# Patient Record
Sex: FEMALE | Race: WHITE | NOT HISPANIC OR LATINO | Employment: OTHER | URBAN - METROPOLITAN AREA
[De-identification: names, ages, dates, MRNs, and addresses within clinical notes are randomized per-mention and may not be internally consistent; named-entity substitution may affect disease eponyms.]

---

## 2017-01-09 ENCOUNTER — GENERIC CONVERSION - ENCOUNTER (OUTPATIENT)
Dept: OTHER | Facility: OTHER | Age: 82
End: 2017-01-09

## 2017-05-25 ENCOUNTER — ALLSCRIPTS OFFICE VISIT (OUTPATIENT)
Dept: OTHER | Facility: OTHER | Age: 82
End: 2017-05-25

## 2017-06-02 ENCOUNTER — GENERIC CONVERSION - ENCOUNTER (OUTPATIENT)
Dept: OTHER | Facility: OTHER | Age: 82
End: 2017-06-02

## 2017-06-05 DIAGNOSIS — M25.559 PAIN IN HIP: ICD-10-CM

## 2017-06-05 DIAGNOSIS — R54 AGE-RELATED PHYSICAL DEBILITY: ICD-10-CM
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
## 2017-06-05 DIAGNOSIS — M16.11 PRIMARY OSTEOARTHRITIS OF RIGHT HIP: ICD-10-CM

## 2017-06-07 ENCOUNTER — HOSPITAL ENCOUNTER (OUTPATIENT)
Dept: RADIOLOGY | Facility: HOSPITAL | Age: 82
Discharge: HOME | End: 2017-06-07
Payer: COMMERCIAL

## 2017-06-07 ENCOUNTER — GENERIC CONVERSION - ENCOUNTER (OUTPATIENT)
Dept: OTHER | Facility: OTHER | Age: 82
End: 2017-06-07

## 2017-06-07 DIAGNOSIS — M25.559 PAIN IN HIP: ICD-10-CM
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
## 2017-06-07 DIAGNOSIS — R54 AGE-RELATED PHYSICAL DEBILITY: ICD-10-CM

## 2017-06-07 PROCEDURE — 73502 X-RAY EXAM HIP UNI 2-3 VIEWS: CPT

## 2017-06-09 ENCOUNTER — GENERIC CONVERSION - ENCOUNTER (OUTPATIENT)
Dept: OTHER | Facility: OTHER | Age: 82
End: 2017-06-09

## 2017-06-13 ENCOUNTER — ALLSCRIPTS OFFICE VISIT (OUTPATIENT)
Dept: OTHER | Facility: OTHER | Age: 82
End: 2017-06-13

## 2017-06-13 ENCOUNTER — APPOINTMENT (OUTPATIENT)
Dept: RADIOLOGY | Facility: CLINIC | Age: 82
End: 2017-06-13
Payer: COMMERCIAL

## 2017-06-13 DIAGNOSIS — M25.559 PAIN IN HIP: ICD-10-CM

## 2017-06-13 PROCEDURE — 73502 X-RAY EXAM HIP UNI 2-3 VIEWS: CPT

## 2017-06-17 ENCOUNTER — GENERIC CONVERSION - ENCOUNTER (OUTPATIENT)
Dept: OTHER | Facility: OTHER | Age: 82
End: 2017-06-17

## 2017-07-14 ENCOUNTER — GENERIC CONVERSION - ENCOUNTER (OUTPATIENT)
Dept: OTHER | Facility: OTHER | Age: 82
End: 2017-07-14

## 2017-07-31 ENCOUNTER — ALLSCRIPTS OFFICE VISIT (OUTPATIENT)
Dept: OTHER | Facility: OTHER | Age: 82
End: 2017-07-31

## 2017-10-19 ENCOUNTER — LAB CONVERSION - ENCOUNTER (OUTPATIENT)
Dept: OTHER | Facility: OTHER | Age: 82
End: 2017-10-19

## 2017-10-19 LAB
25(OH)D3 SERPL-MCNC: 26 NG/ML (ref 30–100)
CHOLEST SERPL-MCNC: 223 MG/DL
CHOLEST/HDLC SERPL: 3.2 (CALC)
HDLC SERPL-MCNC: 70 MG/DL
LDL CHOLESTEROL (HISTORICAL): 132 MG/DL (CALC)
NON-HDL-CHOL (CHOL-HDL) (HISTORICAL): 153 MG/DL (CALC)
TRIGL SERPL-MCNC: 107 MG/DL
TSH SERPL DL<=0.05 MIU/L-ACNC: 2.02 MIU/L (ref 0.4–4.5)

## 2017-10-26 ENCOUNTER — ALLSCRIPTS OFFICE VISIT (OUTPATIENT)
Dept: OTHER | Facility: OTHER | Age: 82
End: 2017-10-26

## 2017-10-27 NOTE — PROGRESS NOTES
Assessment  1  Hyperlipidemia (272 4) (E78 5)   2  Hyperkalemia (276 7) (E87 5)   3  Vitamin D deficiency (268 9) (E55 9)   4  Frailty (797) (R54)   5  Falls (E888 9) (W19 XXXA)   6  Never a smoker   7  Encounter for preventive health examination (V70 0) (Z00 00)    Plan  Falls, Frailty, Hyperkalemia, Hyperlipidemia, Vitamin D deficiency    · (1) CBC/PLT/DIFF; Status:Active; Requested for:26Apr2018;    · (1) COMPREHENSIVE METABOLIC PANEL; Status:Active; Requested for:26Apr2018;    · (1) LIPID PANEL FASTING W DIRECT LDL REFLEX; Status:Active; Requested  for:26Apr2018;    · (1) TSH WITH FT4 REFLEX; Status:Active; Requested for:26Apr2018;    · (1) VITAMIN D 25-HYDROXY; Status:Active; Requested for:26Apr2018; Discussion/Summary    1  Hyperlipidemia-cholesterol numbers are very stable over the past year  She has LDL of 132 with HDL of 70  Hyperkalemia-presently stable, no medication changes  Vitamin-D deficiency-recommended taking a vitamin D3, 2000 international units daily over-the-counter  Discussed benefits of treatment  GERD-stable with Protonix 40 milligrams daily  Health maintenance-flu vaccine and Pneumovax 23 given today  Annual Medicare wellness visit completed  See separate note  Chief Complaint  Follow up to chronic conditions and review bw shot today  History of Present Illness  This is an 80-year-old female who presents to the office for follow-up of chronic health conditions as well as annual Medicare wellness visit  She has been feeling well without any acute complaints  She does mention that she has been receiving physical therapy at home through [facility] and has been very pleased  Her daughter was in earlier today and states that she feels she is doing much better with the therapy  She seems to be coping well since the loss of her  over a year ago  She is staying physically active and still managing well in the home   She does have a history of some esophageal reflux disease and (V49 89) (Z78 9)   · Non-smoker (V49 89) (Z78 9)   · Retired   ·     Current Meds   1  Biotin CAPS; TAKE 1 CAPSULE Daily Recorded   2  Ondansetron HCl - 4 MG Oral Tablet; TAKE 1 TABLET Every 8 hours; Therapy: 84NBO4975 to (Last Rx:17Jun2017)  Requested for: 60QSL6655 Ordered   3  Pantoprazole Sodium 40 MG Oral Tablet Delayed Release; Therapy: 38PSX3305 to (Last Rx:67Tnj9037)  Requested for: 38Bbg7831 Ordered    The medication list was reviewed and updated today  Allergies  1  Sulfa Drugs    Vitals  Vital Signs    Recorded: 95KIY4412 10:34AM Recorded: 21YYO3758 10:02AM   Heart Rate  80   Systolic 253 462   Diastolic 70 70   Height  5 ft 5 in   Weight  138 lb 2 oz   BMI Calculated  22 99   BSA Calculated  1 69     Physical Exam    Constitutional   General appearance: No acute distress, well appearing and well nourished  Eyes   Conjunctiva and lids: No swelling, erythema or discharge  Pupils and irises: Equal, round and reactive to light  Ears, Nose, Mouth, and Throat   External inspection of ears and nose: Normal     Otoscopic examination: Tympanic membranes translucent with normal light reflex  Canals patent without erythema  Nasal mucosa, septum, and turbinates: Normal without edema or erythema  Oropharynx: Normal with no erythema, edema, exudate or lesions  Pulmonary   Respiratory effort: No increased work of breathing or signs of respiratory distress  Auscultation of lungs: Clear to auscultation  Cardiovascular   Auscultation of heart: Normal rate and rhythm, normal S1 and S2, without murmurs  Examination of extremities for edema and/or varicosities: Normal     Abdomen   Abdomen: Non-tender, no masses  Liver and spleen: No hepatomegaly or splenomegaly  Lymphatic   Palpation of lymph nodes in neck: No lymphadenopathy  Musculoskeletal   Gait and station: Abnormal  -- Ambulates with a single-point cane  Digits and nails: Normal without clubbing or cyanosis  Skin   Skin and subcutaneous tissue: Normal without rashes or lesions  Neurologic   Reflexes: 2+ and symmetric  Sensation: No sensory loss  Psychiatric   Orientation to person, place, and time: Normal     Mood and affect: Normal          Health Management  Health Maintenance   Medicare Annual Wellness Visit; every 1 year; Next Due: 36YXQ1174;  Overdue    Signatures   Electronically signed by : Gene Crenshaw [facility]; Oct 26 2017 10:33AM EST                       (Author)    Electronically signed by : Andre Rossi DO; Oct 26 2017 10:38AM EST                       (Author)

## 2018-01-09 NOTE — MISCELLANEOUS
Message  I received a call from Pt's Daughter Royal Ramirez stating pt has had loose stools and nausea with occasional vomiting for the past 2 days  Per Cassandra Perez pt may use Imodium as directed and Zofran 4 mg 1 PO Q8 hours and if not help over the weekend pt should go to ER if worse or follow up with Cassandra Perez on Monday  Daughter agrees and RX sent to [facility]  Active Problems    1  Abnormal weight loss (783 21) (R63 4)   2  Acute conjunctivitis (372 00) (H10 30)   3  Acute lumbar radiculopathy (724 4) (M54 16)   4  Cough (786 2) (R05)   5  Esophageal reflux (530 81) (K21 9)   6  Esophagitis, reflux (530 11) (K21 0)   7  Falls (E888 9) (W19 XXXA)   8  Frailty (797) (R54)   9  Hip pain, acute (719 45) (M25 559)   10  Denied: History of mental disorder   11  Hyperkalemia (276 7) (E87 5)   12  Hyperlipidemia (272 4) (E78 5)   13  Leukocytosis (288 60) (D72 829)   14  Nausea (787 02) (R11 0)   15  Need for immunization against influenza (V04 81) (Z23)   16  Primary localized osteoarthritis of right hip (715 15) (M16 11)   17  Psoas tendinitis (726 5) (M76 10)   18  Viral infection (079 99) (B34 9)   19  Vitamin D deficiency (268 9) (E55 9)    Current Meds   1  Biotin CAPS; TAKE 1 CAPSULE Daily Recorded   2  Cyclobenzaprine HCl - 10 MG Oral Tablet; Take 1 tablet 3 times daily as needed; Therapy: 45HOL6835 to (Last Rx:02Jun2017)  Requested for: 02Jun2017 Ordered   3  MethylPREDNISolone 4 MG Oral Tablet Therapy Pack; use as directed; Therapy: 62QDH4674 to (Last Rx:13Jun2017)  Requested for: 13Jun2017 Ordered   4  Multi Vitamin Daily Oral Tablet; Take 1 tablet daily Recorded   5  Naproxen Sodium 550 MG Oral Tablet (Anaprox DS); TAKE 1 TABLET TWICE DAILY   WITH MEALS; Therapy: 20YEA0612 to (Evaluate:10Jun2017)  Requested for: 19NOD0962; Last   Rx:82Qlv1709 Ordered   6  Pantoprazole Sodium 40 MG Oral Tablet Delayed Release; Therapy: 18GAH2191 to (Last Rx:02Feb2012)  Requested for: 02Feb2012 Ordered   7   TraMADol HCl - 50 MG Oral Tablet; TAKE 1 TABLET 3 TIMES DAILY AS NEEDED; Therapy: 29RDD0926 to (Evaluate:19Jun2017); Last Rx:09Jun2017 Ordered    Allergies    1   Sulfa Drugs    Plan  Nausea    · Ondansetron HCl - 4 MG Oral Tablet; TAKE 1 TABLET Every 8 hours    Signatures   Electronically signed by : Claudia Abel, ; Jun 17 2017 10:46AM EST                       (Author)

## 2018-01-11 NOTE — MISCELLANEOUS
Message   Recorded as Task   Date: 06/02/2017 11:24 AM, Created By: Mandy Marti   Task Name: Follow Up   Assigned To: Juan M and Dong,Clinical Team   Regarding Patient: ANA GONZALES, Status: In Progress   Comment:    Emily Dwyerhany - 02 Jun 2017 11:24 AM     TASK CREATED  Caller: Self; General Medical Question; [phone] (Home)  [phone] Patient has questions re: meds Morgan prescribed at last visit  Please call  Kathy Saglado - 60 Jun 2017 3:54 PM     TASK IN PROGRESS   Nupursobeida Salgado - 10 Jun 2017 3:56 PM     TASK EDITED  Per Pt she is not getting any relief with Naproxen  Morgan states we can add Flexeril 10 mg 1/2 po TID if no relief increase to 1 po TID  I warned pt of Drowsiness  RX sent to [facility] [phone]        Active Problems    1  Abnormal weight loss (783 21) (R63 4)   2  Acute conjunctivitis (372 00) (H10 30)   3  Cough (786 2) (R05)   4  Esophageal reflux (530 81) (K21 9)   5  Esophagitis, reflux (530 11) (K21 0)   6  Falls (E888 9) (W19 XXXA)   7  Frailty (797) (R54)   8  Hip pain, acute (719 45) (M25 559)   9  Denied: History of mental disorder   10  Hyperkalemia (276 7) (E87 5)   11  Hyperlipidemia (272 4) (E78 5)   12  Leukocytosis (288 60) (D72 829)   13  Need for immunization against influenza (V04 81) (Z23)   14  Psoas tendinitis (726 5) (M76 10)   15  Viral infection (079 99) (B34 9)   16  Vitamin D deficiency (268 9) (E55 9)    Current Meds   1  Biotin CAPS; TAKE 1 CAPSULE Daily Recorded   2  Multi Vitamin Daily Oral Tablet; Take 1 tablet daily Recorded   3  Naproxen Sodium 550 MG Oral Tablet (Anaprox DS); TAKE 1 TABLET TWICE DAILY   WITH MEALS; Therapy: 82ETU4818 to (Evaluate:10Jun2017)  Requested for: 40HEM4982; Last   Rx:04Dda8204 Ordered   4  Pantoprazole Sodium 40 MG Oral Tablet Delayed Release; Therapy: 63XFI5684 to (Last Rx:15Orl4166)  Requested for: 67Xxm3483 Ordered    Allergies    1   Sulfa Drugs    Signatures   Electronically signed by : Aarti Gilmore, ; Jun 2 2017 3:57PM EST                       (Author)

## 2018-01-12 NOTE — RESULT NOTES
Verified Results  * XR HIP/PELV 2-3 VWS RIGHT W PELVIS IF PERFORMED 07Jun2017 08:36AM Nessa Addison Order Number: [id number]     Test Name Result Flag Reference   * XR HIP/PELV 2-3 VWS RIGHT (Report)     RIGHT HIP     INDICATION: Chronic right hip pain  COMPARISON: None     VIEWS: AP pelvis and 2 coned down views of the hip     IMAGES: 3     FINDINGS:     There is no acute fracture or dislocation  Mild right hip osteoarthritis is present  No lytic or blastic lesions are seen  Soft tissues are unremarkable  IMPRESSION:     Mild right hip osteoarthritis  Workstation performed: [id number]     Signed by:    Savanna Be MD   6/7/17

## 2018-01-12 NOTE — MISCELLANEOUS
Message  Patient's son Ayana Srinivasan called stating that his sister had just spoken to someone in our office about their mother  She is dehydrated, had nausea/vomiting, and loose bowels  He would like her to be seen tonight but we don't have any appointments open  After speaking with MS he wants pt to go to ER for fluids/to be evaluated and stated if pt were to come in we would end up sending her there anyway    KD      Active Problems    1  Abnormal weight loss (783 21) (R63 4)   2  Acute conjunctivitis (372 00) (H10 30)   3  Cough (786 2) (R05)   4  Esophageal reflux (530 81) (K21 9)   5  Esophagitis, reflux (530 11) (K21 0)   6  Fall at home (H422 2,U875 5) (V76  RKKF,M62 951)   7  Frailty (797) (R54)   8  Denied: History of mental disorder   9  Hyperkalemia (276 7) (E87 5)   10  Hyperlipidemia (272 4) (E78 5)   11  Leukocytosis (288 60) (D72 829)   12  Need for immunization against influenza (V04 81) (Z23)   13  Psoas tendinitis (726 5) (M76 10)   14  Viral infection (079 99) (B34 9)   15  Vitamin D deficiency (268 9) (E55 9)    Current Meds   1  Biotin CAPS; TAKE 1 CAPSULE Daily Recorded   2  Pantoprazole Sodium 40 MG Oral Tablet Delayed Release; Therapy: 89MNS2207 to (Last Rx:85Wvz5939)  Requested for: 80Cad1059 Ordered    Allergies    1   Sulfa Drugs    Signatures   Electronically signed by : Imelda Miles [facility]; Jan 9 2017  3:51PM EST                       (Author)

## 2018-01-13 VITALS — HEART RATE: 101 BPM | DIASTOLIC BLOOD PRESSURE: 73 MMHG | SYSTOLIC BLOOD PRESSURE: 151 MMHG | HEIGHT: 65 IN

## 2018-01-13 VITALS
SYSTOLIC BLOOD PRESSURE: 130 MMHG | WEIGHT: 136.38 LBS | HEART RATE: 88 BPM | BODY MASS INDEX: 22.72 KG/M2 | DIASTOLIC BLOOD PRESSURE: 60 MMHG | HEIGHT: 65 IN

## 2018-01-13 VITALS
SYSTOLIC BLOOD PRESSURE: 134 MMHG | DIASTOLIC BLOOD PRESSURE: 75 MMHG | HEART RATE: 96 BPM | HEIGHT: 65 IN | WEIGHT: 134.13 LBS | BODY MASS INDEX: 22.35 KG/M2

## 2018-01-14 VITALS
WEIGHT: 138.13 LBS | HEART RATE: 80 BPM | HEIGHT: 65 IN | BODY MASS INDEX: 23.01 KG/M2 | DIASTOLIC BLOOD PRESSURE: 70 MMHG | SYSTOLIC BLOOD PRESSURE: 140 MMHG

## 2018-01-14 NOTE — RESULT NOTES
Message   Recorded as Task   Date: 06/09/2017 11:50 AM, Created By: Alyssa Cornell   Task Name: Medical Complaint Callback   Assigned To: Arie,Clinical Team   Regarding Patient: ANA GONZALES, Status: Active   CommentRemer Yvonne - 09 Jun 2017 11:50 AM     TASK CREATED  Caller: Self; Medical Complaint; [phone] (Home)  [phone] Derek Briceno (son) called, naproxen is not relieving pain for patient , son wants to know if there is an non narcotic alternative she could take  please call son he does work in an or if he does not  please leave msg on his Fangruba Moreno - 09 Jun 2017 1:03 PM     TASK EDITED  Would recommend trial of tramadol 50 mg, one tablet every 8 hours when necessary pain, 30 pills with no refills until she can be seen by orthopedic specialist    Star Herndon - 09 Jun 2017 1:03 PM     TASK REASSIGNED: Previously Assigned To Beto Cole Cheryl - 09 Jun 2017 2:32 PM     TASK EDITED  i spoke with patient and script was called to saud   cd        Plan  Hip pain, acute    · TraMADol HCl - 50 MG Oral Tablet; TAKE 1 TABLET 3 TIMES DAILY AS NEEDED    Signatures   Electronically signed by : Jan Olivia, ; Jun 9 2017  2:33PM EST                       (Author)

## 2018-01-15 ENCOUNTER — ALLSCRIPTS OFFICE VISIT (OUTPATIENT)
Dept: OTHER | Facility: OTHER | Age: 83
End: 2018-01-15

## 2018-01-16 NOTE — PROGRESS NOTES
Assessment   1  Herpes zoster (053 9) (B02 9)    Plan   Herpes zoster    · ValACYclovir HCl - 1 GM Oral Tablet; TAKE 1 TABLET 3 TIMES DAILY    Discussion/Summary      1  herpes zoster- valtrex as directed  Care instructions given  Patient had shingles vaccination 2008  No need to follow up unless complications  May use Benadryl or hydrocortisone topically if itching occurs  Chief Complaint   c/o rash on right side and around to back x 1 wk  Pt  is questioning if it could be shingles  kck      History of Present Illness   HPI: Patient here today because last Tuesday she looked in the mirror getting ready to take a shower and she noticed she had a rash on her right side which has gotten worse over the past few days just getting more extensive staying on her right side  She thought at 1st that she ate some bit she might about allergic to however she does not have any pain unless she touches it no itching or burning no cold symptoms  She thinks it might be shingles  She did have the shingles vaccine in 2008  Review of Systems        Constitutional: No fever, no chills, feels well, no tiredness, no recent weight gain or loss  ENT: no ear ache, no loss of hearing, no nosebleeds or nasal discharge, no sore throat or hoarseness  Cardiovascular: no complaints of slow or fast heart rate, no chest pain, no palpitations, no leg claudication or lower extremity edema  Respiratory: no complaints of shortness of breath, no wheezing, no dyspnea on exertion, no orthopnea or PND  Breasts: no complaints of breast pain, breast lump or nipple discharge  Gastrointestinal: no complaints of abdominal pain, no constipation, no nausea or diarrhea, no vomiting, no bloody stools  Genitourinary: no complaints of dysuria, no incontinence, no pelvic pain, no dysmenorrhea, no vaginal discharge or abnormal vaginal bleeding        Musculoskeletal: no complaints of arthralgia, no myalgia, no joint swelling or stiffness, no limb pain or swelling  Integumentary: rash, but-- as noted in HPI  Neurological: no complaints of headache, no confusion, no numbness or tingling, no dizziness or fainting  ROS reviewed  Active Problems   1  Abnormal weight loss (783 21) (R63 4)   2  Acute lumbar radiculopathy (724 4) (M54 16)   3  Esophagitis, reflux (530 11) (K21 0)   4  Falls (E888 9) (W19 XXXA)   5  Frailty (797) (R54)   6  Hip pain, acute (719 45) (M25 559)   7  Denied: History of mental disorder   8  Hyperkalemia (276 7) (E87 5)   9  Hyperlipidemia (272 4) (E78 5)   10  Leukocytosis (288 60) (D72 829)   11  Nausea (787 02) (R11 0)   12  Need for immunization against influenza (V04 81) (Z23)   13  Need for pneumococcal vaccination (V03 82) (Z23)   14  Primary localized osteoarthritis of right hip (715 15) (M16 11)   15  Psoas tendinitis (726 5) (M76 10)   16  Vitamin D deficiency (268 9) (E55 9)    Past Medical History   1  Denied: History of mental disorder  Active Problems And Past Medical History Reviewed: The active problems and past medical history were reviewed and updated today  Family History   Mother    1  Family history of Congestive Heart Failure  Father    2  Family history of Coronary Artery Disease (V17 49)  Family History    3  No family history of mental disorder  Family History Reviewed: The family history was reviewed and updated today  Social History    · Denied: History of Drug Use   · Minimum alcohol consumption   · Never a smoker   · No secondhand smoke exposure (V49 89) (Z78 9)   · Non-smoker (V49 89) (Z78 9)   · Retired   ·   The social history was reviewed and updated today  Surgical History   1  History of Hysterectomy  Surgical History Reviewed: The surgical history was reviewed and updated today  Current Meds    1  Biotin CAPS; TAKE 1 CAPSULE Daily Recorded   2  Pantoprazole Sodium 40 MG Oral Tablet Delayed Release;  Take 1 tablet daily; Therapy: 93FKJ1935 to (Evaluate:26Jun2018)  Requested for: 73Lkt3689; Last     Rx:77Mbh9549 Ordered   3  PreserVision AREDS Oral Capsule; Therapy: 69TUJ6542 to Recorded   4  Vitamin D 1000 UNIT Oral Tablet; Therapy: 36EPI7677 to Recorded     The medication list was reviewed and updated today  Allergies   1  Sulfa Drugs    Vitals    Recorded: 37PFY2148 02:52PM   Heart Rate 68   Systolic 366, LUE, Sitting   Diastolic 62, LUE, Sitting   Height 5 ft 5 in   Weight 138 lb 8 oz   BMI Calculated 23 05   BSA Calculated 1 69     Physical Exam        Constitutional      General appearance: No acute distress, well appearing and well nourished  Eyes      Conjunctiva and lids: No swelling, erythema or discharge  Ears, Nose, Mouth, and Throat      External inspection of ears and nose: Normal        Pulmonary      Respiratory effort: No increased work of breathing or signs of respiratory distress  Cardiovascular      Auscultation of heart: Normal rate and rhythm, normal S1 and S2, without murmurs  Musculoskeletal      Gait and station: Normal        Skin      Skin and subcutaneous tissue: Abnormal  -- Right thoracic back with scattered erythematous based healing vesicles in different stages that come around to the patient's front torso and in her right breast  No oozing or any signs of cellulitis  Future Appointments      Date/Time Provider Specialty Site   05/03/2018 10:00 AM Nessa Rider, [facility]     Signatures    Electronically signed by : Narayan Richmond [facility]; Duglas 15 2018  3:24PM EST                       (Author)     Electronically signed by :  VAZQUEZ John ; Duglas 15 2018  5:27PM EST

## 2018-01-17 NOTE — PROGRESS NOTES
Assessment    1  Hyperlipidemia (272 4) (E78 5)   2  Hyperkalemia (276 7) (E87 5)   3  Vitamin D deficiency (268 9) (E55 9)   4  Frailty (797) (R54)   5  Falls (E888 9) (W19 XXXA)   6  Never a smoker   7  Encounter for preventive health examination (V70 0) (Z00 00)    Plan  Falls, Frailty, Hyperkalemia, Hyperlipidemia, Vitamin D deficiency    · (1) CBC/PLT/DIFF; Status:Active; Requested for:26Apr2018;    · (1) COMPREHENSIVE METABOLIC PANEL; Status:Active; Requested for:26Apr2018;    · (1) LIPID PANEL FASTING W DIRECT LDL REFLEX; Status:Active; Requested  for:26Apr2018;    · (1) TSH WITH FT4 REFLEX; Status:Active; Requested for:26Apr2018;    · (1) VITAMIN D 25-HYDROXY; Status:Active; Requested for:26Apr2018; Health Maintenance    · *VB - Urinary Incontinence Screen (Dx Z13 89 Screen for UI); Status:Complete;   Done:  70OCW5812 11:07AM  Need for immunization against influenza    · Fluzone High-Dose 0 5 ML Intramuscular Suspension Prefilled Syringe  Need for pneumococcal vaccination    · Pneumovax 23 25 MCG/0 5ML Injection Injectable    Chief Complaint  Annual Medicare Wellness Exam  mjs      History of Present Illness  The patient is being seen for the subsequent annual wellness visit  Medicare Screening and Risk Factors   Hospitalizations: no previous hospitalizations  Medicare Screening Tests Risk Questions   Drug and Alcohol Use: The patient has never smoked cigarettes  The patient reports never drinking alcohol  Alcohol concern:   The patient has no concerns about alcohol abuse  She has never used illicit drugs  Diet and Physical Activity: Current diet includes well balanced meals, 1 servings of fruit per day, 2 servings of vegetables per day, 2 servings of meat per day, 1 servings of whole grains per day, 2 servings of dairy products per day, 1 cups of coffee per day and 3 glasses of water/day  She exercises daily  Exercise: walking, stretching 45 minutes per day     Mood Disorder and Cognitive Impairment Screening: She denies feeling down, depressed, or hopeless over the past two weeks  She denies feeling little interest or pleasure in doing things over the past two weeks  Cognitive impairment screening: denies difficulty learning/retaining new information, denies difficulty handling complex tasks, denies difficulty with reasoning, denies difficulty with spatial ability and orientation, denies difficulty with language and denies difficulty with behavior  Functional Ability/Level of Safety: Hearing is normal bilaterally, normal in the right ear and normal in the left ear  She denies hearing difficulties  She does not use a hearing aid  Activities of daily living details: does not need help using the phone, no transportation help needed, does not need help shopping, no meal preparation help needed, does not need help doing housework, does not need help doing laundry, does not need help managing medications and does not need help managing money  Fall risk factors: The patient fell 0 times in the past 12 months  Home safety risk factors:  no loose rugs, no poor household lighting, no uneven floors, no household clutter, grab bars in the bathroom and handrails on the stairs  Advance Directives: Advance directives: living will, durable power of  for health care directives and advance directives  end of life decisions were reviewed with the patient and I agree with the patient's decisions  Co-Managers and Medical Equipment/Suppliers: See Patient Care Team   Falls Risk: The patient fell 0 times in the past 12 months  The patient currently has no urinary incontinence symptoms         Patient Care Team    Care Team Member Role Specialty Office Number   Tashi Blandon  [place] [address] Specialist Orthopedic Surgery [phone] [facility]  Physician Assistant [phone]   [facility]  Physician Assistant [phone]   Shawna Islas [facility]  Physician Assistant [phone]     Active Problems    1  Abnormal weight loss (783 21) (R63 4)   2  Acute lumbar radiculopathy (724 4) (M54 16)   3  Esophagitis, reflux (530 11) (K21 0)   4  Falls (E888 9) (W19 XXXA)   5  Frailty (797) (R54)   6  Hip pain, acute (719 45) (M25 559)   7  Denied: History of mental disorder   8  Hyperkalemia (276 7) (E87 5)   9  Hyperlipidemia (272 4) (E78 5)   10  Leukocytosis (288 60) (D72 829)   11  Nausea (787 02) (R11 0)   12  Need for immunization against influenza (V04 81) (Z23)   13  Primary localized osteoarthritis of right hip (715 15) (M16 11)   14  Psoas tendinitis (726 5) (M76 10)   15  Vitamin D deficiency (268 9) (E55 9)    Past Medical History    1  Denied: History of mental disorder    Surgical History    1  History of Hysterectomy    Family History  Mother    1  Family history of Congestive Heart Failure  Father    2  Family history of Coronary Artery Disease (V17 49)  Family History    3  No family history of mental disorder    Social History    · Denied: History of Drug Use   · Minimum alcohol consumption   · Never a smoker   · No secondhand smoke exposure (V49 89) (Z78 9)   · Non-smoker (V49 89) (Z78 9)   · Retired   ·     Current Meds   1  Biotin CAPS; TAKE 1 CAPSULE Daily Recorded   2  Ondansetron HCl - 4 MG Oral Tablet; TAKE 1 TABLET Every 8 hours; Therapy: 92ZHY6596 to (Last Rx:17Jun2017)  Requested for: 94KXS7019 Ordered   3  Pantoprazole Sodium 40 MG Oral Tablet Delayed Release; Therapy: 65CYN0189 to (Last Rx:39Jgm1561)  Requested for: 75Lqg6946 Ordered    Allergies    1  Sulfa Drugs    Immunizations  Influenza --- Tacy Later: 29-Sep-2004  (76y); Manuel Case: 34-NXD-0983  (77y); Shruthi Noa: 27-ZQV-3787   (78y); Series4: 12-Nov-2007  (79y); Series5: 11-Oct-2008  (80y); Series6: 07-Oct-2009  (81y); Series7: 19-Oct-2010  (82y); Series8: 04-Oct-2012  (84y); Series9: 14-Oct-2013  (85y); Ayuawm17:  20-Oct-2014  (86y); QSTKZL33: 04-Nov-2015  (87y);  XKRTXM83: 13-Oct-2016  (88y)   Zoster --- Tacy Later: 28-Apr-2008  (80y)     Vitals  Signs   Recorded: 30ZQA7404 96:28VM   Systolic: 040  Diastolic: 70  Recorded: 76WVJ4361 10:02AM   Heart Rate: 80  Systolic: 441  Diastolic: 70  Height: 5 ft 5 in  Weight: 138 lb 2 oz  BMI Calculated: 22 99  BSA Calculated: 1 69    Results/Data  Falls Risk Assessment (Dx Z13 89 Screen for Neurologic Disorder) 26Oct2017 11:07AM User, Ahs     Test Name Result Flag Reference   Falls Risk      No falls in the past year     *VB - Urinary Incontinence Screen (Dx Z13 89 Screen for UI) 26Oct2017 11:07AM Cherie Hagan     Test Name Result Flag Reference   Urinary Incontinence Assessment 26Oct2017       PHQ-9 Adult Depression Screening 26Oct2017 10:53AM User, Ahs     Test Name Result Flag Reference   PHQ-9 Adult Depression Score 2     Over the last two weeks, how often have you been bothered by any of the following problems? Little interest or pleasure in doing things: Not at all - 0  Feeling down, depressed, or hopeless: Not at all - 0  Trouble falling or staying asleep, or sleeping too much: Several days - 1  Feeling tired or having little energy: Several days - 1  Poor appetite or over eating: Not at all - 0  Feeling bad about yourself - or that you are a failure or have let yourself or your family down: Not at all - 0  Trouble concentrating on things, such as reading the newspaper or watching television: Not at all - 0  Moving or speaking so slowly that other people could have noticed  Or the opposite -  being so fidgety or restless that you have been moving around a lot more than usual: Not at all - 0  Thoughts that you would be better off dead, or of hurting yourself in some way: Not at all - 0   PHQ-9 Adult Depression Screening Negative     PHQ-9 Difficulty Level Not difficult at all     PHQ-9 Severity Minimal Depression         Health Management  Health Maintenance   Medicare Annual Wellness Visit; every 1 year; Next Due: 82GZS2084;  Overdue    Future Appointments    Date/Time Provider Specialty Site   05/03/2018 10:00 AM Venessa Wilkinson, [address]     Signatures   Electronically signed by : Leonie Pablo, [facility]; Oct 26 2017 11:29AM EST                       (Author)    Electronically signed by : Donis Mendez DO; Oct 26 2017 11:30AM EST                       (Author)

## 2018-01-22 VITALS
HEART RATE: 68 BPM | SYSTOLIC BLOOD PRESSURE: 138 MMHG | DIASTOLIC BLOOD PRESSURE: 62 MMHG | WEIGHT: 138.5 LBS | HEIGHT: 65 IN | BODY MASS INDEX: 23.07 KG/M2
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
## 2018-04-26 DIAGNOSIS — E78.5 HYPERLIPIDEMIA: ICD-10-CM

## 2018-04-26 DIAGNOSIS — E55.9 VITAMIN D DEFICIENCY: ICD-10-CM

## 2018-04-26 DIAGNOSIS — R54 AGE-RELATED PHYSICAL DEBILITY: ICD-10-CM

## 2018-04-26 DIAGNOSIS — E87.5 HYPERKALEMIA: ICD-10-CM

## 2018-05-03 LAB
25(OH)D3 SERPL-MCNC: 40 NG/ML (ref 30–100)
ALBUMIN SERPL-MCNC: 4 G/DL (ref 3.6–5.1)
ALBUMIN/GLOB SERPL: 1.5 (CALC) (ref 1–2.5)
ALP SERPL-CCNC: 44 U/L (ref 33–130)
ALT SERPL-CCNC: 11 U/L (ref 6–29)
AST SERPL-CCNC: 17 U/L (ref 10–35)
BASOPHILS # BLD AUTO: 42 CELLS/UL (ref 0–200)
BASOPHILS NFR BLD AUTO: 0.8 %
BILIRUB SERPL-MCNC: 0.6 MG/DL (ref 0.2–1.2)
BUN SERPL-MCNC: 34 MG/DL (ref 7–25)
BUN/CREAT SERPL: 34 (CALC) (ref 6–22)
CALCIUM SERPL-MCNC: 9.3 MG/DL (ref 8.6–10.4)
CHLORIDE SERPL-SCNC: 106 MMOL/L (ref 98–110)
CHOLEST SERPL-MCNC: 217 MG/DL
CHOLEST/HDLC SERPL: 2.8 (CALC)
CO2 SERPL-SCNC: 32 MMOL/L (ref 20–31)
CREAT SERPL-MCNC: 1.01 MG/DL (ref 0.6–0.88)
EOSINOPHIL # BLD AUTO: 348 CELLS/UL (ref 15–500)
EOSINOPHIL NFR BLD AUTO: 6.7 %
ERYTHROCYTE [DISTWIDTH] IN BLOOD BY AUTOMATED COUNT: 12 % (ref 11–15)
GLOBULIN SER CALC-MCNC: 2.7 G/DL (CALC) (ref 1.9–3.7)
GLUCOSE SERPL-MCNC: 91 MG/DL (ref 65–99)
HCT VFR BLD AUTO: 35.4 % (ref 35–45)
HDLC SERPL-MCNC: 78 MG/DL
HGB BLD-MCNC: 11.8 G/DL (ref 11.7–15.5)
LDLC SERPL CALC-MCNC: 125 MG/DL (CALC)
LYMPHOCYTES # BLD AUTO: 1643 CELLS/UL (ref 850–3900)
LYMPHOCYTES NFR BLD AUTO: 31.6 %
MCH RBC QN AUTO: 33.3 PG (ref 27–33)
MCHC RBC AUTO-ENTMCNC: 33.3 G/DL (ref 32–36)
MCV RBC AUTO: 100 FL (ref 80–100)
MONOCYTES # BLD AUTO: 489 CELLS/UL (ref 200–950)
MONOCYTES NFR BLD AUTO: 9.4 %
NEUTROPHILS # BLD AUTO: 2678 CELLS/UL (ref 1500–7800)
NEUTROPHILS NFR BLD AUTO: 51.5 %
NONHDLC SERPL-MCNC: 139 MG/DL (CALC)
PLATELET # BLD AUTO: 219 THOUSAND/UL (ref 140–400)
PMV BLD REES-ECKER: 11.6 FL (ref 7.5–12.5)
POTASSIUM SERPL-SCNC: 4.2 MMOL/L (ref 3.5–5.3)
PROT SERPL-MCNC: 6.7 G/DL (ref 6.1–8.1)
RBC # BLD AUTO: 3.54 MILLION/UL (ref 3.8–5.1)
SL AMB EGFR AFRICAN AMERICAN: 57 ML/MIN/1.73M2
SL AMB EGFR NON AFRICAN AMERICAN: 49 ML/MIN/1.73M2
SODIUM SERPL-SCNC: 144 MMOL/L (ref 135–146)
TRIGL SERPL-MCNC: 58 MG/DL
TSH SERPL-ACNC: 2.05 MIU/L (ref 0.4–4.5)
WBC # BLD AUTO: 5.2 THOUSAND/UL (ref 3.8–10.8)

## 2018-05-10 ENCOUNTER — OFFICE VISIT (OUTPATIENT)
Dept: FAMILY MEDICINE CLINIC | Facility: CLINIC | Age: 83
End: 2018-05-10
Payer: COMMERCIAL

## 2018-05-10 VITALS
HEART RATE: 76 BPM | DIASTOLIC BLOOD PRESSURE: 72 MMHG | BODY MASS INDEX: 23.49 KG/M2 | HEIGHT: 65 IN | SYSTOLIC BLOOD PRESSURE: 140 MMHG | WEIGHT: 141 LBS

## 2018-05-10 DIAGNOSIS — E87.5 HYPERKALEMIA: ICD-10-CM

## 2018-05-10 DIAGNOSIS — E55.9 VITAMIN D DEFICIENCY: ICD-10-CM

## 2018-05-10 DIAGNOSIS — E78.2 MIXED HYPERLIPIDEMIA: Primary | ICD-10-CM

## 2018-05-10 DIAGNOSIS — R54 FRAILTY: ICD-10-CM

## 2018-05-10 PROBLEM — M16.11 PRIMARY LOCALIZED OSTEOARTHRITIS OF RIGHT HIP: Status: ACTIVE | Noted: 2017-06-13

## 2018-05-10 PROCEDURE — 99214 OFFICE O/P EST MOD 30 MIN: CPT | Performed by: PHYSICIAN ASSISTANT

## 2018-05-10 RX ORDER — VIT A/VIT C/VIT E/ZINC/COPPER 4296-226
CAPSULE ORAL 2 TIMES DAILY
COMMUNITY
Start: 2018-01-15

## 2018-05-10 RX ORDER — NICOTINE POLACRILEX 2 MG
1 GUM BUCCAL DAILY
COMMUNITY
End: 2021-01-01 | Stop reason: ALTCHOICE

## 2018-05-10 RX ORDER — PANTOPRAZOLE SODIUM 40 MG/1
TABLET, DELAYED RELEASE ORAL
Refills: 1 | COMMUNITY
Start: 2018-03-26 | End: 2018-06-23 | Stop reason: SDUPTHER

## 2018-05-10 NOTE — PROGRESS NOTES
Assessment/Plan:  Patient Instructions   1  Mixed hyperlipidemia-cholesterol numbers were reviewed with the patient  Her HDL is good at 78 with LDL of 121   2   Esophageal reflux disease-presently stable with Protonix 40 mg daily  3   Hyperkalemia-stable, potassium level was at 4 0   4  Vitamin-D deficiency-level is at 40  Continue over-the-counter supplementation  5   Frailty-continue regular walking and strengthening  No problem-specific Assessment & Plan notes found for this encounter  Diagnoses and all orders for this visit:    Mixed hyperlipidemia  -     CBC and differential; Future  -     Comprehensive metabolic panel; Future  -     Lipid Panel with Direct LDL reflex; Future  -     TSH, 3rd generation; Future  -     Vitamin D 25 hydroxy; Future    Vitamin D deficiency  -     CBC and differential; Future  -     Comprehensive metabolic panel; Future  -     Lipid Panel with Direct LDL reflex; Future  -     TSH, 3rd generation; Future  -     Vitamin D 25 hydroxy; Future    Frailty  -     CBC and differential; Future  -     Comprehensive metabolic panel; Future  -     Lipid Panel with Direct LDL reflex; Future  -     TSH, 3rd generation; Future  -     Vitamin D 25 hydroxy; Future    Hyperkalemia    Other orders  -     pantoprazole (PROTONIX) 40 mg tablet; TK 1 T PO D  -     Biotin 1 MG CAPS; Take 1 capsule by mouth daily  -     Multiple Vitamins-Minerals (PRESERVISION AREDS) CAPS; Take by mouth  -     cholecalciferol (VITAMIN D3) 1,000 units tablet; Take by mouth          Subjective:   6 month follow up to chronic conditions and review bw   c/o in the morning upon wakening she has a funny feeling in her head  Then it goes away  Onset 2 weeks  mjs     Patient ID: Oscar Blackwell is a 80 y o  female  HPI:  This is a 51-year-old female who presents to the office for follow-up of six-month blood work  She has been feeling well for the most part    She does complain of an occasional episode in the morning where she may wake up in feel slightly funny in the head for an hour or 2  She states that it is not dizziness and there is no spinning  She does not have any difficulty walking  Symptoms always resolved spontaneously  She does not have any accompanying shortness of breath or chest pain  She denies any other neurologic symptoms such as difficulty speaking or weakness in an extremity  She does have a history of hyperkalemia in the past but has had recent blood test completed and is here to discuss the results  The only prescription medication she is on is Protonix currently for esophageal reflux disease and seems to be helping to control her symptoms quite well  She has had a history of hyperlipidemia in the past but does not want to take statin therapy  She states that her  had such leg weakness and problems with it before he passed that she is concerned with similar symptoms  The following portions of the patient's history were reviewed and updated as appropriate: allergies, current medications, past family history, past medical history, past social history, past surgical history and problem list     Review of Systems   Constitutional: Negative for chills, fatigue and fever  HENT: Negative for congestion, ear pain and sinus pressure  Eyes: Negative for visual disturbance  Respiratory: Negative for cough, chest tightness and shortness of breath  Cardiovascular: Negative for chest pain and palpitations  Gastrointestinal: Negative for diarrhea, nausea and vomiting  Endocrine: Negative for polyuria  Genitourinary: Negative for dysuria and frequency  Musculoskeletal: Negative for arthralgias and myalgias  Skin: Negative for pallor and rash  Neurological: Negative for dizziness, weakness, light-headedness, numbness and headaches  Psychiatric/Behavioral: Negative for agitation, behavioral problems and sleep disturbance  All other systems reviewed and are negative  Objective:      /72   Pulse 76   Ht 5' 5" (1 651 m)   Wt 64 kg (141 lb)   BMI 23 46 kg/m²          Physical Exam   Constitutional: She is oriented to person, place, and time  She appears well-developed and well-nourished  No distress  HENT:   Head: Normocephalic and atraumatic  Right Ear: External ear normal    Left Ear: External ear normal    Nose: Nose normal    Mouth/Throat: Oropharynx is clear and moist  No oropharyngeal exudate  Eyes: Conjunctivae and EOM are normal  Pupils are equal, round, and reactive to light  Neck: Normal range of motion  Neck supple  No tracheal deviation present  No thyromegaly present  Cardiovascular: Normal rate, regular rhythm and normal heart sounds  Exam reveals no friction rub  No murmur heard  Pulmonary/Chest: Effort normal and breath sounds normal  No respiratory distress  She has no wheezes  She has no rales  Abdominal: Soft  Bowel sounds are normal  She exhibits no distension  There is no tenderness  There is no rebound and no guarding  Musculoskeletal: Normal range of motion  She exhibits edema  She exhibits no tenderness  1+ pitting edema symmetric bilaterally  Baseline for patient  Lymphadenopathy:     She has no cervical adenopathy  Neurological: She is alert and oriented to person, place, and time  No cranial nerve deficit  Coordination normal    Skin: Skin is warm and dry  No rash noted  No erythema  Psychiatric: She has a normal mood and affect  Her behavior is normal  Thought content normal    Nursing note and vitals reviewed

## 2018-05-10 NOTE — PATIENT INSTRUCTIONS
1   Mixed hyperlipidemia-cholesterol numbers were reviewed with the patient  Her HDL is good at 78 with LDL of 121   2   Esophageal reflux disease-presently stable with Protonix 40 mg daily  3   Hyperkalemia-stable, potassium level was at 4 0   4  Vitamin-D deficiency-level is at 40  Continue over-the-counter supplementation  5   Frailty-continue regular walking and strengthening

## 2018-06-23 DIAGNOSIS — K21.9 GASTROESOPHAGEAL REFLUX DISEASE WITHOUT ESOPHAGITIS: Primary | ICD-10-CM

## 2018-06-24 RX ORDER — PANTOPRAZOLE SODIUM 40 MG/1
TABLET, DELAYED RELEASE ORAL
Qty: 90 TABLET | Refills: 0 | Status: SHIPPED | OUTPATIENT
Start: 2018-06-24 | End: 2018-10-17 | Stop reason: SDUPTHER

## 2018-07-19 ENCOUNTER — TRANSCRIBE ORDERS (OUTPATIENT)
Dept: URGENT CARE | Facility: MEDICAL CENTER | Age: 83
End: 2018-07-19

## 2018-07-19 ENCOUNTER — APPOINTMENT (OUTPATIENT)
Dept: RADIOLOGY | Facility: MEDICAL CENTER | Age: 83
End: 2018-07-19
Payer: COMMERCIAL

## 2018-07-19 ENCOUNTER — OFFICE VISIT (OUTPATIENT)
Dept: URGENT CARE | Facility: MEDICAL CENTER | Age: 83
End: 2018-07-19
Payer: COMMERCIAL

## 2018-07-19 VITALS
TEMPERATURE: 96.4 F | HEART RATE: 95 BPM | RESPIRATION RATE: 20 BRPM | OXYGEN SATURATION: 95 % | HEIGHT: 66 IN | DIASTOLIC BLOOD PRESSURE: 60 MMHG | WEIGHT: 141.4 LBS | SYSTOLIC BLOOD PRESSURE: 135 MMHG | BODY MASS INDEX: 22.73 KG/M2
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
## 2018-07-19 DIAGNOSIS — S52.501A CLOSED FRACTURE OF DISTAL END OF RIGHT RADIUS, UNSPECIFIED FRACTURE MORPHOLOGY, INITIAL ENCOUNTER: ICD-10-CM

## 2018-07-19 DIAGNOSIS — S42.224A CLOSED 2-PART NONDISPLACED FRACTURE OF SURGICAL NECK OF RIGHT HUMERUS, INITIAL ENCOUNTER: ICD-10-CM
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
## 2018-07-19 PROCEDURE — 99213 OFFICE O/P EST LOW 20 MIN: CPT | Performed by: PHYSICIAN ASSISTANT

## 2018-07-19 PROCEDURE — 29125 APPL SHORT ARM SPLINT STATIC: CPT | Performed by: PHYSICIAN ASSISTANT

## 2018-07-19 PROCEDURE — 73030 X-RAY EXAM OF SHOULDER: CPT

## 2018-07-19 PROCEDURE — 73130 X-RAY EXAM OF HAND: CPT

## 2018-07-19 PROCEDURE — 73110 X-RAY EXAM OF WRIST: CPT

## 2018-07-19 RX ORDER — TRAMADOL HYDROCHLORIDE 50 MG/1
50 TABLET ORAL EVERY 6 HOURS PRN
Qty: 30 TABLET | Refills: 0 | Status: SHIPPED | OUTPATIENT
Start: 2018-07-19 | End: 2018-11-27 | Stop reason: ALTCHOICE

## 2018-07-19 NOTE — PROGRESS NOTES
[facility] [facility]        NAME: Joy Wiggins is a 80 y o  female  : 1927    MRN: [id number]  DATE: 2018  TIME: 7:03 PM    Assessment and Plan   Fall, initial encounter [W19  XXXA]  1  Fall, initial encounter  XR hand 3+ vw right    XR wrist 3+ vw right    XR shoulder 2+ vw right   2  Closed 2-part nondisplaced fracture of surgical neck of right humerus, initial encounter  Sling    traMADol (ULTRAM) 50 mg tablet    Ambulatory referral to Orthopedic Surgery    Splint application   3  Closed fracture of distal end of right radius, unspecified fracture morphology, initial encounter  traMADol (ULTRAM) 50 mg tablet    Ambulatory referral to Orthopedic Surgery    Splint application     Patient placed in static splint  Volar wrist splint  Patient placed in sling  Will start patient on tramadol  I explained urgent need to follow up with Orthopedic surgery  Patient understands and agrees  Patient Instructions       Rest, ice, elevate the affected limb  Take over-the-counter pain medication for symptom relief  Follow up with Orthopedics as soon as possible  Call Dayron Mejia to schedule an appointment: [phone]  Go to ER if new or worsening symptoms occur  Chief Complaint     Chief Complaint   Patient presents with    Shoulder Pain     Pt states she fell today in her backyard as she went to trim a bush at [facility]  She has right sided shoulder pain that radiates to her arm and hand  Has positive swelling in hand  Two Tylenol taken at [facility]          History of Present Illness       Shoulder Pain    Pain location: Worst pain is in R shoulder however she has profound swelling in R radial aspet of hand however this does not hurt badly  This is a new problem  The current episode started today (1 hour PTA patient was in garding trimming bushes  Bent over and lost balance, landing on R shoulder in garden)  There has been no history of extremity trauma  The problem occurs constantly   The problem has been unchanged  The quality of the pain is described as aching  The pain is at a severity of 7/10  Pertinent negatives include no fever, joint locking, numbness, stiffness or tingling  The symptoms are aggravated by activity  Treatments tried: Ice  Review of Systems   Review of Systems   Constitutional: Negative  Negative for chills, fatigue and fever  HENT: Negative  Eyes: Negative  Respiratory: Negative  Negative for chest tightness and shortness of breath  Cardiovascular: Negative  Negative for chest pain and palpitations  Gastrointestinal: Negative for abdominal pain, constipation, diarrhea, nausea and vomiting  Endocrine: Negative  Genitourinary: Negative for dysuria, flank pain, frequency, pelvic pain, vaginal discharge and vaginal pain  Musculoskeletal: Negative for back pain, gait problem, neck pain, neck stiffness and stiffness  Allergic/Immunologic: Negative  Neurological: Negative  Negative for tingling, weakness and numbness  Hematological: Negative  Psychiatric/Behavioral: Negative            Current Medications       Current Outpatient Prescriptions:     Biotin 1 MG CAPS, Take 1 capsule by mouth daily, Disp: , Rfl:     cholecalciferol (VITAMIN D3) 1,000 units tablet, Take by mouth, Disp: , Rfl:     Multiple Vitamins-Minerals (PRESERVISION AREDS) CAPS, Take by mouth, Disp: , Rfl:     pantoprazole (PROTONIX) 40 mg tablet, TAKE 1 TABLET BY MOUTH DAILY, Disp: 90 tablet, Rfl: 0    traMADol (ULTRAM) 50 mg tablet, Take 1 tablet (50 mg total) by mouth every 6 (six) hours as needed for moderate pain, Disp: 30 tablet, Rfl: 0    Current Allergies     Allergies as of 07/19/2018 - Reviewed 07/19/2018   Allergen Reaction Noted    Sulfa antibiotics Hives 05/10/2018            The following portions of the patient's history were reviewed and updated as appropriate: allergies, current medications, past family history, past medical history, past social history, past surgical history and problem list      No past medical history on file  Past Surgical History:   Procedure Laterality Date    HYSTERECTOMY  1973    Total       Family History   Problem Relation Age of Onset    Heart failure Mother     Coronary artery disease Father          Medications have been verified  Objective   /60   Pulse 95   Temp (!) 96 4 °F (35 8 °C)   Resp 20   Ht 5' 6" (1 676 m)   Wt 64 1 kg (141 lb 6 4 oz)   SpO2 95%   Breastfeeding? No   BMI 22 82 kg/m²        Physical Exam     Physical Exam   Constitutional: She is oriented to person, place, and time  She appears well-developed and well-nourished  No distress  HENT:   Head: Normocephalic and atraumatic  Right Ear: External ear normal    Left Ear: External ear normal    Nose: Nose normal    Mouth/Throat: Oropharynx is clear and moist  No oropharyngeal exudate  Eyes: Conjunctivae and EOM are normal  Pupils are equal, round, and reactive to light  Right eye exhibits no discharge  Left eye exhibits no discharge  No scleral icterus  Neck: Normal range of motion  Neck supple  Cardiovascular: Normal rate, regular rhythm, normal heart sounds and intact distal pulses  Pulmonary/Chest: Effort normal and breath sounds normal  No respiratory distress  She has no wheezes  She has no rales  Musculoskeletal: She exhibits no edema or deformity  Right shoulder: She exhibits tenderness  She exhibits no swelling, no effusion, no crepitus and no deformity  Right wrist: She exhibits normal range of motion, no tenderness, no bony tenderness and no deformity  Arms:       Right hand: She exhibits swelling (Profound bruising and swelling to radial aspect of R dorsal hand)  She exhibits normal range of motion, no tenderness (No snuffbox tenderness), no bony tenderness, normal capillary refill, no deformity and no laceration  Normal sensation noted  Normal strength noted          Hands:  Neurological: She is alert and oriented to person, place, and time  Skin: Skin is warm  No rash noted  She is not diaphoretic  Nursing note and vitals reviewed  Splint application  Date/Time: 7/19/2018 7:00 PM  Performed by: Reynaldo Schmitt  Authorized by: Reynaldo Schmitt     Consent:     Consent obtained:  Verbal    Consent given by:  Patient    Risks discussed:  Discoloration, numbness, pain and swelling    Alternatives discussed:  Referral and delayed treatment  Universal protocol:     Procedure explained and questions answered to patient or proxy's satisfaction: yes      Relevant documents present and verified: yes      Test results available and properly labeled: yes      Imaging studies available: yes      Required blood products, implants, devices, and special equipment available: yes      Site/side marked: yes      Immediately prior to procedure a time out was called: yes      Patient identity confirmed:  Verbally with patient  Pre-procedure details:     Sensation:  Normal  Procedure details:     Laterality:  Right    Location:  Wrist    Wrist:  R wristCast type: Volar  Splint type:  Volar short arm    Supplies:  Cotton padding, sling, fiberglass and elastic bandage  Post-procedure details:     Pain:  Unchanged    Sensation:  Normal    Patient tolerance of procedure: Tolerated well, no immediate complications  Comments:      Patient instructed that if swelling, color change, numbness, tingling, or other sensations occur, she may adjust tightness of splint or go immediately to ER  Pt referred to ortho STAT  Pt understands and agrees

## 2018-07-19 NOTE — PATIENT INSTRUCTIONS
Rest, ice, elevate the affected limb  Take over-the-counter pain medication for symptom relief  Follow up with Orthopedics as soon as possible  Call Diana Cho to schedule an appointment: [phone]  Go to ER if new or worsening symptoms occur  Wrist Fracture in Adults   AMBULATORY CARE:   A wrist fracture  is a break in one or more of the bones in your wrist  A wrist fracture may be caused by a fall, car accident, or sports injury  In older adults, a wrist fracture may be caused by weak bones  Signs and symptoms:   · Pain, swelling, and bruising of your injured wrist    · Wrist pain that is worse when you hold something or put pressure on your wrist    · Weakness, numbness, or tingling in your injured hand or wrist    · Trouble moving your wrist, hand, or fingers    · A change in the shape of your wrist  Seek care immediately if:   · Your pain gets worse or does not get better after you take pain medicine  · Your cast or splint breaks, gets wet, or is damaged  · Your hand or fingers feel numb or cold  · Your hand or fingers turn white or blue  · Your splint or cast feels too tight  · You have more pain or swelling after the cast or splint is put on  Contact your healthcare provider if:   · You have a fever  · There is a foul smell or blood coming from under the cast     · You have questions or concerns about your condition or care  Treatment for a wrist fracture  will depend on which wrist bone was broken and the kind of fracture you have  You may need any of the following:  · Medicine  may be given to decrease pain and swelling  You may need antibiotic medicine or a tetanus shot if there is a break in your skin  · A cast, splint, or brace  may be placed on your wrist to decrease movement  These devices will help hold the bones in place while they heal      · Traction  may be needed if your bone broke into 2 pieces   Traction pulls on the bone pieces to pull them back into place  A pin may be put in your bone or cast and hooked to ropes and a pulley  Weight is hung on the rope to help pull on the bones so they will heal correctly  · A closed reduction  is a procedure to put your bones into the correct position without surgery  · Surgery  may be needed to put your bones back into the correct position  Wires, pins, plates or screws may be used to help hold the bones in place  Self-care:   · Rest  as much as possible  Do not play contact sports until the healthcare provider says it is okay  · Apply ice  on your wrist for 15 to 20 minutes every hour or as directed  Use an ice pack, or put crushed ice in a plastic bag  Cover it with a towel before you place it on your skin  Ice helps prevent tissue damage and decreases swelling and pain  · Elevate  your wrist above the level of your heart as often as possible  This will help decrease swelling and pain  Prop your wrist on pillows or blankets to keep it elevated comfortably  Cast or splint care:   · You may take a bath or shower as directed  Do not let your cast or splint get wet  Before bathing, cover the cast or splint with 2 plastic trash bags  Tape the bags to your skin above the cast or splint to seal out the water  Keep your arm out of the water in case the bag breaks  If a plaster cast gets wet and soft, call your healthcare provider  · Check the skin around the cast or splint every day  You may put lotion on any red or sore areas  · Do not push down or lean on the cast or brace because it may break  · Do not  scratch the skin under the cast by putting a sharp or pointed object inside the cast   Go to physical therapy as directed: You may need physical therapy after your wrist heals and the cast is removed  A physical therapist can teach you exercises to help improve movement and strength and to decrease pain  Follow up with your healthcare provider or bone specialist as directed:   You may need to return to have your cast removed  You may also need an x-ray to check how well the bone has healed  Write down your questions so you remember to ask them during your visits  © 2017 [employer]  Information is for End User's use only and may not be sold, redistributed or otherwise used for commercial purposes  All illustrations and images included in CareNotes® are the copyrighted property of [employer]  or [employer]  The above information is an  only  It is not intended as medical advice for individual conditions or treatments  Talk to your doctor, nurse or pharmacist before following any medical regimen to see if it is safe and effective for you  Arm Fracture in Adults   WHAT YOU NEED TO KNOW:   An arm fracture is a crack or break in one or more of the bones in your arm  An arm fracture may be caused by a fall onto an outstretched hand  It may also be caused by trauma from a car accident or a sports injury  Osteoporosis (brittle bones) can increase your risk for a fracture  DISCHARGE INSTRUCTIONS:   Return to the emergency department if:   · The pain in your injured arm does not get better or gets worse, even after you rest and take medicine  · Your injured arm, hand, or fingers feel numb  · Your arm is swollen, red, and feels warm  · Your skin over the arm fracture is swollen, cold, or pale  · You cannot move your arm, hand, or fingers  Contact your healthcare provider if:   · You have a fever  · Your brace or splint becomes wet, damaged, or comes off  · You have questions or concerns about your injury, treatment, or care  Medicines:   · NSAIDs , such as ibuprofen, help decrease swelling and pain  This medicine is available with or without a doctor's order  NSAIDs can cause stomach bleeding or kidney problems in certain people  If you take blood thinner medicine, always ask your healthcare provider if NSAIDs are safe for you   Always read the medicine label and follow directions  · Acetaminophen  decreases pain  It is available without a doctor's order  Ask how much to take and how often to take it  Follow directions  Acetaminophen can cause liver damage if not taken correctly  · Prescription pain medicine  may be given  Ask how to take this medicine safely  · Take your medicine as directed  Contact your healthcare provider if you think your medicine is not helping or if you have side effects  Tell him or her if you are allergic to any medicine  Keep a list of the medicines, vitamins, and herbs you take  Include the amounts, and when and why you take them  Bring the list or the pill bottles to follow-up visits  Carry your medicine list with you in case of an emergency  Follow up with your healthcare provider within 1 week: You may need to see a bone specialist within 3 to 4 days if you need surgery or further treatment for your arm fracture  Write down your questions so you remember to ask them during your visits  Rest:  You should rest your arm as much as possible  Ask your healthcare provider when you can put pressure or weight on your arm  Also ask when you can return to sports or vigorous exercises  Ice:  Apply ice on your arm for 15 to 20 minutes every hour or as directed  Use an ice pack, or put crushed ice in a plastic bag  Cover it with a towel  Ice helps prevent tissue damage and decreases swelling and pain  Elevate:  Elevate your arm above the level of your heart as often as you can  This will help decrease swelling and pain  Prop your arm on pillows or blankets to keep it elevated comfortably  Care for your cast or splint:  Ask your healthcare provider when it is okay to bathe  Do not get your cast or splint wet  Before you take a bath or shower, cover your cast or splint with a plastic bag  Tape the bag to your skin to help keep water out  Hold your arm away from the water in case the bag leaks    · Check the skin around your cast or splint each day for any redness or open skin  · Do not use a sharp or pointed object to scratch your skin under the cast or splint  Physical therapy:  A physical therapist teaches you exercises to help improve movement and strength, and to decrease pain  © 2017 [employer] Information is for End User's use only and may not be sold, redistributed or otherwise used for commercial purposes  All illustrations and images included in CareNotes® are the copyrighted property of [employer]  or [employer]  The above information is an  only  It is not intended as medical advice for individual conditions or treatments  Talk to your doctor, nurse or pharmacist before following any medical regimen to see if it is safe and effective for you

## 2018-07-20 ENCOUNTER — HOSPITAL ENCOUNTER (INPATIENT)
Facility: HOSPITAL | Age: 83
LOS: 4 days | Discharge: NURSING, REHAB OR LONG-TERM CARE | DRG: 563 | End: 2018-07-24
Attending: EMERGENCY MEDICINE | Admitting: INTERNAL MEDICINE
Payer: COMMERCIAL

## 2018-07-20 ENCOUNTER — APPOINTMENT (EMERGENCY)
Dept: CT IMAGING | Facility: HOSPITAL | Age: 83
DRG: 563 | End: 2018-07-20
Payer: COMMERCIAL

## 2018-07-20 DIAGNOSIS — R53.1 WEAKNESS: ICD-10-CM

## 2018-07-20 DIAGNOSIS — N39.0 URINARY TRACT INFECTION: Primary | ICD-10-CM
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
## 2018-07-20 DIAGNOSIS — S42.301A RIGHT HUMERAL FRACTURE: ICD-10-CM

## 2018-07-20 PROBLEM — R73.9 HYPERGLYCEMIA: Status: ACTIVE | Noted: 2018-07-20

## 2018-07-20 PROBLEM — S52.501A FRACTURE OF DISTAL END OF RIGHT RADIUS: Status: ACTIVE | Noted: 2018-07-20

## 2018-07-20 LAB
ALBUMIN SERPL BCP-MCNC: 3.9 G/DL (ref 3.5–5)
ALP SERPL-CCNC: 36 U/L (ref 46–116)
ALT SERPL W P-5'-P-CCNC: 17 U/L (ref 12–78)
ANION GAP SERPL CALCULATED.3IONS-SCNC: 7 MMOL/L (ref 4–13)
APTT PPP: 28 SECONDS (ref 24–36)
AST SERPL W P-5'-P-CCNC: 21 U/L (ref 5–45)
ATRIAL RATE: 96 BPM
BACTERIA UR QL AUTO: ABNORMAL /HPF
BASOPHILS # BLD AUTO: 0.01 THOUSANDS/ΜL (ref 0–0.1)
BASOPHILS NFR BLD AUTO: 0 % (ref 0–1)
BILIRUB DIRECT SERPL-MCNC: 0.17 MG/DL (ref 0–0.2)
BILIRUB SERPL-MCNC: 0.75 MG/DL (ref 0.2–1)
BILIRUB UR QL STRIP: NEGATIVE
BUN SERPL-MCNC: 36 MG/DL (ref 5–25)
CALCIUM SERPL-MCNC: 9 MG/DL (ref 8.3–10.1)
CHLORIDE SERPL-SCNC: 104 MMOL/L (ref 100–108)
CLARITY UR: ABNORMAL
CO2 SERPL-SCNC: 26 MMOL/L (ref 21–32)
COLOR UR: YELLOW
CREAT SERPL-MCNC: 1.15 MG/DL (ref 0.6–1.3)
EOSINOPHIL # BLD AUTO: 0.01 THOUSAND/ΜL (ref 0–0.61)
EOSINOPHIL NFR BLD AUTO: 0 % (ref 0–6)
ERYTHROCYTE [DISTWIDTH] IN BLOOD BY AUTOMATED COUNT: 13 % (ref 11.6–15.1)
GFR SERPL CREATININE-BSD FRML MDRD: 42 ML/MIN/1.73SQ M
GLUCOSE SERPL-MCNC: 161 MG/DL (ref 65–140)
GLUCOSE UR STRIP-MCNC: NEGATIVE MG/DL
HCT VFR BLD AUTO: 32.3 % (ref 34.8–46.1)
HGB BLD-MCNC: 10.4 G/DL (ref 11.5–15.4)
HGB UR QL STRIP.AUTO: ABNORMAL
INR PPP: 1.09 (ref 0.86–1.17)
KETONES UR STRIP-MCNC: NEGATIVE MG/DL
LEUKOCYTE ESTERASE UR QL STRIP: ABNORMAL
LYMPHOCYTES # BLD AUTO: 0.71 THOUSANDS/ΜL (ref 0.6–4.47)
LYMPHOCYTES NFR BLD AUTO: 7 % (ref 14–44)
MAGNESIUM SERPL-MCNC: 2.2 MG/DL (ref 1.6–2.6)
MCH RBC QN AUTO: 32.9 PG (ref 26.8–34.3)
MCHC RBC AUTO-ENTMCNC: 32.2 G/DL (ref 31.4–37.4)
MCV RBC AUTO: 102 FL (ref 82–98)
MONOCYTES # BLD AUTO: 0.66 THOUSAND/ΜL (ref 0.17–1.22)
MONOCYTES NFR BLD AUTO: 7 % (ref 4–12)
NEUTROPHILS # BLD AUTO: 8.72 THOUSANDS/ΜL (ref 1.85–7.62)
NEUTS SEG NFR BLD AUTO: 86 % (ref 43–75)
NITRITE UR QL STRIP: POSITIVE
NON-SQ EPI CELLS URNS QL MICRO: ABNORMAL /HPF
NRBC BLD AUTO-RTO: 0 /100 WBCS
P AXIS: 42 DEGREES
PH UR STRIP.AUTO: 5.5 [PH] (ref 4.5–8)
PLATELET # BLD AUTO: 184 THOUSANDS/UL (ref 149–390)
PMV BLD AUTO: 11.4 FL (ref 8.9–12.7)
POTASSIUM SERPL-SCNC: 4 MMOL/L (ref 3.5–5.3)
PR INTERVAL: 122 MS
PROT SERPL-MCNC: 7 G/DL (ref 6.4–8.2)
PROT UR STRIP-MCNC: ABNORMAL MG/DL
PROTHROMBIN TIME: 14.2 SECONDS (ref 11.8–14.2)
QRS AXIS: -7 DEGREES
QRSD INTERVAL: 100 MS
QT INTERVAL: 364 MS
QTC INTERVAL: 460 MS
RBC # BLD AUTO: 3.16 MILLION/UL (ref 3.81–5.12)
RBC #/AREA URNS AUTO: ABNORMAL /HPF
SODIUM SERPL-SCNC: 137 MMOL/L (ref 136–145)
SP GR UR STRIP.AUTO: 1.02 (ref 1–1.03)
T WAVE AXIS: 233 DEGREES
TSH SERPL DL<=0.05 MIU/L-ACNC: 1.46 UIU/ML (ref 0.36–3.74)
UROBILINOGEN UR QL STRIP.AUTO: 0.2 E.U./DL
VENTRICULAR RATE: 96 BPM
WBC # BLD AUTO: 10.11 THOUSAND/UL (ref 4.31–10.16)
WBC #/AREA URNS AUTO: ABNORMAL /HPF

## 2018-07-20 PROCEDURE — G8979 MOBILITY GOAL STATUS: HCPCS

## 2018-07-20 PROCEDURE — 99285 EMERGENCY DEPT VISIT HI MDM: CPT

## 2018-07-20 PROCEDURE — 87040 BLOOD CULTURE FOR BACTERIA: CPT | Performed by: EMERGENCY MEDICINE

## 2018-07-20 PROCEDURE — 70450 CT HEAD/BRAIN W/O DYE: CPT

## 2018-07-20 PROCEDURE — 85610 PROTHROMBIN TIME: CPT | Performed by: EMERGENCY MEDICINE

## 2018-07-20 PROCEDURE — 36415 COLL VENOUS BLD VENIPUNCTURE: CPT | Performed by: EMERGENCY MEDICINE

## 2018-07-20 PROCEDURE — 81001 URINALYSIS AUTO W/SCOPE: CPT

## 2018-07-20 PROCEDURE — 83735 ASSAY OF MAGNESIUM: CPT | Performed by: EMERGENCY MEDICINE

## 2018-07-20 PROCEDURE — 97166 OT EVAL MOD COMPLEX 45 MIN: CPT

## 2018-07-20 PROCEDURE — 87186 SC STD MICRODIL/AGAR DIL: CPT

## 2018-07-20 PROCEDURE — G8987 SELF CARE CURRENT STATUS: HCPCS

## 2018-07-20 PROCEDURE — 99223 1ST HOSP IP/OBS HIGH 75: CPT | Performed by: INTERNAL MEDICINE

## 2018-07-20 PROCEDURE — 80048 BASIC METABOLIC PNL TOTAL CA: CPT | Performed by: EMERGENCY MEDICINE

## 2018-07-20 PROCEDURE — 97163 PT EVAL HIGH COMPLEX 45 MIN: CPT

## 2018-07-20 PROCEDURE — 96365 THER/PROPH/DIAG IV INF INIT: CPT

## 2018-07-20 PROCEDURE — G8978 MOBILITY CURRENT STATUS: HCPCS

## 2018-07-20 PROCEDURE — 97116 GAIT TRAINING THERAPY: CPT

## 2018-07-20 PROCEDURE — 81002 URINALYSIS NONAUTO W/O SCOPE: CPT | Performed by: EMERGENCY MEDICINE

## 2018-07-20 PROCEDURE — 87086 URINE CULTURE/COLONY COUNT: CPT

## 2018-07-20 PROCEDURE — 93010 ELECTROCARDIOGRAM REPORT: CPT | Performed by: INTERNAL MEDICINE

## 2018-07-20 PROCEDURE — 85025 COMPLETE CBC W/AUTO DIFF WBC: CPT | Performed by: EMERGENCY MEDICINE

## 2018-07-20 PROCEDURE — 80076 HEPATIC FUNCTION PANEL: CPT | Performed by: EMERGENCY MEDICINE

## 2018-07-20 PROCEDURE — 87077 CULTURE AEROBIC IDENTIFY: CPT

## 2018-07-20 PROCEDURE — 84443 ASSAY THYROID STIM HORMONE: CPT | Performed by: EMERGENCY MEDICINE

## 2018-07-20 PROCEDURE — 85730 THROMBOPLASTIN TIME PARTIAL: CPT | Performed by: EMERGENCY MEDICINE

## 2018-07-20 PROCEDURE — 93005 ELECTROCARDIOGRAM TRACING: CPT

## 2018-07-20 PROCEDURE — G8988 SELF CARE GOAL STATUS: HCPCS

## 2018-07-20 RX ORDER — ACETAMINOPHEN 325 MG/1
650 TABLET ORAL EVERY 6 HOURS PRN
Status: DISCONTINUED | OUTPATIENT
Start: 2018-07-20 | End: 2018-07-24 | Stop reason: HOSPADM

## 2018-07-20 RX ORDER — HYDROCODONE BITARTRATE AND ACETAMINOPHEN 5; 325 MG/1; MG/1
1 TABLET ORAL EVERY 4 HOURS PRN
Status: DISCONTINUED | OUTPATIENT
Start: 2018-07-20 | End: 2018-07-24 | Stop reason: HOSPADM

## 2018-07-20 RX ORDER — TRAMADOL HYDROCHLORIDE 50 MG/1
50 TABLET ORAL EVERY 6 HOURS PRN
Status: DISCONTINUED | OUTPATIENT
Start: 2018-07-20 | End: 2018-07-24 | Stop reason: HOSPADM

## 2018-07-20 RX ORDER — DOCUSATE SODIUM 100 MG/1
100 CAPSULE, LIQUID FILLED ORAL 2 TIMES DAILY PRN
Status: DISCONTINUED | OUTPATIENT
Start: 2018-07-20 | End: 2018-07-24 | Stop reason: HOSPADM

## 2018-07-20 RX ORDER — PANTOPRAZOLE SODIUM 40 MG/1
40 TABLET, DELAYED RELEASE ORAL
Status: DISCONTINUED | OUTPATIENT
Start: 2018-07-21 | End: 2018-07-24 | Stop reason: HOSPADM

## 2018-07-20 RX ORDER — ONDANSETRON 2 MG/ML
4 INJECTION INTRAMUSCULAR; INTRAVENOUS EVERY 4 HOURS PRN
Status: DISCONTINUED | OUTPATIENT
Start: 2018-07-20 | End: 2018-07-24 | Stop reason: HOSPADM

## 2018-07-20 RX ORDER — MELATONIN
1000 DAILY
Status: DISCONTINUED | OUTPATIENT
Start: 2018-07-21 | End: 2018-07-24 | Stop reason: HOSPADM

## 2018-07-20 RX ADMIN — ENOXAPARIN SODIUM 30 MG: 30 INJECTION SUBCUTANEOUS at 16:39

## 2018-07-20 RX ADMIN — CEFTRIAXONE SODIUM 1000 MG: 10 INJECTION, POWDER, FOR SOLUTION INTRAVENOUS at 12:52

## 2018-07-20 RX ADMIN — CEFAZOLIN SODIUM 1000 MG: 1 SOLUTION INTRAVENOUS at 21:01

## 2018-07-20 NOTE — ASSESSMENT & PLAN NOTE
Right humeral neck fracture after falling in to a raspberry push  Has been splinted  Will consult orthopedics to determine need for operative intervention    Continue tramadol p r n  and add Norco for breakthrough pain

## 2018-07-20 NOTE — OCCUPATIONAL THERAPY NOTE
[address] [facility] Evaluation     Patient Name: Justin Hernandez  JHDXI'V Date: 7/20/2018  Problem List  Patient Active Problem List   Diagnosis    Esophagitis, reflux    Frailty    Hyperkalemia    Hyperlipidemia    Primary localized osteoarthritis of right hip    Vitamin D deficiency     Past Medical History  Past Medical History:   Diagnosis Date    GERD (gastroesophageal reflux disease)      Past Surgical History  Past Surgical History:   Procedure Laterality Date    HYSTERECTOMY  1973    Total    JOINT REPLACEMENT Right     knee         07/20/18 1158   Note Type   Note type Eval only   Restrictions/Precautions   Weight Bearing Precautions Per Order Yes   RUE Weight Bearing Per Order NWB   Braces or Orthoses Sling  (RUE sling)   Other Precautions WBS; Fall Risk;Pain   Pain Assessment   Pain Assessment FLACC   Pain Rating: FLACC (Rest) - Face 0   Pain Rating: FLACC (Rest) - Legs 0   Pain Rating: FLACC (Rest) - Activity 0   Pain Rating: FLACC (Rest) - Cry 0   Pain Rating: FLACC (Rest) - Consolability 0   Score: FLACC (Rest) 0   Pain Rating: FLACC (Activity) - Face 1   Pain Rating: FLACC (Activity) - Legs 1   Pain Rating: FLACC (Activity) - Activity 1   Pain Rating: FLACC (Activity) - Cry 1   Pain Rating: FLACC (Activity) - Consolability 1   Score: FLACC (Activity) 5   Home Living   Type of Home House   Home Layout Two level;Stairs to enter with rails; Able to live on main level with bedroom/bathroom  (4 HÉCTOR with R Railing (pt NWB RUE))   Bathroom Shower/Tub Tub/shower unit   [employer] Raised   Bathroom Equipment Grab bars in shower;Built-in shower seat   [address]  (RW)   Additional Comments Pt lives alone in a two level home with 4 HÉCTOR w/ R railing and 1st floor setup  Pt reports local family (dtr, grandchildren) that are able to assist as needed      Prior Function   Level of [place] Independent with ADLs and functional mobility   Lives With Alone Receives Help From Family   ADL Assistance Independent   IADLs Independent   Falls in the last 6 months 1 to 4  (3 falls since yesterday)   Vocational Retired   Comments At baseline, pt was I w/ ADLs, IADLs, and functional mobility/transfers w/o use of DME/AD, (+) , and reports 3 falls PTA (all 3 falls since yesterday)  Lifestyle   Autonomy At baseline, pt was I w/ ADLs, IADLs, and functional mobility/transfers w/o use of DME/AD, (+) , and reports 3 falls PTA (all 3 falls since yesterday)  Reciprocal Relationships Lives alone- Supportive family   Service to 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   Intrinsic Gratification Gardening   Psychosocial   Psychosocial (WDL) WDL   Subjective   Subjective "My legs feel so weak  They've never been this weak before!"   ADL   Eating Assistance 4  Minimal Assistance   Grooming Assistance 4  Minimal Assistance   UB Bathing Assistance 4  Minimal Assistance   LB Bathing Assistance 3  Moderate Assistance   [address] 4  Minimal [address] 3  Moderate [address]  3  Moderate Assistance   Bed Mobility   Supine to Sit 4  Minimal assistance   Additional items Assist x 2;HOB elevated; Bedrails; Increased time required;Verbal cues;LE management   Sit to Supine 3  Moderate assistance   Additional items Assist x 2;Bedrails; Increased time required;Verbal cues;LE management   Additional Comments Pt lying supine at end of session with call bell and phone within reach  All needs met and pt reports no further questions for OT at this time   Transfers   Sit to Stand 4  Minimal assistance   Additional items Assist x 2; Increased time required;Verbal cues   Stand to Sit 4  Minimal assistance   Additional items Assist x 2; Increased time required;Verbal cues   Additional Comments Cues for safe technique and placement of LUE   Functional Mobility   Functional Mobility 4  Minimal assistance   Additional Comments Assist x2 w/ 1 LOB noted, requiring Min A of 2 to regain balance   Additional items Hand hold assistance   Balance   Static Sitting Fair   Dynamic Sitting Fair -   Static Standing Fair -   Dynamic Standing Poor +   Ambulatory Poor +   Activity Tolerance   Activity Tolerance Patient limited by fatigue;Patient limited by pain   Nurse Made Aware Pt appropriate to be seen per nursing   RUE Assessment   RUE Assessment (not formally tested 2* pt in RUE sling; NWB RUE)   LUE Assessment   LUE Assessment WFL   LUE Strength   LUE Overall Strength Within Functional Limits - able to perform ADL tasks with strength  (3+/5)   Hand Function   Gross Motor Coordination (Functional LUE, Impaired RUE)   Fine Motor Coordination (Functional LUE, Impaired RUE)   Sensation   Light Touch No apparent deficits   Sharp/Dull No apparent deficits   Proprioception   Proprioception No apparent deficits   Vision-Basic Assessment   Current Vision Wears glasses all the time   Vision - Complex Assessment   Ocular Range of Motion [facility]   Acuity Able to read clock/calendar on wall without difficulty   Perception   Inattention/Neglect Appears intact   Cognition   Overall Cognitive Status [facility]   Arousal/Participation Alert; Cooperative   Attention Within functional limits   Orientation Level Oriented X4   Memory Within functional limits   Following Commands Follows one step commands without difficulty   Assessment   Limitation Decreased ADL status; Decreased UE ROM; Decreased UE strength;Decreased endurance;Decreased self-care trans;Decreased high-level ADLs   Prognosis Fair   Assessment Pt is a 80 y o  female seen for OT evaluation s/p adm to [facility] on 7/20/2018 s/p 3 falls since yesterday, which resulted in fracture of R humerus and likely acute fracture deformity of the distal radius  Pt w/ orders for NWB RUE and RUE sling  Comorbidities affecting pts functional performance include a significant PMH of GERD   Pt with active OT orders and activity orders for Activity as tolerated  Pt lives alone in a two level home with 4 HÉCTOR w/ R railing and 1st floor setup  Pt reports local family (dtr, grandchildren) that are able to assist as needed  At baseline, pt was I w/ ADLs, IADLs, and functional mobility/transfers w/o use of DME/AD, (+) , and reports 3 falls PTA (all 3 falls since yesterday)  Upon evaluation, pt currently requires Mod A for LB ADLs, Mod A for toileting, Min A for UB ADLs, Min A for eating/grooming, Mod-Min A of 2 for bed mobility, and Min A of 2 for functional mobility/transfers 2* the following deficits impacting occupational performance: weakness, decreased ROM, decreased strength, decreased balance, decreased tolerance and increased pain   These impairments, as well at pts steps to enter environment, limited home support, difficulty performing ADLS and difficulty performing IADLS  limit pts ability to safely engage in all baseline areas of occupation, including grooming, bathing, dressing, toileting, functional mobility/transfers, community mobility, laundry , house maintenance, meal prep, cleaning, social participation  and leisure activities   Pt scored overall 40/100 on the Barthel Index  Based on the aforementioned OT evaluation, functional performance deficits, and assessments, pt has been identified as a moderate complexity evaluation  Pt to continue to benefit from continued acute OT services during hospital stay to address defined deficits and to maximize level of functional independence in the following Occupational Performance areas: grooming, bathing/shower, toilet hygiene, dressing, socialization, health maintenance, functional mobility, community mobility, clothing management, cleaning, meal prep, household maintenance, social participation and transfers to common household surfaces  From OT standpoint, recommend STR upon D/C   OT will continue to follow pt 3-5x/wk to address the following goals to  w/in 10-14 days:   Goals Patient Goals "to get my legs stronger"   LTG Time Frame 10-14   Long Term Goal Please refer to LTGs listed below   Plan   Treatment Interventions ADL retraining;Functional transfer training;UE strengthening/ROM; Endurance training;Equipment evaluation/education;Patient/family training; Compensatory technique education;Continued evaluation; Energy conservation; Activityengagement   Goal Expiration Date 08/03/18   Treatment Day 0   OT Frequency 3-5x/wk   Recommendation   OT Discharge Recommendation Short Term Rehab   OT - OK to Discharge Yes  (when medically cleared to STR)   Barthel Index   Feeding 5   Bathing 0   Grooming Score 0   Dressing Score 5   Bladder Score 10   Bowels Score 10   Toilet Use Score 5   Transfers (Bed/Chair) Score 5   Mobility (Level Surface) Score 0   Stairs Score 0   Barthel Index Score 40   Modified [place] Scale   Modified [place] Scale 4      GOALS    1) Pt will improve activity tolerance to G for min 45 min txment sessions    2) Pt will complete bed mobility at a Mod I level w/ G balance/safety demonstrated    3) Pt will demonstrate 100% adherence to NWB status of RUE during functional activities w/o cues from therapist     4) Pt will complete UB/LB dressing/self care w/ mod I using adaptive device, DME, and one-handed dressing techniques as needed    5) Pt will complete bathing w/ Mod I w/ use of AE and DME as needed    6) Pt will complete toileting w/ mod I w/ G hygiene/thoroughness using DME as needed    7) Pt will improve functional transfers to Mod I on/off all surfaces using DME as needed w/ G balance/safety     8) Pt will improve functional mobility during ADL/IADL/leisure tasks to Mod I using DME as needed w/ G balance/safety     9) Pt will participate in simulated IADL management task to increase independence to Mod I w/ G safety and endurance    10) Pt will engage in ongoing cognitive assessment w/ G participation w/ mod I to assist w/ safe d/c planning/recommendations    11) Pt will demonstrate G carryover of pt/caregiver education and training as appropriate w/ mod I w/o cues w/ good tolerance    12) Pt will demonstrate 100% carryover of energy conservation techniques w/ mod I t/o functional I/ADL/leisure tasks w/o cues s/p skilled education     13) Pt will increase UE strength by 1 MM grade to increase independence in ADLs and transfers      Matteo Flores, OTR/L

## 2018-07-20 NOTE — PHYSICAL THERAPY NOTE
PT EVALUATION  Time-In: 1132  Time-Out: 1150  Total Time: 18 minutes    80 y o     [id number]    Arm pain [M79 603]    Length of Stay: 0    Past Medical History:   Diagnosis Date    GERD (gastroesophageal reflux disease)          Past Surgical History:   Procedure Laterality Date    HYSTERECTOMY  1973    Total    JOINT REPLACEMENT Right     knee      07/20/18 1200   Note Type   Note type Eval/Treat   Pain Assessment   Pain Assessment FLACC   Pain Rating: FLACC (Rest) - Face 0   Pain Rating: FLACC (Rest) - Legs 0   Pain Rating: FLACC (Rest) - Activity 0   Pain Rating: FLACC (Rest) - Cry 0   Pain Rating: FLACC (Rest) - Consolability 0   Score: FLACC (Rest) 0   Pain Rating: FLACC (Activity) - Face 1   Pain Rating: FLACC (Activity) - Legs 1   Pain Rating: FLACC (Activity) - Activity 1   Pain Rating: FLACC (Activity) - Cry 1   Pain Rating: FLACC (Activity) - Consolability 1   Score: FLACC (Activity) 5   Home Living   Type of Home House   Home Layout Two level;Stairs to enter with rails; Able to live on main level with bedroom/bathroom  (4 HÉCTOR w/ R railing)   Bathroom Shower/Tub Tub/shower unit   [employer] Raised   Bathroom Equipment Grab bars in shower;Built-in [id number]  (RW)   Prior Function   Level of [place] Independent with ADLs and functional mobility   Lives With Alone   Receives Help From Family   ADL Assistance Independent   IADLs Independent   Falls in the last 6 months 1 to 4  (3 between today and yesterday)   Vocational Retired   Comments (I) ADLS, (I) IADLS, ambulates w/o DME, 3 recent falls, (+)    Restrictions/Precautions   Weight Bearing Precautions Per Order Yes   RUE Weight Bearing Per Order NWB   Braces or Orthoses Sling  (RUE sling)   Other Precautions WBS; Fall Risk;Pain;Multiple lines;Telemetry; hard of hearing   General   Additional Pertinent History acute comminuted fracture R humerus neck and fracture deformity distal radius likely acute Family/Caregiver Present Yes  (daughter)   Cognition   Overall Cognitive Status WFL   Arousal/Participation Alert   Orientation Level Oriented X4   Memory Within functional limits   Following Commands Follows all commands and directions without difficulty   RUE Assessment   RUE Assessment X  (not formally tested  in RUE ANIKET ricardo )   LUE Assessment   LUE Assessment WFL  (see OT eval for details)   RLE Assessment   RLE Assessment WFL  (4/5)   LLE Assessment   LLE Assessment WFL  (4/5)   Coordination   Movements are Fluid and Coordinated 1   Sensation WFL   Light Touch   RLE Light Touch Grossly intact   LLE Light Touch Grossly intact   Sharp/Dull   RLE Sharp/Dull Not tested   LLE Sharp/Dull Not tested   Proprioception   RLE Proprioception Grossly intact   LLE Proprioception Grossly Intact   Bed Mobility   Supine to Sit 4  Minimal assistance   Additional items Increased time required;Verbal cues;LE management;Assist x 2   Transfers   Sit to Stand 4  Minimal assistance   Additional items Assist x 2; Increased time required;Verbal cues   Stand to Sit 4  Minimal assistance   Additional items Assist x 2; Increased time required;Verbal cues   Additional Comments VC for hand placement  HHA on LUE  Balance   Static Sitting Fair   Dynamic Sitting Fair -   Static Standing Fair -   Dynamic Standing Poor +   Ambulatory Poor +   Endurance Deficit   Endurance Deficit Yes   Endurance Deficit Description pain, fatigue, and weakness   Activity Tolerance   Activity Tolerance Patient limited by fatigue;Patient limited by pain   Blayne Kinmichelleer Dr Bradly Will Yes   Assessment   Prognosis Good   Problem List Decreased strength;Decreased endurance;Decreased range of motion; Impaired balance;Decreased mobility; Decreased safety awareness;Decreased skin integrity;Orthopedic restrictions;Pain   Assessment PT consult received and evaluation complete  Pt is 80 y o   Female admitted from home w/ alone for Arm pain M79 606 s/p 3 falls between yesterday and today  Fall(s) resulted in acute comminuted fracture R humeral neck and fracture deformity distal radius likely acute  Primary fall was yesterday while she was out trimming her nasrin bushjalen  Pt agreeable to participate w/ therapy and identified by 2 patient identifiers: name and birth date  Pt presenting supine in stretcher bed w/ telemetry and daughter present upon arrival  PTA pt was independent w/ all functional mobility w/ no DME, has RW in home if needed, lived in one floor environment and had 4 HÉCTOR w/ R railing, independent ADLS, and  independent w/ IADLS, yes driving, yes recent falls 3, and retired  Pt presents w/ RLE MMT 4/5, LLE MMT 4/5, supine>sit minAx2, sit<>stand minAx2 R HHA, and activity FLACC score 5 pain R shoulder/arm    Pt would benefit from continued skilled PT for deficits in strength, balance, locomotion, stair negotiation, functional endurance, functional mobility and safety awareness with mobility At this time recommend d/c inpatient rehab History: co-morbidities, fall risk, lives alone, mult-level home, HÉCTOR and multiple lines Exam: strength, balance, locomotion, stair negotiation, functional endurance, functional mobility and safety awareness with mobility Barthel Index: 40 Modified [place]:Clinical: 4 unstable/unpredictable: pain, decreased safety awareness and 2 person assist fall risk RUE sling WBS telemetry Complexity: high   Barriers to Discharge Inaccessible home environment;Decreased caregiver support   Barriers to Discharge Comments lives alone, 4 HÉCTOR w/ single railing   Goals   Patient Goals "to get stronger in her legs"   LTG Expiration Date 07/30/18   Long Term Goal #1 In 10 days pt will demonstrate: bed mobility mod (I) for independence home function OOB, sit<>stand and functional transfers S w/ least restrictive AD NWB RUE for independent home function, gait training 150ft S w/ least restrictive AD NWB RUE for independence with home and progress to community distances, 4 steps S w/ L railing or least restrictive AD for independent home entrance, improve BLE by 1/2 grade strength to optimize functional mobility, gait and stairs, improve balance by 1 grade to decrease fall risk with gait, mobility, and stairs  Treatment Day 1   Plan   Treatment/Interventions Functional transfer training;LE strengthening/ROM; Therapeutic exercise; Endurance training;Patient/family training;Equipment eval/education; Bed mobility;Gait training;Spoke to nursing;OT;Family   PT Frequency (4-5x/wk)   Recommendation   Recommendation Short-term skilled PT  (STR)   Equipment Recommended (defer at this time)   PT - OK to Discharge Yes  (yes to rehab once medically stable, not to home)   Modified [place] Scale   Modified [place] Scale 4   Barthel Index   Feeding 5   Bathing 0   Grooming Score 0   Dressing Score 5   Bladder Score 10   Bowels Score 10   Toilet Use Score 5   Transfers (Bed/Chair) Score 5   Mobility (Level Surface) Score 0   Stairs Score 0   Barthel Index Score 40       Gladys Stevenson, PT ,DPT  PT TREATMENT IMMEDIATELY FOLLOWING EVALUATION    Time in: 1150  Time out: 1200  Total Time:10 minutes      S: Pt's goal is to get stronger and be able to return home to her house  Pt was very independent before falls  Pt reports that she just feels weak and denies dizziness  O: Pt progressed ambulation by gait training 30ftx2 w/ RUE HHA minAx2  Pt demonstrates short stride, increased lateral sway, and unsteadiness  Pt needing short standing rest break mid gait training and note intermittent minor buckle in knees  Limited distance d/t fatigue and weakness  Pt performed stand>sit minAx1 w/ verbal cueing for hand placement and technique  Pt performed sit>supine modAx2 needing assistance to manage BLE and assist w/ trunk  Pt left supine in stretcher bed w/ telemetry attached, daughter, present, all needs in reach, call [place] in hand, and Dr Leonia Paget notified of PT's recommendations    A: Pt tolerated treatment fairly  Limited d/t fatigue and weakness  Pt is not safe to discharge home even with family support  Pt requiring 2 person assistance, increased unsteadiness, and NWB w/ RUE in sling  Pt would benefit from continued skilled PT for deficits in strength, balance, mobility, and gait  Will progress stairs as tolerated for home entrance  P: Continue PT POC as written in initial evaluation  Recommending discharge STR at this time      Mary Ann Brennan, PT,DPT

## 2018-07-20 NOTE — PLAN OF CARE
Problem: PHYSICAL THERAPY ADULT  Goal: Performs mobility at highest level of function for planned discharge setting  See evaluation for individualized goals  Treatment/Interventions: Functional transfer training, LE strengthening/ROM, Therapeutic exercise, Endurance training, Patient/family training, Equipment eval/education, Bed mobility, Gait training, Spoke to nursing, OT, Family  Equipment Recommended:  (defer at this time)       See flowsheet documentation for full assessment, interventions and recommendations  Outcome: Progressing  Prognosis: Good  Problem List: Decreased strength, Decreased endurance, Decreased range of motion, Impaired balance, Decreased mobility, Decreased safety awareness, Decreased skin integrity, Orthopedic restrictions, Pain  Assessment: PT consult received and evaluation complete  Pt is 80 y o  Female admitted from home w/ alone for Arm pain M79 603 s/p 3 falls between yesterday and today  Fall(s) resulted in acute comminuted fracture R humeral neck and fracture deformity distal radius likely acute  Primary fall was yesterday while she was out trimming her raspberry bushes  Pt agreeable to participate w/ therapy and identified by 2 patient identifiers: name and birth date  Pt presenting supine in stretcher bed w/ telemetry and daughter present upon arrival  PTA pt was independent w/ all functional mobility w/ no DME, has RW in home if needed, lived in one floor environment and had 4 HÉCTOR w/ R railing, independent ADLS, and  independent w/ IADLS, yes driving, yes recent falls 3, and retired  Pt presents w/ RLE MMT 4/5, LLE MMT 4/5, supine>sit minAx2, sit<>stand minAx2 R HHA, and activity FLACC score 5 pain R shoulder/arm    Pt would benefit from continued skilled PT for deficits in strength, balance, locomotion, stair negotiation, functional endurance, functional mobility and safety awareness with mobility At this time recommend d/c inpatient rehab History: co-morbidities, fall risk, lives alone, mult-level home, HÉCTOR and multiple lines Exam: strength, balance, locomotion, stair negotiation, functional endurance, functional mobility and safety awareness with mobility Barthel Index: Modified [place]:Clinical: unstable/unpredictable: pain, decreased safety awareness and 2 person assist fall risk RUE sling WBS telemetry Complexity: high  Barriers to Discharge: Inaccessible home environment, Decreased caregiver support  Barriers to Discharge Comments: lives alone, 4 HÉCTOR w/ single railing  Recommendation: Short-term skilled PT (STR)     PT - OK to Discharge: Yes (yes to rehab once medically stable, not to home)    See flowsheet documentation for full assessment         Comments: Jad Llanes, PT,DPT

## 2018-07-20 NOTE — ED NOTES
Patient and her daughter informed of room 242 assigned  Seen by Dr Horn Adjutawilda Chavez, RN  07/20/18 8587

## 2018-07-20 NOTE — H&P
H&P- Trung Clark 12/8/1927, 80 y o  female MRN: [id number]    Unit/Bed#: ED 09 Encounter: [id number]    Primary Care Provider: Andre Rossi DO   Date and time admitted to hospital: 7/20/2018  9:11 AM        Assessment and Plan    * Right humeral fracture   Assessment & Plan    Right humeral neck fracture after falling in to a raspberry push  Has been splinted  Will consult orthopedics to determine need for operative intervention  Continue tramadol p r n  and add Norco for breakthrough pain        Fracture of distal end of right radius   Assessment & Plan    Nondisplaced  Continue immobilization  Hyperglycemia   Assessment & Plan    Likely stress-induced  No history of diabetes  Check A1c in a m         UTI (urinary tract infection)   Assessment & Plan    With debility likely symptomatic  Given ceftriaxone x1 in the emergency department  Add cefazolin during hospitalization until cultures available  Vitamin D deficiency   Assessment & Plan    Continue cholecalciferol        Esophagitis, reflux   Assessment & Plan    Continue PPI        VTE Prophylaxis: Enoxaparin (Lovenox)  Code Status: Level 1 - Full Code  Anticipated Length of Stay:  Patient will be admitted on an Inpatient basis with an anticipated length of stay of  greater than 2 midnights  Justification for Hospital Stay: Right humeral fracture  Total Time for Visit, including Counseling / Coordination of Care: 45 mins  Greater than 50% of this total time spent on direct patient counseling and coordination of care  Chief Complaint:     Chief Complaint   Patient presents with    Fall     Patient has had 3 falls since yesterday  José Miguel Roberson outside while gardening yesterday- fx of shoulder and right wrist   Today was sleeping in chair and slipped out onto buttocks  Helped up by daughter  Went to bathroom and got weak and fell on her back  Denies any injury or new pain  History of Present Illness:    Trung Clark is a 80 y o  female who presents with three falls since yesterday  The patient at baseline lives on her own and ambulates without any assistive devices  She was trimming her raspberry bush when she lost her footing and fell into the tree  She is right-handed dominant and had difficulty getting up  There was a neighbor that was able to assist her where she was brought to [facility] and was found to have a humeral neck fracture as well as distal radial fracture  She was splinted and has an appointment with orthopedics Saturday  Unfortunately she is unable to get up from a seated position and has fallen several more times  She denies any precipitating factors such as dizziness headache chest pain or shortness of breath  She was brought here to the emergency department where she was evaluated by physical therapy and currently not safe to be discharged to home environment  She was found to have a urinary tract infection and admission was requested  Review of Systems:    History obtained from chart review, the patient and daughter  General ROS: negative for - chills or fever  Psychological ROS: negative for - anxiety, depression or hallucinations  Ophthalmic ROS: negative for - blurry vision or dry eyes  Respiratory ROS: negative for - cough or shortness of breath  Cardiovascular ROS: negative for - chest pain or palpitations  Gastrointestinal ROS: negative for - change in stools or heartburn  Genito-Urinary ROS: negative for - dysuria  Musculoskeletal ROS: positive for - muscle pain right upper extremity  Neurological ROS: negative for - headaches or memory loss  Otherwise, all other 12 point review of systems normal     Past Medical and Surgical History:   Past Medical History:   Diagnosis Date    GERD (gastroesophageal reflux disease)      Past Surgical History:   Procedure Laterality Date    HYSTERECTOMY  1973    Total    JOINT REPLACEMENT Right     knee     Meds/Allergies: Allergies:    Allergies Allergen Reactions    Sulfa Antibiotics Hives     Prior to Admission Medications   Prescriptions Last Dose Informant Patient Reported? Taking?    Biotin 1 MG CAPS  Self Yes No   Sig: Take 1 capsule by mouth daily   Multiple Vitamins-Minerals (PRESERVISION AREDS) CAPS  Self Yes No   Sig: Take by mouth   cholecalciferol (VITAMIN D3) 1,000 units tablet  Self Yes No   Sig: Take by mouth   pantoprazole (PROTONIX) 40 mg tablet   No No   Sig: TAKE 1 TABLET BY MOUTH DAILY   traMADol (ULTRAM) 50 mg tablet   No No   Sig: Take 1 tablet (50 mg total) by mouth every 6 (six) hours as needed for moderate pain      Facility-Administered Medications: None     Social History:     Social History     Social History    Marital status: /Civil Union     Spouse name: N/A    Number of children: N/A    Years of education: N/A     Occupational History    Retired      Social History Main Topics    Smoking status: Never Smoker    Smokeless tobacco: Never Used      Comment: No secondhand smoke exposure    Alcohol use Yes      Comment: Minimal consumption    Drug use: No    Sexual activity: Not on file     Other Topics Concern    Not on file     Social History Narrative    No narrative on file     Patient Pre-hospital Living Situation: Lives alone  Patient Pre-hospital Level of Mobility: At baseline no devices  Patient Pre-hospital Diet Restrictions:     Family History:  Family History   Problem Relation Age of Onset    Heart failure Mother     Coronary artery disease Father      Physical Exam:   Vitals:   Blood Pressure: 129/60 (07/20/18 1244)  Pulse: 86 (07/20/18 1244)  Temperature: 98 2 °F (36 8 °C) (07/20/18 0919)  Temp Source: Oral (07/20/18 0919)  Respirations: 16 (07/20/18 1244)  Weight - Scale: 66 kg (145 lb 8 1 oz) (07/20/18 0919)  SpO2: 100 % (07/20/18 1244)    General appearance: alert, appears stated age and cooperative  Skin: ecchymosis right hand  Head: Normocephalic, without obvious abnormality, atraumatic  Eyes: conjunctivae/corneas clear  PERRL, EOM's intact  Lungs: clear to auscultation bilaterally  Heart: regular rate and rhythm  Abdomen: soft, non-tender; bowel sounds normal; no masses,  no organomegaly  Back: negative, range of motion normal  Extremities: right upper extremity in splint  Neurologic: Grossly normal  Psychiatric:  Good eye contact mood appropriate    Lab Results: I have personally reviewed pertinent reports  Results from last 7 days  Lab Units 07/20/18  0915   WBC Thousand/uL 10 11   HEMOGLOBIN g/dL 10 4*   HEMATOCRIT % 32 3*   PLATELETS Thousands/uL 184   NEUTROS PCT % 86*   LYMPHS PCT % 7*   MONOS PCT % 7   EOS PCT % 0       Results from last 7 days  Lab Units 07/20/18  0915   SODIUM mmol/L 137   POTASSIUM mmol/L 4 0   CHLORIDE mmol/L 104   CO2 mmol/L 26   ANION GAP mmol/L 7   BUN mg/dL 36*   CREATININE mg/dL 1 15   CALCIUM mg/dL 9 0   TOTAL PROTEIN g/dL 7 0   BILIRUBIN TOTAL mg/dL 0 75   ALK PHOS U/L 36*   ALT U/L 17   AST U/L 21   EGFR ml/min/1 73sq m 42   GLUCOSE RANDOM mg/dL 161*   MAGNESIUM mg/dL 2 2       Results from last 7 days  Lab Units 07/20/18  0915   INR  1 09            Results from last 7 days  Lab Units 07/20/18  1102   COLOR UA  Yellow   CLARITY UA  Cloudy   SPEC GRAV UA  1 025   PH UA  5 5   LEUKOCYTES UA  Moderate*   NITRITE UA  Positive*   PROTEIN UA mg/dl 100 (2+)*   GLUCOSE UA mg/dl Negative   KETONES UA mg/dl Negative   BILIRUBIN UA  Negative   BLOOD UA  Moderate*        Results from last 7 days  Lab Units 07/20/18  1102   RBC UA /hpf Field obscured, unable to enumerate*   WBC UA /hpf Innumerable*   EPITHELIAL CELLS WET PREP /hpf None Seen   BACTERIA UA /hpf Innumerable*      Imaging: I have personally reviewed pertinent films in PACS  Xr Shoulder 2+ Vw Right  Result Date: 7/19/2018  Impression: Acute comminuted fracture of the right humeral neck likely extending into the head and possibly the greater tuberosity   The study was marked in [facility] for significant notification  Workstation performed: [id number]     Xr Wrist 3+ Vw Right  Result Date: 7/19/2018  Impression: Fracture deformity of the distal radius is likely acute  The study was marked in EP[employer] for significant notification  Workstation performed: [id number]     Xr Hand 3+ Vw Right  Result Date: 7/19/2018  Impression: No acute osseous abnormality  Please see the separate right wrist study  Workstation performed: [id number]     Ct Head Without Contrast  Result Date: 7/20/2018  Impression: No acute intracranial abnormality  Workstation performed: [id number]       EKG, Pathology, and Other Studies Reviewed on Admission:     Allscripts/ [facility] Records Reviewed: Yes    ** Please Note: This note has been constructed using a voice recognition system   **

## 2018-07-20 NOTE — ED NOTES
Explained CT of head to patient and daughter- verbalized understanding       Edinson Mccauley RN  07/20/18 8586

## 2018-07-20 NOTE — ED NOTES
CT scan result noted    Patient provided with water to drink on request      Cleve Meyers RN  07/20/18 1100

## 2018-07-20 NOTE — ASSESSMENT & PLAN NOTE
With debility likely symptomatic  Given ceftriaxone x1 in the emergency department  Add cefazolin during hospitalization until cultures available

## 2018-07-20 NOTE — PLAN OF CARE
Problem: OCCUPATIONAL THERAPY ADULT  Goal: Performs self-care activities at highest level of function for planned discharge setting  See evaluation for individualized goals  Treatment Interventions: ADL retraining, Functional transfer training, UE strengthening/ROM, Endurance training, Equipment evaluation/education, Patient/family training, Compensatory technique education, Continued evaluation, Energy conservation, Activityengagement          See flowsheet documentation for full assessment, interventions and recommendations  Limitation: Decreased ADL status, Decreased UE ROM, Decreased UE strength, Decreased endurance, Decreased self-care trans, Decreased high-level ADLs  Prognosis: Fair  Assessment: Pt is a 80 y o  female seen for OT evaluation s/p adm to [facility] on 7/20/2018 s/p 3 falls since yesterday, which resulted in fracture of R humerus and likely acute fracture deformity of the distal radius  Pt w/ orders for NWB RUE and RUE sling  Comorbidities affecting pts functional performance include a significant PMH of GERD  Pt with active OT orders and activity orders for Activity as tolerated  Pt lives alone in a two level home with 4 HÉCTOR w/ R railing and 1st floor setup  Pt reports local family (dtr, grandchildren) that are able to assist as needed  At baseline, pt was I w/ ADLs, IADLs, and functional mobility/transfers w/o use of DME/AD, (+) , and reports 3 falls PTA (all 3 falls since yesterday)  Upon evaluation, pt currently requires Mod A for LB ADLs, Mod A for toileting, Min A for UB ADLs, Min A for eating/grooming, Mod-Min A of 2 for bed mobility, and Min A of 2 for functional mobility/transfers 2* the following deficits impacting occupational performance: weakness, decreased ROM, decreased strength, decreased balance, decreased tolerance and increased pain    These impairments, as well at pts steps to enter environment, limited home support, difficulty performing ADLS and difficulty performing IADLS  limit pts ability to safely engage in all baseline areas of occupation, including grooming, bathing, dressing, toileting, functional mobility/transfers, community mobility, laundry , house maintenance, meal prep, cleaning, social participation  and leisure activities   Pt scored overall 40/100 on the Barthel Index  Based on the aforementioned OT evaluation, functional performance deficits, and assessments, pt has been identified as a moderate complexity evaluation  Pt to continue to benefit from continued acute OT services during hospital stay to address defined deficits and to maximize level of functional independence in the following Occupational Performance areas: grooming, bathing/shower, toilet hygiene, dressing, socialization, health maintenance, functional mobility, community mobility, clothing management, cleaning, meal prep, household maintenance, social participation and transfers to common household surfaces  From OT standpoint, recommend STR upon D/C   OT will continue to follow pt 3-5x/wk to address the following goals to  w/in 10-14 days:     OT Discharge Recommendation: Short Term Rehab  OT - OK to Discharge: Yes (when medically cleared to STR)

## 2018-07-20 NOTE — ED NOTES
Ambulated with assist of one to the bathroom and back  Able to walk with a bit of help for balance  Returned to bed  Urine specimen obtained       Loyda Burrell RN  07/20/18 4569

## 2018-07-20 NOTE — ED PROVIDER NOTES
History  Chief Complaint   Patient presents with    Fall     Patient has had 3 falls since yesterday  [place] outside while gardening yesterday- fx of shoulder and right wrist   Today was sleeping in chair and slipped out onto buttocks  Helped up by daughter  Went to bathroom and got weak and fell on her back  Denies any injury or new pain  79 YO female presents after 3 falls since yesterday  Pt had a fall while pruning yesterday, this resulted in a fracture of the Right humerus  Pt is currently wearing a sling  States this morning she slipped out of her recliner, she denies actually falling at that time, states legs did not feel weak and she had no lightheadedness, vertigo, chest pain, shortness of breath  Daughter states she did fall again from standing this morning while she was in the bathroom  Pt again denies dizziness, weakness in the legs  She did strike her head with no LOC, denies other injuries  Daughter states pt is usually very mobile for her age, she does not use cane or walker for assistance, though her mobility has decreased some in the last 2 months  Pt has pain over the Right arm but otherwise denies complaints  Pt denies CP/SOB/F/C/N/V/D/C, no dysuria, burning on urination or blood in urine            History provided by:  Patient and relative   used: No    Fall   Mechanism of injury: fall    Injury location:  Head/neck  Head/neck injury location:  Head  Incident location:  Bathroom  Time since incident:  1 hour  Arrived directly from scene: yes    Fall:     Fall occurred:  Standing    Impact surface:  Hard floor    Point of impact:  Head  Suspicion of alcohol use: no    Suspicion of drug use: no    Prior to arrival data:     Bystander interventions:  None    Patient ambulatory at scene: yes      Blood loss:  None    Responsiveness at scene:  Alert    Orientation at scene:  Person, place, situation and time    Loss of consciousness: no      Amnesic to event: no Associated symptoms: no abdominal pain, no chest pain and no vomiting        Prior to Admission Medications   Prescriptions Last Dose Informant Patient Reported? Taking? Biotin 1 MG CAPS 7/19/2018 at Unknown time Self Yes Yes   Sig: Take 1 capsule by mouth daily   Multiple Vitamins-Minerals (PRESERVISION AREDS) CAPS 7/19/2018 at Unknown time Self Yes Yes   Sig: Take by mouth   cholecalciferol (VITAMIN D3) 1,000 units tablet 7/19/2018 at Unknown time Self Yes Yes   Sig: Take by mouth   pantoprazole (PROTONIX) 40 mg tablet 7/19/2018 at Unknown time  No Yes   Sig: TAKE 1 TABLET BY MOUTH DAILY   traMADol (ULTRAM) 50 mg tablet 7/20/2018 at Unknown time  No Yes   Sig: Take 1 tablet (50 mg total) by mouth every 6 (six) hours as needed for moderate pain      Facility-Administered Medications: None       Past Medical History:   Diagnosis Date    GERD (gastroesophageal reflux disease)        Past Surgical History:   Procedure Laterality Date    HYSTERECTOMY  1973    Total    JOINT REPLACEMENT Right     knee       Family History   Problem Relation Age of Onset    Heart failure Mother     Coronary artery disease Father      I have reviewed and agree with the history as documented  Social History   Substance Use Topics    Smoking status: Never Smoker    Smokeless tobacco: Never Used      Comment: No secondhand smoke exposure    Alcohol use Yes      Comment: Minimal consumption        Review of Systems   Constitutional: Negative for chills, fatigue and fever  HENT: Negative for dental problem  Eyes: Negative for visual disturbance  Respiratory: Negative for shortness of breath  Cardiovascular: Negative for chest pain  Gastrointestinal: Negative for abdominal pain, diarrhea and vomiting  Genitourinary: Negative for dysuria and frequency  Musculoskeletal: Negative for arthralgias  Skin: Negative for rash  Neurological: Negative for dizziness, weakness and light-headedness  Psychiatric/Behavioral: Negative for agitation, behavioral problems and confusion  All other systems reviewed and are negative  Physical Exam  Physical Exam   Constitutional: She is oriented to person, place, and time  She appears well-developed and well-nourished  HENT:   Head: Normocephalic and atraumatic  Eyes: EOM are normal    Neck: Normal range of motion  Cardiovascular: Normal rate, regular rhythm and normal heart sounds  Pulmonary/Chest: Effort normal and breath sounds normal    Abdominal: Soft  Musculoskeletal: Normal range of motion  Right arm in sling, NV intact  Neurological: She is alert and oriented to person, place, and time  Skin: Skin is warm and dry  Psychiatric: She has a normal mood and affect  Her behavior is normal  Thought content normal    Nursing note and vitals reviewed        Vital Signs  ED Triage Vitals   Temperature Pulse Respirations Blood Pressure SpO2   07/20/18 0919 07/20/18 0919 07/20/18 0919 07/20/18 0930 07/20/18 0919   98 2 °F (36 8 °C) 95 15 120/56 97 %      Temp Source Heart Rate Source Patient Position - Orthostatic VS BP Location FiO2 (%)   07/20/18 0919 07/20/18 1015 07/20/18 0930 07/20/18 0930 --   Oral Monitor Sitting Left arm       Pain Score       07/20/18 0919       No Pain           Vitals:    07/20/18 1244 07/20/18 1441 07/20/18 2305 07/21/18 0700   BP: 129/60 118/68 118/60 121/56   Pulse: 86 95 97 86   Patient Position - Orthostatic VS:  Lying Lying        Visual Acuity  Visual Acuity      Most Recent Value   L Pupil Size (mm)  3   R Pupil Size (mm)  3          ED Medications  Medications   traMADol (ULTRAM) tablet 50 mg (not administered)   pantoprazole (PROTONIX) EC tablet 40 mg (40 mg Oral Given 7/21/18 0640)   multivitamin-minerals (CENTRUM) tablet 1 tablet (0 tablets Oral Hold 7/21/18 1034)   acetaminophen (TYLENOL) tablet 650 mg (not administered)   magnesium hydroxide (MILK OF MAGNESIA) 400 mg/5 mL oral suspension 30 mL (not administered)   ondansetron (ZOFRAN) injection 4 mg (not administered)   docusate sodium (COLACE) capsule 100 mg (not administered)   HYDROcodone-acetaminophen (NORCO) 5-325 mg per tablet 1 tablet (not administered)   ceFAZolin (ANCEF) IVPB (premix) 1,000 mg (1,000 mg Intravenous New Bag 7/21/18 0640)   cholecalciferol (VITAMIN D3) tablet 1,000 Units (0 Units Oral Hold 7/21/18 1034)   enoxaparin (LOVENOX) subcutaneous injection 30 mg (0 mg Subcutaneous Hold 7/21/18 1033)   ceftriaxone (ROCEPHIN) 1 g/50 mL in dextrose IVPB (0 mg Intravenous Stopped 7/20/18 1320)       Diagnostic Studies  Results Reviewed     Procedure Component Value Units Date/Time    Blood culture #2 [[id number]] Collected:  07/20/18 1244    Lab Status: In process Specimen:  Blood from Hand, Left Updated:  07/20/18 1247    Blood culture #1 [[id number]] Collected:  07/20/18 1232    Lab Status: In process Specimen:  Blood from Hand, Left Updated:  07/20/18 1235    Urine Microscopic [[id number]]  (Abnormal) Collected:  07/20/18 1102    Lab Status:  Final result Specimen:  Urine from Urine, Clean Catch Updated:  07/20/18 1136     RBC, UA       Field obscured, unable to enumerate (A)     /hpf     WBC, UA Innumerable (A) /hpf      Epithelial Cells None Seen /hpf      Bacteria, UA Innumerable (A) /hpf     Urine culture [[id number]] Collected:  07/20/18 1102    Lab Status:   In process Specimen:  Urine from Urine, Clean Catch Updated:  07/20/18 1136    POCT urinalysis dipstick [[id number]]  (Abnormal) Resulted:  07/20/18 1058    Lab Status:  Final result Specimen:  Urine Updated:  07/20/18 1058    ED Urine Macroscopic [[id number]]  (Abnormal) Collected:  07/20/18 1102    Lab Status:  Final result Specimen:  Urine Updated:  07/20/18 1056     Color, UA Yellow     Clarity, UA Cloudy     pH, UA 5 5     Leukocytes, UA Moderate (A)     Nitrite, UA Positive (A)     Protein,  (2+) (A) mg/dl      Glucose, UA Negative mg/dl      Ketones, UA Negative mg/dl Urobilinogen, UA 0 2 E U /dl      Bilirubin, UA Negative     Blood, UA Moderate (A)     Specific [place], UA 1 025    Narrative:       CLINITEK RESULT    Basic metabolic panel [[id number]]  (Abnormal) Collected:  07/20/18 0915    Lab Status:  Final result Specimen:  Blood from Arm, Left Updated:  07/20/18 1027     Sodium 137 mmol/L      Potassium 4 0 mmol/L      Chloride 104 mmol/L      CO2 26 mmol/L      Anion Gap 7 mmol/L      BUN 36 (H) mg/dL      Creatinine 1 15 mg/dL      Glucose 161 (H) mg/dL      Calcium 9 0 mg/dL      eGFR 42 ml/min/1 73sq m     Narrative:         National Kidney Disease Education Program recommendations are as follows:  GFR calculation is accurate only with a steady state creatinine  Chronic Kidney disease less than 60 ml/min/1 73 sq  meters  Kidney failure less than 15 ml/min/1 73 sq  meters  TSH [[id number]]  (Normal) Collected:  07/20/18 0915    Lab Status:  Final result Specimen:  Blood from Arm, Left Updated:  07/20/18 1027     TSH 3RD GENERATON 1 458 uIU/mL     Narrative:         Patients undergoing fluorescein dye angiography may retain small amounts of fluorescein in the body for 48-72 hours post procedure  Samples containing fluorescein can produce falsely depressed TSH values  If the patient had this procedure,a specimen should be resubmitted post fluorescein clearance            The recommended reference ranges for TSH during pregnancy are as follows:  First trimester 0 1 to 2 5 uIU/mL  Second trimester  0 2 to 3 0 uIU/mL  Third trimester 0 3 to 3 0 uIU/m      Hepatic function panel [[id number]]  (Abnormal) Collected:  07/20/18 0915    Lab Status:  Final result Specimen:  Blood from Arm, Left Updated:  07/20/18 1027     Total Bilirubin 0 75 mg/dL      Bilirubin, Direct 0 17 mg/dL      Alkaline Phosphatase 36 (L) U/L      AST 21 U/L      ALT 17 U/L      Total Protein 7 0 g/dL      Albumin 3 9 g/dL     Magnesium [[id number]]  (Normal) Collected:  07/20/18 0915    Lab Status:  Final result Specimen:  Blood from Arm, Left Updated:  07/20/18 1027     Magnesium 2 2 mg/dL     Protime-INR [[id number]]  (Normal) Collected:  07/20/18 0915    Lab Status:  Final result Specimen:  Blood from Arm, Left Updated:  07/20/18 1010     Protime 14 2 seconds      INR 1 09    APTT [[id number]]  (Normal) Collected:  07/20/18 0915    Lab Status:  Final result Specimen:  Blood from Arm, Left Updated:  07/20/18 1010     PTT 28 seconds     CBC and differential [[id number]]  (Abnormal) Collected:  07/20/18 0915    Lab Status:  Final result Specimen:  Blood from Arm, Left Updated:  07/20/18 1003     WBC 10 11 Thousand/uL      RBC 3 16 (L) Million/uL      Hemoglobin 10 4 (L) g/dL      Hematocrit 32 3 (L) %       (H) fL      MCH 32 9 pg      MCHC 32 2 g/dL      RDW 13 0 %      MPV 11 4 fL      Platelets 957 Thousands/uL      nRBC 0 /100 WBCs      Neutrophils Relative 86 (H) %      Lymphocytes Relative 7 (L) %      Monocytes Relative 7 %      Eosinophils Relative 0 %      Basophils Relative 0 %      Neutrophils Absolute 8 72 (H) Thousands/µL      Lymphocytes Absolute 0 71 Thousands/µL      Monocytes Absolute 0 66 Thousand/µL      Eosinophils Absolute 0 01 Thousand/µL      Basophils Absolute 0 01 Thousands/µL                  CT head without contrast   Final Result by Michelle Angeles MD (07/20 1045)      No acute intracranial abnormality  Workstation performed: [id number]         XR wrist 3+ vw right    (Results Pending)              Procedures  Procedures       Phone Contacts  ED Phone Contact    ED Course  ED Course as of Jul 21 1146 Fri Jul 20, 2018   1218 Spoke with physical therapy, pt unsteady on feet  Feel pt would benefit from acute rehab  MDM  Number of Diagnoses or Management Options  Fall, initial encounter: new and requires workup  Urinary tract infection: new and requires workup  Weakness: new and requires workup  Diagnosis management comments: 1   Fall - Pt with 3 falls since yesterday, injury yesterday  Will CT head as pt had trauma, check electrolytes for dehydration, CBC for anemia, TSH  Urine for infection, will have PT/OT evaluate in department  Amount and/or Complexity of Data Reviewed  Clinical lab tests: ordered and reviewed  Tests in the radiology section of CPT®: ordered and reviewed  Discussion of test results with the performing providers: yes  Obtain history from someone other than the patient: yes  Review and summarize past medical records: yes  Independent visualization of images, tracings, or specimens: yes    Patient Progress  Patient progress: stable    CritCare Time    Disposition  Final diagnoses:   Urinary tract infection   Fall, initial encounter   Weakness     Time reflects when diagnosis was documented in both MDM as applicable and the Disposition within this note     Time User Action Codes Description Comment    7/20/2018  1:25 PM Tanda Andreis E Add [N39 0] Urinary tract infection     7/20/2018  1:25 PM Tanda Andreis E Add [E35  LOAW] Fall, initial encounter     7/20/2018  1:25 PM Yessyjean paul Wise Add [R53 1] Weakness     7/20/2018  2:17 PM Clari Jacobs Add [S42 301A] Right humeral fracture     7/20/2018  2:17 PM Clari Jacobs Modify [S42 301A] Right humeral fracture       ED Disposition     ED Disposition Condition Comment    Admit  Case was discussed with Dr Nazanin Jolly and the patient's admission status was agreed to be Admission Status: inpatient status to the service of Dr Nazanin Jolly           Follow-up Information    None         Current Discharge Medication List      CONTINUE these medications which have NOT CHANGED    Details   Biotin 1 MG CAPS Take 1 capsule by mouth daily      cholecalciferol (VITAMIN D3) 1,000 units tablet Take by mouth      Multiple Vitamins-Minerals (PRESERVISION AREDS) CAPS Take by mouth      pantoprazole (PROTONIX) 40 mg tablet TAKE 1 TABLET BY MOUTH DAILY  Qty: 90 tablet, Refills: 0    Associated Diagnoses: Gastroesophageal reflux disease without esophagitis      traMADol (ULTRAM) 50 mg tablet Take 1 tablet (50 mg total) by mouth every 6 (six) hours as needed for moderate pain  Qty: 30 tablet, Refills: 0    Associated Diagnoses: Closed 2-part nondisplaced fracture of surgical neck of right humerus, initial encounter; Closed fracture of distal end of right radius, unspecified fracture morphology, initial encounter           No discharge procedures on file      ED Provider  Electronically Signed by           Vahid Rosas MD  07/21/18 8900

## 2018-07-21 ENCOUNTER — APPOINTMENT (INPATIENT)
Dept: RADIOLOGY | Facility: HOSPITAL | Age: 83
DRG: 563 | End: 2018-07-21
Payer: COMMERCIAL

## 2018-07-21 PROBLEM — R73.9 HYPERGLYCEMIA: Status: RESOLVED | Noted: 2018-07-20 | Resolved: 2018-07-21

## 2018-07-21 LAB
ALBUMIN SERPL BCP-MCNC: 3.1 G/DL (ref 3.5–5)
ALP SERPL-CCNC: 34 U/L (ref 46–116)
ALT SERPL W P-5'-P-CCNC: 14 U/L (ref 12–78)
ANION GAP SERPL CALCULATED.3IONS-SCNC: 9 MMOL/L (ref 4–13)
AST SERPL W P-5'-P-CCNC: 21 U/L (ref 5–45)
BILIRUB SERPL-MCNC: 0.66 MG/DL (ref 0.2–1)
BUN SERPL-MCNC: 24 MG/DL (ref 5–25)
CALCIUM SERPL-MCNC: 8.6 MG/DL (ref 8.3–10.1)
CHLORIDE SERPL-SCNC: 105 MMOL/L (ref 100–108)
CO2 SERPL-SCNC: 24 MMOL/L (ref 21–32)
CREAT SERPL-MCNC: 0.83 MG/DL (ref 0.6–1.3)
EST. AVERAGE GLUCOSE BLD GHB EST-MCNC: 105 MG/DL
GFR SERPL CREATININE-BSD FRML MDRD: 62 ML/MIN/1.73SQ M
GLUCOSE SERPL-MCNC: 113 MG/DL (ref 65–140)
HBA1C MFR BLD: 5.3 % (ref 4.2–6.3)
POTASSIUM SERPL-SCNC: 4 MMOL/L (ref 3.5–5.3)
PROT SERPL-MCNC: 6.1 G/DL (ref 6.4–8.2)
SODIUM SERPL-SCNC: 138 MMOL/L (ref 136–145)

## 2018-07-21 PROCEDURE — 2W3CX2Z IMMOBILIZATION OF RIGHT LOWER ARM USING CAST: ICD-10-PCS | Performed by: ORTHOPAEDIC SURGERY

## 2018-07-21 PROCEDURE — 99221 1ST HOSP IP/OBS SF/LOW 40: CPT | Performed by: ORTHOPAEDIC SURGERY

## 2018-07-21 PROCEDURE — 83036 HEMOGLOBIN GLYCOSYLATED A1C: CPT | Performed by: INTERNAL MEDICINE

## 2018-07-21 PROCEDURE — 99232 SBSQ HOSP IP/OBS MODERATE 35: CPT | Performed by: INTERNAL MEDICINE

## 2018-07-21 PROCEDURE — 25600 CLTX DST RDL FX/EPHYS SEP WO: CPT | Performed by: ORTHOPAEDIC SURGERY

## 2018-07-21 PROCEDURE — 23600 CLTX PROX HUMRL FX W/O MNPJ: CPT | Performed by: ORTHOPAEDIC SURGERY

## 2018-07-21 PROCEDURE — 80053 COMPREHEN METABOLIC PANEL: CPT | Performed by: INTERNAL MEDICINE

## 2018-07-21 PROCEDURE — 73110 X-RAY EXAM OF WRIST: CPT

## 2018-07-21 RX ADMIN — Medication 1 TABLET: at 11:58

## 2018-07-21 RX ADMIN — ENOXAPARIN SODIUM 30 MG: 30 INJECTION SUBCUTANEOUS at 11:56

## 2018-07-21 RX ADMIN — CEFAZOLIN SODIUM 1000 MG: 1 SOLUTION INTRAVENOUS at 21:23

## 2018-07-21 RX ADMIN — TRAMADOL HYDROCHLORIDE 50 MG: 50 TABLET, FILM COATED ORAL at 21:16

## 2018-07-21 RX ADMIN — CEFAZOLIN SODIUM 1000 MG: 1 SOLUTION INTRAVENOUS at 13:54

## 2018-07-21 RX ADMIN — PANTOPRAZOLE SODIUM 40 MG: 40 TABLET, DELAYED RELEASE ORAL at 06:40

## 2018-07-21 RX ADMIN — CEFAZOLIN SODIUM 1000 MG: 1 SOLUTION INTRAVENOUS at 06:40

## 2018-07-21 RX ADMIN — VITAMIN D, TAB 1000IU (100/BT) 1000 UNITS: 25 TAB at 11:58

## 2018-07-21 NOTE — ASSESSMENT & PLAN NOTE
Right humeral neck fracture after falling in to a raspberry push    Activity orders as recommended by orthopedics --nonweightbearing right upper extremity

## 2018-07-21 NOTE — PROCEDURES
Procedure discussed with patient and her family  A short-arm cast was placed over the right distal radius  The bony prominence was padded with Webril  Cast was molded to reverse the deformity of the wrist  Patient tolerated procedure well  Instruction of cast care was provided to the patient  Continue elevation and cold compress

## 2018-07-21 NOTE — CONSULTS
Consultation - Jennifer Maris 80 y o  female MRN: [id number]  Unit/Bed#: E2 -01 Encounter: [id number]      Assessment/Plan     Assessment:  Right proximal humerus fracture with minimum displacement  Right distal radius fracture with dorsal angulation    Plan:  Sling for right shoulder  [address] for right wrist x 6 weeks  Pain control  Non-weight bearing right UE  Elevation and cold compress  Follow as out patient in 1-2 weeks  History of Present Illness   Physician Requesting Consult: Maxx Higuera DO  Reason for Consult / Principal Problem:  Right shoulder and wrist pain  HPI: Marleny Hernandez is a 80y o  year old female who presents with pain in her right shoulder and wrist after falling into a raspberry bush on 7/19/18  Patient had pain in her shoulder and wrist   She was brought to the hospital due to not able to get up from a sitting position and had fell several times  Patient was seen in the ER and splinted with a sling  She denies loss of consciousness  Inpatient consult to Orthopedic Surgery  Consult performed by: Ashley Vogel  Consult ordered by: Margaux Hadley          Review of Systems   Constitutional: Negative  HENT: Negative  Eyes: Negative  Respiratory: Negative  Cardiovascular: Negative  Gastrointestinal: Negative  Endocrine: Negative  Genitourinary: Negative  Musculoskeletal: Positive for arthralgias (Right shoulder and wrist) and joint swelling (Right shoulder and wrist)  Skin: Negative  Allergic/Immunologic: Negative  Neurological: Negative  Hematological: Negative  Psychiatric/Behavioral: Negative          Historical Information   Past Medical History:   Diagnosis Date    GERD (gastroesophageal reflux disease)      Past Surgical History:   Procedure Laterality Date    HYSTERECTOMY  1973    Total    JOINT REPLACEMENT Right     knee     Social History   History   Alcohol Use    Yes     Comment: Minimal consumption     History   Drug Use No History   Smoking Status    Never Smoker   Smokeless Tobacco    Never Used     Comment: No secondhand smoke exposure     Family History: non-contributory    Meds/Allergies   all current active meds have been reviewed  Allergies   Allergen Reactions    Sulfa Antibiotics Hives       Objective   Vitals: Blood pressure 121/56, pulse 86, temperature (!) 96 4 °F (35 8 °C), temperature source Temporal, resp  rate 18, height 5' 5" (1 651 m), weight 66 kg (145 lb 8 1 oz), SpO2 96 %, not currently breastfeeding  ,Body mass index is 24 21 kg/m²  Intake/Output Summary (Last 24 hours) at 07/21/18 1039  Last data filed at 07/20/18 2131   Gross per 24 hour   Intake              140 ml   Output                0 ml   Net              140 ml     I/O last 24 hours: In: 140 [I V :40; IV Piggyback:100]  Out: -     Invasive Devices     Peripheral Intravenous Line            Peripheral IV 07/20/18 Left Antecubital 1 day                Physical Exam   Constitutional: She is oriented to person, place, and time  She appears well-developed  HENT:   Head: Normocephalic and atraumatic  Eyes: EOM are normal  Pupils are equal, round, and reactive to light  Neck: Neck supple  Neurological: She is alert and oriented to person, place, and time  Skin: Skin is warm  Psychiatric: She has a normal mood and affect  Nursing note and vitals reviewed  Right Hand Exam     Tenderness   The patient is experiencing tenderness in the dorsal area (Distal radius)  Range of Motion     Wrist   Extension: abnormal   Flexion: abnormal   Pronation: abnormal   Supination: abnormal     Other   Erythema: absent  Sensation: normal  Pulse: present    Comments:  Diffuse ecchymosis over wrist and hand  Pain with active and passive range of motion of the wrist       Right Shoulder Exam     Tenderness   Right shoulder tenderness location: Proximal humerus      Range of Motion   Active Abduction: abnormal   Passive Abduction: abnormal   Extension: abnormal   Forward Flexion: abnormal   External Rotation: abnormal   Internal Rotation 0 degrees: abnormal   Internal Rotation 90 degrees: abnormal     Other   Erythema: absent  Sensation: normal  Pulse: present    Comments:  Not able to perform test with the right shoulder due to pain  Increased pain with any range of motion of the right shoulder  Diffuse swelling and ecchymosis        Imaging Studies: I have personally reviewed pertinent films in PACS showed right proximal humerus fracture with slight displacement  Alignment is still acceptable  Right distal radius fracture with dorsal angulation        Code Status: Level 1 - Full Code  Advance Directive and Living Will:      Power of :    POLST:

## 2018-07-21 NOTE — PROGRESS NOTES
Progress Note - Kumar Billings 12/8/1927, 80 y o  female MRN: [id number]    Unit/Bed#: E2 -01 Encounter: [id number]    Primary Care Provider: Shawna Whaley DO   Date and time admitted to hospital: 7/20/2018  9:11 AM        Assessment and Plan  * Right humeral fracture   Assessment & Plan    Right humeral neck fracture after falling in to a raspberry push  Activity orders as recommended by orthopedics --nonweightbearing right upper extremity        Fracture of distal end of right radius   Assessment & Plan    Nondisplaced  Seen by orthopedics on cast placed        UTI (urinary tract infection)   Assessment & Plan    Continue cefazolin follow up on cultures  Antibiotic day ##2  Vitamin D deficiency   Assessment & Plan    Continue cholecalciferol        Esophagitis, reflux   Assessment & Plan    Continue pantoprazole        Hyperglycemiaresolved as of 7/21/2018   Assessment & Plan    Likely stress induced  A1c within limits  VTE Pharmacologic Prophylaxis: Enoxaparin (Lovenox)    Patient Centered Rounds: I have performed bedside rounds with nursing staff today  Discussions with Specialists or Other Care Team Provider: orthopedics    Education and Discussions with Family / Patient: daughter    Time Spent for Care: 25 mins  More than 50% of total time spent on counseling and coordination of care as described above  Current Length of Stay: 1 day(s)    Current Patient Status: Inpatient   Certification Statement: The patient will continue to require additional inpatient hospital stay due to Right humeral fracture    Discharge Plan / Estimated Discharge Date: PT recommending SNF    Code Status: Level 1 - Full Code  ______________________________________________________________________________    Subjective:   Patient seen and examined    No new complaints, pain controlled    Objective:   Vitals: Blood pressure 121/56, pulse 86, temperature (!) 96 4 °F (35 8 °C), temperature source Temporal, resp  rate 18, height 5' 5" (1 651 m), weight 66 kg (145 lb 8 1 oz), SpO2 96 %, not currently breastfeeding  Physical Exam:   General appearance: alert, appears stated age and cooperative  Head: Normocephalic, without obvious abnormality, atraumatic  Lungs: clear to auscultation bilaterally  Heart: regular rate and rhythm  Abdomen: soft, non-tender, positive bowel sounds   Back: negative, range of motion normal  Extremities: right upper extremity in sling  Neurologic: Grossly normal    Additional Data:   Labs:    Results from last 7 days  Lab Units 07/20/18  0915   WBC Thousand/uL 10 11   HEMOGLOBIN g/dL 10 4*   HEMATOCRIT % 32 3*   MCV fL 102*   PLATELETS Thousands/uL 184   INR  1 09       Results from last 7 days  Lab Units 07/21/18  0450 07/20/18  0915   SODIUM mmol/L 138 137   POTASSIUM mmol/L 4 0 4 0   CHLORIDE mmol/L 105 104   CO2 mmol/L 24 26   ANION GAP mmol/L 9 7   BUN mg/dL 24 36*   CREATININE mg/dL 0 83 1 15   CALCIUM mg/dL 8 6 9 0   BILIRUBIN TOTAL mg/dL 0 66 0 75   ALK PHOS U/L 34* 36*   ALT U/L 14 17   AST U/L 21 21   EGFR ml/min/1 73sq m 62 42   GLUCOSE RANDOM mg/dL 113 161*       Results from last 7 days  Lab Units 07/20/18  0915   MAGNESIUM mg/dL 2 2                        Results from last 7 days  Lab Units 07/21/18  0450   HEMOGLOBIN A1C % 5 3       Results from last 7 days  Lab Units 07/20/18  0915   TSH 3RD GENERATON uIU/mL 1 458     * I Have Reviewed All Lab Data Listed Above        Scheduled Meds:  Current Facility-Administered Medications:  acetaminophen 650 mg Oral Q6H PRN Rosaline Spark, DO    cefazolin 1,000 mg Intravenous Q8H Fadi Chilo, DO Last Rate: 1,000 mg (07/21/18 0640)   cholecalciferol 1,000 Units Oral Daily Fadi Chilo, DO    docusate sodium 100 mg Oral BID PRN Rosaline Spark, DO    enoxaparin 30 mg Subcutaneous Daily Rosaline Spark, DO    HYDROcodone-acetaminophen 1 tablet Oral Q4H PRN Fadi Chilo, DO    magnesium hydroxide 30 mL Oral BID PRN Rosaline Spark, DO multivitamin-minerals 1 tablet Oral Daily Fadi Woo, DO    ondansetron 4 mg Intravenous Q4H PRN Sherry Latham, DO    pantoprazole 40 mg Oral Early Morning Fadi Woo, DO    traMADol 50 mg Oral Q6H PRN Sherry Latham, DO        Sherry Latham, DO  [facility] Internal Medicine  Hospitalist    ** Please Note: This note has been constructed using a voice recognition system   **

## 2018-07-21 NOTE — CASE MANAGEMENT
Initial Clinical Review    Admission: Date/Time/Statement: 7/20/18 @ 1326     Orders Placed This Encounter   Procedures    Inpatient Admission (expected length of stay for this patient is greater than two midnights)     Standing Status:   Standing     Number of Occurrences:   1     Order Specific Question:   Admitting Physician     Answer:   Юлия Celis [[id number]]     Order Specific Question:   Level of Care     Answer:   Med Surg [16]     Order Specific Question:   Estimated length of stay     Answer:   More than 2 Midnights     Order Specific Question:   Certification     Answer:   I certify that inpatient services are medically necessary for this patient for a duration of greater than two midnights  See H&P and MD Progress Notes for additional information about the patient's course of treatment  ED: Date/Time/Mode of Arrival:   ED Arrival Information     Expected Arrival Acuity Means of Arrival Escorted By Service Admission Type    - 7/20/2018 09:11 Urgent Ambulance [facility] General Medicine Urgent    Arrival Complaint    falls          Chief Complaint:   Chief Complaint   Patient presents with    Fall     Patient has had 3 falls since yesterday  [place] outside while gardening yesterday- fx of shoulder and right wrist   Today was sleeping in chair and slipped out onto buttocks  Helped up by daughter  Went to bathroom and got weak and fell on her back  Denies any injury or new pain  History of Illness: The patient at baseline lives on her own and ambulates without any assistive devices  She was trimming her raspberry bush when she lost her footing and fell into the tree  She is right-handed dominant and had difficulty getting up  There was a neighbor that was able to assist her where she was brought to [facility] and was found to have a humeral neck fracture as well as distal radial fracture  She was splinted and has an appointment with orthopedics Saturday    Unfortunately she is unable to get up from a seated position and has fallen several more times  She denies any precipitating factors such as dizziness headache chest pain or shortness of breath    She was found to have a urinary tract infection and admission was requested          ED Vital Signs:   ED Triage Vitals   Temperature Pulse Respirations Blood Pressure SpO2   07/20/18 0919 07/20/18 0919 07/20/18 0919 07/20/18 0930 07/20/18 0919   98 2 °F (36 8 °C) 95 15 120/56 97 %      Temp Source Heart Rate Source Patient Position - Orthostatic VS BP Location FiO2 (%)   07/20/18 0919 07/20/18 1015 07/20/18 0930 07/20/18 0930 --   Oral Monitor Sitting Left arm       Pain Score       07/20/18 0919       No Pain        Wt Readings from Last 1 Encounters:   07/20/18 66 kg (145 lb 8 1 oz)     Results from last 7 days  Lab Units 07/20/18  0915   WBC Thousand/uL 10 11   HEMOGLOBIN g/dL 10 4*   HEMATOCRIT % 32 3*   PLATELETS Thousands/uL 184   NEUTROS PCT % 86*   LYMPHS PCT % 7*   MONOS PCT % 7   EOS PCT % 0         Results from last 7 days  Lab Units 07/20/18  0915   SODIUM mmol/L 137   POTASSIUM mmol/L 4 0   CHLORIDE mmol/L 104   CO2 mmol/L 26   ANION GAP mmol/L 7   BUN mg/dL 36*   CREATININE mg/dL 1 15   CALCIUM mg/dL 9 0   TOTAL PROTEIN g/dL 7 0   BILIRUBIN TOTAL mg/dL 0 75   ALK PHOS U/L 36*   ALT U/L 17   AST U/L 21   EGFR ml/min/1 73sq m 42   GLUCOSE RANDOM mg/dL 161*   MAGNESIUM mg/dL 2 2         Results from last 7 days  Lab Units 07/20/18  0915   INR   1 09              Results from last 7 days  Lab Units 07/20/18  1102   COLOR UA   Yellow   CLARITY UA   Cloudy   SPEC GRAV UA   1 025   PH UA   5 5   LEUKOCYTES UA   Moderate*   NITRITE UA   Positive*   PROTEIN UA mg/dl 100 (2+)*   GLUCOSE UA mg/dl Negative   KETONES UA mg/dl Negative   BILIRUBIN UA   Negative   BLOOD UA   Moderate*         Results from last 7 days  Lab Units 07/20/18  1102   RBC UA /hpf Field obscured, unable to enumerate*   WBC UA /hpf Innumerable*   EPITHELIAL CELLS WET PREP /hpf None Seen   BACTERIA UA /hpf Innumerable       RIGHT SHOULDER X-RAY  Impression: Acute comminuted fracture of the right humeral neck likely extending into the head and possibly the greater tuberosity    RIGHT WRIST Impression: Fracture deformity of the distal radius is likely acute  R HAND WNL  CT HEAD WNL    ED Treatment:   Medication Administration from 07/20/2018 0911 to 07/20/2018 1419       Date/Time Order Dose Route Action Action by Comments     07/20/2018 1320 ceftriaxone (ROCEPHIN) 1 g/50 mL in dextrose IVPB 0 mg Intravenous Stopped Crista Sandifer, RN      07/20/2018 1252 ceftriaxone (ROCEPHIN) 1 g/50 mL in dextrose IVPB 1,000 mg Intravenous New Bag aRchel Hernandez RN           Past Medical/Surgical History: Active Ambulatory Problems     Diagnosis Date Noted    Esophagitis, reflux 08/01/2016    Frailty 04/14/2016    Hyperkalemia 10/03/2012    Hyperlipidemia 10/03/2012    Primary localized osteoarthritis of right hip 06/13/2017    Vitamin D deficiency 01/07/2015     Resolved Ambulatory Problems     Diagnosis Date Noted    No Resolved Ambulatory Problems     Past Medical History:   Diagnosis Date    GERD (gastroesophageal reflux disease)        Admitting Diagnosis: Arm pain [M79 603]  Weakness [R53 1]  Urinary tract infection [N39 0]  Right humeral fracture [S42 301A]    Age/Sex: 80 y o  female    Assessment/Plan: Assessment and Plan         * Right humeral fracture   Assessment & Plan     Right humeral neck fracture after falling in to a raspberry push  Has been splinted  Will consult orthopedics to determine need for operative intervention  Continue tramadol p r n  and add Norco for breakthrough pain          Fracture of distal end of right radius   Assessment & Plan     Nondisplaced  Continue immobilization           Hyperglycemia   Assessment & Plan     Likely stress-induced  No history of diabetes    Check A1c in a m           UTI (urinary tract infection)   Assessment & Plan     With debility likely symptomatic  Given ceftriaxone x1 in the emergency department  Add cefazolin during hospitalization until cultures available           Vitamin D deficiency   Assessment & Plan     Continue cholecalciferol            Admission Orders:  Scheduled Meds:   Current Facility-Administered Medications:  acetaminophen 650 mg Oral Q6H PRN Raynold Sheller, DO    cefazolin 1,000 mg Intravenous Q8H Fadi Chilo, DO Last Rate: 1,000 mg (07/21/18 0640)   cholecalciferol 1,000 Units Oral Daily Fadi Chilo, DO    docusate sodium 100 mg Oral BID PRN Raynold Sheller, DO    enoxaparin 30 mg Subcutaneous Daily Raynold Sheller, DO    HYDROcodone-acetaminophen 1 tablet Oral Q4H PRN Raynold Sheller, DO    magnesium hydroxide 30 mL Oral BID PRN Raynold Sheller, DO    multivitamin-minerals 1 tablet Oral Daily Fadi Chiol, DO    ondansetron 4 mg Intravenous Q4H PRN Fadi Chilo, DO    pantoprazole 40 mg Oral Early Morning Fadi Chilo, DO    traMADol 50 mg Oral Q6H PRN Raynold Sheller, DO      Continuous Infusions:    PRN Meds:   acetaminophen    docusate sodium    HYDROcodone-acetaminophen    magnesium hydroxide    ondansetron    traMADol  Consult orthopedics    Assessment:  Right proximal humerus fracture with minimum displacement  Right distal radius fracture with dorsal angulation     Plan:  Sling for right shoulder  [address] for right wrist x 6 weeks  Pain control  Non-weight bearing right UE    Elevation and cold compress  Follow as out patient in 1-2 weeks  PT/OT [address]      Tracking Number: [id number] Authorization Number:    Status: PENDED

## 2018-07-22 LAB — BACTERIA UR CULT: ABNORMAL

## 2018-07-22 PROCEDURE — 99024 POSTOP FOLLOW-UP VISIT: CPT | Performed by: ORTHOPAEDIC SURGERY

## 2018-07-22 PROCEDURE — 99232 SBSQ HOSP IP/OBS MODERATE 35: CPT | Performed by: INTERNAL MEDICINE

## 2018-07-22 RX ADMIN — Medication 1 TABLET: at 09:12

## 2018-07-22 RX ADMIN — CEFAZOLIN SODIUM 1000 MG: 1 SOLUTION INTRAVENOUS at 13:59

## 2018-07-22 RX ADMIN — VITAMIN D, TAB 1000IU (100/BT) 1000 UNITS: 25 TAB at 09:12

## 2018-07-22 RX ADMIN — CEFAZOLIN SODIUM 1000 MG: 1 SOLUTION INTRAVENOUS at 21:43

## 2018-07-22 RX ADMIN — CEFAZOLIN SODIUM 1000 MG: 1 SOLUTION INTRAVENOUS at 05:51

## 2018-07-22 RX ADMIN — PANTOPRAZOLE SODIUM 40 MG: 40 TABLET, DELAYED RELEASE ORAL at 05:51

## 2018-07-22 RX ADMIN — ENOXAPARIN SODIUM 30 MG: 30 INJECTION SUBCUTANEOUS at 09:12

## 2018-07-22 NOTE — PROGRESS NOTES
Progress Note - Sienna Honeycutt 12/8/1927, 80 y o  female MRN: [id number]    Unit/Bed#: E2 -01 Encounter: [id number]    Primary Care Provider: Yamini Godoy DO   Date and time admitted to hospital: 7/20/2018  9:11 AM        Assessment and Plan  * Right humeral fracture   Assessment & Plan    Right humeral neck fracture after falling in to a raspberry push  Activity orders as recommended by orthopedics --nonweightbearing right upper extremity        Fracture of distal end of right radius   Assessment & Plan    Nondisplaced  Seen by orthopedics on cast placed        UTI (urinary tract infection)   Assessment & Plan    Continue cefazolin follow up on cultures still pending  Antibiotic day ##3  Vitamin D deficiency   Assessment & Plan    Continue cholecalciferol        Esophagitis, reflux   Assessment & Plan    Continue pantoprazole        Hyperglycemiaresolved as of 7/21/2018   Assessment & Plan    Likely stress induced  A1c within limits  VTE Pharmacologic Prophylaxis: Enoxaparin (Lovenox)    Patient Centered Rounds: I have performed bedside rounds with nursing staff today  Discussions with Specialists or Other Care Team Provider:     Education and Discussions with Family / Patient: daughter    Time Spent for Care: 25 mins  More than 50% of total time spent on counseling and coordination of care as described above  Current Length of Stay: 2 day(s)    Current Patient Status: Inpatient   Certification Statement: The patient will continue to require additional inpatient hospital stay due to Right humeral fracture    Discharge Plan / Estimated Discharge Date: awaiting rehab placement    Code Status: Level 1 - Full Code  ______________________________________________________________________________    Subjective:   Patient seen and examined  No new complaints, arm pain controlled when immobile    Objective:   Vitals: Blood pressure 119/60, pulse 84, temperature 97 9 °F (36 6 °C), resp  rate 18, height 5' 5" (1 651 m), weight 66 kg (145 lb 8 1 oz), SpO2 94 %, not currently breastfeeding  Physical Exam:   General appearance: alert, appears stated age and cooperative  Head: Normocephalic, without obvious abnormality, atraumatic  Lungs: clear to auscultation bilaterally  Heart: regular rate and rhythm  Abdomen: soft, non-tender, positive bowel sounds   Back: negative, range of motion normal  Extremities: right upper extremity in sling  Neurologic: Grossly normal    Additional Data:   Labs:    Results from last 7 days  Lab Units 07/20/18  0915   WBC Thousand/uL 10 11   HEMOGLOBIN g/dL 10 4*   HEMATOCRIT % 32 3*   MCV fL 102*   PLATELETS Thousands/uL 184   INR  1 09       Results from last 7 days  Lab Units 07/21/18  0450 07/20/18  0915   SODIUM mmol/L 138 137   POTASSIUM mmol/L 4 0 4 0   CHLORIDE mmol/L 105 104   CO2 mmol/L 24 26   ANION GAP mmol/L 9 7   BUN mg/dL 24 36*   CREATININE mg/dL 0 83 1 15   CALCIUM mg/dL 8 6 9 0   BILIRUBIN TOTAL mg/dL 0 66 0 75   ALK PHOS U/L 34* 36*   ALT U/L 14 17   AST U/L 21 21   EGFR ml/min/1 73sq m 62 42   GLUCOSE RANDOM mg/dL 113 161*       Results from last 7 days  Lab Units 07/20/18  0915   MAGNESIUM mg/dL 2 2                        Results from last 7 days  Lab Units 07/21/18  0450   HEMOGLOBIN A1C % 5 3       Results from last 7 days  Lab Units 07/20/18  0915   TSH 3RD GENERATON uIU/mL 1 458     * I Have Reviewed All Lab Data Listed Above  Cultures:     Results from last 7 days  Lab Units 07/20/18  1244 07/20/18  1232   BLOOD CULTURE  No Growth at 24 hrs  No Growth at 24 hrs  Imaging:  Imaging Reports Reviewed Today Include:   Procedure: Xr Shoulder 2+ Vw Right  Result Date: 7/19/2018  Impression: Acute comminuted fracture of the right humeral neck likely extending into the head and possibly the greater tuberosity  The study was marked in EPIC for significant notification   Workstation performed: [id number]     Procedure: Xr Wrist 3+ Vw Right  Result Date: 7/21/2018  Impression: Status post external casting  No change in right wrist alignment Large amount of dorsal hand soft tissue swelling Workstation performed: [id number]     Procedure: Xr Wrist 3+ Vw Right  Result Date: 7/19/2018  Impression: Fracture deformity of the distal radius is likely acute  The study was marked in EPIC for significant notification  Workstation performed: [id number]     Procedure: Xr Hand 3+ Vw Right  Result Date: 7/19/2018  Impression: No acute osseous abnormality  Please see the separate right wrist study  Workstation performed: [id number]     Procedure: Ct Head Without Contrast  Result Date: 7/20/2018  Impression: No acute intracranial abnormality  Workstation performed: [id number]         Scheduled Meds:  Current Facility-Administered Medications:  acetaminophen 650 mg Oral Q6H PRN Katherine Lords, DO    cefazolin 1,000 mg Intravenous Q8H Fadi Woo, DO Last Rate: 1,000 mg (07/22/18 0551)   cholecalciferol 1,000 Units Oral Daily Fadi Chilo, DO    docusate sodium 100 mg Oral BID PRN Katherine Lords, DO    enoxaparin 30 mg Subcutaneous Daily Katherine s, DO    HYDROcodone-acetaminophen 1 tablet Oral Q4H PRN Katherine Lords, DO    magnesium hydroxide 30 mL Oral BID PRN Katherine Lords, DO    multivitamin-minerals 1 tablet Oral Daily Fadi Chilo, DO    ondansetron 4 mg Intravenous Q4H PRN Katherine Lords, DO    pantoprazole 40 mg Oral Early Morning Fadi Chilo, DO    traMADol 50 mg Oral Q6H PRN Katherine Lords, DO        Katherine Elizabeths, DO  [facility] Internal Medicine  Hospitalist    ** Please Note: This note has been constructed using a voice recognition system   **

## 2018-07-22 NOTE — SOCIAL WORK
CM met with the patient to do a general SW assessment and discuss d/c recommendations  The patient [place] alone [place] style home  She has a first floor set up  She was independent PTA  She drives locally  No hx of VNA, however, hx of home PT with [facility]  No hx of STR  PCP is Petra Due  She uses the [facility] on [address] for rx needs  PT/OT rec STR - the patient is agreeable  DOMINIQUE provided SNF list to the patient  CM called the patients daughter and notified her that SNF list is in the patients room to review tonight  They will f/u with Salma on Monday re: choices

## 2018-07-22 NOTE — PROGRESS NOTES
Annika Angeles  80 y o   female  MR#: [id number]  7/22/2018    Subjective: Patient awake in bed  Right wrist is feeling better  She has no pain in the wrist  She is moving the finger well  She does complain of pain in her right shoulder  Vitals:   Vitals:    07/22/18 0700   BP: 119/60   Pulse: 84   Resp: 18   Temp: 97 9 °F (36 6 °C)   SpO2: 94%       Exam:   Alert awake and oriented x3  Not in acute distress  Tenderness over the right shoulder with limited motion  Cast in good condition no impingement around the skin  Still has swelling around the fingers  Good sensation and good cap refill  X-rays:  Right distal radius fracture in stable acceptable position  Still slight dorsal angulated      Labs:   WBC   Recent Labs      07/20/18   0915   WBC  10 11     H/H   Recent Labs      07/20/18   0915   HGB  10 4*   /  Recent Labs      07/20/18   0915   HCT  32 3*     Assessment:   Right proximal humerus and right distal radius fracture    Plan:   Cold compresses over the shoulder and the wrist   Instructed position of the arm and use of the sling  Follow-up as outpatient

## 2018-07-22 NOTE — PLAN OF CARE

## 2018-07-23 LAB
ALBUMIN SERPL BCP-MCNC: 2.8 G/DL (ref 3.5–5)
ALP SERPL-CCNC: 31 U/L (ref 46–116)
ALT SERPL W P-5'-P-CCNC: 12 U/L (ref 12–78)
ANION GAP SERPL CALCULATED.3IONS-SCNC: 6 MMOL/L (ref 4–13)
AST SERPL W P-5'-P-CCNC: 19 U/L (ref 5–45)
BILIRUB SERPL-MCNC: 0.5 MG/DL (ref 0.2–1)
BUN SERPL-MCNC: 19 MG/DL (ref 5–25)
CALCIUM SERPL-MCNC: 8.5 MG/DL (ref 8.3–10.1)
CHLORIDE SERPL-SCNC: 107 MMOL/L (ref 100–108)
CO2 SERPL-SCNC: 28 MMOL/L (ref 21–32)
CREAT SERPL-MCNC: 0.8 MG/DL (ref 0.6–1.3)
ERYTHROCYTE [DISTWIDTH] IN BLOOD BY AUTOMATED COUNT: 13.1 % (ref 11.6–15.1)
GFR SERPL CREATININE-BSD FRML MDRD: 65 ML/MIN/1.73SQ M
GLUCOSE SERPL-MCNC: 103 MG/DL (ref 65–140)
HCT VFR BLD AUTO: 29.1 % (ref 34.8–46.1)
HGB BLD-MCNC: 9.3 G/DL (ref 11.5–15.4)
MCH RBC QN AUTO: 32.9 PG (ref 26.8–34.3)
MCHC RBC AUTO-ENTMCNC: 32 G/DL (ref 31.4–37.4)
MCV RBC AUTO: 103 FL (ref 82–98)
PLATELET # BLD AUTO: 179 THOUSANDS/UL (ref 149–390)
PMV BLD AUTO: 11.8 FL (ref 8.9–12.7)
POTASSIUM SERPL-SCNC: 3.8 MMOL/L (ref 3.5–5.3)
PROT SERPL-MCNC: 6.1 G/DL (ref 6.4–8.2)
RBC # BLD AUTO: 2.83 MILLION/UL (ref 3.81–5.12)
SODIUM SERPL-SCNC: 141 MMOL/L (ref 136–145)
WBC # BLD AUTO: 7.08 THOUSAND/UL (ref 4.31–10.16)

## 2018-07-23 PROCEDURE — 97535 SELF CARE MNGMENT TRAINING: CPT

## 2018-07-23 PROCEDURE — 97110 THERAPEUTIC EXERCISES: CPT

## 2018-07-23 PROCEDURE — 80053 COMPREHEN METABOLIC PANEL: CPT | Performed by: INTERNAL MEDICINE

## 2018-07-23 PROCEDURE — 99232 SBSQ HOSP IP/OBS MODERATE 35: CPT | Performed by: INTERNAL MEDICINE

## 2018-07-23 PROCEDURE — 97116 GAIT TRAINING THERAPY: CPT

## 2018-07-23 PROCEDURE — 85027 COMPLETE CBC AUTOMATED: CPT | Performed by: INTERNAL MEDICINE

## 2018-07-23 PROCEDURE — 99024 POSTOP FOLLOW-UP VISIT: CPT | Performed by: PHYSICIAN ASSISTANT

## 2018-07-23 PROCEDURE — 97530 THERAPEUTIC ACTIVITIES: CPT

## 2018-07-23 RX ADMIN — ENOXAPARIN SODIUM 30 MG: 30 INJECTION SUBCUTANEOUS at 08:53

## 2018-07-23 RX ADMIN — CEFAZOLIN SODIUM 1000 MG: 1 SOLUTION INTRAVENOUS at 23:25

## 2018-07-23 RX ADMIN — PANTOPRAZOLE SODIUM 40 MG: 40 TABLET, DELAYED RELEASE ORAL at 05:54

## 2018-07-23 RX ADMIN — VITAMIN D, TAB 1000IU (100/BT) 1000 UNITS: 25 TAB at 08:53

## 2018-07-23 RX ADMIN — Medication 1 TABLET: at 08:53

## 2018-07-23 RX ADMIN — CEFAZOLIN SODIUM 1000 MG: 1 SOLUTION INTRAVENOUS at 16:16

## 2018-07-23 RX ADMIN — CEFAZOLIN SODIUM 1000 MG: 1 SOLUTION INTRAVENOUS at 05:54

## 2018-07-23 NOTE — PROGRESS NOTES
Joy Wiggins  80 y o   female  MR#: [id number]  7/23/2018    Subjective:  Patient is comfortable at this time  Right wrist is feeling better  She has no pain in the wrist and states that the cast is comfortable  She is moving the fingers well and denies any numbness or tingling distally  Her right shoulder is only thing that is painful when she states that only hurts when she moves it  Vitals:   Vitals:    07/22/18 2300   BP: 121/56   Pulse: 95   Resp: 16   Temp: 97 6 °F (36 4 °C)   SpO2: 96%       Exam:   Alert awake and oriented x3  Not in acute distress  Tenderness, with mild swelling and moderate ecchymosis over the right shoulder with limited motion  Cast in good condition no impingement around the skin  Still has swelling and ecchymosis around the fingers  Good sensation and good cap refill distally        Labs:   WBC   Recent Labs      07/20/18   0915  07/23/18   0516   WBC  10 11  7 08     H/H   Recent Labs      07/20/18   0915  07/23/18   0516   HGB  10 4*  9 3*   /  Recent Labs      07/20/18   0915  07/23/18   0516   HCT  32 3*  29 1*     Assessment:   Right proximal humerus fracture and right distal radius fracture    Plan:   Continue with ice pack as needed on the shoulder and the wrist   Continue with use of the sling as ordered  Follow-up as outpatient

## 2018-07-23 NOTE — PLAN OF CARE
Problem: Potential for Falls  Goal: Patient will remain free of falls  INTERVENTIONS:  - Assess patient frequently for physical needs  -  Identify cognitive and physical deficits and behaviors that affect risk of falls    -  [place] fall precautions as indicated by assessment   - Educate patient/family on patient safety including physical limitations  - Instruct patient to call for assistance with activity based on assessment  - Modify environment to reduce risk of injury  - Consider OT/PT consult to assist with strengthening/mobility   Outcome: Progressing

## 2018-07-23 NOTE — PLAN OF CARE
Problem: OCCUPATIONAL THERAPY ADULT  Goal: Performs self-care activities at highest level of function for planned discharge setting  See evaluation for individualized goals  Treatment Interventions: ADL retraining, Functional transfer training, UE strengthening/ROM, Endurance training, Equipment evaluation/education, Patient/family training, Compensatory technique education, Continued evaluation, Energy conservation, Activityengagement          See flowsheet documentation for full assessment, interventions and recommendations  Outcome: Progressing  Limitation: Decreased ADL status, Decreased UE ROM, Decreased UE strength, Decreased endurance, Decreased self-care trans, Decreased high-level ADLs  Prognosis: Fair  Assessment: Pt seen for OT treatment session this AM focusing on functional activity tolerance, bed mobility, ADLs, and functional mobility/transfers  Pt alert and cooperative throughout session  Pt lying supine at start of session, requiring Min A for supine>sit 2* assist for trunk control  Transfers (sit<>stand) completed with Mod-Min A with increased assist required to initiate forward weight shift for sit>stand  Cues required for safe technique and placement of UEs during transfers  Min A required for functional mobility with use of hand held assistance 2* decreased dynamic standing balance demonstrated  ADL routine completed while seated OOB in chair  UB bathing completed with UB dressing completed Mod A 2* assist to wash LUE 2* decreased functional use of RUE (sling donned) and assist for thoroughness of chest and abdomen  LB bathing completed with Mod A 2* assist to wash B/L lower legs and buttocks 2* decreased functional reach and decreased dynamic standing balance  UB dressing completed with Mod A 2* assist to thread RUE through hospital gown and assist to bring gown down/around in back  LB dressing completed with Mod A   Pt able to doff B/L socks and thread BLEs into underwear, however required assist to don B/L socks and bring underwear over hips  Light grooming tasks completed with Supervision 2* setup required and increased time to complete  Pt seated OOB in chair at end of session with call bell and phone within reach  All needs met and pt reports no further questions for OT at this time  Continue to recommend STR upon D/C and pt agreeable  OT to follow pt on caseload        OT Discharge Recommendation: Short Term Rehab  OT - OK to Discharge: Yes (when medically cleared to STR)

## 2018-07-23 NOTE — PROGRESS NOTES
Progress Note - Terrell Boogie 80 y o  female MRN: [id number]    Unit/Bed#: E2 -01 Encounter: [id number]    Assessment/Plan:    Right humeral fracture proximal continue pain control, sling and monitor with Orthopedics    Right distal radius fracture sling for right shoulder, pain control, nonweightbearing, elevate with cold compresses, follow-up with Orthopedics    UTI    E coli complete 5 days of antibiotics    GERD    continue PPI for acid control    Subjective:   Right upper extremity pain with any activity, pain stable at rest using sling, denies chest pain shortness of breath nausea vomiting diarrhea no fevers chills appetite is stable    Objective:     Vitals: Blood pressure 121/56, pulse 95, temperature 97 6 °F (36 4 °C), temperature source Temporal, resp  rate 16, height 5' 5" (1 651 m), weight 66 kg (145 lb 8 1 oz), SpO2 96 %, not currently breastfeeding  ,Body mass index is 24 21 kg/m²          Results from last 7 days  Lab Units 07/23/18  0516 07/20/18  0915   WBC Thousand/uL 7 08 10 11   HEMOGLOBIN g/dL 9 3* 10 4*   HEMATOCRIT % 29 1* 32 3*   PLATELETS Thousands/uL 179 184   INR   --  1 09       Results from last 7 days  Lab Units 07/23/18  0516   SODIUM mmol/L 141   POTASSIUM mmol/L 3 8   CHLORIDE mmol/L 107   CO2 mmol/L 28   BUN mg/dL 19   CREATININE mg/dL 0 80   CALCIUM mg/dL 8 5   TOTAL PROTEIN g/dL 6 1*   BILIRUBIN TOTAL mg/dL 0 50   ALK PHOS U/L 31*   ALT U/L 12   AST U/L 19   GLUCOSE RANDOM mg/dL 103       Scheduled Meds:    Current Facility-Administered Medications:  acetaminophen 650 mg Oral Q6H PRN Windy Pain, DO    cefazolin 1,000 mg Intravenous Q8H Fadi Chilo, DO Last Rate: 1,000 mg (07/23/18 0554)   cholecalciferol 1,000 Units Oral Daily Fadi Woo, DO    docusate sodium 100 mg Oral BID PRN Windy Pain, DO    enoxaparin 30 mg Subcutaneous Daily Windy Pain, DO    HYDROcodone-acetaminophen 1 tablet Oral Q4H PRN Fadi Woo, DO    magnesium hydroxide 30 mL Oral BID PRN Aviva Patel Chilo, DO    multivitamin-minerals 1 tablet Oral Daily Fadi Woo, DO    ondansetron 4 mg Intravenous Q4H PRN Fadi Woo, DO    pantoprazole 40 mg Oral Early Morning Fadi Woo, DO    traMADol 50 mg Oral Q6H PRN Fadi Woo, DO        Continuous Infusions:     Physical exam:  General appearance:  Alert no distress interaction appropriate elderly   Head/Eyes:  Nonicteric PERRL EOMI  Neck:  Supple  Lungs: CTA bilateral no wheezing rhonchi or rales  Heart: normal S1 S2 regular  Abdomen: Soft nontender with bowel sounds  Extremities: no edema right arm ecchymotic in sling  Skin: no rash    Invasive Devices     Peripheral Intravenous Line            Peripheral IV 07/20/18 Left Antecubital 2 days                  VTE Pharmacologic Prophylaxis:  Lovenox   VTE Mechanical Prophylaxis:  SCDs                     Counseling / Coordination of Care  Total floor / unit time spent today  30  minutes  Greater than 50% of total time was spent with the patient and / or family counseling and / or coordination of care    A description of the counseling / coordination of care:

## 2018-07-23 NOTE — PLAN OF CARE
Problem: Potential for Falls  Goal: Patient will remain free of falls  INTERVENTIONS:  - Assess patient frequently for physical needs  -  Identify cognitive and physical deficits and behaviors that affect risk of falls    -  [place] fall precautions as indicated by assessment   - Educate patient/family on patient safety including physical limitations  - Instruct patient to call for assistance with activity based on assessment  - Modify environment to reduce risk of injury  - Consider OT/PT consult to assist with strengthening/mobility   Outcome: Progressing      Problem: Prexisting or High Potential for Compromised Skin Integrity  Goal: Skin integrity is maintained or improved  INTERVENTIONS:  - Identify patients at risk for skin breakdown  - Assess and monitor skin integrity  - Assess and monitor nutrition and hydration status  - Monitor labs (i e  albumin)  - Assess for incontinence   - Turn and reposition patient  - Assist with mobility/ambulation  - Relieve pressure over bony prominences  - Avoid friction and shearing  - Provide appropriate hygiene as needed including keeping skin clean and dry  - Evaluate need for skin moisturizer/barrier cream  - Collaborate with interdisciplinary team (i e  Nutrition, Rehabilitation, etc )   - Patient/family teaching   Outcome: Progressing      Problem: PAIN - ADULT  Goal: Verbalizes/displays adequate comfort level or baseline comfort level  Interventions:  - Encourage patient to monitor pain and request assistance  - Assess pain using appropriate pain scale  - Administer analgesics based on type and severity of pain and evaluate response  - Implement non-pharmacological measures as appropriate and evaluate response  - Consider cultural and social influences on pain and pain management  - Notify physician/advanced practitioner if interventions unsuccessful or patient reports new pain   Outcome: Adequate for Discharge      Problem: INFECTION - ADULT  Goal: Absence or prevention of progression during hospitalization  INTERVENTIONS:  - Assess and monitor for signs and symptoms of infection  - Monitor lab/diagnostic results  - Monitor all insertion sites, i e  indwelling lines, tubes, and drains  - Monitor endotracheal (as able) and nasal secretions for changes in amount and color  - [place] appropriate cooling/warming therapies per order  - Administer medications as ordered  - Instruct and encourage patient and family to use good hand hygiene technique  - Identify and instruct in appropriate isolation precautions for identified infection/condition   Outcome: Progressing      Problem: SAFETY ADULT  Goal: Maintain or return to baseline ADL function  INTERVENTIONS:  -  Assess patient's ability to carry out ADLs; assess patient's baseline for ADL function and identify physical deficits which impact ability to perform ADLs (bathing, care of mouth/teeth, toileting, grooming, dressing, etc )  - Assess/evaluate cause of self-care deficits   - Assess range of motion  - Assess patient's mobility; develop plan if impaired  - Assess patient's need for assistive devices and provide as appropriate  - Encourage maximum independence but intervene and supervise when necessary  ¯ Involve family in performance of ADLs  ¯ Assess for home care needs following discharge   ¯ Request OT consult to assist with ADL evaluation and planning for discharge  ¯ Provide patient education as appropriate   Outcome: Progressing    Goal: Maintain or return mobility status to optimal level  INTERVENTIONS:  - Assess patient's baseline mobility status (ambulation, transfers, stairs, etc )    - Identify cognitive and physical deficits and behaviors that affect mobility  - Identify mobility aids required to assist with transfers and/or ambulation (gait belt, sit-to-stand, lift, walker, cane, etc )  - [place] fall precautions as indicated by assessment  - Record patient progress and toleration of activity level on Mobility SBAR; progress patient to next Phase/Stage  - Instruct patient to call for assistance with activity based on assessment  - Request Rehabilitation consult to assist with strengthening/weightbearing, etc    Outcome: Progressing      Problem: DISCHARGE PLANNING  Goal: Discharge to home or other facility with appropriate resources  INTERVENTIONS:  - Identify barriers to discharge w/patient and caregiver  - Arrange for needed discharge resources and transportation as appropriate  - Identify discharge learning needs (meds, wound care, etc )  - Arrange for interpretive services to assist at discharge as needed  - Refer to Case Management Department for coordinating discharge planning if the patient needs post-hospital services based on physician/advanced practitioner order or complex needs related to functional status, cognitive ability, or social support system   Outcome: Progressing

## 2018-07-23 NOTE — OCCUPATIONAL THERAPY NOTE
OccupationalTherapy Progress Note     Patient Name: Cornelio Liu  PJEFX'C Date: 7/23/2018  Problem List  Patient Active Problem List   Diagnosis    Esophagitis, reflux    Frailty    Hyperkalemia    Hyperlipidemia    Primary localized osteoarthritis of right hip    Vitamin D deficiency    Right humeral fracture    UTI (urinary tract infection)    Fracture of distal end of right radius           07/23/18 0029   Restrictions/Precautions   Weight Bearing Precautions Per Order Yes   RUE Weight Bearing Per Order NWB   Braces or Orthoses Sling  (RUE sling)   Other Precautions WBS; Fall Risk;Pain   Lifestyle   Autonomy At baseline, pt was I w/ ADLs, IADLs, and functional mobility/transfers w/o use of DME/AD, (+) , and reports 3 falls PTA (all 3 falls since yesterday)  Reciprocal Relationships Lives alone- Supportive family   Service to 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   Intrinsic Gratification Gardening   Pain Assessment   Pain Assessment No/denies pain   Pain Score No Pain   ADL   Where Assessed Chair   Grooming Assistance 5  Supervision/Setup   Grooming Deficit Setup;Supervision/safety; Increased time to complete   Grooming Comments Light grooming tasks completed with Supervision 2* setup required and increased time to complete  UB Bathing Assistance 3  Moderate Assistance   UB Bathing Deficit Setup;Verbal cueing;Supervision/safety; Increased time to complete;Left arm; Chest;Abdomen   UB Bathing Comments UB bathing completed with UB dressing completed Mod A 2* assist to wash LUE 2* decreased functional use of RUE (sling donned) and assist for thoroughness of chest and abdomen  LB Bathing Assistance 3  Moderate Assistance   LB Bathing Deficit Setup;Verbal cueing;Supervision/safety; Increased time to complete   LB Bathing Comments LB bathing completed with Mod A 2* assist to wash B/L lower legs and buttocks 2* decreased functional reach and decreased dynamic standing balance     [address] 3 Moderate Assistance   UB Dressing Deficit Setup;Verbal cueing;Supervision/safety; Increased time to complete; Thread RUE;Pull around back;Pull down in back   UB Dressing Comments UB dressing completed with Mod A 2* assist to thread RUE through hospital gown and assist to bring gown down/around in back  LB Dressing Assistance 3  Moderate Assistance   LB Dressing Deficit Setup; Requires assistive device for steadying;Steadying;Verbal cueing;Supervision/safety; Increased time to complete; Don/doff R sock; Don/doff L sock;Pull up over hips   LB Dressing Comments LB dressing completed with Mod A  Pt able to doff B/L socks and thread BLEs into underwear, however required assist to don B/L socks and bring underwear over hips  Functional Standing Tolerance   Time 3 mins   Activity Functional mobility, self care tasks   Comments No LOB noted   Bed Mobility   Supine to Sit 4  Minimal assistance   Additional items Assist x 1;HOB elevated; Bedrails; Increased time required;Verbal cues;LE management   Sit to Supine Unable to assess  (Pt seated OOB in chair at end of session)   Additional Comments Pt seated OOB in chair at end of session with call bell and phone within reach  All needs met and pt reports no further questions for OT at this time  Transfers   Sit to Stand 3  Moderate assistance   Additional items Assist x 1;Bedrails;Armrests; Increased time required;Verbal cues   Stand to Sit 4  Minimal assistance   Additional items Assist x 1; Armrests; Increased time required;Verbal cues   Additional Comments Cues for safe technique and hand placement   Functional Mobility   Functional Mobility 4  Minimal assistance   Additional Comments Assist x1   Additional items Hand hold assistance   Cognition   Overall Cognitive Status WFL   Arousal/Participation Alert; Cooperative   Attention Within functional limits   Orientation Level Oriented X4   Memory Within functional limits   Following Commands Follows all commands and directions without difficulty   Activity Tolerance   Activity Tolerance Patient limited by fatigue;Patient tolerated treatment well   Medical Staff Made Aware pt appropriate to be seen per RIANNA Alejandro   Assessment   Assessment Pt seen for OT treatment session this AM focusing on functional activity tolerance, bed mobility, ADLs, and functional mobility/transfers  Pt alert and cooperative throughout session  Pt lying supine at start of session, requiring Min A for supine>sit 2* assist for trunk control  Transfers (sit<>stand) completed with Mod-Min A with increased assist required to initiate forward weight shift for sit>stand  Cues required for safe technique and placement of UEs during transfers  Min A required for functional mobility with use of hand held assistance 2* decreased dynamic standing balance demonstrated  ADL routine completed while seated OOB in chair  UB bathing completed with UB dressing completed Mod A 2* assist to wash LUE 2* decreased functional use of RUE (sling donned) and assist for thoroughness of chest and abdomen  LB bathing completed with Mod A 2* assist to wash B/L lower legs and buttocks 2* decreased functional reach and decreased dynamic standing balance  UB dressing completed with Mod A 2* assist to thread RUE through hospital gown and assist to bring gown down/around in back  LB dressing completed with Mod A  Pt able to doff B/L socks and thread BLEs into underwear, however required assist to don B/L socks and bring underwear over hips  Light grooming tasks completed with Supervision 2* setup required and increased time to complete  Pt seated OOB in chair at end of session with call bell and phone within reach  All needs met and pt reports no further questions for OT at this time  Continue to recommend STR upon D/C and pt agreeable  OT to follow pt on caseload  Plan   Treatment Interventions ADL retraining;Functional transfer training;UE strengthening/ROM; Endurance training;Patient/family training;Equipment evaluation/education; Compensatory technique education;Continued evaluation; Energy conservation; Activityengagement   Goal Expiration Date 08/03/18   Treatment Day 1   OT Frequency 3-5x/wk   Recommendation   OT Discharge Recommendation Short Term Rehab   OT - OK to Discharge Yes  (when medically cleared to STR)   Barthel Index   Feeding 5   Bathing 0   Grooming Score 0   Dressing Score 5   Bladder Score 10   Bowels Score 10   Toilet Use Score 5   Transfers (Bed/Chair) Score 5   Mobility (Level Surface) Score 0   Stairs Score 0   B[place]hel Index Score 40   Modified Kian Scale   Modified Kian Scale 4         Nora Edwards, OTR/L

## 2018-07-23 NOTE — PLAN OF CARE
Problem: PHYSICAL THERAPY ADULT  Goal: Performs mobility at highest level of function for planned discharge setting  See evaluation for individualized goals  Treatment/Interventions: Functional transfer training, LE strengthening/ROM, Therapeutic exercise, Endurance training, Patient/family training, Equipment eval/education, Bed mobility, Gait training, Spoke to nursing, OT, Family  Equipment Recommended:  (defer at this time)       See flowsheet documentation for full assessment, interventions and recommendations  Outcome: Progressing  Prognosis: Good  Problem List: Decreased strength, Decreased range of motion, Decreased endurance, Impaired balance, Decreased mobility, Decreased skin integrity, Pain, Orthopedic restrictions, Decreased safety awareness  Assessment: Pt found seated in bedside chair upon arrival and willing to participate in PT  Pt able to transfer with greater ease this session requiring Min A of therapist and verbal cues for proper hand placement and safety with SPC  Pt was given [facility] for ambulation and states she has never used AD prior to humeral fracture  Pt demonstrates good mobility during ambulation, but requires cues for proper technique with SPC  Encouraged pt to continue using cane to decrease risk for falls  Pt was repositioned in bedside chair at end of session and able to perform seated BLE exercises  Pt given ice to RUE with call bell in reach and SCD's activated at end of session  Continue to recommend STR at this time due to increased risk for falls and deficits listed above  Barriers to Discharge: Inaccessible home environment, Decreased caregiver support  Barriers to Discharge Comments: [address], lives alone  Recommendation: Short-term skilled PT     PT - OK to Discharge: Yes (to rehab)    See flowsheet documentation for full assessment

## 2018-07-23 NOTE — SOCIAL WORK
Dtr of patient was able to tour facilities and made the final decision of [facility] for pt  Pt is agreeable  Cm made accepting facility aware as they will being insurance authorization for STR stay  Cm made medical team aware  Cm following

## 2018-07-23 NOTE — PHYSICAL THERAPY NOTE
Physical Therapy Progress Note     07/23/18 1130   Pain Assessment   Pain Assessment No/denies pain  (No pain at rest, shoulder pain 3/10 with activity)   Pain Score No Pain   Pain Location Shoulder   Pain Orientation Right   Restrictions/Precautions   Weight Bearing Precautions Per Order Yes   RUE Weight Bearing Per Order NWB   Braces or Orthoses Sling   Other Precautions WBS; Fall Risk;Pain   General   Chart Reviewed Yes   Response to Previous Treatment Patient with no complaints from previous session  Family/Caregiver Present No   Cognition   Overall Cognitive Status WFL   Arousal/Participation Alert; Responsive; Cooperative   Attention Within functional limits   Orientation Level Oriented X4   Memory Within functional limits   Following Commands Follows all commands and directions without difficulty   Subjective   Subjective Pt willing to participate in PT    Transfers   Sit to Stand 4  Minimal assistance   Additional items Assist x 1; Armrests; Increased time required;Verbal cues   Stand to Sit 4  Minimal assistance   Additional items Assist x 1; Armrests; Increased time required;Verbal cues   Ambulation/Elevation   Gait pattern Narrow QUINN; Short stride; Excessively slow   Gait Assistance 4  Minimal assist   Additional items Assist x 1;Verbal cues   Assistive Device [facility]   Distance 100' x1   Balance   Static Sitting Fair +   Dynamic Sitting Fair   Static Standing Fair -   Dynamic Standing Poor +   Ambulatory Poor +   Endurance Deficit   Endurance Deficit Yes   Endurance Deficit Description fatigue and weakness   Activity Tolerance   Activity Tolerance Patient limited by fatigue   Nurse Frida Melendez RN   Exercises   Hip Abduction Sitting;10 reps;AROM; Bilateral   Knee AROM Long Arc Quad Sitting;10 reps;AROM; Bilateral   Ankle Pumps Sitting;10 reps;AROM; Bilateral   Marching Sitting;10 reps;AROM; Bilateral   Assessment   Prognosis Good   Problem List Decreased strength;Decreased range of motion;Decreased endurance; Impaired balance;Decreased mobility; Decreased skin integrity;Pain;Orthopedic restrictions   Assessment Pt found seated in bedside chair upon arrival and willing to participate in PT  Pt able to transfer with greater ease this session requiring Min A of therapist and verbal cues for proper hand placement and safety with SPC  Pt was given [facility] for ambulation and states she has never used AD prior to humeral fracture  Pt demonstrates good mobility during ambulation, but requires cues for proper technique with SPC  Encouraged pt to continue using cane to decrease risk for falls  Pt was repositioned in bedside chair at end of session and able to perform seated BLE exercises  Pt given ice to RUE with call bell in reach and SCD's activated at end of session  Continue to recommend STR at this time due to increased risk for falls and deficits listed above  Barriers to Discharge Inaccessible home environment;Decreased caregiver support   Barriers to Discharge Comments 4 [address], lives alone   Goals   Patient Goals None stated   LTG Expiration Date 07/30/18   Treatment Day 2   Plan   Treatment/Interventions Functional transfer training;LE strengthening/ROM; Elevations; Therapeutic exercise; Endurance training;Patient/family training;Equipment eval/education; Bed mobility;Gait training;Spoke to nursing   Progress Progressing toward goals   PT Frequency Other (Comment)  (4-5x/wk)   Recommendation   Recommendation Short-term skilled PT   Equipment Recommended Cane  (Given during treatment session today)   PT - OK to Discharge Yes  (to rehab)     Cheyenne Grover, PTA Student

## 2018-07-23 NOTE — SOCIAL WORK
Cm scheduled tentative transport tomorrow for 5pm via [id number], pending auth from [employer]  [facility] is obtaining that [place] information  Cm made pt and family aware of transport

## 2018-07-24 VITALS
HEIGHT: 65 IN | OXYGEN SATURATION: 99 % | RESPIRATION RATE: 18 BRPM | HEART RATE: 100 BPM | BODY MASS INDEX: 24.24 KG/M2 | WEIGHT: 145.5 LBS | DIASTOLIC BLOOD PRESSURE: 60 MMHG | TEMPERATURE: 97.9 F | SYSTOLIC BLOOD PRESSURE: 130 MMHG

## 2018-07-24 PROCEDURE — 97116 GAIT TRAINING THERAPY: CPT

## 2018-07-24 PROCEDURE — 97530 THERAPEUTIC ACTIVITIES: CPT

## 2018-07-24 PROCEDURE — 99232 SBSQ HOSP IP/OBS MODERATE 35: CPT | Performed by: INTERNAL MEDICINE

## 2018-07-24 PROCEDURE — 97110 THERAPEUTIC EXERCISES: CPT

## 2018-07-24 PROCEDURE — 99239 HOSP IP/OBS DSCHRG MGMT >30: CPT | Performed by: INTERNAL MEDICINE

## 2018-07-24 PROCEDURE — 97535 SELF CARE MNGMENT TRAINING: CPT

## 2018-07-24 RX ADMIN — VITAMIN D, TAB 1000IU (100/BT) 1000 UNITS: 25 TAB at 08:36

## 2018-07-24 RX ADMIN — CEFAZOLIN SODIUM 1000 MG: 1 SOLUTION INTRAVENOUS at 08:36

## 2018-07-24 RX ADMIN — PANTOPRAZOLE SODIUM 40 MG: 40 TABLET, DELAYED RELEASE ORAL at 06:29

## 2018-07-24 RX ADMIN — ENOXAPARIN SODIUM 30 MG: 30 INJECTION SUBCUTANEOUS at 08:36

## 2018-07-24 RX ADMIN — CEFAZOLIN SODIUM 1000 MG: 1 SOLUTION INTRAVENOUS at 15:47

## 2018-07-24 RX ADMIN — Medication 1 TABLET: at 08:36

## 2018-07-24 NOTE — DISCHARGE SUMMARY
Discharge Summary - Medical Monica Brooks 80 y o  female MRN: [id number]    [address] [place] [address] MED SURG Room / Bed: [place] [address] /E2 -* Encounter: [id number]    BRIEF OVERVIEW    Admitting Provider: Yulia Barnard DO  Discharge Provider: Sherice Dillard DO  Admission Date: 7/20/2018     Discharge Date: No discharge date for patient encounter  Primary Care Physician at Discharge: Flora Johnson [phone]    Primary Discharge Diagnosis  Right humerus and radial fracture    Other Problems Addressed  Patient Active Problem List    Diagnosis Date Noted    Right humeral fracture 07/20/2018    UTI (urinary tract infection) 07/20/2018    Fracture of distal end of right radius 07/20/2018    Primary localized osteoarthritis of right hip 06/13/2017    Esophagitis, reflux 08/01/2016    Frailty 04/14/2016    Vitamin D deficiency 01/07/2015    Hyperkalemia 10/03/2012    Hyperlipidemia 10/03/2012       Consulting Providers   Orthopedics  Therapeutic Operative Procedures Performed  Right shoulder comminuted fracture of the right humeral neck  Right wrist distal radius fracture      Discharge Disposition: rehab  Facility: SNF    Allergies  Allergies   Allergen Reactions    Sulfa Antibiotics Hives     Diet restrictions:  none   Activity restrictions:  right upper extremity nonweightbearing  Discharge Condition:  Esteban Mcdaniel in 1 week  Follow up with consulting providers  Orthopedic surgery in 2 weeks       [address] [place] [address]    Presenting Problem/History of Present Illness  Right humeral fracture  Hospital Course  24-year-old female presents after falling and severe pain of the right upper extremity  Right humeral and radius fracture   patient was admitted for IV narcotic pain control and orthopedic evaluation  Orthopedics recommended right shoulder sling and SAC for right wrist x6 weeks    Also nonweightbearing status, elevation with cold compresses, follow-up with Ortho in 2 weeks  Patient being right handed evaluated by Physical therapy with recommendation for short-term rehab, patient discharged to rehab at [facility]  Other medical conditions stable      Discharge  Statement   I spent 35 minutes discharging the patient  This time was spent on the day of discharge  I had direct contact with the patient on the day of discharge  Additional documentation is required if more than 30 minutes were spent on discharge  Discussed discharge follow-up    Discharge instructions/Information to patient and family:   See after visit summary for information provided to patient and family

## 2018-07-24 NOTE — OCCUPATIONAL THERAPY NOTE
Occupational Therapy Treatment Note:       07/24/18 1140   Restrictions/Precautions   Weight Bearing Precautions Per Order Yes   RUE Weight Bearing Per Order NWB   Braces or Orthoses Sling   Other Precautions WBS; Fall Risk;Pain   Pain Assessment   Pain Assessment No/denies pain   Pain Score No Pain   Pain Location Shoulder   Pain Orientation Right   Hospital Pain Intervention(s) Cold applied   ADL   Where Assessed Chair   Grooming Assistance 5  Supervision/Setup   Grooming Deficit Setup   Grooming Comments oral hygiene and hair combing  UB Bathing Assistance 4  Minimal Assistance   UB Bathing Deficit Setup;Verbal cueing;Supervision/safety; Increased time to complete;Left arm   UB Bathing Comments Pt educated on one handed techs  LB Bathing Assistance 4  Minimal Assistance   LB Bathing Deficit Setup;Steadying;Verbal cueing;Supervision/safety; Increased time to complete;Perineal area; Buttocks;Right lower leg including foot; Left lower leg including foot   LB Bathing Comments cues for safe balance with funtional reach provided  UB Dressing Assistance 3  Moderate Assistance   UB Dressing Deficit Setup;Supervision/safety;Verbal cueing; Increased time to complete; Thread RUE   UB Dressing Comments One handed dressing techs reviewed  LB Dressing Assistance 3  Moderate Assistance   LB Dressing Deficit Setup;Verbal cueing;Supervision/safety; Increased time to complete; Don/doff R sock; Don/doff L sock; Thread RLE into underwear; Thread LLE into underwear;Pull up over hips   LB Dressing Comments Increased A to don clothing/socks over feet  Toileting Assistance  4  Minimal Assistance   Toileting Deficit Setup;Steadying;Verbal cueing;Supervison/safety; Increased time to complete; Bedside commode;Clothing management down;Clothing management up;Perineal hygiene   Toileting Comments No LOB noted  Cues for safe hand placement provided  Functional Standing Tolerance   Time 4 mins  Activity functional mobility      Comments No LOB noted with activities instance  Bed Mobility   Supine to Sit 4  Minimal assistance   Additional items Assist x 1   Additional Comments Pt with F sitting balance noted  Transfers   Sit to Stand 4  Minimal assistance   Additional items Assist x 1   Stand to Sit 4  Minimal assistance   Additional items Assist x 1   Stand pivot 4  Minimal assistance   Additional items Assist x 1   Toilet transfer 4  Minimal assistance   Additional items Assist x 1   Additional Comments cues for safe tech provided  Functional Mobility   Functional Mobility 4  Minimal assistance   Additional Comments x1   Additional items [facility]   Toilet Transfers   Toilet Transfer From Bed   Toilet Transfer Type To and from   Toilet Transfer to Standard bedside commode   Toilet Transfer Technique Stand pivot; Ambulating   Toilet Transfers Verbal cues; Minimal assistance   Toilet Transfers Comments Pt will benefit from continued use of bedside commode  Cognition   Overall Cognitive Status WFL   Arousal/Participation Alert; Responsive; Cooperative   Attention Within functional limits   Orientation Level Oriented X4   Memory Within functional limits   Following Commands Follows multistep commands with increased time or repetition   Comments Pt with F carry over of newly learned information  Additional Activities   Additional Activities (reviewed fall prevention  )   Additional Activities Comments Pt will beneift from further review  Activity Tolerance   Activity Tolerance Patient limited by fatigue   Medical Staff Made Aware reported all findings to nursing staff  Assessment   Assessment Pt was seen for skilled OT with focus on completion of self care tasks, functional mobility, review of fall prevention and review of current plan of care  Reviewed one handed self care techs with Pt  Encouraged NWB in RUE with functional tasks instance  Min A overall for UE/LE self care routine with cues for safety with functional reach   Pt with impulsive movements noted but no LOB during self care routine  Improved activity tolerance with functional tasks instance for more than 4 mins  Pt will benefit from further review of one handed techs and dynamic balance with maintenance of NWB status in RUE  See above levels of A required for all functional tasks  Pt may benefit from further rehab with focus on achieving optimal performance levels with all functional tasks  Continue to recommend Inpatient rehab   Plan   Treatment Interventions ADL retraining;Functional transfer training; Endurance training   Goal Expiration Date 08/03/18   Treatment Day 2   OT Frequency 3-5x/wk   Recommendation   OT Discharge Recommendation Short Term Rehab   Barthel Index   Feeding 5   Bathing 0   Grooming Score 0   Dressing Score 5   Bladder Score 10   Bowels Score 10   Toilet Use Score 5   Transfers (Bed/Chair) Score 5   Mobility (Level Surface) Score 0   Stairs Score 0   Barthel Index Score 40   Modified Nome Scale   Modified Nome Scale 4   Gerold Bosworth, [address]

## 2018-07-24 NOTE — PROGRESS NOTES
Progress Note - Bhargav Bending 80 y o  female MRN: [id number]    Unit/Bed#: E2 -01 Encounter: [id number]    Assessment/Plan:    Right humeral fracture continue pain control, nonweightbearing status, follow with Orthopedics    Right distal radial fracture continue pain control, nonweightbearing status, sling, follow with Orthopedics    UTI    complete 5 days of antibiotics transition to p o  on discharge E coli identified    GERD    continue PPI for acid control    Ambulatory dysfunction rehab planned on discharge    Subjective:   Right upper extremity pain stable at rest, exacerbated with activity, denies chest pain shortness of breath nausea vomiting diarrhea no fevers chills appetite is okay    Objective:     Vitals: Blood pressure 148/63, pulse 88, temperature 99 2 °F (37 3 °C), temperature source Temporal, resp  rate 18, height 5' 5" (1 651 m), weight 66 kg (145 lb 8 1 oz), SpO2 98 %, not currently breastfeeding  ,Body mass index is 24 21 kg/m²          Results from last 7 days  Lab Units 07/23/18  0516 07/20/18  0915   WBC Thousand/uL 7 08 10 11   HEMOGLOBIN g/dL 9 3* 10 4*   HEMATOCRIT % 29 1* 32 3*   PLATELETS Thousands/uL 179 184   INR   --  1 09       Results from last 7 days  Lab Units 07/23/18  0516   SODIUM mmol/L 141   POTASSIUM mmol/L 3 8   CHLORIDE mmol/L 107   CO2 mmol/L 28   BUN mg/dL 19   CREATININE mg/dL 0 80   CALCIUM mg/dL 8 5   TOTAL PROTEIN g/dL 6 1*   BILIRUBIN TOTAL mg/dL 0 50   ALK PHOS U/L 31*   ALT U/L 12   AST U/L 19   GLUCOSE RANDOM mg/dL 103       Scheduled Meds:    Current Facility-Administered Medications:  acetaminophen 650 mg Oral Q6H PRN Holyrood Flattery, DO    cefazolin 1,000 mg Intravenous Q8H Fadi Woo DO Last Rate: 1,000 mg (07/24/18 0836)   cholecalciferol 1,000 Units Oral Daily Fadi Woo DO    docusate sodium 100 mg Oral BID PRN Juju Flattery, DO    enoxaparin 30 mg Subcutaneous Daily Holyrood Flattery, DO    HYDROcodone-acetaminophen 1 tablet Oral Q4H PRN Holyrood Flattery, DO    magnesium hydroxide 30 mL Oral BID PRN Jessica Barbone, DO    multivitamin-minerals 1 tablet Oral Daily Fadi Woo, DO    ondansetron 4 mg Intravenous Q4H PRN Fadi Woo, DO    pantoprazole 40 mg Oral Early Morning Fadi Woo, DO    traMADol 50 mg Oral Q6H PRN Fadi Woo, DO        Continuous Infusions:     Physical exam:  General appearance:  Alert no distress interaction appropriate   Head/Eyes:  Nonicteric PERRL EO MI  Neck:  Supple  Lungs: CTA bilateral no wheezing rhonchi or rales  Heart: normal S1 S2 regular  Abdomen: Soft nontender with bowel sounds  Extremities: no edema right upper extremity in sling, ecchymosis present  Skin: no rash    Invasive Devices     Peripheral Intravenous Line            Peripheral IV 07/20/18 Left Antecubital 3 days                  VTE Pharmacologic Prophylaxis:  Lovenox   VTE Mechanical Prophylaxis:  SCDs                     Counseling / Coordination of Care  Total floor / unit time spent today 30  minutes  Greater than 50% of total time was spent with the patient and / or family counseling and / or coordination of care    A description of the counseling / coordination of care:

## 2018-07-24 NOTE — PHYSICAL THERAPY NOTE
Physical Therapy Progress Note     07/24/18 1123   Pain Assessment   Pain Assessment 0-10   Pain Score 3   Pain Location Shoulder   Pain Orientation Right   Hospital Pain Intervention(s) Cold applied; Ambulation/increased activity;Repositioned   Response to Interventions Tolerated  Restrictions/Precautions   Weight Bearing Precautions Per Order Yes   RUE Weight Bearing Per Order NWB   Braces or Orthoses Sling  (RUE sling)   Other Precautions WBS; Fall Risk;Pain   General   Chart Reviewed Yes   Response to Previous Treatment Patient reporting fatigue but able to participate  Family/Caregiver Present No   Subjective   Subjective Willing to participate in therapy this AM    Bed Mobility   Supine to Sit 4  Minimal assistance   Additional items Assist x 1;HOB elevated; Bedrails; Increased time required;Verbal cues;LE management   Transfers   Sit to Stand 4  Minimal assistance   Additional items Assist x 1;Bedrails; Increased time required;Verbal cues   Stand to Sit 4  Minimal assistance   Additional items Armrests; Increased time required;Assist x 1;Verbal cues   Toilet transfer 4  Minimal assistance   Additional items Assist x 1; Armrests; Increased time required;Verbal cues; Commode   Ambulation/Elevation   Gait pattern Narrow QUINN; Short stride; Excessively slow;Decreased foot clearance  (R lateral sway)   Gait Assistance 4  Minimal assist   Additional items Assist x 1;Verbal cues; Tactile cues   Assistive Device [facility]   Distance 80'   Balance   Static Sitting Fair +   Dynamic Sitting Fair   Static Standing Fair -   Dynamic Standing Fair -   Ambulatory Fair -   Endurance Deficit   Endurance Deficit Yes   Endurance Deficit Description fatigue/pain   Activity Tolerance   Activity Tolerance Patient limited by fatigue;Patient limited by pain   Nurse Made Aware Yes   Exercises   TKR Sitting;10 reps;AAROM; Bilateral   Assessment   Prognosis Good   Problem List Decreased strength;Decreased range of motion;Decreased endurance; Impaired balance;Decreased mobility; Decreased safety awareness;Decreased skin integrity;Orthopedic restrictions;Pain   Assessment Pt  supine in bed upon my arrival  Pt  able to maintain NWB status of RUE throughout treatment session  Progressed with transfers continuing to require Francoise of therapist with cues provided for proper technique/hand placement  Pt  requested to use Ezio king present for bathroom trial  After bathroom trial, pt  continued with an amb  trial with use of SPC and Francoise of therapist with cues provided for LE sequencing  Pt  continues to remain at a risk for falls due to noted gait impairments above  After additional amb  trial pt  returned to room and repositioned seated in bedside chair at end of treatment session with Ezio Shah present at end of treatment session  PT will continue to recommend rehab upon d/c for continued improvement of noted impairments above  Barriers to Discharge Decreased caregiver support; Inaccessible home environment   Barriers to Discharge Comments 4 HÉCTOR, lives alone  Goals   Patient Goals To go to rehab  LTG Expiration Date 07/30/18   Treatment Day 3   Plan   Treatment/Interventions Functional transfer training;LE strengthening/ROM; Therapeutic exercise; Endurance training;Bed mobility;Gait training;Spoke to nursing;Spoke to case management;OT   Progress Progressing toward goals   PT Frequency Other (Comment)  (4-5x/wk)   Recommendation   Recommendation Short-term skilled PT   Equipment Recommended Cane   PT - OK to Discharge Yes  (if d/c to rehab when medically stable )     Yuliet Victor PTA

## 2018-07-25 LAB
BACTERIA BLD CULT: NORMAL
BACTERIA BLD CULT: NORMAL

## 2018-07-26 ENCOUNTER — TRANSITIONAL CARE MANAGEMENT (OUTPATIENT)
Dept: FAMILY MEDICINE CLINIC | Facility: CLINIC | Age: 83
End: 2018-07-26

## 2018-07-31 ENCOUNTER — TELEPHONE (OUTPATIENT)
Dept: OBGYN CLINIC | Facility: HOSPITAL | Age: 83
End: 2018-07-31

## 2018-08-01 ENCOUNTER — DOCUMENTATION (OUTPATIENT)
Dept: OBGYN CLINIC | Facility: CLINIC | Age: 83
End: 2018-08-01

## 2018-08-03 ENCOUNTER — TELEPHONE (OUTPATIENT)
Dept: OBGYN CLINIC | Facility: CLINIC | Age: 83
End: 2018-08-03

## 2018-08-03 DIAGNOSIS — S42.201D CLOSED FRACTURE OF PROXIMAL END OF RIGHT HUMERUS WITH ROUTINE HEALING, UNSPECIFIED FRACTURE MORPHOLOGY, SUBSEQUENT ENCOUNTER: Primary | ICD-10-CM

## 2018-08-03 DIAGNOSIS — S52.501D CLOSED FRACTURE OF DISTAL END OF RIGHT RADIUS WITH ROUTINE HEALING, UNSPECIFIED FRACTURE MORPHOLOGY, SUBSEQUENT ENCOUNTER: ICD-10-CM

## 2018-08-03 NOTE — TELEPHONE ENCOUNTER
I put an order in for physical therapy as well as the communication note to go to [facility] in [place]  Could you please send this to them and then also make sure that we confirmed that they did receive it has apparently the prior notes in scripts did not go through  Thank you very much

## 2018-08-03 NOTE — TELEPHONE ENCOUNTER
Occupational Therapy Daily Treatment     Visit Count: 3   Plan of Care Dates: Initial: 9/19/2017 Through: 11/1/2017  Insurance Information: THERAPY BENEFITS:  PAYOR: [facility] - CHOICE PLUS  VISIT LIMIT: 20 VISITS MAX PER YEAR  AUTHORIZATION NEEDED: NO        DEDUCTIBLE: $2500        MET: $2260.48  OUT OF POCKET: $6500        MET: $6260.48  COINSURANCE: 0%  COPAY: $40.00  Next Referring Provider Visit:   Referred by: Cornelia Huertas OT   Medical Diagnosis (from order):    784.3 (ICD-9-CM) - R47.01 (ICD-10-CM) - Expressive aphasia   434.91 (ICD-9-CM) - I63.50 (ICD-10-CM) - Cerebrovascular accident (CVA) due to occlusion of cerebral artery (CMS/[facility])     Insurance: 1. [employer]  2. 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 Pt and son refusing , reporting they did not have good experience previously and it is too difficult for pt to communicate with aphasia.   Current Pain: 0/10.      OBJECTIVE     Cognitive Assessment   Result Comments   SLUMS (27-30 normal, 21-26 MCI,   1 - 20 Dementia for High school education) (25-30 normal, 20-24 MCI, 1-19 dementia for < High school education) NT    Short Blessed Test (7 or less)  WFL 2   Following directions  Inconsistent-in part due to aphasia/language barrier   Driving Knowledge Questionnaire     Trailmaking A: less than 90 sec norm WFL 40 seconds   Trailmaking B: less than 180 sec norm BFL Pt unable to complete, likely in part due to language barrier   Traffic sign recognition (less than 10) WNL 12/12  Needed alternate testing method   Maze (<60 second norm) WNL 23 seconds       Gas Brake Reaction Time:   Premorbid brake  Put an order in for physical therapy as well as the communication note to go to [facility] in [place]  Could you please send this to them and then also make sure that we confirmed that they did receive it has apparently the prior notes in scripts did not go through  Thank you very much  pattern:   Hard Break Reaction Time (sec) Norm   Trial 1 .38 .75 seconds   Trial 2 .55 .75 seconds   Trial 3 1.98 .75 seconds   Trial 4 .29 .75 seconds   Trial 5 .47 .75 seconds   Trial 6 .34 .75 seconds   Trial 7 1.68 .75 seconds   Trial 8 .49 .75 seconds   Trial 9 .54 .75 seconds   Trial 10 .36 .75 seconds   Percentage Passed:  80% but pt anticipating majority of the time and reacting too early    Random Reaction Time (sec) Stimulus Type Norm   Trial 1 1.11 Left 1.5 seconds   Trial 2 1.21 Brake 1.5 seconds   Trial 3 52 Left 1.5 seconds   Trial 4 1.46 Brake (L foot) 1.5 seconds   Trial 5 2.04 Right 1.5 seconds   Trial 6 84 Right 1.5 seconds   Trial 7 66 Left 1.5 seconds   Trial 8 65 Brake 1.5 seconds   Trial 9 5.8 Wrong 1.5 seconds   Trial 10 2.05 Left 1.5 seconds   Percentage Passed:  80%      Treatment   Community re-integration     Completion of [place] road law: emphasis also on working on slowing speech and using correct expressive language with son to translate. Read questions in both english and Greenlandic. Scored 33/37 which indicates pt understands the road laws.    Completed Trail Making B second attempt going through sequence together of alternating number and letter with written alphabet and numerics. Pt continued to have difficulty sequencing requiring over 4 prompts, at 4 minutes 16 seconds with inability to finish testing secondary to needing step by step cues in the middle of the test.        ASSESSMENT   After finishing Part 1 of 's readiness program using son as the , recommending pt continue with driving sessions at [employer] secondary to mild cognitive impairment in combination with english being second language and aphasia. Would want to see how pt does following directions, sequencing, navigating road signs, and problem solving in near environment or unexpected detour. Per son, pt did attempt driving with wife and stated he did not do well. Pt believes to think he would  have no issue with driving. Pt scored below functional limits on Trial Making B with difficulty in sequencing, sustaining attention to task, and needing extra time to problem solve. Otherwise pt did score WFL on the 's questionnaire, cognitive screen, visual assessments, physical assessments and reaction time testing. Once he has lessons with [employer] will inform MD to determine if pt is medically cleared to drive. Pt states he does not remember a medical MD telling him not to drive, although it was written in Dr. Gerard note to get assessed by OT and possible DMV.  Pain after treatment: 0/10  Result of above outlined education: Verbalizes understanding    Goals:       To be obtained by end of this plan of care:  1. Patient independent with modified and progressed home exercise program.  2. Pt will demonstrate safety for community re-entry by successfully passing driving tests with     PLAN   See if pt was able to contact [employer] and schedule a private session with Mr. Landry for testing. ONce he is assessed f/u with Dr. Roberts for recommendation in regards to return to driving. Continue with higher level sequencing (multiple errands with navigating the hospital or weekly planner).     THERAPY DAILY BILLING   Primary Insurance:  [employer] [facility]  Secondary Insurance: N/A    Evaluation Procedures:  No evaluation codes were used on this date of service    Timed Procedures:/  Community/work reintegration: 4 unit(s), 60 minutes    Untimed Procedures:  No untimed codes were used on this date of service    Total Treatment Time: 60 minutes

## 2018-08-03 NOTE — TELEPHONE ENCOUNTER
Faxed over to [facility] [phone], received that confirmation that it went through  Left a note on face sheet that if they could give us a call to let us know that it went through  They gave us a call that they received it and confirmed next appt

## 2018-08-03 NOTE — TELEPHONE ENCOUNTER
Call from patient's son Jose Trevino   Call back # [phone]  Dr Jeremias Parmar 3rd call to the office requesting an update on his original question below  Jose Trevino is very upset and would like a call back asap  I did advise Jose Trevino that Dr Josr Alvarado and PA is out of the office  Per the office send another (this) message to the office and Shandra Duarte will call back on Monday

## 2018-08-03 NOTE — TELEPHONE ENCOUNTER
Patients Son wants to be contact on his cell phone #[phone]  He wants to know why his mother did not get surgery or nail in her arm and has questions about her mobility with her arm in regards to sling she is in and is concerned that if she does not move it at all she will not regain mobility back in her elbow

## 2018-08-07 ENCOUNTER — APPOINTMENT (OUTPATIENT)
Dept: RADIOLOGY | Facility: CLINIC | Age: 83
End: 2018-08-07
Payer: COMMERCIAL

## 2018-08-07 ENCOUNTER — OFFICE VISIT (OUTPATIENT)
Dept: OBGYN CLINIC | Facility: CLINIC | Age: 83
End: 2018-08-07

## 2018-08-07 VITALS
HEIGHT: 65 IN | HEART RATE: 97 BPM | WEIGHT: 144.6 LBS | DIASTOLIC BLOOD PRESSURE: 86 MMHG | SYSTOLIC BLOOD PRESSURE: 145 MMHG | BODY MASS INDEX: 24.09 KG/M2

## 2018-08-07 DIAGNOSIS — S52.501D CLOSED FRACTURE OF DISTAL END OF RIGHT RADIUS WITH ROUTINE HEALING, UNSPECIFIED FRACTURE MORPHOLOGY, SUBSEQUENT ENCOUNTER: ICD-10-CM

## 2018-08-07 DIAGNOSIS — S42.214D CLOSED NONDISPLACED FRACTURE OF SURGICAL NECK OF RIGHT HUMERUS WITH ROUTINE HEALING, UNSPECIFIED FRACTURE MORPHOLOGY, SUBSEQUENT ENCOUNTER: Primary | ICD-10-CM

## 2018-08-07 DIAGNOSIS — S42.214D CLOSED NONDISPLACED FRACTURE OF SURGICAL NECK OF RIGHT HUMERUS WITH ROUTINE HEALING, UNSPECIFIED FRACTURE MORPHOLOGY, SUBSEQUENT ENCOUNTER: ICD-10-CM

## 2018-08-07 PROCEDURE — 73110 X-RAY EXAM OF WRIST: CPT

## 2018-08-07 PROCEDURE — 99024 POSTOP FOLLOW-UP VISIT: CPT | Performed by: ORTHOPAEDIC SURGERY

## 2018-08-07 PROCEDURE — 73030 X-RAY EXAM OF SHOULDER: CPT

## 2018-08-07 NOTE — PROGRESS NOTES
Assessment:     1  Closed nondisplaced fracture of surgical neck of right humerus with routine healing, unspecified fracture morphology, subsequent encounter    2  Closed fracture of distal end of right radius with routine healing, unspecified fracture morphology, subsequent encounter          Plan:     Problem List Items Addressed This Visit        Musculoskeletal and Integument    Right humeral fracture - Primary     61-year-old female with a right proximal humerus fracture  Her x-rays do show significant displacement of the proximal humerus  I reviewed the x-rays with her and her son  We discussed treatment options including had shoulder arthroplasty versus non operative management  I do not think that her bone quality would support and reduction and internal fixation very well  Patient strongly would like to avoid any surgery  We discussed that she will likely have very good function close to her body and should be able to touch the top of her head once everything is healed  She is very comfortable with this function  We will plan on non operative management  She will follow-up with Dr Ameya Liao in one month with x-rays of the right shoulder  Relevant Orders    XR shoulder 2+ vw right    Fracture of distal end of right radius     Patient with a right distal radius fracture  X-rays today show maintained acceptable alignment  Plan will be to follow-up with Dr Ameya Liao in one month with x-rays of the right wrist out of the cast          Relevant Orders    XR wrist 3+ vw right           Patient ID: Asim Ramesh is a 80 y o  female  Chief Complaint:  Follow-up right shoulder and wrist fracture    Subjective:  61-year-old female who fell sustaining a right proximal humerus fracture and right distal radius fracture on June 30th  She was initially hospitalized is currently at rehab  They are getting ready to have her transferred to an assisted living facility  Her pain is improving    She notes continued arm swelling  She denies any numbness or tingling  Allergy:  Allergies   Allergen Reactions    Sulfa Antibiotics Hives       Medications:  all current active meds have been reviewed    ROS:  Review of Systems    Objective:  BP Readings from Last 1 Encounters:   08/07/18 145/86      Wt Readings from Last 1 Encounters:   08/07/18 65 6 kg (144 lb 9 6 oz)        Exam:   Physical Exam  Right Hand Exam     Comments:  Patient's cast is on on the right upper extremity  She has fairly good range of motion of her fingers  Minimal swelling of the fingers  Sensation light touch intact throughout the fingers  Right Shoulder Exam     Comments:  Mild tenderness to palpation around the shoulder, some stiffness of the right elbow, moderate swelling of the arm and proximal forearm              Radiographs:  I have personally reviewed pertinent films in PACS and my interpretation is Maintained alignment of the right distal radius fracture with some shortening in acceptable dorsal angulation  There has been displacement of proximal humerus fracture the valgus deformity and angulation  Marlon Ortiz

## 2018-08-07 NOTE — ASSESSMENT & PLAN NOTE
Patient with a right distal radius fracture  X-rays today show maintained acceptable alignment    Plan will be to follow-up with Dr Candido Burns in one month with x-rays of the right wrist out of the cast

## 2018-08-07 NOTE — ASSESSMENT & PLAN NOTE
20-year-old female with a right proximal humerus fracture  Her x-rays do show significant displacement of the proximal humerus  I reviewed the x-rays with her and her son  We discussed treatment options including had shoulder arthroplasty versus non operative management  I do not think that her bone quality would support and reduction and internal fixation very well  Patient strongly would like to avoid any surgery  We discussed that she will likely have very good function close to her body and should be able to touch the top of her head once everything is healed  She is very comfortable with this function  We will plan on non operative management  She will follow-up with Dr Josr Alvarado in one month with x-rays of the right shoulder

## 2018-08-16 ENCOUNTER — TELEPHONE (OUTPATIENT)
Dept: OBGYN CLINIC | Facility: HOSPITAL | Age: 83
End: 2018-08-16

## 2018-08-16 NOTE — TELEPHONE ENCOUNTER
Dr Shimon Moreno from Occupational Therapy calling to find out if there is any restrictions, Wt bearing status and ROM restrictions  She needs to know about shoulder movement as she was going to be helping her to dress  Please advise

## 2018-08-24 ENCOUNTER — TELEPHONE (OUTPATIENT)
Dept: FAMILY MEDICINE CLINIC | Facility: CLINIC | Age: 83
End: 2018-08-24

## 2018-08-24 NOTE — TELEPHONE ENCOUNTER
DAUGHTER CALLED REGARDING MOM - SHE IS IN [place] AND IS ASKING IF IT IS POSSIBLE FOR HER MOTHER TO BE ABLE TO SELF ADMINISTER HER PANTAPROZOLE? SHE ONLY TAKES THAT MED AND RENATE PRESCRIBED IT FOR HER  OSMANI Pinckard HAS BEEN GIVING DAUGHTER CONFLICTING ANSWERS  DAUGHTER BELIEVES OSMANI Pinckard MAY HAVE REACHED OUT TO US APPROX A WEEK AGO - DAUGHTER WILL GLADLY HELP FACILITATE THIS IF WE NEED ANY FORM FILLED OUT ETC   Sonya Joel

## 2018-08-28 NOTE — TELEPHONE ENCOUNTER
I do believe she is able to self medicate with this medication and will complete the form which I do not have

## 2018-08-28 NOTE — TELEPHONE ENCOUNTER
Spoke to patient, I told her we needed the form and that MS would be willing to fill it out, she will talk to the office at [address] [place] [address] to fax a form over

## 2018-09-04 ENCOUNTER — APPOINTMENT (OUTPATIENT)
Dept: RADIOLOGY | Facility: CLINIC | Age: 83
End: 2018-09-04
Payer: COMMERCIAL

## 2018-09-04 ENCOUNTER — OFFICE VISIT (OUTPATIENT)
Dept: PHYSICAL THERAPY | Facility: CLINIC | Age: 83
End: 2018-09-04
Payer: COMMERCIAL

## 2018-09-04 ENCOUNTER — OFFICE VISIT (OUTPATIENT)
Dept: OBGYN CLINIC | Facility: CLINIC | Age: 83
End: 2018-09-04

## 2018-09-04 VITALS
BODY MASS INDEX: 24.49 KG/M2 | DIASTOLIC BLOOD PRESSURE: 72 MMHG | HEART RATE: 68 BPM | WEIGHT: 147 LBS | HEIGHT: 65 IN | SYSTOLIC BLOOD PRESSURE: 112 MMHG

## 2018-09-04 DIAGNOSIS — S52.501D CLOSED FRACTURE OF DISTAL END OF RIGHT RADIUS WITH ROUTINE HEALING, UNSPECIFIED FRACTURE MORPHOLOGY, SUBSEQUENT ENCOUNTER: Primary | ICD-10-CM

## 2018-09-04 DIAGNOSIS — S42.214D: ICD-10-CM

## 2018-09-04 DIAGNOSIS — S42.211S CLOSED DISPLACED FRACTURE OF SURGICAL NECK OF RIGHT HUMERUS, UNSPECIFIED FRACTURE MORPHOLOGY, SEQUELA: ICD-10-CM

## 2018-09-04 DIAGNOSIS — S42.211D CLOSED DISPLACED FRACTURE OF SURGICAL NECK OF RIGHT HUMERUS WITH ROUTINE HEALING, UNSPECIFIED FRACTURE MORPHOLOGY, SUBSEQUENT ENCOUNTER: Primary | ICD-10-CM

## 2018-09-04 DIAGNOSIS — S42.214D CLOSED NONDISPLACED FRACTURE OF SURGICAL NECK OF RIGHT HUMERUS WITH ROUTINE HEALING, UNSPECIFIED FRACTURE MORPHOLOGY, SUBSEQUENT ENCOUNTER: ICD-10-CM

## 2018-09-04 DIAGNOSIS — S52.501D CLOSED FRACTURE OF DISTAL END OF RIGHT RADIUS WITH ROUTINE HEALING, UNSPECIFIED FRACTURE MORPHOLOGY, SUBSEQUENT ENCOUNTER: ICD-10-CM

## 2018-09-04 PROCEDURE — G8985 CARRY GOAL STATUS: HCPCS

## 2018-09-04 PROCEDURE — G8986 CARRY D/C STATUS: HCPCS

## 2018-09-04 PROCEDURE — 99024 POSTOP FOLLOW-UP VISIT: CPT | Performed by: ORTHOPAEDIC SURGERY

## 2018-09-04 PROCEDURE — 73110 X-RAY EXAM OF WRIST: CPT

## 2018-09-04 PROCEDURE — 97760 ORTHOTIC MGMT&TRAING 1ST ENC: CPT

## 2018-09-04 PROCEDURE — G8984 CARRY CURRENT STATUS: HCPCS

## 2018-09-04 PROCEDURE — 73030 X-RAY EXAM OF SHOULDER: CPT

## 2018-09-04 NOTE — ASSESSMENT & PLAN NOTE
Patient with a right distal radius fracture-healing  X-rays were reviewed with the patient and her son  Short-arm cast was removed  She was given a prescription to have the occupational therapist make a custom molded wrist brace to use with activities  Continue physical and occupational therapy  Follow-up in 6 weeks for re-evaluation

## 2018-09-04 NOTE — PROGRESS NOTES
Assessment:     1  Closed displaced fracture of surgical neck of right humerus with routine healing, unspecified fracture morphology, subsequent encounter    2  Closed displaced fracture of surgical neck of right humerus, unspecified fracture morphology, sequela    3  Closed fracture of distal end of right radius with routine healing, unspecified fracture morphology, subsequent encounter          Plan:  The patient was seen and examined by Dr Caridad Varghese and myself  Problem List Items Addressed This Visit        Musculoskeletal and Integument    Right humeral fracture - Primary     70-year-old female with a right proximal humerus fracture-healing  X-rays reviewed with the patient and her son  Continue physical therapy  Continue to limit use of that arm and regards to lifting and weight-bearing  Follow-up in 6 weeks for re-evaluation  All questions were answered to their satisfaction  Relevant Orders    XR shoulder 2+ vw right    Ambulatory referral to Physical Therapy    XR wrist 3+ vw right    Fracture of distal end of right radius     Patient with a right distal radius fracture-healing  X-rays were reviewed with the patient and her son  Short-arm cast was removed  She was given a prescription to have the occupational therapist make a custom molded wrist brace to use with activities  Continue physical and occupational therapy  Follow-up in 6 weeks for re-evaluation  Relevant Orders    Ambulatory referral to Occupational Therapy    Ambulatory referral to Physical Therapy           Patient ID: Leif Valera is a 80 y o  female  Chief Complaint:  Follow-up right shoulder and wrist fracture    Subjective:  70-year-old female who fell sustaining a right proximal humerus fracture and right distal radius fracture on June 30th  She is currently residing in an assisted living facility and getting physical therapy  Her right shoulder has minimal discomfort    She feels increased pain in her right wrist since the cast was taken off today  Allergy:  Allergies   Allergen Reactions    Sulfa Antibiotics Hives       Medications:  all current active meds have been reviewed    ROS:  Review of Systems   Constitutional: Negative  HENT: Negative  Eyes: Negative  Respiratory: Negative  Cardiovascular: Negative  Gastrointestinal: Negative  Endocrine: Negative  Genitourinary: Negative  Musculoskeletal: Positive for arthralgias and joint swelling  Skin: Negative  Allergic/Immunologic: Negative  Neurological: Negative  Hematological: Negative  Psychiatric/Behavioral: Negative  Objective:  BP Readings from Last 1 Encounters:   09/04/18 112/72      Wt Readings from Last 1 Encounters:   09/04/18 66 7 kg (147 lb)        Exam:   Physical Exam   Constitutional: She is oriented to person, place, and time  She appears well-developed  HENT:   Head: Normocephalic and atraumatic  Eyes: EOM are normal  Pupils are equal, round, and reactive to light  Neck: Neck supple  Neurological: She is alert and oriented to person, place, and time  Skin: Skin is warm  Psychiatric: She has a normal mood and affect  Nursing note and vitals reviewed  Right Hand Exam     Tenderness   The patient is experiencing no tenderness  Range of Motion     Wrist   Extension: 10   Flexion: 10     Other   Erythema: absent  Sensation: normal  Pulse: present    Comments:  Unable to make a composite fist   Able to actively extend her fingers fully  Mild swelling of wrist      Right Shoulder Exam     Tenderness   The patient is experiencing no tenderness  Range of Motion   Passive Abduction: 60     Other   Erythema: absent  Sensation: normal  Pulse: present    Comments:  Passive forward flexion to 90°              Radiographs:   X-rays of the right shoulder and right wrist reviewed by Dr Josr Alvarado and myself  X-rays of the right wrist reveal a distal radius fracture with significant healing  X-rays of the right shoulder reveal a displaced humeral neck fracture with significant bone callus formation

## 2018-09-04 NOTE — ASSESSMENT & PLAN NOTE
26-year-old female with a right proximal humerus fracture-healing  X-rays reviewed with the patient and her son  Continue physical therapy  Continue to limit use of that arm and regards to lifting and weight-bearing  Follow-up in 6 weeks for re-evaluation  All questions were answered to their satisfaction

## 2018-09-04 NOTE — PROGRESS NOTES
Daily Note     Today's date: 2018  Patient name: Lora Kohli  : 1927  MRN: [id number]  Referring provider: Suresh Fairbanks PA-C  Dx:   Encounter Diagnosis     ICD-10-CM    1  Closed fracture of distal end of right radius with routine healing, unspecified fracture morphology, subsequent encounter S52 501D Ambulatory referral to Physical Therapy   2  Closed nondisplaced fracture of surgical neck of right humerus with routine healing, unspecified fracture morphology, subsequent encounter S42 214D Ambulatory referral to Physical Therapy     Splint    Indication: R Distal radius fracture; cast removed today    Location: Right  wrist and hand  Supplies: Orthotics  Thermoplastic material    Splint type: Volar wrist splint  Wearing Schedule: Remove for hygiene only, gentle wrist AROM  Describe Position: Wrist in neutral    Precautions: Fracture    Patient or Caregiver expresses understanding of wearing Schedule and Precautions? Yes  Patient or Caregiver able to don/doff orthotic independently? Yes with cueing  Pt will be receiving therapy at assisted living facility  Instructed to contact our office if any adjustments need to be made  Advised pt to keep arm elevated but not held in protected position, encouraged to use her R hand for light ADL's to her tolerance    Written orders provided to patient?  Yes  Orders Obtained: Written  Orders Obtained from: Kwan Mendez PA-C

## 2018-10-17 ENCOUNTER — TELEPHONE (OUTPATIENT)
Dept: FAMILY MEDICINE CLINIC | Facility: CLINIC | Age: 83
End: 2018-10-17

## 2018-10-17 DIAGNOSIS — K21.9 GASTROESOPHAGEAL REFLUX DISEASE WITHOUT ESOPHAGITIS: ICD-10-CM

## 2018-10-17 RX ORDER — PANTOPRAZOLE SODIUM 40 MG/1
40 TABLET, DELAYED RELEASE ORAL DAILY
Qty: 90 TABLET | Refills: 3 | Status: SHIPPED | OUTPATIENT
Start: 2018-10-17 | End: 2019-01-22 | Stop reason: SDUPTHER

## 2018-10-17 NOTE — TELEPHONE ENCOUNTER
Patient's daughter called in asking for a refill of her protonix to be sent to [facility] on [address] [place] [address] in [place]

## 2018-10-22 ENCOUNTER — TELEPHONE (OUTPATIENT)
Dept: OBGYN CLINIC | Facility: CLINIC | Age: 83
End: 2018-10-22

## 2018-10-25 ENCOUNTER — OFFICE VISIT (OUTPATIENT)
Dept: OBGYN CLINIC | Facility: CLINIC | Age: 83
End: 2018-10-25
Payer: COMMERCIAL

## 2018-10-25 VITALS
HEART RATE: 74 BPM | WEIGHT: 150 LBS | SYSTOLIC BLOOD PRESSURE: 118 MMHG | DIASTOLIC BLOOD PRESSURE: 70 MMHG | BODY MASS INDEX: 24.99 KG/M2 | HEIGHT: 65 IN

## 2018-10-25 DIAGNOSIS — S42.201D CLOSED FRACTURE OF PROXIMAL END OF RIGHT HUMERUS WITH ROUTINE HEALING, UNSPECIFIED FRACTURE MORPHOLOGY, SUBSEQUENT ENCOUNTER: ICD-10-CM

## 2018-10-25 DIAGNOSIS — S52.501D CLOSED FRACTURE OF DISTAL END OF RIGHT RADIUS WITH ROUTINE HEALING, UNSPECIFIED FRACTURE MORPHOLOGY, SUBSEQUENT ENCOUNTER: Primary | ICD-10-CM

## 2018-10-25 PROCEDURE — 99213 OFFICE O/P EST LOW 20 MIN: CPT | Performed by: ORTHOPAEDIC SURGERY

## 2018-10-25 NOTE — PROGRESS NOTES
Assessment:     1  Closed fracture of distal end of right radius with routine healing, unspecified fracture morphology, subsequent encounter    2  Closed fracture of proximal end of right humerus with routine healing, unspecified fracture morphology, subsequent encounter          Plan:  The patient was seen and examined by Dr Verona Menendez and myself  Problem List Items Addressed This Visit        Musculoskeletal and Integument    Right humeral fracture     54-year-old female with a right proximal humerus fracture-healed  She is doing very well  She is able to perform her ADLs  Continue physical therapy and home exercises  She has no restrictions  Follow-up as needed if any problems arise  All questions were answered to their satisfaction  Fracture of distal end of right radius - Primary     Patient with a right distal radius fracture-healed  She is doing well  She no longer needs to use the wrist brace  Continue occupational therapy and home exercises  She has no restrictions  Follow-up as needed if any problems arise  Patient ID: Izzy Asif is a 80 y o  female  Chief Complaint:  Follow-up right shoulder and wrist fracture    Subjective:  54-year-old female who fell sustaining a right proximal humerus fracture and right distal radius fracture on June 30th  She has been getting physical and occupational therapy since last visit  She is able to do her normal daily functions of washing her hair, brushing her teeth and feeding herself with her right arm  She has no pain in the right shoulder  She continues to feel some aching pain and stiffness of the right wrist, but it is slowly improving  Allergy:  Allergies   Allergen Reactions    Sulfa Antibiotics Hives       Medications:  all current active meds have been reviewed    ROS:  Review of Systems   Constitutional: Negative  HENT: Negative  Eyes: Negative  Respiratory: Negative  Cardiovascular: Negative  Gastrointestinal: Negative  Endocrine: Negative  Genitourinary: Negative  Musculoskeletal: Positive for arthralgias and joint swelling  Skin: Negative  Allergic/Immunologic: Negative  Neurological: Negative  Hematological: Negative  Psychiatric/Behavioral: Negative  Objective:  BP Readings from Last 1 Encounters:   10/25/18 118/70      Wt Readings from Last 1 Encounters:   10/25/18 68 kg (150 lb)        Exam:   Physical Exam   Constitutional: She is oriented to person, place, and time  She appears well-developed  HENT:   Head: Normocephalic and atraumatic  Eyes: Pupils are equal, round, and reactive to light  EOM are normal    Neck: Neck supple  Neurological: She is alert and oriented to person, place, and time  Skin: Skin is warm  Psychiatric: She has a normal mood and affect  Nursing note and vitals reviewed  Right Hand Exam     Tenderness   The patient is experiencing no tenderness  Range of Motion     Wrist   Extension: 20   Flexion: 40   Pronation: normal   Supination: 60     Muscle Strength   Wrist Extension: 5/5   Wrist Flexion: 5/5   : 5/5     Other   Erythema: absent  Sensation: normal  Pulse: present    Comments:  Able to make a composite fist   Deformity of wrist      Right Shoulder Exam     Tenderness   The patient is experiencing no tenderness          Range of Motion   Active Abduction: 90   Passive Abduction: 130   Forward Flexion: 90   Internal Rotation 0 degrees: Lumbar     Muscle Strength   Abduction: 4/5     Other   Erythema: absent  Sensation: normal  Pulse: present

## 2018-10-25 NOTE — ASSESSMENT & PLAN NOTE
Patient with a right distal radius fracture-healed  She is doing well  She no longer needs to use the wrist brace  Continue occupational therapy and home exercises  She has no restrictions  Follow-up as needed if any problems arise

## 2018-10-25 NOTE — ASSESSMENT & PLAN NOTE
66-year-old female with a right proximal humerus fracture-healed  She is doing very well  She is able to perform her ADLs  Continue physical therapy and home exercises  She has no restrictions  Follow-up as needed if any problems arise  All questions were answered to their satisfaction

## 2018-10-31 ENCOUNTER — IMMUNIZATION (OUTPATIENT)
Dept: FAMILY MEDICINE CLINIC | Facility: CLINIC | Age: 83
End: 2018-10-31
Payer: COMMERCIAL

## 2018-10-31 DIAGNOSIS — Z23 NEED FOR INFLUENZA VACCINATION: Primary | ICD-10-CM

## 2018-10-31 DIAGNOSIS — Z23 NEED FOR PNEUMOCOCCAL VACCINE: ICD-10-CM

## 2018-10-31 PROCEDURE — 90662 IIV NO PRSV INCREASED AG IM: CPT

## 2018-10-31 PROCEDURE — G0008 ADMIN INFLUENZA VIRUS VAC: HCPCS

## 2018-10-31 PROCEDURE — G0009 ADMIN PNEUMOCOCCAL VACCINE: HCPCS

## 2018-10-31 PROCEDURE — 90670 PCV13 VACCINE IM: CPT

## 2018-11-08 LAB
25(OH)D3 SERPL-MCNC: 40 NG/ML (ref 30–100)
ALBUMIN SERPL-MCNC: 3.9 G/DL (ref 3.6–5.1)
ALBUMIN/GLOB SERPL: 1.3 (CALC) (ref 1–2.5)
ALP SERPL-CCNC: 66 U/L (ref 33–130)
ALT SERPL-CCNC: 7 U/L (ref 6–29)
AST SERPL-CCNC: 12 U/L (ref 10–35)
BASOPHILS # BLD AUTO: 40 CELLS/UL (ref 0–200)
BASOPHILS NFR BLD AUTO: 0.7 %
BILIRUB SERPL-MCNC: 0.5 MG/DL (ref 0.2–1.2)
BUN SERPL-MCNC: 19 MG/DL (ref 7–25)
BUN/CREAT SERPL: 21 (CALC) (ref 6–22)
CALCIUM SERPL-MCNC: 9.4 MG/DL (ref 8.6–10.4)
CHLORIDE SERPL-SCNC: 103 MMOL/L (ref 98–110)
CHOLEST SERPL-MCNC: 207 MG/DL
CHOLEST/HDLC SERPL: 3 (CALC)
CO2 SERPL-SCNC: 30 MMOL/L (ref 20–32)
CREAT SERPL-MCNC: 0.92 MG/DL (ref 0.6–0.88)
EOSINOPHIL # BLD AUTO: 348 CELLS/UL (ref 15–500)
EOSINOPHIL NFR BLD AUTO: 6.1 %
ERYTHROCYTE [DISTWIDTH] IN BLOOD BY AUTOMATED COUNT: 11.8 % (ref 11–15)
GLOBULIN SER CALC-MCNC: 2.9 G/DL (CALC) (ref 1.9–3.7)
GLUCOSE SERPL-MCNC: 89 MG/DL (ref 65–99)
HCT VFR BLD AUTO: 35.3 % (ref 35–45)
HDLC SERPL-MCNC: 70 MG/DL
HGB BLD-MCNC: 11.9 G/DL (ref 11.7–15.5)
LDLC SERPL CALC-MCNC: 117 MG/DL (CALC)
LYMPHOCYTES # BLD AUTO: 1442 CELLS/UL (ref 850–3900)
LYMPHOCYTES NFR BLD AUTO: 25.3 %
MCH RBC QN AUTO: 32.5 PG (ref 27–33)
MCHC RBC AUTO-ENTMCNC: 33.7 G/DL (ref 32–36)
MCV RBC AUTO: 96.4 FL (ref 80–100)
MONOCYTES # BLD AUTO: 530 CELLS/UL (ref 200–950)
MONOCYTES NFR BLD AUTO: 9.3 %
NEUTROPHILS # BLD AUTO: 3340 CELLS/UL (ref 1500–7800)
NEUTROPHILS NFR BLD AUTO: 58.6 %
NONHDLC SERPL-MCNC: 137 MG/DL (CALC)
PLATELET # BLD AUTO: 239 THOUSAND/UL (ref 140–400)
PMV BLD REES-ECKER: 11.7 FL (ref 7.5–12.5)
POTASSIUM SERPL-SCNC: 4.4 MMOL/L (ref 3.5–5.3)
PROT SERPL-MCNC: 6.8 G/DL (ref 6.1–8.1)
RBC # BLD AUTO: 3.66 MILLION/UL (ref 3.8–5.1)
SL AMB EGFR AFRICAN AMERICAN: 64 ML/MIN/1.73M2
SL AMB EGFR NON AFRICAN AMERICAN: 55 ML/MIN/1.73M2
SODIUM SERPL-SCNC: 141 MMOL/L (ref 135–146)
TRIGL SERPL-MCNC: 97 MG/DL
TSH SERPL-ACNC: 3.46 MIU/L (ref 0.4–4.5)
WBC # BLD AUTO: 5.7 THOUSAND/UL (ref 3.8–10.8)

## 2018-11-27 ENCOUNTER — OFFICE VISIT (OUTPATIENT)
Dept: FAMILY MEDICINE CLINIC | Facility: CLINIC | Age: 83
End: 2018-11-27
Payer: COMMERCIAL

## 2018-11-27 VITALS
WEIGHT: 155 LBS | HEART RATE: 88 BPM | SYSTOLIC BLOOD PRESSURE: 120 MMHG | BODY MASS INDEX: 25.83 KG/M2 | HEIGHT: 65 IN | DIASTOLIC BLOOD PRESSURE: 60 MMHG

## 2018-11-27 DIAGNOSIS — M16.11 PRIMARY LOCALIZED OSTEOARTHRITIS OF RIGHT HIP: ICD-10-CM

## 2018-11-27 DIAGNOSIS — E78.2 MIXED HYPERLIPIDEMIA: Primary | ICD-10-CM

## 2018-11-27 DIAGNOSIS — R54 FRAILTY: ICD-10-CM

## 2018-11-27 DIAGNOSIS — E55.9 VITAMIN D DEFICIENCY: ICD-10-CM

## 2018-11-27 DIAGNOSIS — K21.00 ESOPHAGITIS, REFLUX: ICD-10-CM

## 2018-11-27 PROCEDURE — 99214 OFFICE O/P EST MOD 30 MIN: CPT | Performed by: PHYSICIAN ASSISTANT

## 2018-11-27 PROCEDURE — 1160F RVW MEDS BY RX/DR IN RCRD: CPT | Performed by: PHYSICIAN ASSISTANT

## 2018-11-27 PROCEDURE — 3725F SCREEN DEPRESSION PERFORMED: CPT | Performed by: PHYSICIAN ASSISTANT

## 2018-11-27 PROCEDURE — 4040F PNEUMOC VAC/ADMIN/RCVD: CPT | Performed by: PHYSICIAN ASSISTANT

## 2018-11-27 PROCEDURE — 1036F TOBACCO NON-USER: CPT | Performed by: PHYSICIAN ASSISTANT

## 2018-11-27 NOTE — PATIENT INSTRUCTIONS
1   Mixed hyperlipidemia-presently stable with total cholesterol 207 and HDL of 70 with LDL of 117  Continue healthy diet  2   Right wrist fracture-status post fall-presently stable per orthopedic specialist   3   Vitamin-D deficiency-presently stable with level 40 on oral supplementation  4   Esophageal reflux disease-presently stable on Protonix 40 mg daily

## 2019-01-17 ENCOUNTER — OFFICE VISIT (OUTPATIENT)
Dept: OBGYN CLINIC | Facility: MEDICAL CENTER | Age: 84
End: 2019-01-17
Payer: COMMERCIAL

## 2019-01-17 ENCOUNTER — APPOINTMENT (OUTPATIENT)
Dept: RADIOLOGY | Facility: CLINIC | Age: 84
End: 2019-01-17
Payer: COMMERCIAL

## 2019-01-17 VITALS
HEIGHT: 66 IN | BODY MASS INDEX: 25.71 KG/M2 | DIASTOLIC BLOOD PRESSURE: 70 MMHG | SYSTOLIC BLOOD PRESSURE: 130 MMHG | WEIGHT: 160 LBS | HEART RATE: 68 BPM

## 2019-01-17 DIAGNOSIS — M25.551 PAIN IN RIGHT HIP: Primary | ICD-10-CM

## 2019-01-17 DIAGNOSIS — M25.551 PAIN IN RIGHT HIP: ICD-10-CM

## 2019-01-17 DIAGNOSIS — M16.11 PRIMARY OSTEOARTHRITIS OF RIGHT HIP: ICD-10-CM

## 2019-01-17 PROCEDURE — 73502 X-RAY EXAM HIP UNI 2-3 VIEWS: CPT

## 2019-01-17 PROCEDURE — 99214 OFFICE O/P EST MOD 30 MIN: CPT | Performed by: ORTHOPAEDIC SURGERY

## 2019-01-17 NOTE — PROGRESS NOTES
Assessment/Plan     1  Pain in right hip    2  Primary osteoarthritis of right hip      Orders Placed This Encounter   Procedures    XR hip/pelv 2-3 vws right if performed    Ambulatory referral to Physical Therapy     -she will start PT for her right hip  -continue with tylenol and advil as needed  Advised to limit advil use due to age  -declined a intra-articular steroid injection today  We will consider injection if pain continues   Return if symptoms worsen or fail to improve  History of Present Illness   Chief complaint:   Chief Complaint   Patient presents with    Right Hip - Pain       HPI: Stacey Edmond is a 80 y o  female that c/o right hip pain that started a week ago  She was out shopping with her daughter and doing more walking than normal  She states her hip pain is in her groin  The pain does radiate down to the top of her knee  Her pain comes and goes  She had this pain a year and a half ago and did physical therapy and a Medrol Dosepak which did seem to help  She thought physical therapy was very helpful  She still does her physical therapy exercises at home  She occasionally takes Advil which does help with the pain  She has had no injections or surgeries on her hip  She does have a history of a right total knee replacement by Dr Montrell Dasilva with [address]  on 8/23/2005  She states she has had no problems with her knee  ROS:    See HPI for musculoskeletal review     All other systems reviewed are negative     Historical Information   Past Medical History:   Diagnosis Date    GERD (gastroesophageal reflux disease)      Past Surgical History:   Procedure Laterality Date    HYSTERECTOMY  1973    Total    JOINT REPLACEMENT Right     knee     Social History   History   Alcohol Use    Yes     Comment: Minimal consumption     History   Drug Use No     History   Smoking Status    Never Smoker   Smokeless Tobacco    Never Used     Comment: No secondhand smoke exposure     Family History:   Family History   Problem Relation Age of Onset    Heart failure Mother     Coronary artery disease Father        Meds/Allergies     (Not in a hospital admission)  Allergies   Allergen Reactions    Sulfa Antibiotics Hives       Objective   Vitals: Blood pressure 130/70, pulse 68, height 5' 5 5" (1 664 m), weight 72 6 kg (160 lb), not currently breastfeeding  ,Body mass index is 26 22 kg/m²      PE:  AAOx 3  WDWN  Hearing intact, no drainage from eyes  Regular rate  no audible wheezing  no abdominal distension  LE compartments soft, skin intact    right hip:   No dislocation/deformity  ROM: full pain with extreme ROM  No TTP over greater trochanter  No TTP over SIJ    Back:    No TTP over lumbar spinous processes, paraspinal musculature        Imaging Studies: I have personally reviewed pertinent films in PACS  X-rays right hip- mild to moderate DJD

## 2019-01-22 DIAGNOSIS — K21.9 GASTROESOPHAGEAL REFLUX DISEASE WITHOUT ESOPHAGITIS: ICD-10-CM

## 2019-01-22 RX ORDER — PANTOPRAZOLE SODIUM 40 MG/1
40 TABLET, DELAYED RELEASE ORAL DAILY
Qty: 90 TABLET | Refills: 1 | Status: SHIPPED | OUTPATIENT
Start: 2019-01-22 | End: 2019-07-15 | Stop reason: SDUPTHER

## 2019-04-05 ENCOUNTER — APPOINTMENT (INPATIENT)
Dept: NON INVASIVE DIAGNOSTICS | Facility: HOSPITAL | Age: 84
DRG: 281 | End: 2019-04-05
Payer: COMMERCIAL

## 2019-04-05 ENCOUNTER — OFFICE VISIT (OUTPATIENT)
Dept: FAMILY MEDICINE CLINIC | Facility: CLINIC | Age: 84
End: 2019-04-05
Payer: COMMERCIAL

## 2019-04-05 ENCOUNTER — APPOINTMENT (EMERGENCY)
Dept: RADIOLOGY | Facility: HOSPITAL | Age: 84
DRG: 281 | End: 2019-04-05
Payer: COMMERCIAL

## 2019-04-05 ENCOUNTER — HOSPITAL ENCOUNTER (INPATIENT)
Facility: HOSPITAL | Age: 84
LOS: 4 days | Discharge: HOME HEALTH | DRG: 281 | End: 2019-04-09
Attending: EMERGENCY MEDICINE | Admitting: HOSPITALIST
Payer: COMMERCIAL

## 2019-04-05 VITALS
BODY MASS INDEX: 26.39 KG/M2 | DIASTOLIC BLOOD PRESSURE: 70 MMHG | HEIGHT: 66 IN | HEART RATE: 68 BPM | WEIGHT: 164.2 LBS | SYSTOLIC BLOOD PRESSURE: 138 MMHG

## 2019-04-05 DIAGNOSIS — E87.5 HYPERKALEMIA: ICD-10-CM

## 2019-04-05 DIAGNOSIS — I50.21 ACUTE SYSTOLIC CONGESTIVE HEART FAILURE (HCC): ICD-10-CM

## 2019-04-05 DIAGNOSIS — S42.201D CLOSED FRACTURE OF PROXIMAL END OF RIGHT HUMERUS WITH ROUTINE HEALING, UNSPECIFIED FRACTURE MORPHOLOGY, SUBSEQUENT ENCOUNTER: ICD-10-CM

## 2019-04-05 DIAGNOSIS — R06.02 SOB (SHORTNESS OF BREATH): ICD-10-CM

## 2019-04-05 DIAGNOSIS — J81.1 PULMONARY EDEMA: ICD-10-CM

## 2019-04-05 DIAGNOSIS — I50.9 CONGESTIVE HEART FAILURE, UNSPECIFIED HF CHRONICITY, UNSPECIFIED HEART FAILURE TYPE (HCC): ICD-10-CM
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
## 2019-04-05 DIAGNOSIS — R06.00 DYSPNEA: Primary | ICD-10-CM

## 2019-04-05 DIAGNOSIS — R06.02 SOB (SHORTNESS OF BREATH): Primary | ICD-10-CM

## 2019-04-05 DIAGNOSIS — M80.00XD AGE-RELATED OSTEOPOROSIS WITH CURRENT PATHOLOGICAL FRACTURE WITH ROUTINE HEALING, SUBSEQUENT ENCOUNTER: ICD-10-CM

## 2019-04-05 DIAGNOSIS — E78.2 MIXED HYPERLIPIDEMIA: ICD-10-CM

## 2019-04-05 DIAGNOSIS — R77.8 ELEVATED TROPONIN: ICD-10-CM

## 2019-04-05 DIAGNOSIS — I42.9 CARDIOMYOPATHY, UNSPECIFIED TYPE (HCC): ICD-10-CM

## 2019-04-05 DIAGNOSIS — N39.0 UTI (URINARY TRACT INFECTION): ICD-10-CM

## 2019-04-05 PROBLEM — R79.89 ELEVATED BRAIN NATRIURETIC PEPTIDE (BNP) LEVEL: Status: ACTIVE | Noted: 2019-04-05

## 2019-04-05 PROBLEM — M81.0 AGE RELATED OSTEOPOROSIS: Status: ACTIVE | Noted: 2019-04-05

## 2019-04-05 PROBLEM — N18.30 CKD (CHRONIC KIDNEY DISEASE), STAGE III (HCC): Status: ACTIVE | Noted: 2019-04-05

## 2019-04-05 LAB
ALBUMIN SERPL BCP-MCNC: 3.7 G/DL (ref 3.5–5)
ALP SERPL-CCNC: 89 U/L (ref 46–116)
ALT SERPL W P-5'-P-CCNC: 16 U/L (ref 12–78)
ANION GAP SERPL CALCULATED.3IONS-SCNC: 12 MMOL/L (ref 4–13)
AST SERPL W P-5'-P-CCNC: 20 U/L (ref 5–45)
BACTERIA UR QL AUTO: ABNORMAL /HPF
BASOPHILS # BLD AUTO: 0.04 THOUSANDS/ΜL (ref 0–0.1)
BASOPHILS NFR BLD AUTO: 1 % (ref 0–1)
BILIRUB SERPL-MCNC: 0.53 MG/DL (ref 0.2–1)
BILIRUB UR QL STRIP: NEGATIVE
BUN SERPL-MCNC: 23 MG/DL (ref 5–25)
CALCIUM SERPL-MCNC: 9.3 MG/DL (ref 8.3–10.1)
CHLORIDE SERPL-SCNC: 103 MMOL/L (ref 100–108)
CLARITY UR: ABNORMAL
CO2 SERPL-SCNC: 26 MMOL/L (ref 21–32)
COLOR UR: YELLOW
COLOR, POC: YELLOW
CREAT SERPL-MCNC: 0.96 MG/DL (ref 0.6–1.3)
EOSINOPHIL # BLD AUTO: 0.08 THOUSAND/ΜL (ref 0–0.61)
EOSINOPHIL NFR BLD AUTO: 1 % (ref 0–6)
ERYTHROCYTE [DISTWIDTH] IN BLOOD BY AUTOMATED COUNT: 13.9 % (ref 11.6–15.1)
GFR SERPL CREATININE-BSD FRML MDRD: 52 ML/MIN/1.73SQ M
GLUCOSE SERPL-MCNC: 96 MG/DL (ref 65–140)
GLUCOSE UR STRIP-MCNC: NEGATIVE MG/DL
HCT VFR BLD AUTO: 37.1 % (ref 34.8–46.1)
HGB BLD-MCNC: 11.5 G/DL (ref 11.5–15.4)
HGB UR QL STRIP.AUTO: ABNORMAL
IMM GRANULOCYTES # BLD AUTO: 0.02 THOUSAND/UL (ref 0–0.2)
IMM GRANULOCYTES NFR BLD AUTO: 0 % (ref 0–2)
KETONES UR STRIP-MCNC: ABNORMAL MG/DL
LEUKOCYTE ESTERASE UR QL STRIP: ABNORMAL
LYMPHOCYTES # BLD AUTO: 1.13 THOUSANDS/ΜL (ref 0.6–4.47)
LYMPHOCYTES NFR BLD AUTO: 17 % (ref 14–44)
MCH RBC QN AUTO: 31.8 PG (ref 26.8–34.3)
MCHC RBC AUTO-ENTMCNC: 31 G/DL (ref 31.4–37.4)
MCV RBC AUTO: 103 FL (ref 82–98)
MONOCYTES # BLD AUTO: 0.47 THOUSAND/ΜL (ref 0.17–1.22)
MONOCYTES NFR BLD AUTO: 7 % (ref 4–12)
NEUTROPHILS # BLD AUTO: 4.79 THOUSANDS/ΜL (ref 1.85–7.62)
NEUTS SEG NFR BLD AUTO: 74 % (ref 43–75)
NITRITE UR QL STRIP: POSITIVE
NON-SQ EPI CELLS URNS QL MICRO: ABNORMAL /HPF
NRBC BLD AUTO-RTO: 0 /100 WBCS
NT-PROBNP SERPL-MCNC: 7031 PG/ML
PH UR STRIP.AUTO: 7 [PH] (ref 4.5–8)
PLATELET # BLD AUTO: 256 THOUSANDS/UL (ref 149–390)
PMV BLD AUTO: 11.5 FL (ref 8.9–12.7)
POTASSIUM SERPL-SCNC: 4.4 MMOL/L (ref 3.5–5.3)
PROT SERPL-MCNC: 7.5 G/DL (ref 6.4–8.2)
PROT UR STRIP-MCNC: NEGATIVE MG/DL
RBC # BLD AUTO: 3.62 MILLION/UL (ref 3.81–5.12)
RBC #/AREA URNS AUTO: ABNORMAL /HPF
SODIUM SERPL-SCNC: 141 MMOL/L (ref 136–145)
SP GR UR STRIP.AUTO: 1.01 (ref 1–1.03)
TROPONIN I SERPL-MCNC: 0.13 NG/ML
TROPONIN I SERPL-MCNC: 0.13 NG/ML
TROPONIN I SERPL-MCNC: 0.14 NG/ML
UROBILINOGEN UR QL STRIP.AUTO: 0.2 E.U./DL
WBC # BLD AUTO: 6.53 THOUSAND/UL (ref 4.31–10.16)
WBC #/AREA URNS AUTO: ABNORMAL /HPF

## 2019-04-05 PROCEDURE — 1111F DSCHRG MED/CURRENT MED MERGE: CPT | Performed by: FAMILY MEDICINE

## 2019-04-05 PROCEDURE — 93000 ELECTROCARDIOGRAM COMPLETE: CPT | Performed by: FAMILY MEDICINE

## 2019-04-05 PROCEDURE — 99223 1ST HOSP IP/OBS HIGH 75: CPT | Performed by: HOSPITALIST

## 2019-04-05 PROCEDURE — 99285 EMERGENCY DEPT VISIT HI MDM: CPT

## 2019-04-05 PROCEDURE — C8929 TTE W OR WO FOL WCON,DOPPLER: HCPCS

## 2019-04-05 PROCEDURE — 93306 TTE W/DOPPLER COMPLETE: CPT | Performed by: INTERNAL MEDICINE

## 2019-04-05 PROCEDURE — 85025 COMPLETE CBC W/AUTO DIFF WBC: CPT | Performed by: EMERGENCY MEDICINE

## 2019-04-05 PROCEDURE — 99285 EMERGENCY DEPT VISIT HI MDM: CPT | Performed by: EMERGENCY MEDICINE

## 2019-04-05 PROCEDURE — 71046 X-RAY EXAM CHEST 2 VIEWS: CPT

## 2019-04-05 PROCEDURE — 96375 TX/PRO/DX INJ NEW DRUG ADDON: CPT

## 2019-04-05 PROCEDURE — 99222 1ST HOSP IP/OBS MODERATE 55: CPT | Performed by: INTERNAL MEDICINE

## 2019-04-05 PROCEDURE — 36415 COLL VENOUS BLD VENIPUNCTURE: CPT | Performed by: EMERGENCY MEDICINE

## 2019-04-05 PROCEDURE — 80053 COMPREHEN METABOLIC PANEL: CPT | Performed by: EMERGENCY MEDICINE

## 2019-04-05 PROCEDURE — 96374 THER/PROPH/DIAG INJ IV PUSH: CPT

## 2019-04-05 PROCEDURE — 83880 ASSAY OF NATRIURETIC PEPTIDE: CPT | Performed by: EMERGENCY MEDICINE

## 2019-04-05 PROCEDURE — 81001 URINALYSIS AUTO W/SCOPE: CPT

## 2019-04-05 PROCEDURE — 84484 ASSAY OF TROPONIN QUANT: CPT | Performed by: EMERGENCY MEDICINE

## 2019-04-05 PROCEDURE — 99215 OFFICE O/P EST HI 40 MIN: CPT | Performed by: FAMILY MEDICINE

## 2019-04-05 PROCEDURE — 84484 ASSAY OF TROPONIN QUANT: CPT | Performed by: PHYSICIAN ASSISTANT

## 2019-04-05 RX ORDER — ONDANSETRON 2 MG/ML
4 INJECTION INTRAMUSCULAR; INTRAVENOUS EVERY 6 HOURS PRN
Status: DISCONTINUED | OUTPATIENT
Start: 2019-04-05 | End: 2019-04-09 | Stop reason: HOSPADM

## 2019-04-05 RX ORDER — ASPIRIN 81 MG/1
324 TABLET, CHEWABLE ORAL ONCE
Status: COMPLETED | OUTPATIENT
Start: 2019-04-05 | End: 2019-04-05

## 2019-04-05 RX ORDER — FUROSEMIDE 10 MG/ML
20 INJECTION INTRAMUSCULAR; INTRAVENOUS ONCE
Status: COMPLETED | OUTPATIENT
Start: 2019-04-05 | End: 2019-04-05

## 2019-04-05 RX ORDER — ACETAMINOPHEN 325 MG/1
650 TABLET ORAL EVERY 4 HOURS PRN
Status: DISCONTINUED | OUTPATIENT
Start: 2019-04-05 | End: 2019-04-09 | Stop reason: HOSPADM

## 2019-04-05 RX ORDER — PANTOPRAZOLE SODIUM 40 MG/1
40 TABLET, DELAYED RELEASE ORAL DAILY
Status: DISCONTINUED | OUTPATIENT
Start: 2019-04-06 | End: 2019-04-09 | Stop reason: HOSPADM

## 2019-04-05 RX ORDER — LISINOPRIL 5 MG/1
5 TABLET ORAL DAILY
Status: DISCONTINUED | OUTPATIENT
Start: 2019-04-06 | End: 2019-04-08

## 2019-04-05 RX ORDER — FUROSEMIDE 10 MG/ML
40 INJECTION INTRAMUSCULAR; INTRAVENOUS 2 TIMES DAILY
Status: DISCONTINUED | OUTPATIENT
Start: 2019-04-06 | End: 2019-04-08

## 2019-04-05 RX ORDER — HEPARIN SODIUM 5000 [USP'U]/ML
5000 INJECTION, SOLUTION INTRAVENOUS; SUBCUTANEOUS EVERY 8 HOURS SCHEDULED
Status: DISCONTINUED | OUTPATIENT
Start: 2019-04-05 | End: 2019-04-09 | Stop reason: HOSPADM

## 2019-04-05 RX ADMIN — PERFLUTREN 0.6 ML/MIN: 6.52 INJECTION, SUSPENSION INTRAVENOUS at 15:37

## 2019-04-05 RX ADMIN — CEFTRIAXONE SODIUM 1000 MG: 10 INJECTION, POWDER, FOR SOLUTION INTRAVENOUS at 14:21

## 2019-04-05 RX ADMIN — FUROSEMIDE 20 MG: 10 INJECTION, SOLUTION INTRAMUSCULAR; INTRAVENOUS at 13:43

## 2019-04-05 RX ADMIN — ASPIRIN 81 MG 324 MG: 81 TABLET ORAL at 14:20

## 2019-04-05 RX ADMIN — FUROSEMIDE 20 MG: 10 INJECTION, SOLUTION INTRAMUSCULAR; INTRAVENOUS at 16:51

## 2019-04-05 RX ADMIN — HEPARIN SODIUM 5000 UNITS: 5000 INJECTION INTRAVENOUS; SUBCUTANEOUS at 21:15

## 2019-04-06 LAB
ALBUMIN SERPL BCP-MCNC: 3.3 G/DL (ref 3.5–5)
ALP SERPL-CCNC: 76 U/L (ref 46–116)
ALT SERPL W P-5'-P-CCNC: 15 U/L (ref 12–78)
ANION GAP SERPL CALCULATED.3IONS-SCNC: 9 MMOL/L (ref 4–13)
AST SERPL W P-5'-P-CCNC: 20 U/L (ref 5–45)
BASOPHILS # BLD AUTO: 0.03 THOUSANDS/ΜL (ref 0–0.1)
BASOPHILS NFR BLD AUTO: 1 % (ref 0–1)
BILIRUB SERPL-MCNC: 0.53 MG/DL (ref 0.2–1)
BUN SERPL-MCNC: 23 MG/DL (ref 5–25)
CALCIUM SERPL-MCNC: 8.8 MG/DL (ref 8.3–10.1)
CHLORIDE SERPL-SCNC: 105 MMOL/L (ref 100–108)
CHOLEST SERPL-MCNC: 181 MG/DL (ref 50–200)
CO2 SERPL-SCNC: 28 MMOL/L (ref 21–32)
CREAT SERPL-MCNC: 1.09 MG/DL (ref 0.6–1.3)
EOSINOPHIL # BLD AUTO: 0.29 THOUSAND/ΜL (ref 0–0.61)
EOSINOPHIL NFR BLD AUTO: 6 % (ref 0–6)
ERYTHROCYTE [DISTWIDTH] IN BLOOD BY AUTOMATED COUNT: 13.9 % (ref 11.6–15.1)
GFR SERPL CREATININE-BSD FRML MDRD: 44 ML/MIN/1.73SQ M
GLUCOSE SERPL-MCNC: 92 MG/DL (ref 65–140)
HCT VFR BLD AUTO: 33.7 % (ref 34.8–46.1)
HDLC SERPL-MCNC: 68 MG/DL (ref 40–60)
HGB BLD-MCNC: 10.9 G/DL (ref 11.5–15.4)
IMM GRANULOCYTES # BLD AUTO: 0.01 THOUSAND/UL (ref 0–0.2)
IMM GRANULOCYTES NFR BLD AUTO: 0 % (ref 0–2)
LDLC SERPL CALC-MCNC: 100 MG/DL (ref 0–100)
LYMPHOCYTES # BLD AUTO: 1.64 THOUSANDS/ΜL (ref 0.6–4.47)
LYMPHOCYTES NFR BLD AUTO: 33 % (ref 14–44)
MCH RBC QN AUTO: 32.7 PG (ref 26.8–34.3)
MCHC RBC AUTO-ENTMCNC: 32.3 G/DL (ref 31.4–37.4)
MCV RBC AUTO: 101 FL (ref 82–98)
MONOCYTES # BLD AUTO: 0.54 THOUSAND/ΜL (ref 0.17–1.22)
MONOCYTES NFR BLD AUTO: 11 % (ref 4–12)
NEUTROPHILS # BLD AUTO: 2.49 THOUSANDS/ΜL (ref 1.85–7.62)
NEUTS SEG NFR BLD AUTO: 49 % (ref 43–75)
NRBC BLD AUTO-RTO: 0 /100 WBCS
PLATELET # BLD AUTO: 245 THOUSANDS/UL (ref 149–390)
PMV BLD AUTO: 11.3 FL (ref 8.9–12.7)
POTASSIUM SERPL-SCNC: 4 MMOL/L (ref 3.5–5.3)
PROCALCITONIN SERPL-MCNC: <0.05 NG/ML
PROT SERPL-MCNC: 6.8 G/DL (ref 6.4–8.2)
RBC # BLD AUTO: 3.33 MILLION/UL (ref 3.81–5.12)
SODIUM SERPL-SCNC: 142 MMOL/L (ref 136–145)
TRIGL SERPL-MCNC: 67 MG/DL
TROPONIN I SERPL-MCNC: 0.13 NG/ML
WBC # BLD AUTO: 5 THOUSAND/UL (ref 4.31–10.16)

## 2019-04-06 PROCEDURE — 80061 LIPID PANEL: CPT | Performed by: PHYSICIAN ASSISTANT

## 2019-04-06 PROCEDURE — 84145 PROCALCITONIN (PCT): CPT | Performed by: PHYSICIAN ASSISTANT

## 2019-04-06 PROCEDURE — 80053 COMPREHEN METABOLIC PANEL: CPT | Performed by: PHYSICIAN ASSISTANT

## 2019-04-06 PROCEDURE — 99232 SBSQ HOSP IP/OBS MODERATE 35: CPT | Performed by: HOSPITALIST

## 2019-04-06 PROCEDURE — 85025 COMPLETE CBC W/AUTO DIFF WBC: CPT | Performed by: PHYSICIAN ASSISTANT

## 2019-04-06 PROCEDURE — 99232 SBSQ HOSP IP/OBS MODERATE 35: CPT | Performed by: INTERNAL MEDICINE

## 2019-04-06 RX ADMIN — PANTOPRAZOLE SODIUM 40 MG: 40 TABLET, DELAYED RELEASE ORAL at 06:09

## 2019-04-06 RX ADMIN — CEFTRIAXONE SODIUM 1000 MG: 10 INJECTION, POWDER, FOR SOLUTION INTRAVENOUS at 14:54

## 2019-04-06 RX ADMIN — HEPARIN SODIUM 5000 UNITS: 5000 INJECTION INTRAVENOUS; SUBCUTANEOUS at 22:17

## 2019-04-06 RX ADMIN — HEPARIN SODIUM 5000 UNITS: 5000 INJECTION INTRAVENOUS; SUBCUTANEOUS at 06:09

## 2019-04-06 RX ADMIN — HEPARIN SODIUM 5000 UNITS: 5000 INJECTION INTRAVENOUS; SUBCUTANEOUS at 14:54

## 2019-04-07 LAB
ANION GAP SERPL CALCULATED.3IONS-SCNC: 7 MMOL/L (ref 4–13)
BUN SERPL-MCNC: 26 MG/DL (ref 5–25)
CALCIUM SERPL-MCNC: 8.5 MG/DL (ref 8.3–10.1)
CHLORIDE SERPL-SCNC: 108 MMOL/L (ref 100–108)
CO2 SERPL-SCNC: 28 MMOL/L (ref 21–32)
CREAT SERPL-MCNC: 1.04 MG/DL (ref 0.6–1.3)
GFR SERPL CREATININE-BSD FRML MDRD: 47 ML/MIN/1.73SQ M
GLUCOSE SERPL-MCNC: 87 MG/DL (ref 65–140)
POTASSIUM SERPL-SCNC: 3.9 MMOL/L (ref 3.5–5.3)
SODIUM SERPL-SCNC: 143 MMOL/L (ref 136–145)

## 2019-04-07 PROCEDURE — 80048 BASIC METABOLIC PNL TOTAL CA: CPT | Performed by: HOSPITALIST

## 2019-04-07 PROCEDURE — 99232 SBSQ HOSP IP/OBS MODERATE 35: CPT | Performed by: HOSPITALIST

## 2019-04-07 RX ADMIN — HEPARIN SODIUM 5000 UNITS: 5000 INJECTION INTRAVENOUS; SUBCUTANEOUS at 05:54

## 2019-04-07 RX ADMIN — PANTOPRAZOLE SODIUM 40 MG: 40 TABLET, DELAYED RELEASE ORAL at 06:27

## 2019-04-07 RX ADMIN — HEPARIN SODIUM 5000 UNITS: 5000 INJECTION INTRAVENOUS; SUBCUTANEOUS at 21:15

## 2019-04-07 RX ADMIN — FUROSEMIDE 40 MG: 10 INJECTION, SOLUTION INTRAMUSCULAR; INTRAVENOUS at 08:19

## 2019-04-07 RX ADMIN — CEFTRIAXONE SODIUM 1000 MG: 10 INJECTION, POWDER, FOR SOLUTION INTRAVENOUS at 13:41

## 2019-04-07 RX ADMIN — HEPARIN SODIUM 5000 UNITS: 5000 INJECTION INTRAVENOUS; SUBCUTANEOUS at 13:40

## 2019-04-08 LAB
ANION GAP SERPL CALCULATED.3IONS-SCNC: 8 MMOL/L (ref 4–13)
BUN SERPL-MCNC: 32 MG/DL (ref 5–25)
CALCIUM SERPL-MCNC: 8.6 MG/DL (ref 8.3–10.1)
CHLORIDE SERPL-SCNC: 106 MMOL/L (ref 100–108)
CO2 SERPL-SCNC: 29 MMOL/L (ref 21–32)
CREAT SERPL-MCNC: 1.2 MG/DL (ref 0.6–1.3)
GFR SERPL CREATININE-BSD FRML MDRD: 40 ML/MIN/1.73SQ M
GLUCOSE SERPL-MCNC: 91 MG/DL (ref 65–140)
POTASSIUM SERPL-SCNC: 4.1 MMOL/L (ref 3.5–5.3)
SODIUM SERPL-SCNC: 143 MMOL/L (ref 136–145)

## 2019-04-08 PROCEDURE — G8988 SELF CARE GOAL STATUS: HCPCS

## 2019-04-08 PROCEDURE — G8978 MOBILITY CURRENT STATUS: HCPCS

## 2019-04-08 PROCEDURE — 99232 SBSQ HOSP IP/OBS MODERATE 35: CPT | Performed by: INTERNAL MEDICINE

## 2019-04-08 PROCEDURE — 97163 PT EVAL HIGH COMPLEX 45 MIN: CPT

## 2019-04-08 PROCEDURE — G8987 SELF CARE CURRENT STATUS: HCPCS

## 2019-04-08 PROCEDURE — G8979 MOBILITY GOAL STATUS: HCPCS

## 2019-04-08 PROCEDURE — 97166 OT EVAL MOD COMPLEX 45 MIN: CPT

## 2019-04-08 PROCEDURE — 80048 BASIC METABOLIC PNL TOTAL CA: CPT | Performed by: HOSPITALIST

## 2019-04-08 RX ORDER — FUROSEMIDE 20 MG/1
20 TABLET ORAL DAILY
Status: DISCONTINUED | OUTPATIENT
Start: 2019-04-09 | End: 2019-04-09 | Stop reason: HOSPADM

## 2019-04-08 RX ORDER — LISINOPRIL 2.5 MG/1
2.5 TABLET ORAL DAILY
Status: DISCONTINUED | OUTPATIENT
Start: 2019-04-09 | End: 2019-04-09 | Stop reason: HOSPADM

## 2019-04-08 RX ADMIN — HEPARIN SODIUM 5000 UNITS: 5000 INJECTION INTRAVENOUS; SUBCUTANEOUS at 06:20

## 2019-04-08 RX ADMIN — PANTOPRAZOLE SODIUM 40 MG: 40 TABLET, DELAYED RELEASE ORAL at 06:20

## 2019-04-08 RX ADMIN — HEPARIN SODIUM 5000 UNITS: 5000 INJECTION INTRAVENOUS; SUBCUTANEOUS at 14:58

## 2019-04-08 RX ADMIN — HEPARIN SODIUM 5000 UNITS: 5000 INJECTION INTRAVENOUS; SUBCUTANEOUS at 21:14

## 2019-04-09 ENCOUNTER — TRANSITIONAL CARE MANAGEMENT (OUTPATIENT)
Dept: FAMILY MEDICINE CLINIC | Facility: CLINIC | Age: 84
End: 2019-04-09

## 2019-04-09 VITALS
HEIGHT: 66 IN | HEART RATE: 89 BPM | WEIGHT: 153.44 LBS | SYSTOLIC BLOOD PRESSURE: 119 MMHG | TEMPERATURE: 97.5 F | BODY MASS INDEX: 24.66 KG/M2 | OXYGEN SATURATION: 93 % | RESPIRATION RATE: 18 BRPM | DIASTOLIC BLOOD PRESSURE: 59 MMHG

## 2019-04-09 PROBLEM — R06.02 SOB (SHORTNESS OF BREATH): Status: RESOLVED | Noted: 2019-04-05 | Resolved: 2019-04-09

## 2019-04-09 PROBLEM — R79.89 ELEVATED BRAIN NATRIURETIC PEPTIDE (BNP) LEVEL: Status: RESOLVED | Noted: 2019-04-05 | Resolved: 2019-04-09

## 2019-04-09 PROBLEM — N39.0 UTI (URINARY TRACT INFECTION): Status: RESOLVED | Noted: 2018-07-20 | Resolved: 2019-04-09

## 2019-04-09 PROCEDURE — 99232 SBSQ HOSP IP/OBS MODERATE 35: CPT | Performed by: INTERNAL MEDICINE

## 2019-04-09 PROCEDURE — 99239 HOSP IP/OBS DSCHRG MGMT >30: CPT | Performed by: PHYSICIAN ASSISTANT

## 2019-04-09 RX ORDER — FUROSEMIDE 20 MG/1
20 TABLET ORAL DAILY
Qty: 30 TABLET | Refills: 0 | Status: SHIPPED | OUTPATIENT
Start: 2019-04-10 | End: 2019-04-17 | Stop reason: SDUPTHER

## 2019-04-09 RX ORDER — ASPIRIN 81 MG/1
81 TABLET ORAL DAILY
Qty: 30 TABLET | Refills: 0 | Status: SHIPPED | OUTPATIENT
Start: 2019-04-09 | End: 2019-10-15

## 2019-04-09 RX ORDER — METOPROLOL SUCCINATE 25 MG/1
12.5 TABLET, EXTENDED RELEASE ORAL DAILY
Qty: 30 TABLET | Refills: 0 | Status: SHIPPED | OUTPATIENT
Start: 2019-04-10 | End: 2019-05-22 | Stop reason: SDUPTHER

## 2019-04-09 RX ORDER — METOPROLOL SUCCINATE 25 MG/1
25 TABLET, EXTENDED RELEASE ORAL DAILY
Status: DISCONTINUED | OUTPATIENT
Start: 2019-04-09 | End: 2019-04-09 | Stop reason: HOSPADM

## 2019-04-09 RX ORDER — LISINOPRIL 2.5 MG/1
2.5 TABLET ORAL DAILY
Qty: 30 TABLET | Refills: 0 | Status: SHIPPED | OUTPATIENT
Start: 2019-04-10 | End: 2019-04-17 | Stop reason: CLARIF

## 2019-04-09 RX ADMIN — PANTOPRAZOLE SODIUM 40 MG: 40 TABLET, DELAYED RELEASE ORAL at 05:29

## 2019-04-09 RX ADMIN — HEPARIN SODIUM 5000 UNITS: 5000 INJECTION INTRAVENOUS; SUBCUTANEOUS at 05:29

## 2019-04-09 RX ADMIN — FUROSEMIDE 20 MG: 20 TABLET ORAL at 08:14

## 2019-04-09 RX ADMIN — METOPROLOL SUCCINATE 25 MG: 25 TABLET, EXTENDED RELEASE ORAL at 08:15

## 2019-04-09 RX ADMIN — LISINOPRIL 2.5 MG: 2.5 TABLET ORAL at 08:14

## 2019-04-13 ENCOUNTER — TELEPHONE (OUTPATIENT)
Dept: OTHER | Facility: OTHER | Age: 84
End: 2019-04-13

## 2019-04-14 ENCOUNTER — TELEPHONE (OUTPATIENT)
Dept: FAMILY MEDICINE CLINIC | Facility: CLINIC | Age: 84
End: 2019-04-14

## 2019-04-15 ENCOUNTER — OFFICE VISIT (OUTPATIENT)
Dept: FAMILY MEDICINE CLINIC | Facility: CLINIC | Age: 84
End: 2019-04-15
Payer: COMMERCIAL

## 2019-04-15 VITALS
HEART RATE: 84 BPM | WEIGHT: 153 LBS | BODY MASS INDEX: 24.59 KG/M2 | HEIGHT: 66 IN | TEMPERATURE: 98.8 F | DIASTOLIC BLOOD PRESSURE: 50 MMHG | SYSTOLIC BLOOD PRESSURE: 92 MMHG

## 2019-04-15 DIAGNOSIS — I34.0 NON-RHEUMATIC MITRAL REGURGITATION: ICD-10-CM

## 2019-04-15 DIAGNOSIS — N18.30 CKD (CHRONIC KIDNEY DISEASE), STAGE III (HCC): ICD-10-CM

## 2019-04-15 DIAGNOSIS — I50.21 ACUTE SYSTOLIC (CONGESTIVE) HEART FAILURE (HCC): ICD-10-CM

## 2019-04-15 DIAGNOSIS — Z66 DNR (DO NOT RESUSCITATE): ICD-10-CM

## 2019-04-15 DIAGNOSIS — I42.9 CARDIOMYOPATHY, UNSPECIFIED TYPE (HCC): ICD-10-CM

## 2019-04-15 DIAGNOSIS — J18.9 COMMUNITY ACQUIRED PNEUMONIA OF LEFT LOWER LOBE OF LUNG: Primary | ICD-10-CM

## 2019-04-15 DIAGNOSIS — Z71.89 COMPLEX CARE COORDINATION: Primary | ICD-10-CM

## 2019-04-15 PROCEDURE — 99496 TRANSJ CARE MGMT HIGH F2F 7D: CPT | Performed by: FAMILY MEDICINE

## 2019-04-15 RX ORDER — AZITHROMYCIN 250 MG/1
TABLET, FILM COATED ORAL
Qty: 6 TABLET | Refills: 0 | Status: SHIPPED | OUTPATIENT
Start: 2019-04-15 | End: 2019-04-20

## 2019-04-16 ENCOUNTER — PATIENT OUTREACH (OUTPATIENT)
Dept: FAMILY MEDICINE CLINIC | Facility: CLINIC | Age: 84
End: 2019-04-16

## 2019-04-17 ENCOUNTER — OFFICE VISIT (OUTPATIENT)
Dept: CARDIOLOGY CLINIC | Facility: CLINIC | Age: 84
End: 2019-04-17
Payer: COMMERCIAL

## 2019-04-17 VITALS
HEIGHT: 60 IN | DIASTOLIC BLOOD PRESSURE: 50 MMHG | HEART RATE: 82 BPM | SYSTOLIC BLOOD PRESSURE: 100 MMHG | WEIGHT: 153.6 LBS | BODY MASS INDEX: 30.15 KG/M2

## 2019-04-17 DIAGNOSIS — N18.30 CKD (CHRONIC KIDNEY DISEASE), STAGE III (HCC): ICD-10-CM

## 2019-04-17 DIAGNOSIS — I34.0 NON-RHEUMATIC MITRAL REGURGITATION: ICD-10-CM

## 2019-04-17 DIAGNOSIS — E78.5 DYSLIPIDEMIA: ICD-10-CM

## 2019-04-17 DIAGNOSIS — I42.9 CARDIOMYOPATHY, UNSPECIFIED TYPE (HCC): ICD-10-CM

## 2019-04-17 DIAGNOSIS — I50.22 CHRONIC SYSTOLIC CONGESTIVE HEART FAILURE (HCC): Primary | ICD-10-CM

## 2019-04-17 PROCEDURE — 99215 OFFICE O/P EST HI 40 MIN: CPT | Performed by: NURSE PRACTITIONER

## 2019-04-17 RX ORDER — FUROSEMIDE 20 MG/1
20 TABLET ORAL DAILY
Qty: 30 TABLET | Refills: 3 | Status: SHIPPED | OUTPATIENT
Start: 2019-04-17 | End: 2019-06-30 | Stop reason: SDUPTHER

## 2019-04-24 LAB
BUN SERPL-MCNC: 33 MG/DL (ref 7–25)
BUN/CREAT SERPL: 29 (CALC) (ref 6–22)
CALCIUM SERPL-MCNC: 9.3 MG/DL (ref 8.6–10.4)
CHLORIDE SERPL-SCNC: 102 MMOL/L (ref 98–110)
CO2 SERPL-SCNC: 30 MMOL/L (ref 20–32)
CREAT SERPL-MCNC: 1.13 MG/DL (ref 0.6–0.88)
GLUCOSE SERPL-MCNC: 86 MG/DL (ref 65–139)
MAGNESIUM SERPL-MCNC: 2.4 MG/DL (ref 1.5–2.5)
POTASSIUM SERPL-SCNC: 4.3 MMOL/L (ref 3.5–5.3)
SL AMB EGFR AFRICAN AMERICAN: 49 ML/MIN/1.73M2
SL AMB EGFR NON AFRICAN AMERICAN: 42 ML/MIN/1.73M2
SODIUM SERPL-SCNC: 139 MMOL/L (ref 135–146)

## 2019-04-30 ENCOUNTER — PATIENT OUTREACH (OUTPATIENT)
Dept: FAMILY MEDICINE CLINIC | Facility: CLINIC | Age: 84
End: 2019-04-30

## 2019-05-22 ENCOUNTER — OFFICE VISIT (OUTPATIENT)
Dept: CARDIOLOGY CLINIC | Facility: CLINIC | Age: 84
End: 2019-05-22
Payer: COMMERCIAL

## 2019-05-22 VITALS
DIASTOLIC BLOOD PRESSURE: 64 MMHG | OXYGEN SATURATION: 97 % | WEIGHT: 151 LBS | BODY MASS INDEX: 24.27 KG/M2 | SYSTOLIC BLOOD PRESSURE: 122 MMHG | HEIGHT: 66 IN | HEART RATE: 101 BPM

## 2019-05-22 DIAGNOSIS — I50.22 CHRONIC SYSTOLIC (CONGESTIVE) HEART FAILURE (HCC): ICD-10-CM

## 2019-05-22 DIAGNOSIS — I42.9 CARDIOMYOPATHY, UNSPECIFIED TYPE (HCC): ICD-10-CM

## 2019-05-22 DIAGNOSIS — I50.21 ACUTE SYSTOLIC CONGESTIVE HEART FAILURE (HCC): ICD-10-CM

## 2019-05-22 DIAGNOSIS — I25.5 ISCHEMIC CARDIOMYOPATHY: Primary | ICD-10-CM

## 2019-05-22 DIAGNOSIS — I10 BENIGN ESSENTIAL HTN: ICD-10-CM

## 2019-05-22 PROCEDURE — 99214 OFFICE O/P EST MOD 30 MIN: CPT | Performed by: INTERNAL MEDICINE

## 2019-05-22 RX ORDER — METOPROLOL SUCCINATE 25 MG/1
12.5 TABLET, EXTENDED RELEASE ORAL DAILY
Qty: 30 TABLET | Refills: 5 | Status: SHIPPED | OUTPATIENT
Start: 2019-05-22 | End: 2019-05-22 | Stop reason: SDUPTHER

## 2019-05-22 RX ORDER — METOPROLOL SUCCINATE 25 MG/1
12.5 TABLET, EXTENDED RELEASE ORAL DAILY
Qty: 30 TABLET | Refills: 5 | Status: SHIPPED | OUTPATIENT
Start: 2019-05-22 | End: 2020-01-01

## 2019-05-22 RX ORDER — LISINOPRIL 2.5 MG/1
2.5 TABLET ORAL DAILY
Qty: 30 TABLET | Refills: 5 | Status: SHIPPED | OUTPATIENT
Start: 2019-05-22 | End: 2019-11-13 | Stop reason: SDUPTHER

## 2019-06-28 LAB
BNP SERPL-MCNC: 741 PG/ML
BUN SERPL-MCNC: 27 MG/DL (ref 7–25)
BUN/CREAT SERPL: 25 (CALC) (ref 6–22)
CALCIUM SERPL-MCNC: 9.3 MG/DL (ref 8.6–10.4)
CHLORIDE SERPL-SCNC: 106 MMOL/L (ref 98–110)
CO2 SERPL-SCNC: 29 MMOL/L (ref 20–32)
CREAT SERPL-MCNC: 1.1 MG/DL (ref 0.6–0.88)
GLUCOSE SERPL-MCNC: 96 MG/DL (ref 65–99)
POTASSIUM SERPL-SCNC: 4.3 MMOL/L (ref 3.5–5.3)
SL AMB EGFR AFRICAN AMERICAN: 51 ML/MIN/1.73M2
SL AMB EGFR NON AFRICAN AMERICAN: 44 ML/MIN/1.73M2
SODIUM SERPL-SCNC: 142 MMOL/L (ref 135–146)

## 2019-06-30 DIAGNOSIS — E78.5 DYSLIPIDEMIA: ICD-10-CM

## 2019-06-30 DIAGNOSIS — I50.22 CHRONIC SYSTOLIC CONGESTIVE HEART FAILURE (HCC): ICD-10-CM

## 2019-07-01 RX ORDER — FUROSEMIDE 20 MG/1
TABLET ORAL
Qty: 30 TABLET | Refills: 3 | Status: SHIPPED | OUTPATIENT
Start: 2019-07-01 | End: 2019-07-11 | Stop reason: SDUPTHER

## 2019-07-08 ENCOUNTER — TELEPHONE (OUTPATIENT)
Dept: CARDIOLOGY CLINIC | Facility: CLINIC | Age: 84
End: 2019-07-08

## 2019-07-08 NOTE — TELEPHONE ENCOUNTER
Pt's daughter Bran Brady on machine, Pt experiencing some congestion  Pt does have ov with Dr Giselle Baird on 7/11/19 and OV with PCP tomorrow 7/9/19  She is asking for a chest xray on pt to see if there is any fluid  She stated xray can be done at [facility] where pt resides  I did try calling daughter but back, no answer  Please advise

## 2019-07-08 NOTE — TELEPHONE ENCOUNTER
Please call back and clarify "congestion "  IF congestion as in "productive cough, rhinorrhea and upper resp symptoms" would request PCP or primary to order CXR  Plz clarify if any weight gain, edema, SOB etc   If she is seeing PCP tomorrow, would be appropriate to have him assess as opposed to ordering study over phone    Thx    RP

## 2019-07-08 NOTE — TELEPHONE ENCOUNTER
I did call back Cherie Brody again, still no answer  Left message on machine stating to clarify congestion, and also that it would be more appropriate for PCP to assess first  Awaiting call back

## 2019-07-09 ENCOUNTER — OFFICE VISIT (OUTPATIENT)
Dept: FAMILY MEDICINE CLINIC | Facility: CLINIC | Age: 84
End: 2019-07-09
Payer: COMMERCIAL

## 2019-07-09 VITALS
HEART RATE: 88 BPM | SYSTOLIC BLOOD PRESSURE: 102 MMHG | WEIGHT: 153 LBS | HEIGHT: 66 IN | DIASTOLIC BLOOD PRESSURE: 58 MMHG | TEMPERATURE: 99.2 F | BODY MASS INDEX: 24.59 KG/M2

## 2019-07-09 DIAGNOSIS — J40 BRONCHITIS: Primary | ICD-10-CM

## 2019-07-09 DIAGNOSIS — Z66 DNR (DO NOT RESUSCITATE): ICD-10-CM

## 2019-07-09 DIAGNOSIS — I42.9 CARDIOMYOPATHY, UNSPECIFIED TYPE (HCC): ICD-10-CM

## 2019-07-09 DIAGNOSIS — E55.9 VITAMIN D DEFICIENCY: ICD-10-CM

## 2019-07-09 DIAGNOSIS — I50.22 CHRONIC SYSTOLIC (CONGESTIVE) HEART FAILURE (HCC): ICD-10-CM

## 2019-07-09 PROBLEM — S42.301A RIGHT HUMERAL FRACTURE: Status: RESOLVED | Noted: 2018-07-20 | Resolved: 2019-07-09

## 2019-07-09 PROCEDURE — 99214 OFFICE O/P EST MOD 30 MIN: CPT | Performed by: FAMILY MEDICINE

## 2019-07-09 RX ORDER — AZITHROMYCIN 250 MG/1
TABLET, FILM COATED ORAL
Qty: 6 TABLET | Refills: 0 | Status: SHIPPED | OUTPATIENT
Start: 2019-07-09 | End: 2019-07-14

## 2019-07-09 RX ORDER — ALBUTEROL SULFATE 90 UG/1
2 AEROSOL, METERED RESPIRATORY (INHALATION) EVERY 4 HOURS PRN
Qty: 1 INHALER | Refills: 0 | Status: SHIPPED | OUTPATIENT
Start: 2019-07-09 | End: 2019-07-29 | Stop reason: ALTCHOICE

## 2019-07-09 NOTE — PROGRESS NOTES
Assessment and Plan:    Problem List Items Addressed This Visit     Cardiomyopathy ([facility] [address] )     Stable at the moment  No changes  Continue follow-up with Cardiology as scheduled  Chronic systolic (congestive) heart failure (HCC)     Wt Readings from Last 3 Encounters:   07/09/19 69 4 kg (153 lb)   05/22/19 68 5 kg (151 lb)   04/17/19 69 7 kg (153 lb 9 6 oz)       Stable at the moment  She does have a follow-up with Cardiology scheduled  Of note, her BNP has come down quite a bit since her admission before  Despite it being elevated, because it has come down there is no concern of acute CHF  DNR (do not resuscitate)     Reconfirmed with the patient that she did not wish to have pacemaker and/or defibrillator placed  Vitamin D deficiency     Vitamin-D level in November of 2018 was 40  This was quite good  Recommend she continue on the vitamin-D supplementation  No changes  Other Visit Diagnoses     Bronchitis    -  Primary    Relevant Medications    azithromycin (ZITHROMAX) 250 mg tablet    albuterol (VENTOLIN HFA) 90 mcg/act inhaler                 Diagnoses and all orders for this visit:    Bronchitis  -     azithromycin (ZITHROMAX) 250 mg tablet; Take 2 tablets on day 1, then 1 tablet daily days 2 through 5  -     albuterol (VENTOLIN HFA) 90 mcg/act inhaler; Inhale 2 puffs every 4 (four) hours as needed for wheezing or shortness of breath (Cough) for up to 30 days    Chronic systolic (congestive) heart failure (HCC)    Cardiomyopathy, unspecified type ([facility] [address] )    Vitamin D deficiency    DNR (do not resuscitate)              Subjective:      Patient ID: Sienna Honeycutt is a 80 y o  female  CC:    Chief Complaint   Patient presents with    Cold Like Symptoms     patient c/o PND, nasal congestion, dry & productive cough x 6 days  Patient was taking cough syrup with no relief  No ear pain  ak       HPI:    Patient was brought in today before evaluation of cold symptoms      She mentioned that she feels tight in the chest at times  When she takes deep breath, she has some coughing that happens right afterwards  Denies any difficulty getting deep breath  She does have some rhonchi when taking deep breath as well  In the last 2 days or so she has had an increase in nasal discharge  Blowing her nose quite often  She does have a fair amount of postnasal drip  More specifically, she mentioned that she has had this for years, it is not really any different  Denies any face or tooth pain along with this  Patient does have history of CHF  She has been following with Cardiology  They did do recent labs on her including BNP  It was in the 700s, but the 1 that she had prior to that from hospital was over 7000  Patient does have a history of cardiomyopathy  Again, we did discuss this previously and she did not wish to have anything done about that such as pacemakers or defibrillators  Patient does have vitamin-D deficiency  She is taking vitamin-D supplementation  The following portions of the patient's history were reviewed and updated as appropriate: allergies, current medications, past family history, past medical history, past social history, past surgical history and problem list       Review of Systems   Constitutional: Negative  HENT: Positive for postnasal drip and rhinorrhea  Negative for sinus pressure and sinus pain  Eyes: Negative  Respiratory: Positive for cough and shortness of breath  Cardiovascular: Negative  Gastrointestinal: Negative  All other systems reviewed and are negative  Data to review:       Objective:    Vitals:    07/09/19 1429   BP: 102/58   Pulse: 88   Temp: 99 2 °F (37 3 °C)   Weight: 69 4 kg (153 lb)   Height: 5' 6" (1 676 m)        Physical Exam   Constitutional: She appears well-developed and well-nourished  HENT:   Head: Normocephalic and atraumatic     Cardiovascular: Normal rate, regular rhythm and normal heart sounds  Pulses:       Carotid pulses are 2+ on the right side, and 2+ on the left side  Pulmonary/Chest: Effort normal  She has no wheezes  She has rhonchi (Clears with coughing) in the right middle field, the right lower field, the left middle field and the left lower field  She has no rales  She exhibits no tenderness  No crackles, no egophony  Nursing note and vitals reviewed

## 2019-07-09 NOTE — ASSESSMENT & PLAN NOTE
Wt Readings from Last 3 Encounters:   07/09/19 69 4 kg (153 lb)   05/22/19 68 5 kg (151 lb)   04/17/19 69 7 kg (153 lb 9 6 oz)       Stable at the moment  She does have a follow-up with Cardiology scheduled  Of note, her BNP has come down quite a bit since her admission before  Despite it being elevated, because it has come down there is no concern of acute CHF

## 2019-07-09 NOTE — ASSESSMENT & PLAN NOTE
Vitamin-D level in November of 2018 was 40  This was quite good  Recommend she continue on the vitamin-D supplementation  No changes

## 2019-07-09 NOTE — PATIENT INSTRUCTIONS
Problem List Items Addressed This Visit     Cardiomyopathy ([address] )     Stable at the moment  No changes  Continue follow-up with Cardiology as scheduled  Chronic systolic (congestive) heart failure (HCC)     Wt Readings from Last 3 Encounters:   07/09/19 69 4 kg (153 lb)   05/22/19 68 5 kg (151 lb)   04/17/19 69 7 kg (153 lb 9 6 oz)       Stable at the moment  She does have a follow-up with Cardiology scheduled  Of note, her BNP has come down quite a bit since her admission before  Despite it being elevated, because it has come down there is no concern of acute CHF  DNR (do not resuscitate)     Reconfirmed with the patient that she did not wish to have pacemaker and/or defibrillator placed  Vitamin D deficiency     Vitamin-D level in November of 2018 was 40  This was quite good  Recommend she continue on the vitamin-D supplementation  No changes  Other Visit Diagnoses     Bronchitis    -  Primary    With deep breath, rhonchi  Clears with cough  Start Zithromax, Ventolin, follow-up in 2 weeks if needed      Relevant Medications    azithromycin (ZITHROMAX) 250 mg tablet    albuterol (VENTOLIN HFA) 90 mcg/act inhaler

## 2019-07-11 ENCOUNTER — OFFICE VISIT (OUTPATIENT)
Dept: CARDIOLOGY CLINIC | Facility: CLINIC | Age: 84
End: 2019-07-11
Payer: COMMERCIAL

## 2019-07-11 VITALS
OXYGEN SATURATION: 97 % | SYSTOLIC BLOOD PRESSURE: 100 MMHG | DIASTOLIC BLOOD PRESSURE: 50 MMHG | HEIGHT: 66 IN | BODY MASS INDEX: 24.75 KG/M2 | HEART RATE: 101 BPM | WEIGHT: 154 LBS

## 2019-07-11 DIAGNOSIS — E78.5 DYSLIPIDEMIA: ICD-10-CM

## 2019-07-11 DIAGNOSIS — I50.22 CHRONIC SYSTOLIC CONGESTIVE HEART FAILURE (HCC): ICD-10-CM

## 2019-07-11 PROCEDURE — 99214 OFFICE O/P EST MOD 30 MIN: CPT | Performed by: INTERNAL MEDICINE

## 2019-07-11 RX ORDER — FUROSEMIDE 20 MG/1
40 TABLET ORAL DAILY
Qty: 180 TABLET | Refills: 3 | Status: SHIPPED | OUTPATIENT
Start: 2019-07-11 | End: 2020-01-01

## 2019-07-11 NOTE — PROGRESS NOTES
Cardiology Follow Up        Oscar Blackwell      12/8/1927      [id number]      Discussion/Summary:    1  Chronic systolic and diastolic congestive heart failure, compensated  2  Severe cardiomyopathy, uncertain type or etiology, ejection fraction 25%  3  Dyslipidemia  4  Acute bronchitis    · Volume status is compensated  She has been taking an extra furosemide for the past week, total of 40 mg daily to help with her ankle edema  Will continue the same, but recheck BMP and NT proBNP in 1 month  She does elevate her legs and wear compression stockings which continued  · Continue treatment of underlying bronchitis per PCP  Increased inflammation and infection can produce was patient is to volume overload, therefore she will continue higher dose of furosemide for now with recheck a BMP/NT proBNP in 1 month  · Patient to go to the ER if any fevers, or worsening shortness of breath    F/U in 3 months, BMP/NTproBNP in 1 month      Interval History: This is a very pleasant 63-year-old female with a history of GERD, osteoarthritis, and dyslipidemia, hospitalized 04/2018 for shortness of breath and left sided chest discomfort  She was diagnosed with congestive heart failure and diuresis was initiated  Echocardiogram revealed ejection fraction 25% with mostly diffuse hypokinesis and moderate to severe mitral regurgitation  She expressed disinterest in undergoing any invasive procedures or surgeries, and expressed a wish to be DNR status and did not want AICD placement  She was initiated on lisinopril and metoprolol  Discharge weight was 153 lb  Earlier in 2019, lisinopril was discontinued to try to help with her fatigue which did not make much difference  Therefore, it was restarted  Lately, she has been having symptoms of upper respiratory infection and was started on an antibiotic by her PCP    Prior BNP was 741, with prior creatinine 06/27/2019 of 1 1 and BUN of 27     She presents today for follow-up  She resides at [facility] and her outpatient weight is about 150 lb  She denies any weight gain, worsening lower extremity edema  She does admit to increased shortness of breath with her cough and recent diagnosis of bronchitis  She denies chest pain, orthopnea, lightheadedness, and has ongoing fatigue  She states she has been taking 20 mg of furosemide for the past week to help with her lower extremity edema               Vitals:  Vitals:    07/11/19 1113   BP: 100/50   BP Location: Left arm   Patient Position: Sitting   Cuff Size: Adult   Pulse: 101   SpO2: 97%   Weight: 69 9 kg (154 lb)   Height: 5' 6" (1 676 m)         Past Medical History:   Diagnosis Date    GERD (gastroesophageal reflux disease)     Right humeral fracture 7/20/2018     Social History     Socioeconomic History    Marital status: /Civil Union     Spouse name: Not on file    Number of children: Not on file    Years of education: Not on file    Highest education level: Not on file   Occupational History    Occupation: Retired   Social Needs    Financial resource strain: Not on file    Food insecurity:     Worry: Not on file     Inability: Not on file   [employer]ilo needs:     Medical: Not on file     Non-medical: Not on file   Tobacco Use    Smoking status: Never Smoker    Smokeless tobacco: Never Used    Tobacco comment: No secondhand smoke exposure   Substance and Sexual Activity    Alcohol use: Yes     Comment: Minimal consumption    Drug use: No    Sexual activity: Not Currently   Lifestyle    Physical activity:     Days per week: Not on file     Minutes per session: Not on file    Stress: Not on file   Relationships    Social connections:     Talks on phone: Not on file     Gets together: Not on file     Attends Taoism service: Not on file     Active member of club or organization: Not on file     Attends meetings of clubs or organizations: Not on file Relationship status: Not on file    Intimate partner violence:     Fear of current or ex partner: Not on file     Emotionally abused: Not on file     Physically abused: Not on file     Forced sexual activity: Not on file   Other Topics Concern    Not on file   Social History Narrative    Not on file      Family History   Problem Relation Age of Onset    Heart failure Mother     Coronary artery disease Father      Past Surgical History:   Procedure Laterality Date    HYSTERECTOMY  1973    Total    JOINT REPLACEMENT Right     knee       Current Outpatient Medications:     albuterol (VENTOLIN HFA) 90 mcg/act inhaler, Inhale 2 puffs every 4 (four) hours as needed for wheezing or shortness of breath (Cough) for up to 30 days, Disp: 1 Inhaler, Rfl: 0    aspirin (ECOTRIN LOW STRENGTH) 81 mg EC tablet, Take 1 tablet (81 mg total) by mouth daily May keep at bedside, Disp: 30 tablet, Rfl: 0    azithromycin (ZITHROMAX) 250 mg tablet, Take 2 tablets on day 1, then 1 tablet daily days 2 through 5, Disp: 6 tablet, Rfl: 0    Biotin 1 MG CAPS, Take 1 capsule by mouth daily, Disp: , Rfl:     cholecalciferol (VITAMIN D3) 1,000 units tablet, Take by mouth, Disp: , Rfl:     furosemide (LASIX) 20 mg tablet, TAKE ONE TABLET BY MOUTH DAILY  TAKE EXTRA TAB TODAY& AS NEEDED FOR WT  GAIN 3 LBS/DAY, Disp: 30 tablet, Rfl: 3    lisinopril (ZESTRIL) 2 5 mg tablet, Take 1 tablet (2 5 mg total) by mouth daily, Disp: 30 tablet, Rfl: 5    metoprolol succinate (TOPROL-XL) 25 mg 24 hr tablet, Take 0 5 tablets (12 5 mg total) by mouth daily May keep at bedside, Disp: 30 tablet, Rfl: 5    Multiple Vitamins-Minerals (PRESERVISION AREDS) CAPS, Take by mouth 2 (two) times a day , Disp: , Rfl:     pantoprazole (PROTONIX) 40 mg tablet, Take 1 tablet (40 mg total) by mouth daily, Disp: 90 tablet, Rfl: 1        Review of Systems:  Review of Systems   Constitutional: Positive for fatigue   Negative for activity change, fever and unexpected weight change  HENT: Negative for facial swelling, nosebleeds and voice change  Respiratory: Positive for cough and shortness of breath  Negative for chest tightness and wheezing  Cardiovascular: Negative for chest pain, palpitations and leg swelling  Gastrointestinal: Negative for abdominal distention  Genitourinary: Negative for hematuria  Musculoskeletal: Negative for arthralgias  Skin: Negative for color change, pallor, rash and wound  Neurological: Negative for dizziness, seizures and syncope  Psychiatric/Behavioral: Negative for agitation  Physical Exam:  Physical Exam   Constitutional: She is oriented to person, place, and time  She appears well-developed and well-nourished  HENT:   Head: Normocephalic and atraumatic  Eyes: EOM are normal    Neck: Normal range of motion  Neck supple  Cardiovascular: Normal rate, regular rhythm, normal heart sounds and intact distal pulses  Pulmonary/Chest: Effort normal and breath sounds normal    Abdominal: Soft  Musculoskeletal: Normal range of motion  Neurological: She is alert and oriented to person, place, and time  Skin: Skin is warm and dry  Psychiatric: She has a normal mood and affect  Her behavior is normal  Judgment and thought content normal    Vitals reviewed  This note was completed in part utilizing [employer] Software  Grammatical errors, random word insertions, spelling mistakes, and incomplete sentences can be an occasional consequence of this system secondary to software limitations, ambient noise, and hardware issues  If you have any questions or concerns about the content, text, or information contained within the body of this dictation, please contact the provider for clarification

## 2019-07-15 ENCOUNTER — OFFICE VISIT (OUTPATIENT)
Dept: FAMILY MEDICINE CLINIC | Facility: CLINIC | Age: 84
End: 2019-07-15
Payer: COMMERCIAL

## 2019-07-15 VITALS
BODY MASS INDEX: 24.53 KG/M2 | TEMPERATURE: 98.9 F | SYSTOLIC BLOOD PRESSURE: 110 MMHG | DIASTOLIC BLOOD PRESSURE: 52 MMHG | HEART RATE: 100 BPM | WEIGHT: 152 LBS

## 2019-07-15 DIAGNOSIS — N18.30 CKD (CHRONIC KIDNEY DISEASE), STAGE III (HCC): ICD-10-CM

## 2019-07-15 DIAGNOSIS — K21.9 GASTROESOPHAGEAL REFLUX DISEASE WITHOUT ESOPHAGITIS: ICD-10-CM

## 2019-07-15 DIAGNOSIS — I50.21 ACUTE SYSTOLIC (CONGESTIVE) HEART FAILURE (HCC): ICD-10-CM

## 2019-07-15 DIAGNOSIS — I50.22 CHRONIC SYSTOLIC (CONGESTIVE) HEART FAILURE (HCC): ICD-10-CM

## 2019-07-15 DIAGNOSIS — R05.9 COUGH: Primary | ICD-10-CM

## 2019-07-15 PROCEDURE — 99214 OFFICE O/P EST MOD 30 MIN: CPT | Performed by: FAMILY MEDICINE

## 2019-07-15 RX ORDER — PANTOPRAZOLE SODIUM 40 MG/1
40 TABLET, DELAYED RELEASE ORAL DAILY
Qty: 90 TABLET | Refills: 3 | Status: SHIPPED | OUTPATIENT
Start: 2019-07-15 | End: 2020-01-01

## 2019-07-15 RX ORDER — CEFUROXIME AXETIL 500 MG/1
500 TABLET ORAL EVERY 12 HOURS SCHEDULED
Qty: 20 TABLET | Refills: 0 | Status: SHIPPED | OUTPATIENT
Start: 2019-07-15 | End: 2019-07-29 | Stop reason: ALTCHOICE

## 2019-07-15 NOTE — ASSESSMENT & PLAN NOTE
She does have a significant amount of coughing  Check chest x-ray to rule out any pulmonary issues  There are some rhonchi noted of the lungs, with crackles at the right base  Rule out CHF versus pneumonia  She was on Zithromax, as well as inhalers

## 2019-07-15 NOTE — PROGRESS NOTES
Assessment and Plan:    Problem List Items Addressed This Visit     Acute systolic (congestive) heart failure (HCC)     Wt Readings from Last 3 Encounters:   07/15/19 68 9 kg (152 lb)   07/11/19 69 9 kg (154 lb)   07/09/19 69 4 kg (153 lb)       Patient does have some issues with regard to heart failure  I am concerned that she has crackles at the right base, though none are at the left based, she has not gained weight, and she does not have swelling  Check chest x-ray stat, follow up after that  Consider antibiotics for possible pneumonia  Chronic systolic (congestive) heart failure (HCC)     Wt Readings from Last 3 Encounters:   07/15/19 68 9 kg (152 lb)   07/11/19 69 9 kg (154 lb)   07/09/19 69 4 kg (153 lb)       Does not appear to be acute CHF  Check chest x-ray           CKD (chronic kidney disease), stage III (HCC)     CKD stable  No changes  Continue current treatment  Cough - Primary     She does have a significant amount of coughing  Check chest x-ray to rule out any pulmonary issues  There are some rhonchi noted of the lungs, with crackles at the right base  Rule out CHF versus pneumonia  She was on Zithromax, as well as inhalers  Relevant Medications    cefuroxime (CEFTIN) 500 mg tablet    Other Relevant Orders    XR chest pa & lateral    GERD (gastroesophageal reflux disease)    Relevant Medications    pantoprazole (PROTONIX) 40 mg tablet                 Diagnoses and all orders for this visit:    Cough  -     XR chest pa & lateral; Future  -     cefuroxime (CEFTIN) 500 mg tablet; Take 1 tablet (500 mg total) by mouth every 12 (twelve) hours for 10 days    Chronic systolic (congestive) heart failure (HCC)    CKD (chronic kidney disease), stage III (HCC)    Acute systolic (congestive) heart failure (HCC)    Gastroesophageal reflux disease without esophagitis  -     pantoprazole (PROTONIX) 40 mg tablet;  Take 1 tablet (40 mg total) by mouth daily              Subjective: Patient ID: Bhargav Randolph is a 80 y o  female  CC:    Chief Complaint   Patient presents with    Follow-up    Cough     She finished the antibiotics Friday  Feels slight improvement  mjs       HPI:      Patient is having significant amount of coughing that is continuing  It is not improving at all  This despite using the antibiotics and inhaler  She wondered what else we could try for it  Chest x-ray was done, but was in April  She has not had anything done recently for this  She wondered about having a handicap placard filled out as she has significant issues with walking long distances  Distances are limited by heart disease and arthritis  CHF:  Patient feels that she does not have a significant amount of swelling  Her weight is about the same as before as well  She is on multiple medications for this  Hypertension:  Has been doing well  No changes  CKD:  Patient does have chronic kidney disease  Has been stable  Denies any problems  The following portions of the patient's history were reviewed and updated as appropriate: allergies, current medications, past family history, past medical history, past social history, past surgical history and problem list       Review of Systems   Constitutional: Positive for activity change  Negative for fatigue  HENT: Negative  Eyes: Negative  Respiratory: Positive for cough  Negative for shortness of breath and wheezing  Cardiovascular: Negative  Gastrointestinal: Negative  Genitourinary: Negative  All other systems reviewed and are negative  Data to review:       Objective:    Vitals:    07/15/19 1345   BP: 110/52   Pulse: 100   Temp: 98 9 °F (37 2 °C)   Weight: 68 9 kg (152 lb)        Physical Exam   Constitutional: She appears well-developed and well-nourished  HENT:   Head: Normocephalic and atraumatic  Cardiovascular: Normal rate, regular rhythm and normal heart sounds     Pulses:       Carotid pulses are 2+ on the right side, and 2+ on the left side  Pulmonary/Chest: Effort normal  She has no wheezes  She has no rales  She exhibits no tenderness  Lymphadenopathy:     She has no cervical adenopathy  Nursing note and vitals reviewed

## 2019-07-15 NOTE — ASSESSMENT & PLAN NOTE
Wt Readings from Last 3 Encounters:   07/15/19 68 9 kg (152 lb)   07/11/19 69 9 kg (154 lb)   07/09/19 69 4 kg (153 lb)       Does not appear to be acute CHF    Check chest x-ray

## 2019-07-15 NOTE — ASSESSMENT & PLAN NOTE
Wt Readings from Last 3 Encounters:   07/15/19 68 9 kg (152 lb)   07/11/19 69 9 kg (154 lb)   07/09/19 69 4 kg (153 lb)       Patient does have some issues with regard to heart failure  I am concerned that she has crackles at the right base, though none are at the left based, she has not gained weight, and she does not have swelling  Check chest x-ray stat, follow up after that  Consider antibiotics for possible pneumonia

## 2019-07-15 NOTE — PATIENT INSTRUCTIONS
Problem List Items Addressed This Visit     Acute systolic (congestive) heart failure (HCC)     Wt Readings from Last 3 Encounters:   07/15/19 68 9 kg (152 lb)   07/11/19 69 9 kg (154 lb)   07/09/19 69 4 kg (153 lb)       Patient does have some issues with regard to heart failure  I am concerned that she has crackles at the right base, though none are at the left based, she has not gained weight, and she does not have swelling  Check chest x-ray stat, follow up after that  Consider antibiotics for possible pneumonia  Chronic systolic (congestive) heart failure (HCC)     Wt Readings from Last 3 Encounters:   07/15/19 68 9 kg (152 lb)   07/11/19 69 9 kg (154 lb)   07/09/19 69 4 kg (153 lb)       Does not appear to be acute CHF  Check chest x-ray           CKD (chronic kidney disease), stage III (HCC)     CKD stable  No changes  Continue current treatment  Cough - Primary     She does have a significant amount of coughing  Check chest x-ray to rule out any pulmonary issues  There are some rhonchi noted of the lungs, with crackles at the right base  Rule out CHF versus pneumonia  She was on Zithromax, as well as inhalers           Relevant Medications    cefuroxime (CEFTIN) 500 mg tablet    Other Relevant Orders    XR chest pa & lateral    GERD (gastroesophageal reflux disease)    Relevant Medications    pantoprazole (PROTONIX) 40 mg tablet

## 2019-07-29 ENCOUNTER — OFFICE VISIT (OUTPATIENT)
Dept: FAMILY MEDICINE CLINIC | Facility: CLINIC | Age: 84
End: 2019-07-29
Payer: COMMERCIAL

## 2019-07-29 VITALS
HEIGHT: 66 IN | BODY MASS INDEX: 24.59 KG/M2 | HEART RATE: 80 BPM | SYSTOLIC BLOOD PRESSURE: 104 MMHG | WEIGHT: 153 LBS | DIASTOLIC BLOOD PRESSURE: 60 MMHG | RESPIRATION RATE: 16 BRPM

## 2019-07-29 DIAGNOSIS — R05.9 COUGH: ICD-10-CM

## 2019-07-29 DIAGNOSIS — K21.9 GASTROESOPHAGEAL REFLUX DISEASE WITHOUT ESOPHAGITIS: ICD-10-CM

## 2019-07-29 DIAGNOSIS — Z00.00 MEDICARE ANNUAL WELLNESS VISIT, SUBSEQUENT: ICD-10-CM

## 2019-07-29 DIAGNOSIS — I50.22 CHRONIC SYSTOLIC (CONGESTIVE) HEART FAILURE (HCC): Primary | ICD-10-CM

## 2019-07-29 PROCEDURE — G0439 PPPS, SUBSEQ VISIT: HCPCS | Performed by: FAMILY MEDICINE

## 2019-07-29 PROCEDURE — 1170F FXNL STATUS ASSESSED: CPT | Performed by: FAMILY MEDICINE

## 2019-07-29 PROCEDURE — 1036F TOBACCO NON-USER: CPT | Performed by: FAMILY MEDICINE

## 2019-07-29 PROCEDURE — 99214 OFFICE O/P EST MOD 30 MIN: CPT | Performed by: FAMILY MEDICINE

## 2019-07-29 PROCEDURE — 1125F AMNT PAIN NOTED PAIN PRSNT: CPT | Performed by: FAMILY MEDICINE

## 2019-07-29 PROCEDURE — 1160F RVW MEDS BY RX/DR IN RCRD: CPT | Performed by: FAMILY MEDICINE

## 2019-07-29 NOTE — PROGRESS NOTES
Assessment and Plan:     Problem List Items Addressed This Visit     None         History of Present Illness:     Patient presents for Medicare Annual Wellness visit    Patient Care Team:  Lauren Leblanc MD as PCP - General (Family Medicine)  DANIEL Patel PA-C Lianne Norman, PA-C Criss Rain, DO     Problem List:     Patient Active Problem List   Diagnosis    Esophagitis, reflux    Frailty    Hyperkalemia    Hyperlipidemia    Primary localized osteoarthritis of right hip    Vitamin D deficiency    Fracture of distal end of right radius    Falls    Age related osteoporosis    GERD (gastroesophageal reflux disease)    Elevated troponin    CKD (chronic kidney disease), stage III ([address]    Acute systolic [facility]congestive) heart failure [facility]    Cardiomyopathy ([facility]    Mitral regurgitation    DNR (do not resuscitate)    Chronic systolic (congestive) heart failure ([facility] [facility]    Cough      Past Medical and Surgical History:     Past Medical History:   Diagnosis Date    GERD (gastroesophageal reflux disease)     Right humeral fracture 7/20/2018     Past Surgical History:   Procedure Laterality Date    HYSTERECTOMY  1973    Total    JOINT REPLACEMENT Right     knee      Family History:     Family History   Problem Relation Age of Onset    Heart failure Mother     Coronary artery disease Father       Social History:     Social History     Tobacco Use   Smoking Status Never Smoker   Smokeless Tobacco Never Used   Tobacco Comment    No secondhand smoke exposure     Social History     Substance and Sexual Activity   Alcohol Use Yes    Comment: Minimal consumption     Social History     Substance and Sexual Activity   Drug Use No      Medications and Allergies:     Current Outpatient Medications   Medication Sig Dispense Refill    aspirin (ECOTRIN LOW STRENGTH) 81 mg EC tablet Take 1 tablet (81 mg total) by mouth daily May keep at bedside 30 tablet 0    Biotin 1 MG CAPS Take 1 capsule by mouth daily      cholecalciferol (VITAMIN D3) 1,000 units tablet Take by mouth      furosemide (LASIX) 20 mg tablet Take 2 tablets (40 mg total) by mouth daily 180 tablet 3    lisinopril (ZESTRIL) 2 5 mg tablet Take 1 tablet (2 5 mg total) by mouth daily 30 tablet 5    metoprolol succinate (TOPROL-XL) 25 mg 24 hr tablet Take 0 5 tablets (12 5 mg total) by mouth daily May keep at bedside 30 tablet 5    Multiple Vitamins-Minerals (PRESERVISION AREDS) CAPS Take by mouth 2 (two) times a day       pantoprazole (PROTONIX) 40 mg tablet Take 1 tablet (40 mg total) by mouth daily 90 tablet 3    albuterol (VENTOLIN HFA) 90 mcg/act inhaler Inhale 2 puffs every 4 (four) hours as needed for wheezing or shortness of breath (Cough) for up to 30 days (Patient not taking: Reported on 7/29/2019) 1 Inhaler 0     No current facility-administered medications for this visit  Allergies   Allergen Reactions    Sulfa Antibiotics Hives      Immunizations:     Immunization History   Administered Date(s) Administered    Influenza Split High Dose Preservative Free IM 11/04/2015, 10/13/2016, 10/26/2017    Influenza TIV (IM) [birth year]/1927, 09/29/2004, 11/04/2005, 11/02/2006, 11/12/2007, 10/11/2008, 10/07/2009, 10/19/2010, 10/04/2012, 10/14/2013, 10/20/2014    Influenza, high dose seasonal 0 5 mL 10/31/2018    Pneumococcal Conjugate 13-Valent 10/31/2018    Pneumococcal Polysaccharide PPV23 05/10/1990, 10/26/2017    Zoster 04/28/2008      Medicare Screening Tests and Risk Assessments:     Eryn Medina is here for her Subsequent Wellness visit  Health Risk Assessment:  Patient rates overall health as good  Patient feels that their physical health rating is Same  Eyesight was rated as Same  Hearing was rated as Same  Patient feels that their emotional and mental health rating is Same  Pain experienced by patient in the last 7 days has been None  Patient's pain rating has been 1/10   Patient states that she has experienced no weight loss or gain in last 6 months  Emotional/Mental Health:    PHQ-9 Depression Screening:    Frequency of the following problems over the past two weeks:      1  Little interest or pleasure in doing things: 0 - not at all      2  Feeling down, depressed, or hopeless: 0 - not at all  PHQ-2 Score: 0          Broken Bones/Falls: Fall Risk Assessment:    In the past year, patient has experienced: No history of falling in past year          Bladder/Bowel:  Patient has not leaked urine accidently in the last six months  Patient reports no loss of bowel control  Immunizations:  Patient has had a flu vaccination within the last year  Patient has received a pneumonia shot  Patient has received a shingles shot  Patient has received tetanus/diphtheria shot  Home Safety:  Patient has trouble with stairs inside or outside of their home  Patient currently reports that there are safety hazards present in home , working smoke alarms, working carbon monoxide detectors  Preventative Screenings:   No breast cancer screening performed, no colon cancer screen completed, cholesterol screen completed, glaucoma eye exam completed,     Nutrition:  Current diet: Regular, Low Carb, Low Saturated Fat and Limited junk food with servings of the following:    Medications:  Patient is currently taking over-the-counter supplements  Patient is not able to manage medications  Lifestyle Choices:  Patient reports no tobacco use  Patient has not smoked or used tobacco in the past   Patient reports no alcohol use  Patient does not drive a vehicle  Patient wears seat belt          Activities of Daily Living:  Can get out of bed by his or her self, able to dress self, unable to make own meals, unable to do own shopping, able to bathe self, unable to do laundry/housekeeping, unable to manage own money and other related tasks    Previous Hospitalizations:  No hospitalization or ED visit in past 12 months        Advanced Directives:  Patient has not decided on power of   Patient has completed advanced directive  Preventative Screening/Counseling:      Cardiovascular:      General: Risks and Benefits Discussed and Screening Not Indicated          Diabetes:      General: Risks and Benefits Discussed and Screening Current          Colorectal Cancer:      General: Risks and Benefits Discussed and Screening Not Indicated          Breast Cancer:      General: Risks and Benefits Discussed, Screening Not Indicated and Patient Declines          Cervical Cancer:      General: Screening Not Indicated          Osteoporosis:      General: Screening Not Indicated and Risks and Benefits Discussed      Counseling: Calcium and Vitamin D Intake and Regular Weightbearing Exercise          AAA:      General: Screening Not Indicated          Glaucoma:      General: Risks and Benefits Discussed      Comments: Patient sees eye care professional        HIV:      General: Risks and Benefits Discussed and Screening Not Indicated          Hepatitis C:      General: Risks and Benefits Discussed and Screening Not Indicated        Advanced Directives:   Patient has living will for healthcare, has durable POA for healthcare, patient has an advanced directive  Information on ACP and/or AD provided  No 5 wishes given  End of life assessment reviewed with patient  Provider agrees with end of life decisions   Additional Comments: Patient has pulsed form at the [place] [id number]  She clearly wants no interventions  Patient prefers to be DNR

## 2019-07-29 NOTE — PATIENT INSTRUCTIONS
Obesity   AMBULATORY CARE:   Obesity  is when your body mass index (BMI) is greater than 30  Your healthcare provider will use your height and weight to measure your BMI  The risks of obesity include  many health problems, such as injuries or physical disability  You may need tests to check for the following:  · Diabetes     · High blood pressure or high cholesterol     · Heart disease     · Gallbladder or liver disease     · Cancer of the colon, breast, prostate, liver, or kidney     · Sleep apnea     · Arthritis or gout  Seek care immediately if:   · You have a severe headache, confusion, or difficulty speaking  · You have weakness on one side of your body  · You have chest pain, sweating, or shortness of breath  Contact your healthcare provider if:   · You have symptoms of gallbladder or liver disease, such as pain in your upper abdomen  · You have knee or hip pain and discomfort while walking  · You have symptoms of diabetes, such as intense hunger and thirst, and frequent urination  · You have symptoms of sleep apnea, such as snoring or daytime sleepiness  · You have questions or concerns about your condition or care  Treatment for obesity  focuses on helping you lose weight to improve your health  Even a small decrease in BMI can reduce the risk for many health problems  Your healthcare provider will help you set a weight-loss goal   · Lifestyle changes  are the first step in treating obesity  These include making healthy food choices and getting regular physical activity  Your healthcare provider may suggest a weight-loss program that involves coaching, education, and therapy  · Medicine  may help you lose weight when it is used with a healthy diet and physical activity  · Surgery  can help you lose weight if you are very obese and have other health problems  There are several types of weight-loss surgery  Ask your healthcare provider for more information    Be successful losing weight:   · Set small, realistic goals  An example of a small goal is to walk for 20 minutes 5 days a week  Anther goal is to lose 5% of your body weight  · Tell friends, family members, and coworkers about your goals  and ask for their support  Ask a friend to lose weight with you, or join a weight-loss support group  · Identify foods or triggers that may cause you to overeat , and find ways to avoid them  Remove tempting high-calorie foods from your home and workplace  Place a bowl of fresh fruit on your kitchen counter  If stress causes you to eat, then find other ways to cope with stress  · Keep a diary to track what you eat and drink  Also write down how many minutes of physical activity you do each day  Weigh yourself once a week and record it in your diary  Eating changes: You will need to eat 500 to 1,000 fewer calories each day than you currently eat to lose 1 to 2 pounds a week  The following changes will help you cut calories:  · Eat smaller portions  Use small plates, no larger than 9 inches in diameter  Fill your plate half full of fruits and vegetables  Measure your food using measuring cups until you know what a serving size looks like  · Eat 3 meals and 1 or 2 snacks each day  Plan your meals in advance  Sarah Pel and eat at home most of the time  Eat slowly  · Eat fruits and vegetables at every meal   They are low in calories and high in fiber, which makes you feel full  Do not add butter, margarine, or cream sauce to vegetables  Use herbs to season steamed vegetables  · Eat less fat and fewer fried foods  Eat more baked or grilled chicken and fish  These protein sources are lower in calories and fat than red meat  Limit fast food  Dress your salads with olive oil and vinegar instead of bottled dressing  · Limit the amount of sugar you eat  Do not drink sugary beverages  Limit alcohol  Activity changes:  Physical activity is good for your body in many ways   It helps you burn calories and build strong muscles  It decreases stress and depression, and improves your mood  It can also help you sleep better  Talk to your healthcare provider before you begin an exercise program   · Exercise for at least 30 minutes 5 days a week  Start slowly  Set aside time each day for physical activity that you enjoy and that is convenient for you  It is best to do both weight training and an activity that increases your heart rate, such as walking, bicycling, or swimming  · Find ways to be more active  Do yard work and housecleaning  Walk up the stairs instead of using elevators  Spend your leisure time going to events that require walking, such as outdoor festivals or fairs  This extra physical activity can help you lose weight and keep it off  Follow up with your healthcare provider as directed: You may need to meet with a dietitian  Write down your questions so you remember to ask them during your visits  © 2017 [address] Information is for End User's use only and may not be sold, redistributed or otherwise used for commercial purposes  All illustrations and images included in CareNotes® are the copyrighted property of [employer]  or Manfred Powers  The above information is an  only  It is not intended as medical advice for individual conditions or treatments  Talk to your doctor, nurse or pharmacist before following any medical regimen to see if it is safe and effective for you  Urinary Incontinence   WHAT YOU NEED TO KNOW:   What is urinary incontinence? Urinary incontinence (UI) is when you lose control of your bladder  What causes UI? UI occurs because your bladder cannot store or empty urine properly  The following are the most common types of UI:  · Stress incontinence  is when you leak urine due to increased bladder pressure  This may happen when you cough, sneeze, or exercise       · Urge incontinence  is when you feel the need to urinate right away and leak urine accidentally  · Mixed incontinence  is when you have both stress and urge UI  What are the signs and symptoms of UI?   · You feel like your bladder does not empty completely when you urinate  · You urinate often and need to urinate immediately  · You leak urine when you sleep, or you wake up with the urge to urinate  · You leak urine when you cough, sneeze, exercise, or laugh  How is UI diagnosed? Your healthcare provider will ask how often you leak urine and whether you have stress or urge symptoms  Tell him which medicines you take, how often you urinate, and how much liquid you drink each day  You may need any of the following tests:  · Urine tests  may show infection or kidney function  · A pelvic exam  may be done to check for blockages  A pelvic exam will also show if your bladder, uterus, or other organs have moved out of place  · An x-ray, ultrasound, or CT  may show problems with parts of your urinary system  You may be given contrast liquid to help your organs show up better in the pictures  Tell the healthcare provider if you have ever had an allergic reaction to contrast liquid  Do not enter the MRI room with anything metal  Metal can cause serious injury  Tell the healthcare provider if you have any metal in or on your body  · A bladder scan  will show how much urine is left in your bladder after you urinate  You will be asked to urinate and then healthcare providers will use a small ultrasound machine to check the urine left in your bladder  · Cystometry  is used to check the function of your urinary system  Your healthcare provider checks the pressure in your bladder while filling it with fluid  Your bladder pressure may also be tested when your bladder is full and while you urinate  How is UI treated? · Medicines  can help strengthen your bladder control      · Electrical stimulation  is used to send a small amount of electrical energy to your pelvic floor muscles  This helps control your bladder function  Electrodes may be placed outside your body or in your rectum  For women, the electrodes may be placed in the vagina  · A bulking agent  may be injected into the wall of your urethra to make it thicker  This helps keep your urethra closed and decreases urine leakage  · Devices  such as a clamp, pessary, or tampon may help stop urine leaks  Ask your healthcare provider for more information about these and other devices  · Surgery  may be needed if other treatments do not work  Several types of surgery can help improve your bladder control  Ask your healthcare provider for more information about the surgery you may need  How can I manage my symptoms? · Do pelvic muscle exercises often  Your pelvic muscles help you stop urinating  Squeeze these muscles tight for 5 seconds, then relax for 5 seconds  Gradually work up to squeezing for 10 seconds  Do 3 sets of 15 repetitions a day, or as directed  This will help strengthen your pelvic muscles and improve bladder control  · A catheter  may be used to help empty your bladder  A catheter is a tiny, plastic tube that is put into your bladder to drain your urine  Your healthcare provider may tell you to use a catheter to prevent your bladder from getting too full and leaking urine  · Keep a UI record  Write down how often you leak urine and how much you leak  Make a note of what you were doing when you leaked urine  · Train your bladder  Go to the bathroom at set times, such as every 2 hours, even if you do not feel the urge to go  You can also try to hold your urine when you feel the urge to go  For example, hold your urine for 5 minutes when you feel the urge to go  As that becomes easier, hold your urine for 10 minutes  · Drink liquids as directed  Ask your healthcare provider how much liquid to drink each day and which liquids are best for you   You may need to limit the amount of liquid you drink to help control your urine leakage  Limit or do not have drinks that contain caffeine or alcohol  Do not drink any liquid right before you go to bed  · Prevent constipation  Eat a variety of high-fiber foods  Good examples are high-fiber cereals, beans, vegetables, and whole-grain breads  Prune juice may help make your bowel movement softer  Walking is the best way to trigger your intestines to have a bowel movement  · Exercise regularly and maintain a healthy weight  Ask your healthcare provider how much you should weigh and about the best exercise plan for you  Weight loss and exercise will decrease pressure on your bladder and help you control your leakage  Ask him to help you create a weight loss plan if you are overweight  When should I seek immediate care? · You have severe pain  · You are confused or cannot think clearly  When should I contact my healthcare provider? · You have a fever  · You see blood in your urine  · You have pain when you urinate  · You have new or worse pain, even after treatment  · Your mouth feels dry or you have vision changes  · Your urine is cloudy or smells bad  · You have questions or concerns about your condition or care  CARE AGREEMENT:   You have the right to help plan your care  Learn about your health condition and how it may be treated  Discuss treatment options with your caregivers to decide what care you want to receive  You always have the right to refuse treatment  The above information is an  only  It is not intended as medical advice for individual conditions or treatments  Talk to your doctor, nurse or pharmacist before following any medical regimen to see if it is safe and effective for you  © 2017 [address] Information is for End User's use only and may not be sold, redistributed or otherwise used for commercial purposes   All illustrations and images included in CareNotes® are the copyrighted property of A [employer] A M , Inc  or Manfred Powers  Cigarette Smoking and Your Health   AMBULATORY CARE:   Risks to your health if you smoke:  Nicotine and other chemicals found in tobacco damage every cell in your body  Even if you are a light smoker, you have an increased risk for cancer, heart disease, and lung disease  If you are pregnant or have diabetes, smoking increases your risk for complications  Benefits to your health if you stop smoking:   · You decrease respiratory symptoms such as coughing, wheezing, and shortness of breath  · You reduce your risk for cancers of the lung, mouth, throat, kidney, bladder, pancreas, stomach, and cervix  If you already have cancer, you increase the benefits of chemotherapy  You also reduce your risk for cancer returning or a second cancer from developing  · You reduce your risk for heart disease, blood clots, heart attack, and stroke  · You reduce your risk for lung infections, and diseases such as pneumonia, asthma, chronic bronchitis, and emphysema  · Your circulation improves  More oxygen can be delivered to your body  If you have diabetes, you lower your risk for complications, such as kidney, artery, and eye diseases  You also lower your risk for nerve damage  Nerve damage can lead to amputations, poor vision, and blindness  · You improve your body's ability to heal and to fight infections  Benefits to the health of others if you stop smoking:  Tobacco is harmful to nonsmokers who breathe in your secondhand smoke  The following are ways the health of others around you may improve when you stop smoking:  · You lower the risks for lung cancer and heart disease in nonsmoking adults  · If you are pregnant, you lower the risk for miscarriage, early delivery, low birth weight, and stillbirth  You also lower your baby's risk for SIDS, obesity, developmental delay, and neurobehavioral problems, such as ADHD  · If you have children, you lower their risk for ear infections, colds, pneumonia, bronchitis, and asthma  For more information and support to stop smoking:   · Smokefree  gov  Phone: 7- 844 - 669-5874  Web Address: www smokefree  gov  Follow up with your healthcare provider as directed:  Write down your questions so you remember to ask them during your visits  © 2017 [address] Information is for End User's use only and may not be sold, redistributed or otherwise used for commercial purposes  All illustrations and images included in CareNotes® are the copyrighted property of A [employer] M [employer] Inc  or Manfred Powers  The above information is an  only  It is not intended as medical advice for individual conditions or treatments  Talk to your doctor, nurse or pharmacist before following any medical regimen to see if it is safe and effective for you  Fall Prevention   AMBULATORY CARE:   Fall prevention  includes ways to make your home and other areas safer  It also includes ways you can move more carefully to prevent a fall  Health conditions that cause changes in your blood pressure, vision, or muscle strength and coordination may increase your risk for falls  Medicines may also increase your risk for falls if they make you dizzy, weak, or sleepy  Call 911 or have someone else call if:   · You have fallen and are unconscious  · You have fallen and cannot move part of your body  Contact your healthcare provider if:   · You have fallen and have pain or a headache  · You have questions or concerns about your condition or care  Fall prevention tips:   · Stand or sit up slowly  This may help you keep your balance and prevent falls  · Use assistive devices as directed  Your healthcare provider may suggest that you use a cane or walker to help you keep your balance  You may need to have grab bars put in your bathroom near the toilet or in the shower      · Wear shoes that fit well and have soles that   Wear shoes both inside and outside  Use slippers with good   Do not wear shoes with high heels  · Wear a personal alarm  This is a device that allows you to call 911 if you fall and need help  Ask your healthcare provider for more information  · Stay active  Exercise can help strengthen your muscles and improve your balance  Your healthcare provider may recommend water aerobics or walking  He or she may also recommend physical therapy to improve your coordination  Never start an exercise program without talking to your healthcare provider first      · Manage your medical conditions  Keep all appointments with your healthcare providers  Visit your eye doctor as directed  Home safety tips:   · Add items to prevent falls in the bathroom  Put nonslip strips on your bath or shower floor to prevent you from slipping  Use a bath mat if you do not have carpet in the bathroom  This will prevent you from falling when you step out of the bath or shower  Use a shower seat so you do not need to stand while you shower  Sit on the toilet or a chair in your bathroom to dry yourself and put on clothing  This will prevent you from losing your balance from drying or dressing yourself while you are standing  · Keep paths clear  Remove books, shoes, and other objects from walkways and stairs  Place cords for telephones and lamps out of the way so that you do not need to walk over them  Tape them down if you cannot move them  Remove small rugs  If you cannot remove a rug, secure it with double-sided tape  This will prevent you from tripping  · Install bright lights in your home  Use night lights to help light paths to the bathroom or kitchen  Always turn on the light before you start walking  · Keep items you use often on shelves within reach  Do not use a step stool to help you reach an item  · Paint or place reflective tape on the edges of your stairs    This will help you see the stairs better  Follow up with your healthcare provider as directed:  Write down your questions so you remember to ask them during your visits  © 2017 [address] Information is for End User's use only and may not be sold, redistributed or otherwise used for commercial purposes  All illustrations and images included in CareNotes® are the copyrighted property of [employer]  or Manfred Powers  The above information is an  only  It is not intended as medical advice for individual conditions or treatments  Talk to your doctor, nurse or pharmacist before following any medical regimen to see if it is safe and effective for you  Advance Directives   WHAT YOU NEED TO KNOW:   What are advance directives? Advance directives are legal documents that state your wishes and plans for medical care  These plans are made ahead of time in case you lose your ability to make decisions for yourself  Advance directives can apply to any medical decision, such as the treatments you want, and if you want to donate organs  What are the types of advance directives? There are many types of advance directives, and each state has rules about how to use them  You may choose a combination of any of the following:  · Living will: This is a written record of the treatment you want  You can also choose which treatments you do not want, which to limit, and which to stop at a certain time  This includes surgery, medicine, IV fluid, and tube feedings  · Durable power of  for healthcare [facility]): This is a written record that states who you want to make healthcare choices for you when you are unable to make them for yourself  This person, called a proxy, is usually a family member or a friend  You may choose more than 1 proxy  · Do not resuscitate (DNR) order:  A DNR order is used in case your heart stops beating or you stop breathing   It is a request not to have certain forms of treatment, such as CPR  A DNR order may be included in other types of advance directives  · Medical directive: This covers the care that you want if you are in a coma, near death, or unable to make decisions for yourself  You can list the treatments you want for each condition  Treatment may include pain medicine, surgery, blood transfusions, dialysis, IV or tube feedings, and a ventilator (breathing machine)  · Values history: This document has questions about your views, beliefs, and how you feel and think about life  This information can help others choose the care that you would choose  Why are advance directives important? An advance directive helps you control your care  Although spoken wishes may be used, it is better to have your wishes written down  Spoken wishes can be misunderstood, or not followed  Treatments may be given even if you do not want them  An advance directive may make it easier for your family to make difficult choices about your care  How do I decide what to put in my advance directives? · Make informed decisions:  Make sure you fully understand treatments or care you may receive  Think about the benefits and problems your decisions could cause for you or your family  Talk to healthcare providers if you have concerns or questions before you write down your wishes  You may also want to talk with your Hindu or , or a   Check your state laws to make sure that what you put in your advance directive is legal      · Sign all forms:  Sign and date your advance directive when you have finished  You may also need 2 witnesses to sign the forms  Witnesses cannot be your doctor or his staff, your spouse, heirs or beneficiaries, people you owe money to, or your chosen proxy  Talk to your family, proxy, and healthcare providers about your advance directive  Give each person a copy, and keep one for yourself in a place you can get to easily   Do not keep it hidden or locked away  · Review and revise your plans: You can revise your advance directive at any time, as long as you are able to make decisions  Review your plan every year, and when there are changes in your life, or your health  When you make changes, let your family, proxy, and healthcare providers know  Give each a new copy  Where can I find more information? · [employer]  [address] , [address]  Phone: 9- 068 - [phone]  Phone: 9- 712 - [phone]  Web Address: http://www  aafp org  · [address] [facility])  [address], [address] [facility] , [address] [place] [address]  Phone: [phone]  Phone: [phone]  Web Address: Jaimie anand  CARE AGREEMENT:   You have the right to help plan your care  To help with this plan, you must learn about your health condition and treatment options  You must also learn about advance directives and how they are used  Work with your healthcare providers to decide what care will be used to treat you  You always have the right to refuse treatment  The above information is an  only  It is not intended as medical advice for individual conditions or treatments  Talk to your doctor, nurse or pharmacist before following any medical regimen to see if it is safe and effective for you  © 2017 [address]  Information is for End User's use only and may not be sold, redistributed or otherwise used for commercial purposes  All illustrations and images included in CareNotes® are the copyrighted property of [employer]  or Manfred Powers  Problem List Items Addressed This Visit     Chronic systolic (congestive) heart failure ([facility] [address] ) - Primary     Wt Readings from Last 3 Encounters:   07/29/19 69 4 kg (153 lb)   07/15/19 68 9 kg (152 lb)   07/11/19 69 9 kg (154 lb)         Weight is stable    Patient currently is not having a particular problem  Will follow in the future as needed  Continue with current medications  This would include lisinopril, metoprolol, Lasix  RESOLVED: Cough     Cough appears to have resolved at this point  Patient reports that she only has some drainage and needs to clear her throat only  She is not having coughing  As such, she has stopped using the inhaler, which would be reasonable  GERD (gastroesophageal reflux disease)     On Protonix  Will check in the future             Other Visit Diagnoses     Medicare annual wellness visit, subsequent

## 2019-07-29 NOTE — ASSESSMENT & PLAN NOTE
Wt Readings from Last 3 Encounters:   07/29/19 69 4 kg (153 lb)   07/15/19 68 9 kg (152 lb)   07/11/19 69 9 kg (154 lb)         Weight is stable  Patient currently is not having a particular problem  Will follow in the future as needed  Continue with current medications  This would include lisinopril, metoprolol, Lasix

## 2019-07-29 NOTE — PROGRESS NOTES
Assessment and Plan:    Problem List Items Addressed This Visit     Chronic systolic (congestive) heart failure ([address] ) - Primary     Wt Readings from Last 3 Encounters:   07/29/19 69 4 kg (153 lb)   07/15/19 68 9 kg (152 lb)   07/11/19 69 9 kg (154 lb)         Weight is stable  Patient currently is not having a particular problem  Will follow in the future as needed  Continue with current medications  This would include lisinopril, metoprolol, Lasix  RESOLVED: Cough     Cough appears to have resolved at this point  Patient reports that she only has some drainage and needs to clear her throat only  She is not having coughing  As such, she has stopped using the inhaler, which would be reasonable  GERD (gastroesophageal reflux disease)     On Protonix  Will check in the future  Diagnoses and all orders for this visit:    Chronic systolic (congestive) heart failure (HCC)    Cough    Gastroesophageal reflux disease without esophagitis              Subjective:      Patient ID: José Jaimes is a 80 y o  female  CC:    Chief Complaint   Patient presents with    Follow-up     pt here today for a recheck on cough, pt states she is feeling alot better  R Philippe       HPI:    Patient is here to follow-up on multiple things  With regard to the cough that she was having at her last visit, that seems to be much better than what it was  She did mention that sometimes when she has postnasal drip she will have coughing  She has stopped using the inhalers, finish the antibiotics, is no longer using cough syrup  Again, the cough is only occasionally when she has postnasal drip  With regard to heart failure, the patient seems to be doing relatively well  She again denies any particular problems  Weight seems to be stable today as well  She reports she does not have any issues with regard to reflux  Despite this, she is on Protonix            The following portions of the patient's history were reviewed and updated as appropriate: allergies, current medications, past family history, past medical history, past social history, past surgical history and problem list       Review of Systems   Constitutional: Negative  HENT: Negative  Eyes: Negative  Respiratory: Negative  Cardiovascular: Negative  Gastrointestinal: Negative  Endocrine: Negative  Genitourinary: Negative  Musculoskeletal: Negative  Skin: Negative  Allergic/Immunologic: Negative  Neurological: Negative  Hematological: Negative  Psychiatric/Behavioral: Negative  Data to review:       Objective:    Vitals:    07/29/19 1328   BP: 104/60   BP Location: Left arm   Patient Position: Sitting   Cuff Size: Standard   Pulse: 80   Resp: 16   Weight: 69 4 kg (153 lb)   Height: 5' 6" (1 676 m)        Physical Exam   Constitutional: She appears well-developed and well-nourished  HENT:   Head: Normocephalic and atraumatic  Cardiovascular: Normal rate, regular rhythm and normal heart sounds  Pulses:       Carotid pulses are 2+ on the right side, and 2+ on the left side  Pulmonary/Chest: Effort normal and breath sounds normal  She has no wheezes  She has no rales  She exhibits no tenderness  Nursing note and vitals reviewed

## 2019-07-29 NOTE — ACP (ADVANCE CARE PLANNING)
Reviewed with patient about her choices for medical conditions  She does not wish to be resuscitated  She is quite clear in this  She has filled out a pulsed form in the past at her apartment, [place] [id number]  A copy of it is not available in our chart  Patient does have a decision maker as well  The she reports that is her son and daughter

## 2019-07-29 NOTE — ASSESSMENT & PLAN NOTE
Cough appears to have resolved at this point  Patient reports that she only has some drainage and needs to clear her throat only  She is not having coughing  As such, she has stopped using the inhaler, which would be reasonable

## 2019-10-03 LAB
BNP SERPL-MCNC: 368 PG/ML
BUN SERPL-MCNC: 32 MG/DL (ref 7–25)
BUN/CREAT SERPL: 22 (CALC) (ref 6–22)
CALCIUM SERPL-MCNC: 9.6 MG/DL (ref 8.6–10.4)
CHLORIDE SERPL-SCNC: 104 MMOL/L (ref 98–110)
CO2 SERPL-SCNC: 33 MMOL/L (ref 20–32)
CREAT SERPL-MCNC: 1.44 MG/DL (ref 0.6–0.88)
GLUCOSE SERPL-MCNC: 93 MG/DL (ref 65–99)
POTASSIUM SERPL-SCNC: 4.2 MMOL/L (ref 3.5–5.3)
SL AMB EGFR AFRICAN AMERICAN: 37 ML/MIN/1.73M2
SL AMB EGFR NON AFRICAN AMERICAN: 32 ML/MIN/1.73M2
SODIUM SERPL-SCNC: 144 MMOL/L (ref 135–146)

## 2019-10-15 ENCOUNTER — OFFICE VISIT (OUTPATIENT)
Dept: CARDIOLOGY CLINIC | Facility: CLINIC | Age: 84
End: 2019-10-15
Payer: COMMERCIAL

## 2019-10-15 VITALS
DIASTOLIC BLOOD PRESSURE: 64 MMHG | SYSTOLIC BLOOD PRESSURE: 118 MMHG | HEIGHT: 66 IN | HEART RATE: 84 BPM | BODY MASS INDEX: 24.49 KG/M2 | RESPIRATION RATE: 16 BRPM | WEIGHT: 152.4 LBS

## 2019-10-15 DIAGNOSIS — I42.9 CARDIOMYOPATHY, UNSPECIFIED TYPE (HCC): Primary | ICD-10-CM

## 2019-10-15 DIAGNOSIS — I50.22 CHRONIC SYSTOLIC (CONGESTIVE) HEART FAILURE (HCC): ICD-10-CM

## 2019-10-15 PROCEDURE — 99214 OFFICE O/P EST MOD 30 MIN: CPT | Performed by: INTERNAL MEDICINE

## 2019-10-15 NOTE — PROGRESS NOTES
Cardiology Follow Up        Katie Kapoor      12/8/1927      [id number]      Discussion/Summary:    1  Chronic systolic and diastolic congestive heart failure, compensated  2  Severe cardiomyopathy, uncertain type or etiology, ejection fraction 25%  3        · Volume status compensated  Continue furosemide 40 mg daily  Slightly increased creatinine noted from prior, acceptable as lower extremity edema and shortness of breath has improved  · One episode of chest discomfort when she states she was getting up to get weight in the middle of the night, but usually note chest discomfort with walking      Follow-up in 4 months      Interval History: This is a very pleasant 25-year-old female with a history of GERD, osteoarthritis, and dyslipidemia, hospitalized 04/2018 for shortness of breath and left sided chest discomfort   She was diagnosed with congestive heart failure and diuresis was initiated   Echocardiogram revealed ejection fraction 25% with mostly diffuse hypokinesis and moderate to severe mitral regurgitation   She expressed disinterest in undergoing any invasive procedures or surgeries, and expressed a wish to be DNR status and did not want AICD placement   She was initiated on lisinopril and metoprolol   Discharge weight was 153 lb      Earlier in 2019, lisinopril was discontinued to try to help with her fatigue which did not make much difference  Therefore, it was restarted  During her prior visit 07/11/2019, furosemide was increased to 40 mg daily in light of slight increased ankle edema  She presents today for follow-up with no complaints  Her shortness of breath has improved, and her lower extremity edema has been stable               Vitals:  Vitals:    10/15/19 1135   BP: 118/64   Pulse: 84   Resp: 16   Weight: 69 1 kg (152 lb 6 4 oz)   Height: 5' 6" (1 676 m)         Past Medical History:   Diagnosis Date    GERD (gastroesophageal reflux disease)     Right humeral fracture 7/20/2018     Social History     Socioeconomic History    Marital status: /Civil Union     Spouse name: Not on file    Number of children: Not on file    Years of education: Not on file    Highest education level: Not on file   Occupational History    Occupation: Retired   Social Needs    Financial resource strain: Not on file    Food insecurity:     Worry: Not on file     Inability: Not on file   [employer] needs:     Medical: Not on file     Non-medical: Not on file   Tobacco Use    Smoking status: Never Smoker    Smokeless tobacco: Never Used    Tobacco comment: No secondhand smoke exposure   Substance and Sexual Activity    Alcohol use: Yes     Comment: Minimal consumption    Drug use: No    Sexual activity: Not Currently   Lifestyle    Physical activity:     Days per week: Not on file     Minutes per session: Not on file    Stress: Not on file   Relationships    Social connections:     Talks on phone: Not on file     Gets together: Not on file     Attends Cheondoism service: Not on file     Active member of club or organization: Not on file     Attends meetings of clubs or organizations: Not on file     Relationship status: Not on file    Intimate partner violence:     Fear of current or ex partner: Not on file     Emotionally abused: Not on file     Physically abused: Not on file     Forced sexual activity: Not on file   Other Topics Concern    Not on file   Social History Narrative    Not on file      Family History   Problem Relation Age of Onset    Heart failure Mother     Coronary artery disease Father      Past Surgical History:   Procedure Laterality Date    HYSTERECTOMY  1973    Total    JOINT REPLACEMENT Right     knee       Current Outpatient Medications:     Biotin 1 MG CAPS, Take 1 capsule by mouth daily, Disp: , Rfl:     cholecalciferol (VITAMIN D3) 1,000 units tablet, Take by mouth, Disp: , Rfl:     furosemide (LASIX) 20 mg tablet, Take 2 tablets (40 mg total) by mouth daily, Disp: 180 tablet, Rfl: 3    lisinopril (ZESTRIL) 2 5 mg tablet, Take 1 tablet (2 5 mg total) by mouth daily, Disp: 30 tablet, Rfl: 5    metoprolol succinate (TOPROL-XL) 25 mg 24 hr tablet, Take 0 5 tablets (12 5 mg total) by mouth daily May keep at bedside, Disp: 30 tablet, Rfl: 5    Multiple Vitamins-Minerals (PRESERVISION AREDS) CAPS, Take by mouth 2 (two) times a day , Disp: , Rfl:     pantoprazole (PROTONIX) 40 mg tablet, Take 1 tablet (40 mg total) by mouth daily, Disp: 90 tablet, Rfl: 3        Review of Systems:  Review of Systems   Constitutional: Negative for activity change, fever and unexpected weight change  HENT: Negative for facial swelling, nosebleeds and voice change  Respiratory: Positive for shortness of breath  Negative for chest tightness and wheezing  Cardiovascular: Positive for leg swelling  Negative for chest pain and palpitations  Gastrointestinal: Negative for abdominal distention  Genitourinary: Negative for hematuria  Musculoskeletal: Negative for arthralgias  Skin: Negative for color change, pallor, rash and wound  Neurological: Negative for dizziness, seizures and syncope  Psychiatric/Behavioral: Negative for agitation  Physical Exam:  Physical Exam   Constitutional: She is oriented to person, place, and time  She appears well-developed and well-nourished  HENT:   Head: Normocephalic and atraumatic  Eyes: EOM are normal    Neck: Normal range of motion  Neck supple  Cardiovascular: Normal rate, regular rhythm, normal heart sounds and intact distal pulses  Pulmonary/Chest: Effort normal and breath sounds normal    Abdominal: Soft  Musculoskeletal: Normal range of motion  Neurological: She is alert and oriented to person, place, and time  Skin: Skin is warm and dry  Psychiatric: She has a normal mood and affect  Her behavior is normal  Judgment and thought content normal    Vitals reviewed        This note was completed in part utilizing M-Modal Fluency Direct Software  Grammatical errors, random word insertions, spelling mistakes, and incomplete sentences can be an occasional consequence of this system secondary to software limitations, ambient noise, and hardware issues  If you have any questions or concerns about the content, text, or information contained within the body of this dictation, please contact the provider for clarification

## 2019-11-04 ENCOUNTER — TELEPHONE (OUTPATIENT)
Dept: CARDIOLOGY CLINIC | Facility: CLINIC | Age: 84
End: 2019-11-04

## 2019-11-04 NOTE — TELEPHONE ENCOUNTER
Phone call from daughter about labs recently done   she wasn't sure if they were to be done or if [facility] christina too soon   she thought they were prior to upcoming OV in the future  Please advise and state if additional labs you need drawn prior to next ov in July

## 2019-11-05 DIAGNOSIS — I50.22 CHRONIC SYSTOLIC (CONGESTIVE) HEART FAILURE (HCC): Primary | ICD-10-CM

## 2019-11-05 NOTE — TELEPHONE ENCOUNTER
It's ok that labs were done  Placed order for repeat BMP and NTproBNP to be done days before next visit   Thx    RP

## 2019-11-06 ENCOUNTER — IMMUNIZATIONS (OUTPATIENT)
Dept: FAMILY MEDICINE CLINIC | Facility: CLINIC | Age: 84
End: 2019-11-06
Payer: COMMERCIAL

## 2019-11-06 DIAGNOSIS — Z23 NEED FOR INFLUENZA VACCINATION: Primary | ICD-10-CM

## 2019-11-06 PROCEDURE — 90662 IIV NO PRSV INCREASED AG IM: CPT

## 2019-11-06 PROCEDURE — G0008 ADMIN INFLUENZA VIRUS VAC: HCPCS

## 2019-11-13 DIAGNOSIS — I25.5 ISCHEMIC CARDIOMYOPATHY: ICD-10-CM

## 2019-11-13 DIAGNOSIS — I50.22 CHRONIC SYSTOLIC (CONGESTIVE) HEART FAILURE (HCC): ICD-10-CM

## 2019-11-13 RX ORDER — LISINOPRIL 2.5 MG/1
TABLET ORAL
Qty: 30 TABLET | Refills: 5 | Status: SHIPPED | OUTPATIENT
Start: 2019-11-13 | End: 2020-01-01 | Stop reason: SDUPTHER

## 2020-01-01 ENCOUNTER — OFFICE VISIT (OUTPATIENT)
Dept: OBGYN CLINIC | Facility: MEDICAL CENTER | Age: 85
End: 2020-01-01

## 2020-01-01 ENCOUNTER — TELEPHONE (OUTPATIENT)
Dept: FAMILY MEDICINE CLINIC | Facility: CLINIC | Age: 85
End: 2020-01-01

## 2020-01-01 ENCOUNTER — TELEPHONE (OUTPATIENT)
Dept: OTHER | Facility: OTHER | Age: 85
End: 2020-01-01

## 2020-01-01 ENCOUNTER — TELEMEDICINE (OUTPATIENT)
Dept: FAMILY MEDICINE CLINIC | Facility: CLINIC | Age: 85
End: 2020-01-01
Payer: COMMERCIAL

## 2020-01-01 ENCOUNTER — APPOINTMENT (EMERGENCY)
Dept: CT IMAGING | Facility: HOSPITAL | Age: 85
DRG: 481 | End: 2020-01-01
Payer: COMMERCIAL

## 2020-01-01 ENCOUNTER — TELEPHONE (OUTPATIENT)
Dept: CARDIOLOGY CLINIC | Facility: CLINIC | Age: 85
End: 2020-01-01

## 2020-01-01 ENCOUNTER — ANESTHESIA (INPATIENT)
Dept: PERIOP | Facility: HOSPITAL | Age: 85
DRG: 481 | End: 2020-01-01
Payer: COMMERCIAL

## 2020-01-01 ENCOUNTER — APPOINTMENT (EMERGENCY)
Dept: RADIOLOGY | Facility: HOSPITAL | Age: 85
DRG: 481 | End: 2020-01-01
Payer: COMMERCIAL

## 2020-01-01 ENCOUNTER — LAB REQUISITION (OUTPATIENT)
Dept: LAB | Facility: HOSPITAL | Age: 85
End: 2020-01-01
Payer: COMMERCIAL

## 2020-01-01 ENCOUNTER — TELEPHONE (OUTPATIENT)
Dept: OBGYN CLINIC | Facility: HOSPITAL | Age: 85
End: 2020-01-01

## 2020-01-01 ENCOUNTER — TELEPHONE (OUTPATIENT)
Dept: OBGYN CLINIC | Facility: MEDICAL CENTER | Age: 85
End: 2020-01-01

## 2020-01-01 ENCOUNTER — ANESTHESIA EVENT (INPATIENT)
Dept: PERIOP | Facility: HOSPITAL | Age: 85
DRG: 481 | End: 2020-01-01
Payer: COMMERCIAL

## 2020-01-01 ENCOUNTER — HOSPITAL ENCOUNTER (INPATIENT)
Facility: HOSPITAL | Age: 85
LOS: 7 days | Discharge: NURSING, REHAB OR LONG-TERM CARE | DRG: 481 | End: 2020-09-02
Attending: EMERGENCY MEDICINE | Admitting: HOSPITALIST
Payer: COMMERCIAL

## 2020-01-01 ENCOUNTER — APPOINTMENT (INPATIENT)
Dept: NON INVASIVE DIAGNOSTICS | Facility: HOSPITAL | Age: 85
DRG: 481 | End: 2020-01-01
Payer: COMMERCIAL

## 2020-01-01 ENCOUNTER — HOSPITAL ENCOUNTER (EMERGENCY)
Facility: HOSPITAL | Age: 85
Discharge: HOME | End: 2020-10-29
Attending: EMERGENCY MEDICINE | Admitting: EMERGENCY MEDICINE
Payer: COMMERCIAL

## 2020-01-01 ENCOUNTER — APPOINTMENT (INPATIENT)
Dept: RADIOLOGY | Facility: HOSPITAL | Age: 85
DRG: 481 | End: 2020-01-01
Payer: COMMERCIAL

## 2020-01-01 ENCOUNTER — APPOINTMENT (OUTPATIENT)
Dept: RADIOLOGY | Facility: MEDICAL CENTER | Age: 85
End: 2020-01-01
Payer: COMMERCIAL

## 2020-01-01 VITALS
TEMPERATURE: 97.6 F | HEART RATE: 88 BPM | OXYGEN SATURATION: 99 % | DIASTOLIC BLOOD PRESSURE: 80 MMHG | RESPIRATION RATE: 16 BRPM | WEIGHT: 166.67 LBS | BODY MASS INDEX: 26.9 KG/M2 | SYSTOLIC BLOOD PRESSURE: 136 MMHG

## 2020-01-01 VITALS — BODY MASS INDEX: 26.84 KG/M2 | WEIGHT: 167 LBS | HEIGHT: 66 IN

## 2020-01-01 VITALS
SYSTOLIC BLOOD PRESSURE: 125 MMHG | DIASTOLIC BLOOD PRESSURE: 76 MMHG | BODY MASS INDEX: 26.79 KG/M2 | HEART RATE: 98 BPM | WEIGHT: 166 LBS

## 2020-01-01 VITALS
HEART RATE: 88 BPM | SYSTOLIC BLOOD PRESSURE: 129 MMHG | TEMPERATURE: 97 F | DIASTOLIC BLOOD PRESSURE: 72 MMHG | WEIGHT: 161 LBS | BODY MASS INDEX: 25.99 KG/M2

## 2020-01-01 VITALS
OXYGEN SATURATION: 98 % | BODY MASS INDEX: 26.12 KG/M2 | DIASTOLIC BLOOD PRESSURE: 58 MMHG | TEMPERATURE: 97.7 F | SYSTOLIC BLOOD PRESSURE: 128 MMHG | RESPIRATION RATE: 18 BRPM | HEART RATE: 90 BPM | WEIGHT: 161.82 LBS

## 2020-01-01 DIAGNOSIS — K21.9 GASTROESOPHAGEAL REFLUX DISEASE WITHOUT ESOPHAGITIS: ICD-10-CM

## 2020-01-01 DIAGNOSIS — I34.0 NONRHEUMATIC MITRAL VALVE REGURGITATION: ICD-10-CM

## 2020-01-01 DIAGNOSIS — S72.141A CLOSED COMMINUTED INTERTROCHANTERIC FRACTURE OF PROXIMAL FEMUR, RIGHT, INITIAL ENCOUNTER (HCC): ICD-10-CM

## 2020-01-01 DIAGNOSIS — I50.22 CHRONIC SYSTOLIC (CONGESTIVE) HEART FAILURE (HCC): ICD-10-CM

## 2020-01-01 DIAGNOSIS — I50.22 CHRONIC SYSTOLIC CONGESTIVE HEART FAILURE (HCC): ICD-10-CM

## 2020-01-01 DIAGNOSIS — U07.1 COVID-19: ICD-10-CM

## 2020-01-01 DIAGNOSIS — K64.9 BLEEDING HEMORRHOID: ICD-10-CM

## 2020-01-01 DIAGNOSIS — K64.4 EXTERNAL HEMORRHOID: ICD-10-CM

## 2020-01-01 DIAGNOSIS — I42.9 CARDIOMYOPATHY (HCC): ICD-10-CM

## 2020-01-01 DIAGNOSIS — S72.141D CLOSED INTERTROCHANTERIC FRACTURE OF HIP, RIGHT, WITH ROUTINE HEALING, SUBSEQUENT ENCOUNTER: Primary | ICD-10-CM

## 2020-01-01 DIAGNOSIS — R77.8 ELEVATED TROPONIN: ICD-10-CM

## 2020-01-01 DIAGNOSIS — Z77.098 CONTACT WITH AND (SUSPECTED) EXPOSURE TO OTHER HAZARDOUS, CHIEFLY NONMEDICINAL, CHEMICALS: ICD-10-CM

## 2020-01-01 DIAGNOSIS — D62 ACUTE BLOOD LOSS ANEMIA: ICD-10-CM

## 2020-01-01 DIAGNOSIS — S72.91XA RIGHT FEMORAL FRACTURE (HCC): ICD-10-CM

## 2020-01-01 DIAGNOSIS — Z11.59 ENCOUNTER FOR SCREENING FOR OTHER VIRAL DISEASES: ICD-10-CM

## 2020-01-01 DIAGNOSIS — K64.5 THROMBOSED EXTERNAL HEMORRHOID: Primary | ICD-10-CM

## 2020-01-01 DIAGNOSIS — I50.21 ACUTE SYSTOLIC CONGESTIVE HEART FAILURE (HCC): ICD-10-CM

## 2020-01-01 DIAGNOSIS — I42.9 CARDIOMYOPATHY, UNSPECIFIED TYPE (HCC): ICD-10-CM

## 2020-01-01 DIAGNOSIS — E78.5 DYSLIPIDEMIA: ICD-10-CM

## 2020-01-01 DIAGNOSIS — S52.501D CLOSED FRACTURE OF DISTAL END OF RIGHT RADIUS WITH ROUTINE HEALING, UNSPECIFIED FRACTURE MORPHOLOGY, SUBSEQUENT ENCOUNTER: Primary | ICD-10-CM

## 2020-01-01 DIAGNOSIS — K59.00 CONSTIPATION: ICD-10-CM

## 2020-01-01 DIAGNOSIS — Z20.828 EXPOSURE TO SARS-ASSOCIATED CORONAVIRUS: Primary | ICD-10-CM

## 2020-01-01 DIAGNOSIS — I25.5 ISCHEMIC CARDIOMYOPATHY: ICD-10-CM

## 2020-01-01 DIAGNOSIS — I50.21 ACUTE SYSTOLIC (CONGESTIVE) HEART FAILURE (HCC): ICD-10-CM
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
## 2020-01-01 DIAGNOSIS — S52.501D CLOSED FRACTURE OF DISTAL END OF RIGHT RADIUS WITH ROUTINE HEALING, UNSPECIFIED FRACTURE MORPHOLOGY, SUBSEQUENT ENCOUNTER: ICD-10-CM

## 2020-01-01 DIAGNOSIS — K30 INDIGESTION: ICD-10-CM

## 2020-01-01 DIAGNOSIS — S72.001D CLOSED FRACTURE OF NECK OF RIGHT FEMUR WITH ROUTINE HEALING, SUBSEQUENT ENCOUNTER: ICD-10-CM

## 2020-01-01 DIAGNOSIS — Z86.718 HISTORY OF DVT (DEEP VEIN THROMBOSIS): Primary | ICD-10-CM

## 2020-01-01 LAB
ABO GROUP BLD BPU: NORMAL
ABO GROUP BLD: NORMAL
ALBUMIN SERPL BCP-MCNC: 3.6 G/DL (ref 3.5–5)
ALBUMIN SERPL BCP-MCNC: 3.7 G/DL (ref 3.5–5)
ALP SERPL-CCNC: 152 U/L (ref 46–116)
ALP SERPL-CCNC: 63 U/L (ref 46–116)
ALT SERPL W P-5'-P-CCNC: 18 U/L (ref 12–78)
ALT SERPL W P-5'-P-CCNC: 34 U/L (ref 12–78)
ANION GAP SERPL CALCULATED.3IONS-SCNC: 11 MMOL/L (ref 4–13)
ANION GAP SERPL CALCULATED.3IONS-SCNC: 5 MMOL/L (ref 4–13)
ANION GAP SERPL CALCULATED.3IONS-SCNC: 7 MMOL/L (ref 4–13)
ANION GAP SERPL CALCULATED.3IONS-SCNC: 8 MMOL/L (ref 4–13)
ANION GAP SERPL CALCULATED.3IONS-SCNC: 9 MMOL/L (ref 4–13)
APTT PPP: 34 SECONDS (ref 23–37)
APTT PPP: 37 SECONDS (ref 23–37)
AST SERPL W P-5'-P-CCNC: 18 U/L (ref 5–45)
AST SERPL W P-5'-P-CCNC: 31 U/L (ref 5–45)
ATRIAL RATE: 129 BPM
ATRIAL RATE: 82 BPM
BASOPHILS # BLD AUTO: 0.01 THOUSANDS/ΜL (ref 0–0.1)
BASOPHILS # BLD AUTO: 0.02 THOUSANDS/ΜL (ref 0–0.1)
BASOPHILS # BLD AUTO: 0.04 THOUSANDS/ΜL (ref 0–0.1)
BASOPHILS # BLD AUTO: 0.04 THOUSANDS/ΜL (ref 0–0.1)
BASOPHILS NFR BLD AUTO: 0 % (ref 0–1)
BASOPHILS NFR BLD AUTO: 0 % (ref 0–1)
BASOPHILS NFR BLD AUTO: 1 % (ref 0–1)
BASOPHILS NFR BLD AUTO: 1 % (ref 0–1)
BILIRUB SERPL-MCNC: 0.35 MG/DL (ref 0.2–1)
BILIRUB SERPL-MCNC: 0.52 MG/DL (ref 0.2–1)
BILIRUB UR QL STRIP: NEGATIVE
BLD GP AB SCN SERPL QL: NEGATIVE
BLD GP AB SCN SERPL QL: NEGATIVE
BPU ID: NORMAL
BUN SERPL-MCNC: 25 MG/DL (ref 5–25)
BUN SERPL-MCNC: 29 MG/DL (ref 5–25)
BUN SERPL-MCNC: 30 MG/DL (ref 5–25)
BUN SERPL-MCNC: 31 MG/DL (ref 5–25)
BUN SERPL-MCNC: 33 MG/DL (ref 5–25)
CALCIUM SERPL-MCNC: 7.3 MG/DL (ref 8.3–10.1)
CALCIUM SERPL-MCNC: 7.4 MG/DL (ref 8.3–10.1)
CALCIUM SERPL-MCNC: 8.2 MG/DL (ref 8.3–10.1)
CALCIUM SERPL-MCNC: 8.9 MG/DL (ref 8.3–10.1)
CALCIUM SERPL-MCNC: 9.3 MG/DL (ref 8.3–10.1)
CHLORIDE SERPL-SCNC: 103 MMOL/L (ref 100–108)
CHLORIDE SERPL-SCNC: 105 MMOL/L (ref 100–108)
CHLORIDE SERPL-SCNC: 106 MMOL/L (ref 100–108)
CLARITY UR: CLEAR
CO2 SERPL-SCNC: 26 MMOL/L (ref 21–32)
CO2 SERPL-SCNC: 28 MMOL/L (ref 21–32)
CO2 SERPL-SCNC: 31 MMOL/L (ref 21–32)
COLOR UR: COLORLESS
CREAT SERPL-MCNC: 1.13 MG/DL (ref 0.6–1.3)
CREAT SERPL-MCNC: 1.3 MG/DL (ref 0.6–1.3)
CREAT SERPL-MCNC: 1.36 MG/DL (ref 0.6–1.3)
CREAT SERPL-MCNC: 1.36 MG/DL (ref 0.6–1.3)
CREAT SERPL-MCNC: 1.4 MG/DL (ref 0.6–1.3)
CROSSMATCH: NORMAL
EOSINOPHIL # BLD AUTO: 0.01 THOUSAND/ΜL (ref 0–0.61)
EOSINOPHIL # BLD AUTO: 0.02 THOUSAND/ΜL (ref 0–0.61)
EOSINOPHIL # BLD AUTO: 0.23 THOUSAND/ΜL (ref 0–0.61)
EOSINOPHIL # BLD AUTO: 0.41 THOUSAND/ΜL (ref 0–0.61)
EOSINOPHIL NFR BLD AUTO: 0 % (ref 0–6)
EOSINOPHIL NFR BLD AUTO: 0 % (ref 0–6)
EOSINOPHIL NFR BLD AUTO: 4 % (ref 0–6)
EOSINOPHIL NFR BLD AUTO: 6 % (ref 0–6)
ERYTHROCYTE [DISTWIDTH] IN BLOOD BY AUTOMATED COUNT: 13.1 % (ref 11.6–15.1)
ERYTHROCYTE [DISTWIDTH] IN BLOOD BY AUTOMATED COUNT: 13.2 % (ref 11.6–15.1)
ERYTHROCYTE [DISTWIDTH] IN BLOOD BY AUTOMATED COUNT: 14.6 % (ref 11.6–15.1)
ERYTHROCYTE [DISTWIDTH] IN BLOOD BY AUTOMATED COUNT: 16.5 % (ref 11.6–15.1)
ERYTHROCYTE [DISTWIDTH] IN BLOOD BY AUTOMATED COUNT: 17.5 % (ref 11.6–15.1)
ERYTHROCYTE [DISTWIDTH] IN BLOOD BY AUTOMATED COUNT: 18 % (ref 11.6–15.1)
EXT SARS-COV-2: NOT DETECTED
FERRITIN SERPL-MCNC: 156 NG/ML (ref 8–388)
FOLATE SERPL-MCNC: 19.2 NG/ML (ref 3.1–17.5)
GFR SERPL CREATININE-BSD FRML MDRD: 33 ML/MIN/1.73SQ M
GFR SERPL CREATININE-BSD FRML MDRD: 34 ML/MIN/1.73SQ M
GFR SERPL CREATININE-BSD FRML MDRD: 34 ML/MIN/1.73SQ M
GFR SERPL CREATININE-BSD FRML MDRD: 36 ML/MIN/1.73SQ M
GFR SERPL CREATININE-BSD FRML MDRD: 42 ML/MIN/1.73SQ M
GLUCOSE SERPL-MCNC: 100 MG/DL (ref 65–140)
GLUCOSE SERPL-MCNC: 108 MG/DL (ref 65–140)
GLUCOSE SERPL-MCNC: 131 MG/DL (ref 65–140)
GLUCOSE SERPL-MCNC: 85 MG/DL (ref 65–140)
GLUCOSE SERPL-MCNC: 91 MG/DL (ref 65–140)
GLUCOSE UR STRIP-MCNC: NEGATIVE MG/DL
HCT VFR BLD AUTO: 23.6 % (ref 34.8–46.1)
HCT VFR BLD AUTO: 25.5 % (ref 34.8–46.1)
HCT VFR BLD AUTO: 26.9 % (ref 34.8–46.1)
HCT VFR BLD AUTO: 28.1 % (ref 34.8–46.1)
HCT VFR BLD AUTO: 28.5 % (ref 34.8–46.1)
HCT VFR BLD AUTO: 34.5 % (ref 34.8–46.1)
HCT VFR BLD AUTO: 36 % (ref 34.8–46.1)
HGB BLD-MCNC: 10.6 G/DL (ref 11.5–15.4)
HGB BLD-MCNC: 11 G/DL (ref 11.5–15.4)
HGB BLD-MCNC: 7.5 G/DL (ref 11.5–15.4)
HGB BLD-MCNC: 7.9 G/DL (ref 11.5–15.4)
HGB BLD-MCNC: 8.4 G/DL (ref 11.5–15.4)
HGB BLD-MCNC: 8.4 G/DL (ref 11.5–15.4)
HGB BLD-MCNC: 8.9 G/DL (ref 11.5–15.4)
HGB UR QL STRIP.AUTO: NEGATIVE
IMM GRANULOCYTES # BLD AUTO: 0.01 THOUSAND/UL (ref 0–0.2)
IMM GRANULOCYTES # BLD AUTO: 0.02 THOUSAND/UL (ref 0–0.2)
IMM GRANULOCYTES # BLD AUTO: 0.04 THOUSAND/UL (ref 0–0.2)
IMM GRANULOCYTES # BLD AUTO: 0.04 THOUSAND/UL (ref 0–0.2)
IMM GRANULOCYTES NFR BLD AUTO: 0 % (ref 0–2)
IMM GRANULOCYTES NFR BLD AUTO: 1 % (ref 0–2)
INR PPP: 1.22 (ref 0.84–1.19)
INR PPP: 1.34 (ref 0.84–1.19)
IRON SATN MFR SERPL: 11 %
IRON SERPL-MCNC: 24 UG/DL (ref 50–170)
KETONES UR STRIP-MCNC: NEGATIVE MG/DL
LEUKOCYTE ESTERASE UR QL STRIP: NEGATIVE
LYMPHOCYTES # BLD AUTO: 1.06 THOUSANDS/ΜL (ref 0.6–4.47)
LYMPHOCYTES # BLD AUTO: 1.17 THOUSANDS/ΜL (ref 0.6–4.47)
LYMPHOCYTES # BLD AUTO: 1.72 THOUSANDS/ΜL (ref 0.6–4.47)
LYMPHOCYTES # BLD AUTO: 2.23 THOUSANDS/ΜL (ref 0.6–4.47)
LYMPHOCYTES NFR BLD AUTO: 12 % (ref 14–44)
LYMPHOCYTES NFR BLD AUTO: 14 % (ref 14–44)
LYMPHOCYTES NFR BLD AUTO: 31 % (ref 14–44)
LYMPHOCYTES NFR BLD AUTO: 33 % (ref 14–44)
MAGNESIUM SERPL-MCNC: 2.1 MG/DL (ref 1.6–2.6)
MAGNESIUM SERPL-MCNC: 2.3 MG/DL (ref 1.6–2.6)
MCH RBC QN AUTO: 31 PG (ref 26.8–34.3)
MCH RBC QN AUTO: 31.6 PG (ref 26.8–34.3)
MCH RBC QN AUTO: 31.9 PG (ref 26.8–34.3)
MCH RBC QN AUTO: 32.6 PG (ref 26.8–34.3)
MCH RBC QN AUTO: 32.7 PG (ref 26.8–34.3)
MCH RBC QN AUTO: 32.7 PG (ref 26.8–34.3)
MCHC RBC AUTO-ENTMCNC: 29.9 G/DL (ref 31.4–37.4)
MCHC RBC AUTO-ENTMCNC: 30.6 G/DL (ref 31.4–37.4)
MCHC RBC AUTO-ENTMCNC: 30.7 G/DL (ref 31.4–37.4)
MCHC RBC AUTO-ENTMCNC: 31 G/DL (ref 31.4–37.4)
MCHC RBC AUTO-ENTMCNC: 31.2 G/DL (ref 31.4–37.4)
MCHC RBC AUTO-ENTMCNC: 31.8 G/DL (ref 31.4–37.4)
MCV RBC AUTO: 100 FL (ref 82–98)
MCV RBC AUTO: 100 FL (ref 82–98)
MCV RBC AUTO: 101 FL (ref 82–98)
MCV RBC AUTO: 106 FL (ref 82–98)
MCV RBC AUTO: 107 FL (ref 82–98)
MCV RBC AUTO: 109 FL (ref 82–98)
MONOCYTES # BLD AUTO: 0.6 THOUSAND/ΜL (ref 0.17–1.22)
MONOCYTES # BLD AUTO: 0.66 THOUSAND/ΜL (ref 0.17–1.22)
MONOCYTES # BLD AUTO: 0.76 THOUSAND/ΜL (ref 0.17–1.22)
MONOCYTES # BLD AUTO: 1.09 THOUSAND/ΜL (ref 0.17–1.22)
MONOCYTES NFR BLD AUTO: 12 % (ref 4–12)
MONOCYTES NFR BLD AUTO: 12 % (ref 4–12)
MONOCYTES NFR BLD AUTO: 9 % (ref 4–12)
MONOCYTES NFR BLD AUTO: 9 % (ref 4–12)
NEUTROPHILS # BLD AUTO: 2.94 THOUSANDS/ΜL (ref 1.85–7.62)
NEUTROPHILS # BLD AUTO: 3.41 THOUSANDS/ΜL (ref 1.85–7.62)
NEUTROPHILS # BLD AUTO: 6.09 THOUSANDS/ΜL (ref 1.85–7.62)
NEUTROPHILS # BLD AUTO: 6.71 THOUSANDS/ΜL (ref 1.85–7.62)
NEUTS SEG NFR BLD AUTO: 51 % (ref 43–75)
NEUTS SEG NFR BLD AUTO: 52 % (ref 43–75)
NEUTS SEG NFR BLD AUTO: 76 % (ref 43–75)
NEUTS SEG NFR BLD AUTO: 76 % (ref 43–75)
NITRITE UR QL STRIP: NEGATIVE
NRBC BLD AUTO-RTO: 0 /100 WBCS
P AXIS: 194 DEGREES
P AXIS: 30 DEGREES
PH UR STRIP.AUTO: 7.5 [PH]
PLATELET # BLD AUTO: 127 THOUSANDS/UL (ref 149–390)
PLATELET # BLD AUTO: 129 THOUSANDS/UL (ref 149–390)
PLATELET # BLD AUTO: 156 THOUSANDS/UL (ref 149–390)
PLATELET # BLD AUTO: 192 THOUSANDS/UL (ref 149–390)
PLATELET # BLD AUTO: 216 THOUSANDS/UL (ref 149–390)
PLATELET # BLD AUTO: 236 THOUSANDS/UL (ref 149–390)
PMV BLD AUTO: 10.7 FL (ref 8.9–12.7)
PMV BLD AUTO: 11 FL (ref 8.9–12.7)
PMV BLD AUTO: 11.3 FL (ref 8.9–12.7)
PMV BLD AUTO: 11.4 FL (ref 8.9–12.7)
PMV BLD AUTO: 11.9 FL (ref 8.9–12.7)
PMV BLD AUTO: 12.4 FL (ref 8.9–12.7)
POTASSIUM SERPL-SCNC: 3.7 MMOL/L (ref 3.5–5.3)
POTASSIUM SERPL-SCNC: 4 MMOL/L (ref 3.5–5.3)
POTASSIUM SERPL-SCNC: 4.1 MMOL/L (ref 3.5–5.3)
POTASSIUM SERPL-SCNC: 4.4 MMOL/L (ref 3.5–5.3)
POTASSIUM SERPL-SCNC: 4.6 MMOL/L (ref 3.5–5.3)
PR INTERVAL: 132 MS
PR INTERVAL: 144 MS
PROT SERPL-MCNC: 7.4 G/DL (ref 6.4–8.2)
PROT SERPL-MCNC: 7.8 G/DL (ref 6.4–8.2)
PROT UR STRIP-MCNC: NEGATIVE MG/DL
PROTHROMBIN TIME: 15.2 SECONDS (ref 11.6–14.5)
PROTHROMBIN TIME: 16.3 SECONDS (ref 11.6–14.5)
QRS AXIS: -4 DEGREES
QRS AXIS: 12 DEGREES
QRSD INTERVAL: 104 MS
QRSD INTERVAL: 108 MS
QT INTERVAL: 372 MS
QT INTERVAL: 390 MS
QTC INTERVAL: 434 MS
QTC INTERVAL: 449 MS
RBC # BLD AUTO: 2.35 MILLION/UL (ref 3.81–5.12)
RBC # BLD AUTO: 2.55 MILLION/UL (ref 3.81–5.12)
RBC # BLD AUTO: 2.57 MILLION/UL (ref 3.81–5.12)
RBC # BLD AUTO: 2.82 MILLION/UL (ref 3.81–5.12)
RBC # BLD AUTO: 3.25 MILLION/UL (ref 3.81–5.12)
RBC # BLD AUTO: 3.36 MILLION/UL (ref 3.81–5.12)
RH BLD: POSITIVE
SARS-COV-2 N GENE RESP QL NAA+PROBE: NEGATIVE
SARS-COV-2 RNA RESP QL NAA+PROBE: NEGATIVE
SODIUM SERPL-SCNC: 138 MMOL/L (ref 136–145)
SODIUM SERPL-SCNC: 139 MMOL/L (ref 136–145)
SODIUM SERPL-SCNC: 139 MMOL/L (ref 136–145)
SODIUM SERPL-SCNC: 142 MMOL/L (ref 136–145)
SODIUM SERPL-SCNC: 143 MMOL/L (ref 136–145)
SP GR UR STRIP.AUTO: 1.01 (ref 1–1.03)
SPECIMEN EXPIRATION DATE: NORMAL
SPECIMEN EXPIRATION DATE: NORMAL
T WAVE AXIS: 236 DEGREES
T WAVE AXIS: 263 DEGREES
TIBC SERPL-MCNC: 221 UG/DL (ref 250–450)
TROPONIN I SERPL-MCNC: 0.07 NG/ML
TROPONIN I SERPL-MCNC: 0.09 NG/ML
UNIT DISPENSE STATUS: NORMAL
UNIT PRODUCT CODE: NORMAL
UNIT RH: NORMAL
UROBILINOGEN UR QL STRIP.AUTO: 0.2 E.U./DL
VENTRICULAR RATE: 80 BPM
VENTRICULAR RATE: 82 BPM
VIT B12 SERPL-MCNC: 490 PG/ML (ref 100–900)
WBC # BLD AUTO: 5.61 THOUSAND/UL (ref 4.31–10.16)
WBC # BLD AUTO: 6.7 THOUSAND/UL (ref 4.31–10.16)
WBC # BLD AUTO: 8.1 THOUSAND/UL (ref 4.31–10.16)
WBC # BLD AUTO: 8.42 THOUSAND/UL (ref 4.31–10.16)
WBC # BLD AUTO: 8.92 THOUSAND/UL (ref 4.31–10.16)
WBC # BLD AUTO: 9.19 THOUSAND/UL (ref 4.31–10.16)

## 2020-01-01 PROCEDURE — 97530 THERAPEUTIC ACTIVITIES: CPT

## 2020-01-01 PROCEDURE — 86920 COMPATIBILITY TEST SPIN: CPT

## 2020-01-01 PROCEDURE — 71045 X-RAY EXAM CHEST 1 VIEW: CPT

## 2020-01-01 PROCEDURE — 99232 SBSQ HOSP IP/OBS MODERATE 35: CPT | Performed by: HOSPITALIST

## 2020-01-01 PROCEDURE — 86900 BLOOD TYPING SEROLOGIC ABO: CPT | Performed by: PHYSICIAN ASSISTANT

## 2020-01-01 PROCEDURE — 93010 ELECTROCARDIOGRAM REPORT: CPT

## 2020-01-01 PROCEDURE — 85014 HEMATOCRIT: CPT | Performed by: PHYSICIAN ASSISTANT

## 2020-01-01 PROCEDURE — 97110 THERAPEUTIC EXERCISES: CPT

## 2020-01-01 PROCEDURE — 99024 POSTOP FOLLOW-UP VISIT: CPT | Performed by: ORTHOPAEDIC SURGERY

## 2020-01-01 PROCEDURE — 97116 GAIT TRAINING THERAPY: CPT

## 2020-01-01 PROCEDURE — 96374 THER/PROPH/DIAG INJ IV PUSH: CPT

## 2020-01-01 PROCEDURE — 73552 X-RAY EXAM OF FEMUR 2/>: CPT

## 2020-01-01 PROCEDURE — 99024 POSTOP FOLLOW-UP VISIT: CPT | Performed by: PHYSICIAN ASSISTANT

## 2020-01-01 PROCEDURE — 99285 EMERGENCY DEPT VISIT HI MDM: CPT

## 2020-01-01 PROCEDURE — 97163 PT EVAL HIGH COMPLEX 45 MIN: CPT

## 2020-01-01 PROCEDURE — 0QS606Z REPOSITION RIGHT UPPER FEMUR WITH INTRAMEDULLARY INTERNAL FIXATION DEVICE, OPEN APPROACH: ICD-10-PCS | Performed by: ORTHOPAEDIC SURGERY

## 2020-01-01 PROCEDURE — 99285 EMERGENCY DEPT VISIT HI MDM: CPT | Performed by: EMERGENCY MEDICINE

## 2020-01-01 PROCEDURE — 96375 TX/PRO/DX INJ NEW DRUG ADDON: CPT

## 2020-01-01 PROCEDURE — 99283 EMERGENCY DEPT VISIT LOW MDM: CPT

## 2020-01-01 PROCEDURE — 85025 COMPLETE CBC W/AUTO DIFF WBC: CPT | Performed by: EMERGENCY MEDICINE

## 2020-01-01 PROCEDURE — 97535 SELF CARE MNGMENT TRAINING: CPT

## 2020-01-01 PROCEDURE — 99239 HOSP IP/OBS DSCHRG MGMT >30: CPT | Performed by: FAMILY MEDICINE

## 2020-01-01 PROCEDURE — 99223 1ST HOSP IP/OBS HIGH 75: CPT | Performed by: HOSPITALIST

## 2020-01-01 PROCEDURE — 85730 THROMBOPLASTIN TIME PARTIAL: CPT | Performed by: PHYSICIAN ASSISTANT

## 2020-01-01 PROCEDURE — C1713 ANCHOR/SCREW BN/BN,TIS/BN: HCPCS | Performed by: ORTHOPAEDIC SURGERY

## 2020-01-01 PROCEDURE — 85610 PROTHROMBIN TIME: CPT | Performed by: PHYSICIAN ASSISTANT

## 2020-01-01 PROCEDURE — C8929 TTE W OR WO FOL WCON,DOPPLER: HCPCS

## 2020-01-01 PROCEDURE — 85027 COMPLETE CBC AUTOMATED: CPT | Performed by: PHYSICIAN ASSISTANT

## 2020-01-01 PROCEDURE — 99443 PR PHYS/QHP TELEPHONE EVALUATION 21-30 MIN: CPT | Performed by: FAMILY MEDICINE

## 2020-01-01 PROCEDURE — P9016 RBC LEUKOCYTES REDUCED: HCPCS

## 2020-01-01 PROCEDURE — 81003 URINALYSIS AUTO W/O SCOPE: CPT | Performed by: PHYSICIAN ASSISTANT

## 2020-01-01 PROCEDURE — 83735 ASSAY OF MAGNESIUM: CPT | Performed by: PHYSICIAN ASSISTANT

## 2020-01-01 PROCEDURE — 83550 IRON BINDING TEST: CPT | Performed by: PHYSICIAN ASSISTANT

## 2020-01-01 PROCEDURE — 82728 ASSAY OF FERRITIN: CPT | Performed by: PHYSICIAN ASSISTANT

## 2020-01-01 PROCEDURE — 82607 VITAMIN B-12: CPT | Performed by: PHYSICIAN ASSISTANT

## 2020-01-01 PROCEDURE — 99223 1ST HOSP IP/OBS HIGH 75: CPT

## 2020-01-01 PROCEDURE — 99282 EMERGENCY DEPT VISIT SF MDM: CPT | Performed by: EMERGENCY MEDICINE

## 2020-01-01 PROCEDURE — 86900 BLOOD TYPING SEROLOGIC ABO: CPT | Performed by: EMERGENCY MEDICINE

## 2020-01-01 PROCEDURE — 99223 1ST HOSP IP/OBS HIGH 75: CPT | Performed by: ORTHOPAEDIC SURGERY

## 2020-01-01 PROCEDURE — 86850 RBC ANTIBODY SCREEN: CPT | Performed by: EMERGENCY MEDICINE

## 2020-01-01 PROCEDURE — 85025 COMPLETE CBC W/AUTO DIFF WBC: CPT | Performed by: PHYSICIAN ASSISTANT

## 2020-01-01 PROCEDURE — 93306 TTE W/DOPPLER COMPLETE: CPT | Performed by: INTERNAL MEDICINE

## 2020-01-01 PROCEDURE — 36415 COLL VENOUS BLD VENIPUNCTURE: CPT | Performed by: EMERGENCY MEDICINE

## 2020-01-01 PROCEDURE — 82746 ASSAY OF FOLIC ACID SERUM: CPT | Performed by: PHYSICIAN ASSISTANT

## 2020-01-01 PROCEDURE — P9040 RBC LEUKOREDUCED IRRADIATED: HCPCS

## 2020-01-01 PROCEDURE — 30233N1 TRANSFUSION OF NONAUTOLOGOUS RED BLOOD CELLS INTO PERIPHERAL VEIN, PERCUTANEOUS APPROACH: ICD-10-PCS | Performed by: ANESTHESIOLOGY

## 2020-01-01 PROCEDURE — 0QSB04Z REPOSITION RIGHT LOWER FEMUR WITH INTERNAL FIXATION DEVICE, OPEN APPROACH: ICD-10-PCS | Performed by: ORTHOPAEDIC SURGERY

## 2020-01-01 PROCEDURE — 99232 SBSQ HOSP IP/OBS MODERATE 35: CPT | Performed by: PHYSICIAN ASSISTANT

## 2020-01-01 PROCEDURE — 99232 SBSQ HOSP IP/OBS MODERATE 35: CPT | Performed by: INTERNAL MEDICINE

## 2020-01-01 PROCEDURE — 86850 RBC ANTIBODY SCREEN: CPT | Performed by: PHYSICIAN ASSISTANT

## 2020-01-01 PROCEDURE — 83540 ASSAY OF IRON: CPT | Performed by: PHYSICIAN ASSISTANT

## 2020-01-01 PROCEDURE — 80053 COMPREHEN METABOLIC PANEL: CPT | Performed by: EMERGENCY MEDICINE

## 2020-01-01 PROCEDURE — 85730 THROMBOPLASTIN TIME PARTIAL: CPT | Performed by: EMERGENCY MEDICINE

## 2020-01-01 PROCEDURE — 80048 BASIC METABOLIC PNL TOTAL CA: CPT | Performed by: PHYSICIAN ASSISTANT

## 2020-01-01 PROCEDURE — 73502 X-RAY EXAM HIP UNI 2-3 VIEWS: CPT

## 2020-01-01 PROCEDURE — 84484 ASSAY OF TROPONIN QUANT: CPT | Performed by: EMERGENCY MEDICINE

## 2020-01-01 PROCEDURE — 85610 PROTHROMBIN TIME: CPT | Performed by: EMERGENCY MEDICINE

## 2020-01-01 PROCEDURE — 27245 TREAT THIGH FRACTURE: CPT | Performed by: ORTHOPAEDIC SURGERY

## 2020-01-01 PROCEDURE — 86901 BLOOD TYPING SEROLOGIC RH(D): CPT | Performed by: PHYSICIAN ASSISTANT

## 2020-01-01 PROCEDURE — 97167 OT EVAL HIGH COMPLEX 60 MIN: CPT

## 2020-01-01 PROCEDURE — 93005 ELECTROCARDIOGRAM TRACING: CPT

## 2020-01-01 PROCEDURE — 27245 TREAT THIGH FRACTURE: CPT | Performed by: PHYSICIAN ASSISTANT

## 2020-01-01 PROCEDURE — 30233N1 TRANSFUSION OF NONAUTOLOGOUS RED BLOOD CELLS INTO PERIPHERAL VEIN, PERCUTANEOUS APPROACH: ICD-10-PCS | Performed by: HOSPITALIST

## 2020-01-01 PROCEDURE — 99223 1ST HOSP IP/OBS HIGH 75: CPT | Performed by: PHYSICAL MEDICINE & REHABILITATION

## 2020-01-01 PROCEDURE — 93010 ELECTROCARDIOGRAM REPORT: CPT | Performed by: INTERNAL MEDICINE

## 2020-01-01 PROCEDURE — 85018 HEMOGLOBIN: CPT | Performed by: PHYSICIAN ASSISTANT

## 2020-01-01 PROCEDURE — U0003 INFECTIOUS AGENT DETECTION BY NUCLEIC ACID (DNA OR RNA); SEVERE ACUTE RESPIRATORY SYNDROME CORONAVIRUS 2 (SARS-COV-2) (CORONAVIRUS DISEASE [COVID-19]), AMPLIFIED PROBE TECHNIQUE, MAKING USE OF HIGH THROUGHPUT TECHNOLOGIES AS DESCRIBED BY CMS-2020-01-R: HCPCS | Performed by: FAMILY MEDICINE

## 2020-01-01 PROCEDURE — 86901 BLOOD TYPING SEROLOGIC RH(D): CPT | Performed by: EMERGENCY MEDICINE

## 2020-01-01 PROCEDURE — G1004 CDSM NDSC: HCPCS

## 2020-01-01 PROCEDURE — 87635 SARS-COV-2 COVID-19 AMP PRB: CPT | Performed by: PHYSICIAN ASSISTANT

## 2020-01-01 PROCEDURE — 70450 CT HEAD/BRAIN W/O DYE: CPT

## 2020-01-01 PROCEDURE — 72125 CT NECK SPINE W/O DYE: CPT

## 2020-01-01 DEVICE — 5.0MM TI LOCKING SCREW 46MM- FOR IM NAILS-STERILE: Type: IMPLANTABLE DEVICE | Site: FEMUR | Status: FUNCTIONAL

## 2020-01-01 DEVICE — 11MM/130 DEG TI CANN TROCH FIXATION NAIL 400MM/RIGHT-STER: Type: IMPLANTABLE DEVICE | Site: FEMUR | Status: FUNCTIONAL

## 2020-01-01 DEVICE — 11.0MM TI TROCH FIXATION NAIL SCREW/105MM - STERILE: Type: IMPLANTABLE DEVICE | Site: FEMUR | Status: FUNCTIONAL

## 2020-01-01 RX ORDER — PANTOPRAZOLE SODIUM 40 MG/1
40 TABLET, DELAYED RELEASE ORAL DAILY
Status: DISCONTINUED | OUTPATIENT
Start: 2020-01-01 | End: 2020-01-01 | Stop reason: HOSPADM

## 2020-01-01 RX ORDER — FUROSEMIDE 10 MG/ML
40 INJECTION INTRAMUSCULAR; INTRAVENOUS ONCE
Status: DISCONTINUED | OUTPATIENT
Start: 2020-01-01 | End: 2020-01-01

## 2020-01-01 RX ORDER — LISINOPRIL 2.5 MG/1
2.5 TABLET ORAL DAILY
Qty: 90 TABLET | Refills: 1 | Status: SHIPPED | OUTPATIENT
Start: 2020-01-01 | End: 2021-01-01 | Stop reason: ALTCHOICE

## 2020-01-01 RX ORDER — CALCIUM CARBONATE 200(500)MG
1000 TABLET,CHEWABLE ORAL DAILY PRN
Status: DISCONTINUED | OUTPATIENT
Start: 2020-01-01 | End: 2020-01-01 | Stop reason: HOSPADM

## 2020-01-01 RX ORDER — FUROSEMIDE 20 MG/1
20 TABLET ORAL DAILY
Refills: 0
Start: 2020-01-01 | End: 2021-01-01 | Stop reason: HOSPADM

## 2020-01-01 RX ORDER — DOCUSATE SODIUM 100 MG/1
100 CAPSULE, LIQUID FILLED ORAL 2 TIMES DAILY
Status: DISCONTINUED | OUTPATIENT
Start: 2020-01-01 | End: 2020-01-01 | Stop reason: HOSPADM

## 2020-01-01 RX ORDER — POLYETHYLENE GLYCOL 3350 17 G/17G
17 POWDER, FOR SOLUTION ORAL DAILY
Status: DISCONTINUED | OUTPATIENT
Start: 2020-01-01 | End: 2020-01-01 | Stop reason: HOSPADM

## 2020-01-01 RX ORDER — OXYCODONE HYDROCHLORIDE 5 MG/1
2.5 TABLET ORAL EVERY 6 HOURS PRN
Qty: 30 TABLET | Refills: 0 | Status: SHIPPED | OUTPATIENT
Start: 2020-01-01 | End: 2020-01-01

## 2020-01-01 RX ORDER — PROPOFOL 10 MG/ML
INJECTION, EMULSION INTRAVENOUS AS NEEDED
Status: DISCONTINUED | OUTPATIENT
Start: 2020-01-01 | End: 2020-01-01

## 2020-01-01 RX ORDER — FENTANYL CITRATE 50 UG/ML
INJECTION, SOLUTION INTRAMUSCULAR; INTRAVENOUS AS NEEDED
Status: DISCONTINUED | OUTPATIENT
Start: 2020-01-01 | End: 2020-01-01

## 2020-01-01 RX ORDER — CEFAZOLIN SODIUM 1 G/50ML
1000 SOLUTION INTRAVENOUS EVERY 8 HOURS
Status: COMPLETED | OUTPATIENT
Start: 2020-01-01 | End: 2020-01-01

## 2020-01-01 RX ORDER — FUROSEMIDE 20 MG/1
20 TABLET ORAL DAILY
Status: DISCONTINUED | OUTPATIENT
Start: 2020-01-01 | End: 2020-01-01 | Stop reason: HOSPADM

## 2020-01-01 RX ORDER — HYDROCORTISONE 25 MG/G
CREAM TOPICAL 2 TIMES DAILY
Qty: 1 TUBE | Refills: 0 | Status: SHIPPED | OUTPATIENT
Start: 2020-01-01 | End: 2020-01-01 | Stop reason: SDUPTHER

## 2020-01-01 RX ORDER — FENTANYL CITRATE 50 UG/ML
50 INJECTION, SOLUTION INTRAMUSCULAR; INTRAVENOUS ONCE
Status: COMPLETED | OUTPATIENT
Start: 2020-01-01 | End: 2020-01-01

## 2020-01-01 RX ORDER — ACETAMINOPHEN 325 MG/1
650 TABLET ORAL EVERY 6 HOURS SCHEDULED
Qty: 30 TABLET | Refills: 0 | Status: SHIPPED | OUTPATIENT
Start: 2020-01-01

## 2020-01-01 RX ORDER — SENNOSIDES 8.6 MG
1 TABLET ORAL
Qty: 120 EACH | Refills: 0 | Status: SHIPPED | OUTPATIENT
Start: 2020-01-01

## 2020-01-01 RX ORDER — ONDANSETRON 4 MG/1
4 TABLET, ORALLY DISINTEGRATING ORAL EVERY 6 HOURS PRN
Status: DISCONTINUED | OUTPATIENT
Start: 2020-01-01 | End: 2020-01-01 | Stop reason: HOSPADM

## 2020-01-01 RX ORDER — ACETAMINOPHEN 325 MG/1
650 TABLET ORAL EVERY 6 HOURS SCHEDULED
Status: DISCONTINUED | OUTPATIENT
Start: 2020-01-01 | End: 2020-01-01 | Stop reason: HOSPADM

## 2020-01-01 RX ORDER — FUROSEMIDE 10 MG/ML
INJECTION INTRAMUSCULAR; INTRAVENOUS AS NEEDED
Status: DISCONTINUED | OUTPATIENT
Start: 2020-01-01 | End: 2020-01-01

## 2020-01-01 RX ORDER — DOCUSATE SODIUM 100 MG/1
100 CAPSULE, LIQUID FILLED ORAL 2 TIMES DAILY
Qty: 10 CAPSULE | Refills: 0
Start: 2020-01-01 | End: 2021-01-01 | Stop reason: SDUPTHER

## 2020-01-01 RX ORDER — PANTOPRAZOLE SODIUM 40 MG/1
TABLET, DELAYED RELEASE ORAL
Qty: 30 TABLET | Refills: 0 | Status: SHIPPED | OUTPATIENT
Start: 2020-01-01

## 2020-01-01 RX ORDER — HYDROMORPHONE HCL/PF 1 MG/ML
0.2 SYRINGE (ML) INJECTION EVERY 4 HOURS PRN
Status: DISCONTINUED | OUTPATIENT
Start: 2020-01-01 | End: 2020-01-01

## 2020-01-01 RX ORDER — SENNOSIDES 8.6 MG
1 TABLET ORAL
Status: DISCONTINUED | OUTPATIENT
Start: 2020-01-01 | End: 2020-01-01 | Stop reason: HOSPADM

## 2020-01-01 RX ORDER — ASPIRIN 81 MG/1
81 TABLET, CHEWABLE ORAL DAILY
Status: CANCELLED | OUTPATIENT
Start: 2020-01-01

## 2020-01-01 RX ORDER — ONDANSETRON 2 MG/ML
4 INJECTION INTRAMUSCULAR; INTRAVENOUS ONCE AS NEEDED
Status: DISCONTINUED | OUTPATIENT
Start: 2020-01-01 | End: 2020-01-01 | Stop reason: HOSPADM

## 2020-01-01 RX ORDER — PANTOPRAZOLE SODIUM 40 MG/1
TABLET, DELAYED RELEASE ORAL
Qty: 30 TABLET | Refills: 0 | Status: SHIPPED | OUTPATIENT
Start: 2020-01-01 | End: 2020-01-01

## 2020-01-01 RX ORDER — OXYCODONE HYDROCHLORIDE 5 MG/1
2.5 TABLET ORAL EVERY 4 HOURS PRN
Status: DISCONTINUED | OUTPATIENT
Start: 2020-01-01 | End: 2020-01-01 | Stop reason: HOSPADM

## 2020-01-01 RX ORDER — OXYCODONE HYDROCHLORIDE 5 MG/1
5 TABLET ORAL EVERY 4 HOURS PRN
Status: DISCONTINUED | OUTPATIENT
Start: 2020-01-01 | End: 2020-01-01 | Stop reason: HOSPADM

## 2020-01-01 RX ORDER — BISACODYL 10 MG
10 SUPPOSITORY, RECTAL RECTAL DAILY PRN
Status: DISCONTINUED | OUTPATIENT
Start: 2020-01-01 | End: 2020-01-01

## 2020-01-01 RX ORDER — HYDROCORTISONE 25 MG/G
CREAM TOPICAL 2 TIMES DAILY
Qty: 1 TUBE | Refills: 0 | Status: SHIPPED | OUTPATIENT
Start: 2020-01-01

## 2020-01-01 RX ORDER — ONDANSETRON 2 MG/ML
4 INJECTION INTRAMUSCULAR; INTRAVENOUS EVERY 6 HOURS PRN
Status: DISCONTINUED | OUTPATIENT
Start: 2020-01-01 | End: 2020-01-01

## 2020-01-01 RX ORDER — FUROSEMIDE 20 MG/1
20 TABLET ORAL DAILY PRN
COMMUNITY
End: 2020-01-01 | Stop reason: HOSPADM

## 2020-01-01 RX ORDER — CEFAZOLIN SODIUM 1 G/50ML
SOLUTION INTRAVENOUS AS NEEDED
Status: DISCONTINUED | OUTPATIENT
Start: 2020-01-01 | End: 2020-01-01

## 2020-01-01 RX ORDER — HEPARIN SODIUM 5000 [USP'U]/ML
5000 INJECTION, SOLUTION INTRAVENOUS; SUBCUTANEOUS EVERY 8 HOURS SCHEDULED
Status: DISCONTINUED | OUTPATIENT
Start: 2020-01-01 | End: 2020-01-01

## 2020-01-01 RX ORDER — SODIUM CHLORIDE 9 MG/ML
INJECTION, SOLUTION INTRAVENOUS AS NEEDED
Status: DISCONTINUED | OUTPATIENT
Start: 2020-01-01 | End: 2020-01-01 | Stop reason: HOSPADM

## 2020-01-01 RX ORDER — FENTANYL CITRATE/PF 50 MCG/ML
12.5 SYRINGE (ML) INJECTION
Status: DISCONTINUED | OUTPATIENT
Start: 2020-01-01 | End: 2020-01-01 | Stop reason: HOSPADM

## 2020-01-01 RX ORDER — HEPARIN SODIUM 5000 [USP'U]/ML
5000 INJECTION, SOLUTION INTRAVENOUS; SUBCUTANEOUS EVERY 8 HOURS SCHEDULED
Status: COMPLETED | OUTPATIENT
Start: 2020-01-01 | End: 2020-01-01

## 2020-01-01 RX ORDER — FUROSEMIDE 20 MG/1
20 TABLET ORAL DAILY
Status: DISCONTINUED | OUTPATIENT
Start: 2020-01-01 | End: 2020-01-01

## 2020-01-01 RX ORDER — ASPIRIN 81 MG/1
81 TABLET, CHEWABLE ORAL DAILY
COMMUNITY
End: 2021-01-01 | Stop reason: ALTCHOICE

## 2020-01-01 RX ORDER — POLYETHYLENE GLYCOL 3350 17 G/17G
17 POWDER, FOR SOLUTION ORAL DAILY
Qty: 14 EACH | Refills: 0 | Status: SHIPPED | OUTPATIENT
Start: 2020-01-01 | End: 2021-01-01 | Stop reason: ALTCHOICE

## 2020-01-01 RX ORDER — METOPROLOL SUCCINATE 25 MG/1
12.5 TABLET, EXTENDED RELEASE ORAL DAILY
Qty: 30 TABLET | Refills: 5 | Status: SHIPPED | OUTPATIENT
Start: 2020-01-01 | End: 2020-01-01 | Stop reason: HOSPADM

## 2020-01-01 RX ORDER — SENNOSIDES 8.6 MG
1 TABLET ORAL
Status: DISCONTINUED | OUTPATIENT
Start: 2020-01-01 | End: 2020-01-01

## 2020-01-01 RX ORDER — CALCIUM CARBONATE 200(500)MG
1000 TABLET,CHEWABLE ORAL DAILY PRN
Refills: 0
Start: 2020-01-01 | End: 2021-01-01 | Stop reason: ALTCHOICE

## 2020-01-01 RX ORDER — SODIUM CHLORIDE 9 MG/ML
125 INJECTION, SOLUTION INTRAVENOUS CONTINUOUS
Status: DISCONTINUED | OUTPATIENT
Start: 2020-01-01 | End: 2020-01-01

## 2020-01-01 RX ORDER — FUROSEMIDE 10 MG/ML
40 INJECTION INTRAMUSCULAR; INTRAVENOUS ONCE
Status: COMPLETED | OUTPATIENT
Start: 2020-01-01 | End: 2020-01-01

## 2020-01-01 RX ORDER — METOPROLOL SUCCINATE 25 MG/1
12.5 TABLET, EXTENDED RELEASE ORAL DAILY
Refills: 0
Start: 2020-01-01

## 2020-01-01 RX ORDER — METOPROLOL SUCCINATE 25 MG/1
12.5 TABLET, EXTENDED RELEASE ORAL DAILY
Status: DISCONTINUED | OUTPATIENT
Start: 2020-01-01 | End: 2020-01-01 | Stop reason: HOSPADM

## 2020-01-01 RX ORDER — HEPARIN SODIUM 5000 [USP'U]/ML
5000 INJECTION, SOLUTION INTRAVENOUS; SUBCUTANEOUS EVERY 8 HOURS SCHEDULED
Status: DISCONTINUED | OUTPATIENT
Start: 2020-01-01 | End: 2020-01-01 | Stop reason: HOSPADM

## 2020-01-01 RX ORDER — ROCURONIUM BROMIDE 10 MG/ML
INJECTION, SOLUTION INTRAVENOUS AS NEEDED
Status: DISCONTINUED | OUTPATIENT
Start: 2020-01-01 | End: 2020-01-01

## 2020-01-01 RX ORDER — EPHEDRINE SULFATE 50 MG/ML
INJECTION INTRAVENOUS AS NEEDED
Status: DISCONTINUED | OUTPATIENT
Start: 2020-01-01 | End: 2020-01-01

## 2020-01-01 RX ORDER — FUROSEMIDE 20 MG/1
TABLET ORAL
Qty: 180 TABLET | Refills: 3 | Status: SHIPPED | OUTPATIENT
Start: 2020-01-01 | End: 2020-01-01 | Stop reason: HOSPADM

## 2020-01-01 RX ADMIN — ACETAMINOPHEN 650 MG: 325 TABLET ORAL at 05:50

## 2020-01-01 RX ADMIN — DOCUSATE SODIUM 100 MG: 100 CAPSULE, LIQUID FILLED ORAL at 17:35

## 2020-01-01 RX ADMIN — FENTANYL CITRATE 50 MCG: 50 INJECTION, SOLUTION INTRAMUSCULAR; INTRAVENOUS at 10:09

## 2020-01-01 RX ADMIN — OXYCODONE HYDROCHLORIDE 2.5 MG: 5 TABLET ORAL at 04:56

## 2020-01-01 RX ADMIN — SENNOSIDES 8.6 MG: 8.6 TABLET, FILM COATED ORAL at 21:04

## 2020-01-01 RX ADMIN — SODIUM CHLORIDE 125 ML/HR: 0.9 INJECTION, SOLUTION INTRAVENOUS at 09:19

## 2020-01-01 RX ADMIN — DOCUSATE SODIUM 100 MG: 100 CAPSULE, LIQUID FILLED ORAL at 08:58

## 2020-01-01 RX ADMIN — HEPARIN SODIUM 5000 UNITS: 5000 INJECTION INTRAVENOUS; SUBCUTANEOUS at 14:15

## 2020-01-01 RX ADMIN — DOCUSATE SODIUM 100 MG: 100 CAPSULE, LIQUID FILLED ORAL at 09:24

## 2020-01-01 RX ADMIN — ACETAMINOPHEN 650 MG: 325 TABLET ORAL at 23:59

## 2020-01-01 RX ADMIN — EPHEDRINE SULFATE 10 MG: 50 INJECTION, SOLUTION INTRAVENOUS at 12:30

## 2020-01-01 RX ADMIN — FUROSEMIDE 20 MG: 20 TABLET ORAL at 09:55

## 2020-01-01 RX ADMIN — ACETAMINOPHEN 650 MG: 325 TABLET ORAL at 11:41

## 2020-01-01 RX ADMIN — METOPROLOL SUCCINATE 12.5 MG: 25 TABLET, EXTENDED RELEASE ORAL at 08:48

## 2020-01-01 RX ADMIN — ACETAMINOPHEN 650 MG: 325 TABLET ORAL at 17:35

## 2020-01-01 RX ADMIN — HEPARIN SODIUM 5000 UNITS: 5000 INJECTION INTRAVENOUS; SUBCUTANEOUS at 21:04

## 2020-01-01 RX ADMIN — MORPHINE SULFATE 2 MG: 2 INJECTION, SOLUTION INTRAMUSCULAR; INTRAVENOUS at 10:56

## 2020-01-01 RX ADMIN — PERFLUTREN 0.8 ML/MIN: 6.52 INJECTION, SUSPENSION INTRAVENOUS at 10:47

## 2020-01-01 RX ADMIN — ACETAMINOPHEN 650 MG: 325 TABLET ORAL at 14:06

## 2020-01-01 RX ADMIN — ACETAMINOPHEN 650 MG: 325 TABLET ORAL at 02:32

## 2020-01-01 RX ADMIN — METOPROLOL SUCCINATE 12.5 MG: 25 TABLET, EXTENDED RELEASE ORAL at 09:41

## 2020-01-01 RX ADMIN — HEPARIN SODIUM 5000 UNITS: 5000 INJECTION INTRAVENOUS; SUBCUTANEOUS at 21:27

## 2020-01-01 RX ADMIN — ACETAMINOPHEN 650 MG: 325 TABLET ORAL at 17:48

## 2020-01-01 RX ADMIN — ACETAMINOPHEN 650 MG: 325 TABLET ORAL at 00:22

## 2020-01-01 RX ADMIN — DOCUSATE SODIUM 100 MG: 100 CAPSULE, LIQUID FILLED ORAL at 17:54

## 2020-01-01 RX ADMIN — ROCURONIUM BROMIDE 30 MG: 10 INJECTION, SOLUTION INTRAVENOUS at 12:23

## 2020-01-01 RX ADMIN — METOPROLOL SUCCINATE 12.5 MG: 25 TABLET, EXTENDED RELEASE ORAL at 08:41

## 2020-01-01 RX ADMIN — ACETAMINOPHEN 650 MG: 325 TABLET ORAL at 14:51

## 2020-01-01 RX ADMIN — SENNOSIDES 8.6 MG: 8.6 TABLET, FILM COATED ORAL at 22:09

## 2020-01-01 RX ADMIN — PHENYLEPHRINE HYDROCHLORIDE 100 MCG: 10 INJECTION INTRAVENOUS at 12:32

## 2020-01-01 RX ADMIN — SODIUM CHLORIDE 125 ML/HR: 0.9 INJECTION, SOLUTION INTRAVENOUS at 23:55

## 2020-01-01 RX ADMIN — METOPROLOL SUCCINATE 12.5 MG: 25 TABLET, EXTENDED RELEASE ORAL at 09:23

## 2020-01-01 RX ADMIN — CEFAZOLIN SODIUM 1000 MG: 1 SOLUTION INTRAVENOUS at 12:08

## 2020-01-01 RX ADMIN — HEPARIN SODIUM 5000 UNITS: 5000 INJECTION INTRAVENOUS; SUBCUTANEOUS at 14:51

## 2020-01-01 RX ADMIN — ACETAMINOPHEN 650 MG: 325 TABLET ORAL at 12:51

## 2020-01-01 RX ADMIN — PANTOPRAZOLE SODIUM 40 MG: 40 TABLET, DELAYED RELEASE ORAL at 14:24

## 2020-01-01 RX ADMIN — FUROSEMIDE 40 MG: 10 INJECTION, SOLUTION INTRAMUSCULAR; INTRAVENOUS at 17:04

## 2020-01-01 RX ADMIN — ACETAMINOPHEN 650 MG: 325 TABLET ORAL at 23:46

## 2020-01-01 RX ADMIN — HEPARIN SODIUM 5000 UNITS: 5000 INJECTION INTRAVENOUS; SUBCUTANEOUS at 21:40

## 2020-01-01 RX ADMIN — FUROSEMIDE 40 MG: 10 INJECTION, SOLUTION INTRAMUSCULAR; INTRAVENOUS at 13:26

## 2020-01-01 RX ADMIN — ACETAMINOPHEN 650 MG: 325 TABLET ORAL at 05:24

## 2020-01-01 RX ADMIN — PANTOPRAZOLE SODIUM 40 MG: 40 TABLET, DELAYED RELEASE ORAL at 08:58

## 2020-01-01 RX ADMIN — HEPARIN SODIUM 5000 UNITS: 5000 INJECTION INTRAVENOUS; SUBCUTANEOUS at 18:04

## 2020-01-01 RX ADMIN — ACETAMINOPHEN 650 MG: 325 TABLET ORAL at 18:04

## 2020-01-01 RX ADMIN — PANTOPRAZOLE SODIUM 40 MG: 40 TABLET, DELAYED RELEASE ORAL at 09:41

## 2020-01-01 RX ADMIN — DOCUSATE SODIUM 100 MG: 100 CAPSULE, LIQUID FILLED ORAL at 18:04

## 2020-01-01 RX ADMIN — EPHEDRINE SULFATE 5 MG: 50 INJECTION, SOLUTION INTRAVENOUS at 12:50

## 2020-01-01 RX ADMIN — PROPOFOL 100 MG: 10 INJECTION, EMULSION INTRAVENOUS at 12:23

## 2020-01-01 RX ADMIN — PHENYLEPHRINE HYDROCHLORIDE 100 MCG: 10 INJECTION INTRAVENOUS at 12:27

## 2020-01-01 RX ADMIN — DOCUSATE SODIUM 100 MG: 100 CAPSULE, LIQUID FILLED ORAL at 17:48

## 2020-01-01 RX ADMIN — ACETAMINOPHEN 650 MG: 325 TABLET ORAL at 14:14

## 2020-01-01 RX ADMIN — FUROSEMIDE 20 MG: 20 TABLET ORAL at 08:58

## 2020-01-01 RX ADMIN — ACETAMINOPHEN 650 MG: 325 TABLET ORAL at 23:13

## 2020-01-01 RX ADMIN — PANTOPRAZOLE SODIUM 40 MG: 40 TABLET, DELAYED RELEASE ORAL at 09:23

## 2020-01-01 RX ADMIN — CEFAZOLIN SODIUM 1000 MG: 1 SOLUTION INTRAVENOUS at 04:31

## 2020-01-01 RX ADMIN — CEFAZOLIN SODIUM 1000 MG: 1 SOLUTION INTRAVENOUS at 20:28

## 2020-01-01 RX ADMIN — HEPARIN SODIUM 5000 UNITS: 5000 INJECTION INTRAVENOUS; SUBCUTANEOUS at 14:06

## 2020-01-01 RX ADMIN — ACETAMINOPHEN 650 MG: 325 TABLET ORAL at 05:00

## 2020-01-01 RX ADMIN — HEPARIN SODIUM 5000 UNITS: 5000 INJECTION INTRAVENOUS; SUBCUTANEOUS at 14:25

## 2020-01-01 RX ADMIN — SENNOSIDES 8.6 MG: 8.6 TABLET, FILM COATED ORAL at 21:00

## 2020-01-01 RX ADMIN — PANTOPRAZOLE SODIUM 40 MG: 40 TABLET, DELAYED RELEASE ORAL at 08:40

## 2020-01-01 RX ADMIN — FUROSEMIDE 20 MG: 20 TABLET ORAL at 09:25

## 2020-01-01 RX ADMIN — HEPARIN SODIUM 5000 UNITS: 5000 INJECTION INTRAVENOUS; SUBCUTANEOUS at 05:06

## 2020-01-01 RX ADMIN — DOCUSATE SODIUM 100 MG: 100 CAPSULE, LIQUID FILLED ORAL at 17:26

## 2020-01-01 RX ADMIN — HEPARIN SODIUM 5000 UNITS: 5000 INJECTION INTRAVENOUS; SUBCUTANEOUS at 13:01

## 2020-01-01 RX ADMIN — SENNOSIDES 8.6 MG: 8.6 TABLET, FILM COATED ORAL at 23:42

## 2020-01-01 RX ADMIN — HEPARIN SODIUM 5000 UNITS: 5000 INJECTION INTRAVENOUS; SUBCUTANEOUS at 23:42

## 2020-01-01 RX ADMIN — FENTANYL CITRATE 100 MCG: 50 INJECTION, SOLUTION INTRAMUSCULAR; INTRAVENOUS at 12:23

## 2020-01-01 RX ADMIN — HEPARIN SODIUM 5000 UNITS: 5000 INJECTION INTRAVENOUS; SUBCUTANEOUS at 22:09

## 2020-01-01 RX ADMIN — ACETAMINOPHEN 650 MG: 325 TABLET ORAL at 17:26

## 2020-01-01 RX ADMIN — PHENYLEPHRINE HYDROCHLORIDE 100 MCG: 10 INJECTION INTRAVENOUS at 13:05

## 2020-01-01 RX ADMIN — ACETAMINOPHEN 650 MG: 325 TABLET ORAL at 23:42

## 2020-01-01 RX ADMIN — SODIUM CHLORIDE 125 ML/HR: 0.9 INJECTION, SOLUTION INTRAVENOUS at 12:05

## 2020-01-01 RX ADMIN — LIDOCAINE HYDROCHLORIDE 60 MG: 20 INJECTION, SOLUTION INTRAVENOUS at 12:23

## 2020-01-01 RX ADMIN — ACETAMINOPHEN 650 MG: 325 TABLET ORAL at 21:05

## 2020-01-01 RX ADMIN — DOCUSATE SODIUM 100 MG: 100 CAPSULE, LIQUID FILLED ORAL at 18:27

## 2020-01-01 RX ADMIN — PANTOPRAZOLE SODIUM 40 MG: 40 TABLET, DELAYED RELEASE ORAL at 09:25

## 2020-01-01 RX ADMIN — DOCUSATE SODIUM 100 MG: 100 CAPSULE, LIQUID FILLED ORAL at 09:41

## 2020-01-01 RX ADMIN — ACETAMINOPHEN 650 MG: 325 TABLET ORAL at 21:24

## 2020-01-01 RX ADMIN — PANTOPRAZOLE SODIUM 40 MG: 40 TABLET, DELAYED RELEASE ORAL at 09:54

## 2020-01-01 RX ADMIN — HEPARIN SODIUM 5000 UNITS: 5000 INJECTION INTRAVENOUS; SUBCUTANEOUS at 21:00

## 2020-01-01 RX ADMIN — HEPARIN SODIUM 5000 UNITS: 5000 INJECTION INTRAVENOUS; SUBCUTANEOUS at 14:30

## 2020-01-01 RX ADMIN — HEPARIN SODIUM 5000 UNITS: 5000 INJECTION INTRAVENOUS; SUBCUTANEOUS at 06:13

## 2020-01-01 RX ADMIN — HEPARIN SODIUM 5000 UNITS: 5000 INJECTION INTRAVENOUS; SUBCUTANEOUS at 05:50

## 2020-01-01 RX ADMIN — METOPROLOL SUCCINATE 12.5 MG: 25 TABLET, EXTENDED RELEASE ORAL at 09:55

## 2020-01-01 RX ADMIN — HEPARIN SODIUM 5000 UNITS: 5000 INJECTION INTRAVENOUS; SUBCUTANEOUS at 05:14

## 2020-01-01 RX ADMIN — POLYETHYLENE GLYCOL 3350 17 G: 17 POWDER, FOR SOLUTION ORAL at 08:40

## 2020-01-01 RX ADMIN — ACETAMINOPHEN 650 MG: 325 TABLET ORAL at 17:54

## 2020-01-01 RX ADMIN — HEPARIN SODIUM 5000 UNITS: 5000 INJECTION INTRAVENOUS; SUBCUTANEOUS at 05:25

## 2020-01-01 RX ADMIN — DOCUSATE SODIUM 100 MG: 100 CAPSULE, LIQUID FILLED ORAL at 08:40

## 2020-01-01 RX ADMIN — METOPROLOL SUCCINATE 12.5 MG: 25 TABLET, EXTENDED RELEASE ORAL at 09:25

## 2020-01-01 RX ADMIN — PHENYLEPHRINE HYDROCHLORIDE 100 MCG: 10 INJECTION INTRAVENOUS at 12:41

## 2020-01-01 RX ADMIN — ACETAMINOPHEN 650 MG: 325 TABLET ORAL at 06:13

## 2020-01-01 RX ADMIN — ACETAMINOPHEN 650 MG: 325 TABLET ORAL at 05:05

## 2020-01-01 RX ADMIN — DOCUSATE SODIUM 100 MG: 100 CAPSULE, LIQUID FILLED ORAL at 17:51

## 2020-01-01 RX ADMIN — ACETAMINOPHEN 650 MG: 325 TABLET ORAL at 14:25

## 2020-01-01 RX ADMIN — PHENYLEPHRINE HYDROCHLORIDE 100 MCG: 10 INJECTION INTRAVENOUS at 12:42

## 2020-01-01 RX ADMIN — SODIUM CHLORIDE 125 ML/HR: 0.9 INJECTION, SOLUTION INTRAVENOUS at 15:08

## 2020-01-01 RX ADMIN — HEPARIN SODIUM 5000 UNITS: 5000 INJECTION INTRAVENOUS; SUBCUTANEOUS at 06:17

## 2020-01-01 RX ADMIN — ACETAMINOPHEN 650 MG: 325 TABLET ORAL at 05:14

## 2020-01-01 RX ADMIN — SODIUM CHLORIDE 500 ML: 0.9 INJECTION, SOLUTION INTRAVENOUS at 11:47

## 2020-01-01 RX ADMIN — FUROSEMIDE 20 MG: 20 TABLET ORAL at 09:24

## 2020-01-01 RX ADMIN — ACETAMINOPHEN 650 MG: 325 TABLET ORAL at 08:40

## 2020-01-01 RX ADMIN — PANTOPRAZOLE SODIUM 40 MG: 40 TABLET, DELAYED RELEASE ORAL at 08:48

## 2020-01-01 RX ADMIN — PHENYLEPHRINE HYDROCHLORIDE 100 MCG: 10 INJECTION INTRAVENOUS at 12:50

## 2020-02-12 ENCOUNTER — TELEPHONE (OUTPATIENT)
Dept: CARDIOLOGY CLINIC | Facility: CLINIC | Age: 85
End: 2020-02-12

## 2020-02-12 NOTE — TELEPHONE ENCOUNTER
Phone call from son Misbah Anaya  Patient experienced chest tightness over weekend with 2 lb weight gain  No real SOB  Per your discussion with patient and family, they gave Greco extra 20mg of lasix for four days with her normal 20mg twice daily  She is feeling much better, weight down, chest tightness gone and urinating  Next available appt with Dr Lindsey Pepper 3/16/20  The family wants to make sure they are keeping ahead of any issues, is this appt ok with Dr Lindsey Anaya states Heidi Jim has not had an episode for approx 4 months  She is in assisted living and eats what is prepared for her   I did discuss with Misbah Anaya, Dr Lindsey Pepper would probably be ok with this appt ,  If she has another episode to call the office at that time and to continue with the lasix protocal     Please advise

## 2020-02-13 NOTE — TELEPHONE ENCOUNTER
Left message son, Bhumika Jhaveri, voice mail  Per Dr Babatunde Stoner, ok with 3/16/20 appt  If any further episoded to contact the office

## 2020-02-14 ENCOUNTER — TELEPHONE (OUTPATIENT)
Dept: CARDIOLOGY CLINIC | Facility: CLINIC | Age: 85
End: 2020-02-14

## 2020-02-14 NOTE — TELEPHONE ENCOUNTER
Phone call from son today  Vernon Singleton has not gained any weight, but is experiencing some sob and chest tightness  Son requested sooner appt than sched 3/16 appt  Cancellation in Monday's sched with Dr Myranda Boland and sched appt for Greco  Encouraged son, Denis Cooper, if symptoms worsen he should take Greco to ER    He agrees, but trying to keep her out of hospital

## 2020-02-17 ENCOUNTER — OFFICE VISIT (OUTPATIENT)
Dept: CARDIOLOGY CLINIC | Facility: CLINIC | Age: 85
End: 2020-02-17
Payer: COMMERCIAL

## 2020-02-17 VITALS
SYSTOLIC BLOOD PRESSURE: 102 MMHG | HEIGHT: 66 IN | DIASTOLIC BLOOD PRESSURE: 50 MMHG | WEIGHT: 158.2 LBS | HEART RATE: 81 BPM | BODY MASS INDEX: 25.43 KG/M2

## 2020-02-17 DIAGNOSIS — R07.89 CHEST TIGHTNESS: Primary | ICD-10-CM

## 2020-02-17 DIAGNOSIS — I10 BENIGN ESSENTIAL HTN: ICD-10-CM

## 2020-02-17 DIAGNOSIS — R53.83 OTHER FATIGUE: ICD-10-CM

## 2020-02-17 DIAGNOSIS — I34.0 NONRHEUMATIC MITRAL VALVE REGURGITATION: ICD-10-CM

## 2020-02-17 PROCEDURE — 1036F TOBACCO NON-USER: CPT | Performed by: INTERNAL MEDICINE

## 2020-02-17 PROCEDURE — 3078F DIAST BP <80 MM HG: CPT | Performed by: INTERNAL MEDICINE

## 2020-02-17 PROCEDURE — 3008F BODY MASS INDEX DOCD: CPT | Performed by: INTERNAL MEDICINE

## 2020-02-17 PROCEDURE — 1160F RVW MEDS BY RX/DR IN RCRD: CPT | Performed by: INTERNAL MEDICINE

## 2020-02-17 PROCEDURE — 93000 ELECTROCARDIOGRAM COMPLETE: CPT | Performed by: INTERNAL MEDICINE

## 2020-02-17 PROCEDURE — 3074F SYST BP LT 130 MM HG: CPT | Performed by: INTERNAL MEDICINE

## 2020-02-17 PROCEDURE — 4040F PNEUMOC VAC/ADMIN/RCVD: CPT | Performed by: INTERNAL MEDICINE

## 2020-02-17 PROCEDURE — 99214 OFFICE O/P EST MOD 30 MIN: CPT | Performed by: INTERNAL MEDICINE

## 2020-02-17 NOTE — PROGRESS NOTES
Cardiology Follow Up        Jean Mcghee      12/8/1927      [id number]      Discussion/Summary:    1  Atypical chest discomfort  2  Chronic systolic and diastolic congestive heart failure, compensated  3  Severe cardiomyopathy, uncertain type or etiology, ejection fraction 25%  4  Dyslipidemia    · Uncertain etiology of chest discomfort, occurring at rest, and with walking, but not necessarily associated with exertion, lasting up to 1 hour without associated symptoms, substernal, nonradiating with no triggers and no exacerbating/alleviating factors  Sound atypical clinically  Patient has been reassured that her symptoms do not sound anginal   However, if they continue or if they worsen in terms of severity or frequency, certainly if she notes some to be more exertional, nuclear stress testing for further risk stratification may be continued  However, she has expressed in the past that she would not like any invasive procedures  ECG today with sinus rhythm, LVH with repolarization abnormalities, fairly unchanged compared to prior ECG  · Volume status seems compensated, baseline weight 158 lb, reviewed weight log from [employer]  No additional lower extremity edema which patient's son admits has been stable  · Blood pressure stable, continue lisinopril 2 5 mg daily and metoprolol succinate 12 5 mg daily  · Will recheck echocardiogram with next visit to re-evaluate left ventricular systolic function          Interval History:      This is a very pleasant 27-year-old female with a history of GERD, osteoarthritis, and dyslipidemia, hospitalized 04/2019 for shortness of breath and left sided chest discomfort  Edilma Connell was diagnosed with congestive heart failure and diuresis was initiated   Echocardiogram 4/5/2019 revealed ejection fraction 25% with mostly diffuse hypokinesis and moderate to severe mitral regurgitation   She expressed disinterest in undergoing any invasive procedures or surgeries, and expressed a wish to be DNR status and did not want AICD placement   She was initiated on lisinopril and metoprolol   Discharge weight was 153 lb      Earlier in 2019, lisinopril was discontinued to try to help with her fatigue which did not make much difference   Therefore, it was restarted  During her prior visit 07/11/2019, furosemide was increased to 40 mg daily in light of slight increased ankle edema  She presents today for urgent follow-up in light of symptoms of dyspnea and chest discomfort  She is 158 lb on today's visit, and prior weight was 152 lb 10/2019  She states her chest pain occurs perhaps every couple of days, lasting anywhere between 30-60 minutes  She has not noticed any triggers such as exertion, meals, or positional changes  She states while it is occurring, nothing exacerbates the pain, nothing alleviates it  She states when she relaxes for a while, it will subside and resolved  It is always mild, always nonradiating, with no associated symptoms  Upon repeated questioning, she does not consistently related to exertional activities  She has not noticed any additional lower extremity edema or dyspnea  Her weight has been stable at 158 lb             Vitals:  Vitals:    02/17/20 1409   BP: 102/50   BP Location: Left arm   Patient Position: Sitting   Cuff Size: Adult   Weight: 71 8 kg (158 lb 3 2 oz)   Height: 5' 6" (1 676 m)         Past Medical History:   Diagnosis Date    GERD (gastroesophageal reflux disease)     Right humeral fracture 7/20/2018     Social History     Socioeconomic History    Marital status: /Civil Union     Spouse name: Not on file    Number of children: Not on file    Years of education: Not on file    Highest education level: Not on file   Occupational History    Occupation: Retired   Social Needs    Financial resource strain: Not on file    Food insecurity:     Worry: Not on file     Inability: Not on file   [address] needs:      Medical: Not on file     Non-medical: Not on file   Tobacco Use    Smoking status: Never Smoker    Smokeless tobacco: Never Used    Tobacco comment: No secondhand smoke exposure   Substance and Sexual Activity    Alcohol use: Yes     Comment: Minimal consumption    Drug use: No    Sexual activity: Not Currently   Lifestyle    Physical activity:     Days per week: Not on file     Minutes per session: Not on file    Stress: Not on file   Relationships    Social connections:     Talks on phone: Not on file     Gets together: Not on file     Attends Sabianist service: Not on file     Active member of club or organization: Not on file     Attends meetings of clubs or organizations: Not on file     Relationship status: Not on file    Intimate partner violence:     Fear of current or ex partner: Not on file     Emotionally abused: Not on file     Physically abused: Not on file     Forced sexual activity: Not on file   Other Topics Concern    Not on file   Social History Narrative    Not on file      Family History   Problem Relation Age of Onset    Heart failure Mother     Coronary artery disease Father      Past Surgical History:   Procedure Laterality Date    HYSTERECTOMY  1973    Total    JOINT REPLACEMENT Right     knee       Current Outpatient Medications:     Biotin 1 MG CAPS, Take 1 capsule by mouth daily, Disp: , Rfl:     cholecalciferol (VITAMIN D3) 1,000 units tablet, Take by mouth, Disp: , Rfl:     furosemide (LASIX) 20 mg tablet, Take 2 tablets (40 mg total) by mouth daily, Disp: 180 tablet, Rfl: 3    lisinopril (ZESTRIL) 2 5 mg tablet, TAKE 1 TABLET BY MOUTH EVERY DAY, Disp: 30 tablet, Rfl: 5    metoprolol succinate (TOPROL-XL) 25 mg 24 hr tablet, Take 0 5 tablets (12 5 mg total) by mouth daily May keep at bedside, Disp: 30 tablet, Rfl: 5    Multiple Vitamins-Minerals (PRESERVISION AREDS) CAPS, Take by mouth 2 (two) times a day , Disp: , Rfl:     pantoprazole (PROTONIX) 40 mg tablet, Take 1 tablet (40 mg total) by mouth daily, Disp: 90 tablet, Rfl: 3        Review of Systems:  Review of Systems   Constitutional: Positive for fatigue  Negative for activity change, fever and unexpected weight change  HENT: Negative for facial swelling, nosebleeds and voice change  Respiratory: Negative for chest tightness, shortness of breath and wheezing  Cardiovascular: Negative for chest pain, palpitations and leg swelling  Chest discomfort   Gastrointestinal: Negative for abdominal distention  Genitourinary: Negative for hematuria  Musculoskeletal: Negative for arthralgias  Skin: Negative for color change, pallor, rash and wound  Neurological: Negative for dizziness, seizures and syncope  Psychiatric/Behavioral: Negative for agitation  Physical Exam:  Physical Exam   Constitutional: She is oriented to person, place, and time  She appears well-developed and well-nourished  HENT:   Head: Normocephalic and atraumatic  Eyes: EOM are normal    Neck: Normal range of motion  Neck supple  Cardiovascular: Normal rate, regular rhythm, normal heart sounds and intact distal pulses  Pulmonary/Chest: Effort normal and breath sounds normal    Abdominal: Soft  Musculoskeletal: Normal range of motion  She exhibits edema  Neurological: She is alert and oriented to person, place, and time  Skin: Skin is warm and dry  Psychiatric: She has a normal mood and affect  Her behavior is normal  Judgment and thought content normal    Vitals reviewed  This note was completed in part utilizing M-Modal Fluency Direct Software  Grammatical errors, random word insertions, spelling mistakes, and incomplete sentences can be an occasional consequence of this system secondary to software limitations, ambient noise, and hardware issues    If you have any questions or concerns about the content, text, or information contained within the body of this dictation, please contact the provider for clarification

## 2020-07-21 NOTE — TELEPHONE ENCOUNTER
Pt daughter called stating pt is in a nursing facility and they are still on lock down, will call closer to make appt

## 2020-08-01 NOTE — TELEPHONE ENCOUNTER
[address], [address] [place] [address] Drive, [phone], STATE MANDATES COVID TESTING FOR PT, SHE WILL NEED A SCRIPT TO BE FAXED TO HER AT [address]
Order placed and faxed 
[phone]

## 2020-08-26 PROBLEM — D64.9 ANEMIA: Status: ACTIVE | Noted: 2020-01-01

## 2020-08-26 PROBLEM — I10 BENIGN ESSENTIAL HTN: Status: ACTIVE | Noted: 2020-01-01

## 2020-08-26 PROBLEM — S72.91XA RIGHT FEMORAL FRACTURE (HCC): Status: ACTIVE | Noted: 2020-01-01

## 2020-08-26 NOTE — ASSESSMENT & PLAN NOTE
· Cr 1 36 on admission  · Recent Cr closer to 1 11   · Hold lasix and lisinopril for now   · Check PVR and monitor for retention   · BMP in AM

## 2020-08-26 NOTE — ASSESSMENT & PLAN NOTE
· XR right femur: acute displaced right femoral intertrochanteric fracture   · Consult to Orthopedics   · Geriatric pain protocol   · Bedrest for now   · NPO after midnight in the event patient can go for surgery however will need clearance prior as below   · Due to patient's significant cardiac history with severe cardiomyopathy will ask for Cardiology eval for preoperative clearance

## 2020-08-26 NOTE — ASSESSMENT & PLAN NOTE
· hgb 11 0   ·    · Check vitamin B12, folate, iron panel     Lab Results   Component Value Date    HGB 11 0 (L) 08/26/2020    HGB 10 9 (L) 04/06/2019    HGB 11 5 04/05/2019    HGB 11 9 11/07/2018

## 2020-08-26 NOTE — ASSESSMENT & PLAN NOTE
· Troponin 0 07   · Denies chest pain   · Previous troponins elevated as much as 0 1   · Continue serial troponins   · Prn ECG for any chest pain

## 2020-08-26 NOTE — CONSULTS
Consult - Cardiology   Ayse Race 80 y o  female MRN: [id number]  Unit/Bed#: E2 -01 Encounter: [id number]        Reason For Consult:  Preoperative risk stratification                 ASSESSMENT:  1  Preoperative risk stratification  2  Acute right femoral intertrochanteric fracture  3  Mechanical fall  4  Non MI troponin elevation, 0 07  5  Unspecified cardiomyopathy, LVEF 25%  6  Chronic systolic heart failure   7  Moderate to severe mitral regurgitation  8  Hypertension    PLAN/ DISCUSSION:     · With known cardiomyopathy (EF 25%) as well as moderate to severe mitral regurgitation this patient represents at least moderate if not high risk for anesthesia  I discussed this in detail today with the patient and her daughter at bedside  We also discussed that not pursuing intervention on her right hip would likely lead to her being bedbound and having a very poor quality of life as well greatly increased mortality  They understand the risk and wished to proceed with surgery after updating echocardiogram       · RCRI risk 6-10% depending on surgical procedure, orthopedic input forthcoming     · Update echocardiogram to re-evaluate EF, mitral valve, and aortic valve    · Fortunately she has decent exercise tolerance and can walk approximately up to 100 ft without the help of a walker and only minimal use of a cane  She appears very well compensated today with lungs clear bilaterally, no JVD, and trace lower extremity edema which is her baseline      · Agree with holding lisinopril    · Can hold Lasix for now as well given acute kidney injury    History Of Present Illness: This is a 80-year-old female who is cared for by Dr Derek Farmer of our cardiology group have an most recently been seen in February of 2020  She resides in an assisted living facility  She generally ambulates without the help of a cane or walker though she does tell me she carries her cane "just in case" and occasionally uses it for balance    When seen in February of this year she was reportedly doing well although she had some atypical chest pain  The symptoms have not recurred  She was scheduled to have a repeat echocardiogram though due to the pandemic this was canceled  She is presently hospitalized for pain in her right leg  In the morning hours of August 26, 2020 this patient was in her room getting dressed when she lost her balance and sustained a mechanical fall landing on her low back and right hip  She did not hit her head nor did she lose consciousness  She felt pain almost immediately and for this reason 911 was called  On arrival x-rays were taken which show right intertrochanteric hip fracture  She was admitted to the hospital for consultation with orthopedics for possible surgical intervention  We are asked to see her in consultation for cardiac preoperative risk stratification  As mentioned above for the last few months she has been in her normal state health  She is compliant with all her medications  She has decent exercise tolerance for her age  She breathes comfortably at night  Her lower extremity edema is stable and has been at baseline for several months  Past Medical History:        Past Medical History:   Diagnosis Date    GERD (gastroesophageal reflux disease)     Right humeral fracture 7/20/2018      Past Surgical History:   Procedure Laterality Date    HYSTERECTOMY  1973    Total    JOINT REPLACEMENT Right     knee        Allergy:        Allergies   Allergen Reactions    Sulfa Antibiotics Hives       Medications:       Prior to Admission medications    Medication Sig Start Date End Date Taking?  Authorizing Provider   aspirin 81 mg chewable tablet Chew 81 mg daily   Yes Historical Provider, MD   furosemide (LASIX) 20 mg tablet TAKE 2 TABLETS BY MOUTH EVERY DAY  Patient taking differently: Take 20 mg by mouth daily  7/2/20  Yes Nadege Chris,    furosemide (LASIX) 20 mg tablet Take 20 mg by mouth daily as needed (extra dose for wt gain 3lbs in one day)   Yes Historical Provider, MD   lisinopril (ZESTRIL) 2 5 mg tablet Take 1 tablet (2 5 mg total) by mouth daily 6/19/20  Yes Nadege Chris DO   metoprolol succinate (TOPROL-XL) 25 mg 24 hr tablet TAKE 0 5 TABLETS (12 5 MG TOTAL) BY MOUTH DAILY MAY KEEP AT BEDSIDE 5/4/20  Yes Nadege Chris DO   Biotin 1 MG CAPS Take 1 capsule by mouth daily    Historical Provider, MD   cholecalciferol (VITAMIN D3) 1,000 units tablet Take by mouth 1/15/18   Historical Provider, MD   Multiple Vitamins-Minerals (PRESERVISION AREDS) CAPS Take by mouth 2 (two) times a day  1/15/18   Historical Provider, MD   pantoprazole (PROTONIX) 40 mg tablet TAKE 1 TABLET BY MOUTH EVERY DAY 8/7/20   Stephen Rob MD       Family History:     Family History   Problem Relation Age of Onset    Heart failure Mother     Coronary artery disease Father         Social History:       Social History     Socioeconomic History    Marital status: /Civil Union     Spouse name: None    Number of children: None    Years of education: None    Highest education level: None   Occupational History    Occupation: Retired   Social Needs    Financial resource strain: None    Food insecurity     Worry: None     Inability: None    Transportation needs     Medical: None     Non-medical: None   Tobacco Use    Smoking status: Never Smoker    Smokeless tobacco: Never Used    Tobacco comment: No secondhand smoke exposure   Substance and Sexual Activity    Alcohol use: Yes     Comment: Minimal consumption    Drug use: No    Sexual activity: Not Currently   Lifestyle    Physical activity     Days per week: None     Minutes per session: None    Stress: None   Relationships    Social connections     Talks on phone: None     Gets together: None     Attends Yarsanism service: None     Active member of club or organization: None     Attends meetings of clubs or organizations: None     Relationship status: None    Intimate partner violence     Fear of current or ex partner: None     Emotionally abused: None     Physically abused: None     Forced sexual activity: None   Other Topics Concern    None   Social History Narrative    None       ROS:  Symptoms per HPI  Remainder review of systems is negative    Exam:  General:  alert, oriented and in no distress, cooperative  Head: Normocephalic, atraumatic  Eyes:  EOMI  Pupils - equal, round, reactive to accomodation  No icterus  Normal Conjunctiva  Oropharynx: moist and normal-appearing mucosa  Neck: supple, symmetrical, trachea midline and no JVD  Heart:  RRR, No: murmer, rub or gallop, S1 & S2 normal   Respiratory effort / Chest Inspection: unlabored on room air  Lungs:  normal air entry, lungs clear to auscultation and no rales, rhonchi or wheezing   Abdomen: flat, normal findings: bowel sounds normal and soft, non-tender  Lower Limbs:  Trace edema bilaterally  Pulses[de-identified]  RLE - DP: present 2+                 LLE - DP: present 2+  Musculoskeletal: ROM grossly normal    DATA:      ECG:   Normal sinus rhythm  ST & T wave abnormality, consider inferolateral ischemia  Abnormal ECG                    Telemetry: Normal sinus rhythm,   HR 70's         Weights: Wt Readings from Last 3 Encounters:   08/26/20 72 kg (158 lb 11 7 oz)   02/17/20 71 8 kg (158 lb 3 2 oz)   10/15/19 69 1 kg (152 lb 6 4 oz)   , Body mass index is 25 62 kg/m²           Lab Studies:    Results from last 7 days   Lab Units 08/26/20  1238 08/26/20  0948   TROPONIN I ng/mL 0 09* 0 07*          Results from last 7 days   Lab Units 08/26/20  0948   WBC Thousand/uL 5 61   HEMOGLOBIN g/dL 11 0*   HEMATOCRIT % 36 0   PLATELETS Thousands/uL 216   ,   Results from last 7 days   Lab Units 08/26/20  0948   POTASSIUM mmol/L 4 4   CHLORIDE mmol/L 106   CO2 mmol/L 28   BUN mg/dL 31*   CREATININE mg/dL 1 36*   CALCIUM mg/dL 9 3   ALK PHOS U/L 63   ALT U/L 34   AST U/L 31

## 2020-08-26 NOTE — PLAN OF CARE
Problem: Potential for Falls  Goal: Patient will remain free of falls  Description: INTERVENTIONS:  - Assess patient frequently for physical needs  -  Identify cognitive and physical deficits and behaviors that affect risk of falls    -  [place] fall precautions as indicated by assessment   - Educate patient/family on patient safety including physical limitations  - Instruct patient to call for assistance with activity based on assessment  - Modify environment to reduce risk of injury  - Consider OT/PT consult to assist with strengthening/mobility  8/26/2020 1542 by Darren Prince RN  Outcome: Progressing  8/26/2020 1541 by Darren Prince RN  Outcome: Progressing     Problem: Prexisting or High Potential for Compromised Skin Integrity  Goal: Skin integrity is maintained or improved  Description: INTERVENTIONS:  - Identify patients at risk for skin breakdown  - Assess and monitor skin integrity  - Assess and monitor nutrition and hydration status  - Monitor labs   - Assess for incontinence   - Turn and reposition patient  - Assist with mobility/ambulation  - Relieve pressure over bony prominences  - Avoid friction and shearing  - Provide appropriate hygiene as needed including keeping skin clean and dry  - Evaluate need for skin moisturizer/barrier cream  - Collaborate with interdisciplinary team   - Patient/family teaching  - Consider wound care consult   8/26/2020 1542 by Darren Prince RN  Outcome: Progressing  8/26/2020 1541 by Darren Prince RN  Outcome: Progressing     Problem: PAIN - ADULT  Goal: Verbalizes/displays adequate comfort level or baseline comfort level  Description: Interventions:  - Encourage patient to monitor pain and request assistance  - Assess pain using appropriate pain scale  - Administer analgesics based on type and severity of pain and evaluate response  - Implement non-pharmacological measures as appropriate and evaluate response  - Consider cultural and social influences on pain and pain management  - Notify physician/advanced practitioner if interventions unsuccessful or patient reports new pain  Outcome: Progressing     Problem: DISCHARGE PLANNING  Goal: Discharge to home or other facility with appropriate resources  Description: INTERVENTIONS:  - Identify barriers to discharge w/patient and caregiver  - Arrange for needed discharge resources and transportation as appropriate  - Identify discharge learning needs (meds, wound care, etc )  - Arrange for interpretive services to assist at discharge as needed  - Refer to Case Management Department for coordinating discharge planning if the patient needs post-hospital services based on physician/advanced practitioner order or complex needs related to functional status, cognitive ability, or social support system  Outcome: Progressing     Problem: Knowledge Deficit  Goal: Patient/family/caregiver demonstrates understanding of disease process, treatment plan, medications, and discharge instructions  Description: Complete learning assessment and assess knowledge base    Interventions:  - Provide teaching at level of understanding  - Provide teaching via preferred learning methods  Outcome: Progressing

## 2020-08-26 NOTE — ASSESSMENT & PLAN NOTE
· Maintained on lasix, lisinopril and toprol XL   · Blood pressure elevated on arrival 178/74 --> 96/52   · Close monitoring of blood pressure   · Will hold lisinopril for now given low normal BP and elevated Cr   · Check all vital signs manually

## 2020-08-26 NOTE — CONSULTS
Chief Complaint     Right hip pain      History of the Present Illness     Frank Craven is a 80 y o  female who ambulates occasionally with a cane who presents after a fall at her assisted living facility [facility])  Patient had no antecedent hip pain  Jerilee Punches when she was trying to put her sweater on over her head  Denies blacking out or any loss of consciousness  Denies any numbness or tingling  Has pain localized to the hip and proximal thigh  Has had a total knee arthroplasty in the past which has not been giving her any trouble  Denies any pain in the left lower extremity or bilateral upper extremities  Daughter is with her at bedside today  Past Medical History:   Diagnosis Date    GERD (gastroesophageal reflux disease)     Right humeral fracture 7/20/2018       Past Surgical History:   Procedure Laterality Date    HYSTERECTOMY  1973    Total    JOINT REPLACEMENT Right     knee       Allergies   Allergen Reactions    Sulfa Antibiotics Hives       No current facility-administered medications on file prior to encounter        Current Outpatient Medications on File Prior to Encounter   Medication Sig Dispense Refill    aspirin 81 mg chewable tablet Chew 81 mg daily      furosemide (LASIX) 20 mg tablet TAKE 2 TABLETS BY MOUTH EVERY DAY (Patient taking differently: Take 20 mg by mouth daily ) 180 tablet 3    furosemide (LASIX) 20 mg tablet Take 20 mg by mouth daily as needed (extra dose for wt gain 3lbs in one day)      lisinopril (ZESTRIL) 2 5 mg tablet Take 1 tablet (2 5 mg total) by mouth daily 90 tablet 1    metoprolol succinate (TOPROL-XL) 25 mg 24 hr tablet TAKE 0 5 TABLETS (12 5 MG TOTAL) BY MOUTH DAILY MAY KEEP AT BEDSIDE 30 tablet 5    Biotin 1 MG CAPS Take 1 capsule by mouth daily      cholecalciferol (VITAMIN D3) 1,000 units tablet Take by mouth      Multiple Vitamins-Minerals (PRESERVISION AREDS) CAPS Take by mouth 2 (two) times a day       pantoprazole (PROTONIX) 40 mg tablet TAKE 1 TABLET BY MOUTH EVERY DAY 30 tablet 0       Social History     Tobacco Use    Smoking status: Never Smoker    Smokeless tobacco: Never Used    Tobacco comment: No secondhand smoke exposure   Substance Use Topics    Alcohol use: Yes     Comment: Minimal consumption    Drug use: No       Family History   Problem Relation Age of Onset    Heart failure Mother     Coronary artery disease Father        Review of Systems     As stated in the HPI  All other systems were reviewed and are negative  Physical Exam     /52 (BP Location: Right arm)   Pulse 91   Temp (!) 96 8 °F (36 °C) (Temporal)   Resp 16   Wt 72 kg (158 lb 11 7 oz)   SpO2 97%   BMI 25 62 kg/m²     GENERAL: This is a well-developed, well-nourished, age-appropriate patient in no acute distress  The patient is alert and oriented x3  Pleasant and cooperative  Eyes: Anicteric sclerae  Extraocular movements appear intact  HENT: Nares are patent with no drainage  Lungs: There is equal chest rise on inspection  Breathing is non-labored with no audible wheezing  Cardiovascular: No cyanosis  No upper extremity lymphadema  Skin: Skin is warm to touch  No obvious skin lesions or rashes other than described below  Neurologic: No ataxia  Psychiatric: Mood and affect are appropriate  Examination of the right lower extremity reveals no masses lesions or ecchymosis about the hip  Her leg is turned into external rotation  She has sensation intact to light touch in the saphenous, sural, superficial peroneal, deep peroneal, tibial nerve distribution  5/5 motor in the EHL, FHL, tibialis anterior, gastrocsoleus complex  Brisk capillary refill  Well-healed incision on the anterior knee  No tenderness to palpation at the knee tibia ankle or foot  Examination of the bilateral upper extremities and left lower extremity reveal atraumatic limbs with no bony or ligamentous laxity or crepitance with any joint motion    No tenderness to palpation through distally neurovascularly intact    Data Review     Results Reviewed     Procedure Component Value Units Date/Time    Troponin I [[id number]]  (Abnormal) Collected:  08/26/20 1238    Lab Status:  Final result Specimen:  Blood from Arm, Left Updated:  08/26/20 1315     Troponin I 0 09 ng/mL     Comprehensive metabolic panel [[id number]]  (Abnormal) Collected:  08/26/20 0948    Lab Status:  Final result Specimen:  Blood from Arm, Left Updated:  08/26/20 1017     Sodium 143 mmol/L      Potassium 4 4 mmol/L      Chloride 106 mmol/L      CO2 28 mmol/L      ANION GAP 9 mmol/L      BUN 31 mg/dL      Creatinine 1 36 mg/dL      Glucose 108 mg/dL      Calcium 9 3 mg/dL      AST 31 U/L      ALT 34 U/L      Alkaline Phosphatase 63 U/L      Total Protein 7 4 g/dL      Albumin 3 6 g/dL      Total Bilirubin 0 52 mg/dL      eGFR 34 ml/min/1 73sq m     Narrative:       [facility] guidelines for Chronic Kidney Disease (CKD):     Stage 1 with normal or high GFR (GFR > 90 mL/min/1 73 square meters)    Stage 2 Mild CKD (GFR = 60-89 mL/min/1 73 square meters)    Stage 3A Moderate CKD (GFR = 45-59 mL/min/1 73 square meters)    Stage 3B Moderate CKD (GFR = 30-44 mL/min/1 73 square meters)    Stage 4 Severe CKD (GFR = 15-29 mL/min/1 73 square meters)    Stage 5 End Stage CKD (GFR <15 mL/min/1 73 square meters)  Note: GFR calculation is accurate only with a steady state creatinine    Troponin I [[id number]]  (Abnormal) Collected:  08/26/20 0948    Lab Status:  Final result Specimen:  Blood from Arm, Left Updated:  08/26/20 1012     Troponin I 0 07 ng/mL     CBC and differential [[id number]]  (Abnormal) Collected:  08/26/20 0948    Lab Status:  Final result Specimen:  Blood from Arm, Left Updated:  08/26/20 0953     WBC 5 61 Thousand/uL      RBC 3 36 Million/uL      Hemoglobin 11 0 g/dL      Hematocrit 36 0 %       fL      MCH 32 7 pg      MCHC 30 6 g/dL      RDW 13 1 %      MPV 11 0 fL Platelets 708 Thousands/uL      nRBC 0 /100 WBCs      Neutrophils Relative 52 %      Immat GRANS % 0 %      Lymphocytes Relative 31 %      Monocytes Relative 12 %      Eosinophils Relative 4 %      Basophils Relative 1 %      Neutrophils Absolute 2 94 Thousands/µL      Immature Grans Absolute 0 02 Thousand/uL      Lymphocytes Absolute 1 72 Thousands/µL      Monocytes Absolute 0 66 Thousand/µL      Eosinophils Absolute 0 23 Thousand/µL      Basophils Absolute 0 04 Thousands/µL              Imaging:  Imaging of the AP pelvis, hip and femur with multiple views taken in the hospital today and personally reviewed by me shows evidence of a 4 part intertrochanteric femur fracture with no evidence of lucency or malalignment of the total knee arthroplasty distal to the fracture    Assessment and Plan      80year-old female with a right intertrochanteric femur fracture  I had a long discussion with both the patient and her daughter was present at bedside  Patient makes her own medical decisions and the daughter is the power of   I discussed that surgical intervention is typically the preferred choice of treatment for most patients who were previously ambulating and do not have significant enough medical comorbidities that increased the risk beyond the benefit of the surgery  Nonsurgical intervention would involve bed rest until her pain was controlled with difficulty in turns and self-care  There is a high risk for decubitus ulcers, DVT, urinary tract infection, and pneumonia  Pain would be likely higher with nonsurgical care then surgical care  Operative intervention would involve intramedullary nail fixation  This would allow for early mobilization and immediate weight-bearing  This would allow for therapy to begin working with the patient as early as the same day or the next day after surgery    Risks of the surgery including bleeding the need for blood transfusion, infection, damage to surrounding neurovascular structures, malunion, nonunion, persistent pain, anesthesia related risks including stroke, cardiac risk and death were all discussed with the patient with good understanding  Patient wishes to proceed with surgical intervention  I discussed that she has significant cardiac morbidity and so the cardiology service is helping to optimized her  She is also not NPO at this time and so she is not in a place where she can currently proceed to the operating room  We will have a multi disciplinary discussion on how best to optimize her for the operating room  Patient wishes to not have any resuscitation perioperatively or intraoperatively if this were needed and we will respect her decision  We did discuss the role of blood transfusion and she is okay with this  We will proceed to the operating room as soon as we can is at a point which she is safe  Until then, she should stay NPO after midnight  IV fluid with gentle hydration should be initiated to what is allow will with a cardiac risk  DVT prophylaxis should be held in the morning for anticipated surgery    She should remain nonweightbearing to the right lower extremity

## 2020-08-26 NOTE — ED PROVIDER NOTES
Pt Name: Darrin Goodpasture  MRN: [id number]  [place] 12/8/1927  Age/Sex: 80 y o  female  Date of evaluation: 8/26/2020  PCP: Buddy Fairbanks MD    [address] [place] [address]    Chief Complaint   Patient presents with    Fall     pt from snf fell states she was putting her sweater on  c-collar in place by ems pt hit head no loc on asa  c/o right hip pain          HPI    Elver Beck presents to the Emergency Department complaining of fall  She is not sure if she lost her balance or turned awkwardly but she denies syncope or LOC  She did not hit her head hard and is not on any anticoagulation  She feels that she is not able to raise her right LE   used: No    Fall   Injury location:  Leg  Leg injury location:  R hip  Incident location:  Home  Arrived directly from scene: yes    Suspicion of alcohol use: no    Suspicion of drug use: no    Prior to arrival data:     Bystander interventions:  None    Patient ambulatory at scene: no      Blood loss:  None    Orientation at scene:  Person, place and situation    Loss of consciousness: no      Amnesic to event: no      Immobilization:  C-collar  Associated symptoms: no abdominal pain, no back pain, no chest pain, no difficulty breathing, no headaches, no loss of consciousness, no nausea, no neck pain, no seizures and no vomiting    Risk factors: beta blocker therapy and CHF          Past Medical and Surgical History    Past Medical History:   Diagnosis Date    GERD (gastroesophageal reflux disease)     Right humeral fracture 7/20/2018       Past Surgical History:   Procedure Laterality Date    HYSTERECTOMY  1973    Total    JOINT REPLACEMENT Right     knee       Family History   Problem Relation Age of Onset    Heart failure Mother     Coronary artery disease Father        Social History     Tobacco Use    Smoking status: Never Smoker    Smokeless tobacco: Never Used    Tobacco comment: No secondhand smoke exposure   Substance Use Topics    Alcohol use:  Yes Comment: Minimal consumption    Drug use: No              Allergies    Allergies   Allergen Reactions    Sulfa Antibiotics Hives       Home Medications    Prior to Admission medications    Medication Sig Start Date End Date Taking? Authorizing Provider   Biotin 1 MG CAPS Take 1 capsule by mouth daily    Historical Provider, MD   cholecalciferol (VITAMIN D3) 1,000 units tablet Take by mouth 1/15/18   Historical Provider, MD   furosemide (LASIX) 20 mg tablet TAKE 2 TABLETS BY MOUTH EVERY DAY 7/2/20   [facility], DO   lisinopril (ZESTRIL) 2 5 mg tablet Take 1 tablet (2 5 mg total) by mouth daily 6/19/20   [facility], DO   metoprolol succinate (TOPROL-XL) 25 mg 24 hr tablet TAKE 0 5 TABLETS (12 5 MG TOTAL) BY MOUTH DAILY MAY KEEP AT BEDSIDE 5/4/20   [facility], DO   Multiple Vitamins-Minerals (PRESERVISION AREDS) CAPS Take by mouth 2 (two) times a day  1/15/18   Historical Provider, MD   pantoprazole (PROTONIX) 40 mg tablet TAKE 1 TABLET BY MOUTH EVERY DAY 8/7/20   Jordan Burger MD           Review of Systems    Review of Systems   Constitutional: Negative for activity change, appetite change, chills, diaphoresis, fatigue and fever  HENT: Negative for congestion, postnasal drip, rhinorrhea, sinus pressure, sneezing and sore throat  Eyes: Negative for pain and visual disturbance  Respiratory: Negative for cough, chest tightness and shortness of breath  Cardiovascular: Negative for chest pain, palpitations and leg swelling  Gastrointestinal: Negative for abdominal distention, abdominal pain, constipation, diarrhea, nausea and vomiting  Endocrine: Negative for polydipsia, polyphagia and polyuria  Genitourinary: Negative for decreased urine volume, difficulty urinating, dysuria, flank pain, frequency and hematuria  Musculoskeletal: Negative for arthralgias, back pain, gait problem, joint swelling and neck pain  Skin: Negative for pallor and rash     Allergic/Immunologic: Negative for immunocompromised state  Neurological: Negative for seizures, loss of consciousness, syncope, speech difficulty, weakness, light-headedness, numbness and headaches  All other systems reviewed and are negative  Physical Exam      ED Triage Vitals [08/26/20 0912]   Temperature Pulse Respirations Blood Pressure SpO2   97 8 °F (36 6 °C) 81 14 (!) 178/74 98 %      Temp Source Heart Rate Source Patient Position - Orthostatic VS BP Location FiO2 (%)   Oral Monitor Lying Right arm --      Pain Score       No Pain               Physical Exam  Vitals signs and nursing note reviewed  Constitutional:       General: She is not in acute distress  Appearance: She is well-developed  She is not diaphoretic  HENT:      Head: Normocephalic and atraumatic  Nose: Nose normal    Eyes:      General: Lids are normal       Conjunctiva/sclera: Conjunctivae normal       Pupils: Pupils are equal, round, and reactive to light  Neck:      Musculoskeletal: Normal range of motion and neck supple  Cardiovascular:      Rate and Rhythm: Normal rate and regular rhythm  Heart sounds: Normal heart sounds  No murmur  No friction rub  No gallop  Pulmonary:      Effort: Pulmonary effort is normal  No accessory muscle usage or respiratory distress  Breath sounds: Normal breath sounds  No wheezing or rales  Abdominal:      General: There is no distension  Palpations: Abdomen is soft  Tenderness: There is no abdominal tenderness  There is no guarding or rebound  Musculoskeletal:      Right hip: She exhibits decreased range of motion, decreased strength, tenderness, bony tenderness and deformity  Comments: RLE is externally rotated   Skin:     General: Skin is warm and dry  Findings: No erythema or rash  Neurological:      Mental Status: She is alert and oriented to person, place, and time  Cranial Nerves: No cranial nerve deficit  Sensory: No sensory deficit     Psychiatric: Speech: Speech normal          Behavior: Behavior normal          Thought Content: Thought content normal          Judgment: Judgment normal            Assessment and Plan    Nury Pierce is a 80 y o  female who presents with right hip pain after a fall  Physical examination remarkable for right LE with limited ROM as well as tendernes  Differential diagnosis (not completely inclusive) includes fracture/dislocation  Plan will be to perform diagnostic testing and treat symptomatically  MDM  Number of Diagnoses or Management Options  Closed comminuted intertrochanteric fracture of proximal femur, right, initial encounter [facility]):   Fall:   Diagnosis management comments: Alert, NAD, NC/AT, no scalp hematoma or laceration, PERRL, no hemotympanum, no butler sign, no raccoon eyes, no septal hematoma, no malocclusion, no dental trauma, no trismus, no oral lacerations, no midline c-spine tenderness, clavicles intact and nontender, CTAB, RRR, no chest wall tenderness, abd soft NT/ND, no bruising, pelvis stable, right LE is externally rotated and with limited ROM and tenderness otherwise no long bone deformity or tenderness, T and L spine without midline tenderness or stepoff, no lacerations or abrasions  The C-cspine was evaluated via Nexus criteria:  Focal Neuro Deficit: no  Midline Tenderness: no  AMS: no  Intoxication: no  Distracting Injury: yes  The C-Spine was not cleared clinically therefore imaging was ordered for confirmation although clinical suspicion was low  Diagnostic Results    EKG INTERPRETATION  EKG Interpretation    Rate: 82 BPM  Rhythm: sinus  Axis: normal  Intervals: Normal, no blocks, QTc 434ms  T waves: nonspecific  ST segments: abnormal    Impression: nondiagnostic EKG  EKG for comparison: reviewed  EKG interpreted by me  Interpretation by Denis Contreras DO  EKG reviewed and interpreted independently      Labs:    Results for orders placed or performed during the hospital encounter of 08/26/20   CBC and differential   Result Value Ref Range    WBC 5 61 4 31 - 10 16 Thousand/uL    RBC 3 36 (L) 3 81 - 5 12 Million/uL    Hemoglobin 11 0 (L) 11 5 - 15 4 g/dL    Hematocrit 36 0 34 8 - 46 1 %     (H) 82 - 98 fL    MCH 32 7 26 8 - 34 3 pg    MCHC 30 6 (L) 31 4 - 37 4 g/dL    RDW 13 1 11 6 - 15 1 %    MPV 11 0 8 9 - 12 7 fL    Platelets 296 950 - 747 Thousands/uL    nRBC 0 /100 WBCs    Neutrophils Relative 52 43 - 75 %    Immat GRANS % 0 0 - 2 %    Lymphocytes Relative 31 14 - 44 %    Monocytes Relative 12 4 - 12 %    Eosinophils Relative 4 0 - 6 %    Basophils Relative 1 0 - 1 %    Neutrophils Absolute 2 94 1 85 - 7 62 Thousands/µL    Immature Grans Absolute 0 02 0 00 - 0 20 Thousand/uL    Lymphocytes Absolute 1 72 0 60 - 4 47 Thousands/µL    Monocytes Absolute 0 66 0 17 - 1 22 Thousand/µL    Eosinophils Absolute 0 23 0 00 - 0 61 Thousand/µL    Basophils Absolute 0 04 0 00 - 0 10 Thousands/µL   Comprehensive metabolic panel   Result Value Ref Range    Sodium 143 136 - 145 mmol/L    Potassium 4 4 3 5 - 5 3 mmol/L    Chloride 106 100 - 108 mmol/L    CO2 28 21 - 32 mmol/L    ANION GAP 9 4 - 13 mmol/L    BUN 31 (H) 5 - 25 mg/dL    Creatinine 1 36 (H) 0 60 - 1 30 mg/dL    Glucose 108 65 - 140 mg/dL    Calcium 9 3 8 3 - 10 1 mg/dL    AST 31 5 - 45 U/L    ALT 34 12 - 78 U/L    Alkaline Phosphatase 63 46 - 116 U/L    Total Protein 7 4 6 4 - 8 2 g/dL    Albumin 3 6 3 5 - 5 0 g/dL    Total Bilirubin 0 52 0 20 - 1 00 mg/dL    eGFR 34 ml/min/1 73sq m   Troponin I   Result Value Ref Range    Troponin I 0 07 (H) <=0 04 ng/mL       All labs reviewed and utilized in the medical decision making process    Radiology:    XR hip/pelv 2-3 vws right if performed   Final Result      Acute displaced right femoral intertrochanteric fracture  The study was marked in [facility] for immediate notification        Workstation performed: [id number]         XR femur 2 views RIGHT   Final Result      Acute displaced right femoral intertrochanteric fracture  The study was marked in [facility] for immediate notification  Workstation performed: [id number]         XR chest 1 view   Final Result      No acute cardiopulmonary disease  Workstation performed: [id number]         CT head without contrast   Final Result      No acute intracranial abnormality  Workstation performed: [id number]         CT spine cervical without contrast   Final Result      No cervical spine fracture or traumatic malalignment  Degenerative changes with mild canal narrowing at C2-C3      Mild left foraminal narrowing at C2-C3  Workstation performed: [id number]             All radiology studies independently viewed by me and interpreted by the radiologist     Procedure    Procedures      ED Course of Care and Re-Assessments    I spoke with both SLIM and ortho  Medications   fentanyl citrate (PF) 100 MCG/2ML 50 mcg (50 mcg Intravenous Given 8/26/20 1009)           FINAL IMPRESSION    Final diagnoses:   Fall   Closed comminuted intertrochanteric fracture of proximal femur, right, initial encounter [facility])         DISPOSITION/PLAN      Time reflects when diagnosis was documented in both MDM as applicable and the Disposition within this note     Time User Action Codes Description Comment    8/26/2020 10:38 AM Cassie Mu Add [L08  XXXA] Fall     8/26/2020 10:38 AM Cassie Mu Add [S72 141A] Closed comminuted intertrochanteric fracture of proximal femur, right, initial encounter [facility])       ED Disposition     ED Disposition Condition Date/Time Comment    Admit Stable Wed Aug 26, 2020 10:38 AM Case was discussed with KEMAR and the patient's admission status was agreed to be Admission Status: inpatient status to the service of Dr Tejinder Dominguez           Follow-up Information    None              DO Orly Stern DO  08/26/20 1228

## 2020-08-26 NOTE — H&P
H&P- Mamadou Sosadina 12/8/1927, 80 y o  female MRN: [id number]  Unit/Bed#: E2 -01 Encounter: [id number]  Primary Care Provider: Buddy Fairbanks MD   Date and time admitted to hospital: 8/26/2020  9:06 AM    * Right femoral fracture (HCC)  Assessment & Plan  · XR right femur: acute displaced right femoral intertrochanteric fracture   · Consult to Orthopedics   · Geriatric pain protocol   · Bedrest for now   · NPO after midnight in the event patient can go for surgery however will need clearance prior as below   · Due to patient's significant cardiac history with severe cardiomyopathy will ask for Cardiology eval for preoperative clearance     Fall  Assessment & Plan  · Resides at [facility]   · Uses cane   · States she was putting on her sweater and turned and fell and landed on her back   · PT/OT after surgical intervention     Esophagitis, reflux  Assessment & Plan  · Continue PPI     Elevated troponin  Assessment & Plan  · Troponin 0 07   · Denies chest pain   · Previous troponins elevated as much as 0 1   · Continue serial troponins   · Prn ECG for any chest pain     Chronic systolic (congestive) heart failure (HCC)  Assessment & Plan  Wt Readings from Last 3 Encounters:   08/26/20 75 1 kg (165 lb 9 1 oz)   02/17/20 71 8 kg (158 lb 3 2 oz)   10/15/19 69 1 kg (152 lb 6 4 oz)     · Echo 4/2019: EF 25%, moderate to severe MR, mild TR, mild IN, small pericardial effusions   · Follows with Dr Judith Hollins outpatient   · Maintained on lasix 20 mg daily, will hold for now and monitor volume status   · CXR: no acute cardiopulmonary disease   · On ACE-inhibitor and toprol XL   · Hold lisinopril for now given elevated renal function and hypotension   · Should be monitored on telemetry michelle-operatively         Cardiomyopathy [facility])  Assessment & Plan  · Severe cardiomyopathy with EF in 2018 at 25%   · Will ask for cardiology eval pre-operatively   · Telemetry monitoring michelle-operatively   · Close monitoring volume status     CKD (chronic kidney disease), stage III (HCC)  Assessment & Plan  · Cr 1 36 on admission  · Recent Cr closer to 1 11   · Hold lasix and lisinopril for now   · Check PVR and monitor for retention   · BMP in AM       Mitral regurgitation  Assessment & Plan  · Moderate to severe MR on echo 2019     Benign essential HTN  Assessment & Plan  · Maintained on lasix, lisinopril and toprol XL   · Blood pressure elevated on arrival 178/74 --> 96/52   · Close monitoring of blood pressure   · Will hold lisinopril for now given low normal BP and elevated Cr   · Check all vital signs manually     Anemia  Assessment & Plan  · hgb 11 0   ·    · Check vitamin B12, folate, iron panel     Lab Results   Component Value Date    HGB 11 0 (L) 08/26/2020    HGB 10 9 (L) 04/06/2019    HGB 11 5 04/05/2019    HGB 11 9 11/07/2018         VTE Prophylaxis: Heparin  / sequential compression device   Code Status: level 3 DNR/DNI   POLST: There is no POLST form on file for this patient (pre-hospital)  Discussion with family: spoke with patient and daughter at the bedside     Anticipated Length of Stay:  Patient will be admitted on an Inpatient basis with an anticipated length of stay of  > 2 midnights  Justification for Hospital Stay: right hip fracture     Total Time for Visit, including Counseling / Coordination of Care: 45 minutes  Greater than 50% of this total time spent on direct patient counseling and coordination of care  Chief Complaint:   Fall with right hip pain     History of Present Illness:    Trang Bains is a 80 y o  female who presents with fall at [facility] where she resides  Patient states she was putting on her sweater and turned too quickly and tripped and fell on her back  She had pain in her right hip  She denies LOC, dizziness, lightheadedness, syncope, chest pain prior to falling  She has full sensation to right leg and pain in right hi   On admission, she was found to have acute displaced right femoral intertrochanteric fracture  CT head and cervical spine without acute traumatic abnormalities  ED physician spoke with Orthopedics and is aware of the patient  Patient has past medical history of GERD, HTN, chronic combined systolic and diastolic CHF, severe cardiomyopathy, stage 3 CKD, moderate to severe MR by echo in 2019  She follows with Dr Stacie Nettles outpatient with Cardiology  Per daughter at the bedside, she was due to have repeat echocardiogram in April which was cancelled due to [place]  She has gained some weight at nursing facility but has been eating more candies and snacks so not sure if its actual weight gain vs water weight  She denies increased shortness of breath, increased abdominal girth, increased leg swelling  She denies chest pain  Due to significant cardiac history, I will obtain Cardiology consult for pre-operative clearance  I confirmed with both the patient and daughter at the bedside that patient is level 3 DNR/DNI  Review of Systems:    Review of Systems   Constitutional: Negative for chills, fatigue and fever  Respiratory: Negative for cough, shortness of breath and wheezing  Cardiovascular: Negative for chest pain, palpitations and leg swelling  Gastrointestinal: Negative for abdominal pain  Musculoskeletal: Positive for arthralgias and gait problem  Neurological: Negative for dizziness, syncope and light-headedness  Psychiatric/Behavioral: Negative for confusion  All other systems reviewed and are negative  Past Medical and Surgical History:     Past Medical History:   Diagnosis Date    GERD (gastroesophageal reflux disease)     Right humeral fracture 7/20/2018       Past Surgical History:   Procedure Laterality Date    HYSTERECTOMY  1973    Total    JOINT REPLACEMENT Right     knee       Meds/Allergies:    Prior to Admission medications    Medication Sig Start Date End Date Taking?  Authorizing Provider   aspirin 81 mg chewable tablet Chew 81 mg daily   Yes Historical Provider, MD   furosemide (LASIX) 20 mg tablet TAKE 2 TABLETS BY MOUTH EVERY DAY  Patient taking differently: Take 20 mg by mouth daily  7/2/20  Yes Nadege Chris DO   furosemide (LASIX) 20 mg tablet Take 20 mg by mouth daily as needed (extra dose for wt gain 3lbs in one day)   Yes Historical Provider, MD   lisinopril (ZESTRIL) 2 5 mg tablet Take 1 tablet (2 5 mg total) by mouth daily 6/19/20  Yes Nadege Chris DO   metoprolol succinate (TOPROL-XL) 25 mg 24 hr tablet TAKE 0 5 TABLETS (12 5 MG TOTAL) BY MOUTH DAILY MAY KEEP AT BEDSIDE 5/4/20  Yes Nadege Chris DO   Biotin 1 MG CAPS Take 1 capsule by mouth daily    Historical Provider, MD   cholecalciferol (VITAMIN D3) 1,000 units tablet Take by mouth 1/15/18   Historical Provider, MD   Multiple Vitamins-Minerals (PRESERVISION AREDS) CAPS Take by mouth 2 (two) times a day  1/15/18   Historical Provider, MD   pantoprazole (PROTONIX) 40 mg tablet TAKE 1 TABLET BY MOUTH EVERY DAY 8/7/20   Elo Hernandez MD     I have reviewed home medications with patient personally  also reviewed with daughter at bedside     Allergies:    Allergies   Allergen Reactions    Sulfa Antibiotics Hives       Social History:     Marital Status: /Civil Union   Occupation:   Patient Pre-hospital Living Situation: lives at [facility]  Patient Pre-hospital Level of Mobility: cane   Patient Pre-hospital Diet Restrictions:   Substance Use History:   Social History     Substance and Sexual Activity   Alcohol Use Yes    Comment: Minimal consumption     Social History     Tobacco Use   Smoking Status Never Smoker   Smokeless Tobacco Never Used   Tobacco Comment    No secondhand smoke exposure     Social History     Substance and Sexual Activity   Drug Use No       Family History:    Family History   Problem Relation Age of Onset    Heart failure Mother     Coronary artery disease Father        Physical Exam:     Vitals:   Blood Pressure: 126/58 (08/26/20 1300)  Pulse: 78 (08/26/20 1300)  Temperature: 97 8 °F (36 6 °C) (08/26/20 0912)  Temp Source: Oral (08/26/20 0912)  Respirations: 18 (08/26/20 1300)  Weight - Scale: 75 1 kg (165 lb 9 1 oz) (08/26/20 0912)  SpO2: 97 % (08/26/20 1300)    Physical Exam  Vitals signs and nursing note reviewed  Constitutional:       General: She is not in acute distress  Appearance: She is not ill-appearing  HENT:      Head: Normocephalic and atraumatic  Eyes:      Conjunctiva/sclera: Conjunctivae normal       Pupils: Pupils are equal, round, and reactive to light  Cardiovascular:      Rate and Rhythm: Normal rate and regular rhythm  Pulmonary:      Effort: Pulmonary effort is normal  No respiratory distress  Breath sounds: Normal breath sounds  Comments: Decreased breath sounds throughout  Abdominal:      General: Bowel sounds are normal       Palpations: Abdomen is soft  Tenderness: There is no abdominal tenderness  There is no guarding or rebound  Musculoskeletal:         General: Tenderness (right hip ) and deformity present  Right lower leg: Edema (right hip) present  Left lower leg: Edema present  Skin:     General: Skin is warm  Neurological:      General: No focal deficit present  Mental Status: She is alert  Psychiatric:         Mood and Affect: Mood normal            Additional Data:     Lab Results: I have personally reviewed pertinent reports        Results from last 7 days   Lab Units 08/26/20  0948   WBC Thousand/uL 5 61   HEMOGLOBIN g/dL 11 0*   HEMATOCRIT % 36 0   PLATELETS Thousands/uL 216   NEUTROS PCT % 52   LYMPHS PCT % 31   MONOS PCT % 12   EOS PCT % 4     Results from last 7 days   Lab Units 08/26/20  0948   SODIUM mmol/L 143   POTASSIUM mmol/L 4 4   CHLORIDE mmol/L 106   CO2 mmol/L 28   BUN mg/dL 31*   CREATININE mg/dL 1 36*   ANION GAP mmol/L 9   CALCIUM mg/dL 9 3   ALBUMIN g/dL 3 6   TOTAL BILIRUBIN mg/dL 0 52   ALK PHOS U/L 63   ALT U/L 34   AST U/L 31   GLUCOSE RANDOM mg/dL 108 Imaging: I have personally reviewed pertinent reports  XR hip/pelv 2-3 vws right if performed   Final Result by Jason Parker MD (08/26 1020)      Acute displaced right femoral intertrochanteric fracture  The study was marked in [facility] for immediate notification  Workstation performed: [id number]         XR femur 2 views RIGHT   Final Result by Jason Parker MD (08/26 1020)      Acute displaced right femoral intertrochanteric fracture  The study was marked in [facility] for immediate notification  Workstation performed: [id number]         XR chest 1 view   Final Result by Jason Parker MD (08/26 1021)      No acute cardiopulmonary disease  Workstation performed: [id number]         CT head without contrast   Final Result by Fozia Lewis MD (08/26 1013)      No acute intracranial abnormality  Workstation performed: [id number]         CT spine cervical without contrast   Final Result by Fozia Lewis MD (08/26 1021)      No cervical spine fracture or traumatic malalignment  Degenerative changes with mild canal narrowing at C2-C3      Mild left foraminal narrowing at C2-C3  Workstation performed: [id number]             EKG, Pathology, and Other Studies Reviewed on Admission:   · EKG: reviewed    Allscripts / Epic Records Reviewed: Yes     ** Please Note: This note has been constructed using a voice recognition system   **

## 2020-08-26 NOTE — ED NOTES
Pt voided on self at this time  Pt changed, cleaned and purewick applied due to pain with turning and urinary incontinence        Mel Wilson RN  08/26/20 4908

## 2020-08-26 NOTE — ASSESSMENT & PLAN NOTE
Wt Readings from Last 3 Encounters:   08/26/20 75 1 kg (165 lb 9 1 oz)   02/17/20 71 8 kg (158 lb 3 2 oz)   10/15/19 69 1 kg (152 lb 6 4 oz)     · Echo 4/2019: EF 25%, moderate to severe MR, mild TR, mild MO, small pericardial effusions   · Follows with Dr Italo Sosa outpatient   · Maintained on lasix 20 mg daily, will hold for now and monitor volume status   · CXR: no acute cardiopulmonary disease   · On ACE-inhibitor and toprol XL   · Hold lisinopril for now given elevated renal function and hypotension   · Should be monitored on telemetry michelle-operatively

## 2020-08-26 NOTE — ASSESSMENT & PLAN NOTE
· Resides at [facility]   · Uses cane   · States she was putting on her sweater and turned and fell and landed on her back   · PT/OT after surgical intervention

## 2020-08-26 NOTE — ASSESSMENT & PLAN NOTE
· Severe cardiomyopathy with EF in 2018 at 25%   · Will ask for cardiology eval pre-operatively   · Telemetry monitoring michelle-operatively   · Close monitoring volume status

## 2020-08-27 PROBLEM — R01.1 MURMUR: Chronic | Status: ACTIVE | Noted: 2020-01-01

## 2020-08-27 NOTE — OP NOTE
OPERATIVE REPORT    PATIENT NAME: Billie Cronin   :  1927  MRN: [id number]  Pt Location: [facility] OR ROOM 02    SURGERY DATE: 2020    SURGEON(S) and ROLE:  Primary: Humberto Prince MD  Assisting: Pam Lam PA-C    NOTE:  The presence of a physician assistant was necessary to help with patient positioning, surgical exposure, wound retraction, wound closure, and other key portions of the procedure  No qualified resident was available for this case  PREOPERATIVE DIAGNOSES:  Right Intertrochanteric Hip Fracture    POSTOPERATIVE DIAGNOSES:  Same as Preoperatvie Diagnoses    PROCEDURES:  Intramedullary Fixation of Right Intertrochanteric Hip Fracture      ANESTHESIA TYPE:  General endotracheal    ANESTHESIA STAFF:   Anesthesiologist: Chuy Ceja DO  CRNA: Zeynep Lopez CRNA    ESTIMATED BLOOD LOSS:  250 mL    PERIOPERATIVE ANTIBIOTICS:  cefazolin, 2 grams    IMPLANTS:  Synthes TFN (400x11 mm), 105 mm CM screw, 46 mm distal interlock screw    Implant Name Type Inv  Item Serial No   Lot No  LRB No  Used Action   NAIL SCREW TI TROCH FIX 11 0 X 105MM [employer] - [id number]  NAIL SCREW TI TROCH FIX 11 0 X 105MM SYNTHES  [employer] [id number] Right 1 Implanted   NAIL RUFUS TI TROCH FIX 11MM X 400MM X 130 DEG RT STR - [id number]  NAIL RUFUS TI TROCH FIX 11MM X 400MM X 130 DEG RT STR  [employer] [id number] Right 1 Implanted   SCREW LCK 5 X 46MM HEX RECES STRL - [id number]  SCREW LCK 5 X 46MM HEX RECES STRL  Synthes  Right 1 Implanted       SPECIMENS:  * No specimens in log *    DRAINS:  None      OPERATIVE INDICATIONS:  The patient is a 80 y o  female with right hip pain and a displaced intertrochanteric fracture  Surgical treatment was indicated due to instability, displacement and liklihood of prolonged or permanent functional impairment with non-surgical treatment  After a thorough discussion of the potential risks, benefits, and alternative treatments, the patient agreed to proceed with surgery    The patient understands that the risks of surgery include, but are not limited to: nonunion, malunion, loss of fixation, infection, neurovascular injury, wound healing complications, venous thromboembolism, persistent pain, stiffness, instability, recurrence of symptoms, potential need for additional surgeries, and loss of limb or life  Oral and written consent for surgery was obtained from the patient preoperatively  PROCEDURE AND TECHNIQUE:  On the day of surgery, the patient was identified in the preoperative holding area  The operative site was marked by the surgeon  The patient was taken into the operating room  A time-out was conducted to confirm the patient's identity, the operative site, and the proposed procedure  The patient was anesthetized, and perioperative antibiotics were administered  The patient was positioned supine on the [place] table  All bony prominences were padded  The operative site was prepped and draped using standard sterile technique  Fracture reduction was achieved with gentle traction and rotation  An incision was made 5 cm proximal to the greater trochanter  The gluteus fascia was split with curved ovalle scissors  Using fluoroscopic guidance, a guide pin was positioned on the tip of the greater trochanter and advanced into the proximal femur  The intramedullary canal was opened, and a ball-tip guide wire was advanced to the distal femoral physeal scar  A depth gauge was used to select the nail length  The femur was reamed to a diameter of 12 5 mm  A 400 x 11 mm TFN was passed over the wire and seated within the intramedullary canal under fluoroscopic guidance  A second incision was made just distal to the greater trochanter, and the cephalomedullary guide was advanced onto the lateral femur  A cephalomedullary guide pin was advanced to the subchondral bone of the femoral head    Fluoroscopy confirmed that the wire was positioned centrally within the femoral neck on AP and lateral views  A depth gauge was used to select the cephalomedullary screw length  A cephalomedullary reamer was passed over the wire to the measured depth  A 105 mm cephalomedullary screw was placed by hand, and appropriated position was confirmed with fluoroscopy  The set screw was tightened  A 46 mm distal static interlocking screw was placed under fluoroscopic guidance using perfect circles  Final fluoroscopic images confirmed appropriate fracture alignment and hardware position  The wounds were irrigated with sterile saline  Incisions were closed with 2-0 vicryl and staples  Sterile dressings were applied  The drapes were removed  The patient was transferred to the hospital bed, awakened from anesthesia, and taken to the PACU in stable condition        COMPLICATIONS:  None    PATIENT DISPOSITION:  PACU       SIGNATURE:  Waldo Rivera MD  DATE:  August 27, 2020  TIME:  1:27 PM

## 2020-08-27 NOTE — PHYSICAL THERAPY NOTE
PT Cancel Note    PT consult received  Per chart review pt admitted w R femoral fx, scheduled for OR today at 1230 for Insertion Nail IM Femur Antegrade  Will cx and continue to follow post operatively w updated WBS/activty orders as appropriate      Lisa Beavers, PT

## 2020-08-27 NOTE — OCCUPATIONAL THERAPY NOTE
Occupational Therapy Note:    Patient Name: Mariah Elliott  FBGNS'V Date: 8/27/2020    OT consult received  Chart reviewed  PT admitted s/p fall resulting in R intertrochanteric femur fracture  Pt planned for OR today, 8/27/20, for Insertion Nail IM Femur Antegrade  Will cx and follow as medically appropriate post-op w/ updated WBS/activity orders      Jaycee Wells, OTR/L

## 2020-08-27 NOTE — PROGRESS NOTES
Progress Note - Arik Olvera 1927, 80 y o  female MRN: [id number]    Unit/Bed#: E2 -01 Encounter: [id number]    Primary Care Provider: Cony Dhaliwla MD   Date and time admitted to hospital: 2020  9:06 AM        * Right femoral fracture [facility])  Assessment & Plan  · For surgery today  · Pain is tolerable    Chronic systolic congestive heart failure (HCC)  Assessment & Plan  Wt Readings from Last 3 Encounters:   20 71 6 kg (157 lb 13 6 oz)   20 71 8 kg (158 lb 3 2 oz)   10/15/19 69 1 kg (152 lb 6 4 oz)     She is euvolemic      Fall  Assessment & Plan  Likely needs STR after surgery          Subjective:   Feels well  Hip pain is tolerable with prn medciations  She is ready for surgery  Objective:     Vitals:   Temp (24hrs), Av 8 °F (36 °C), Min:96 5 °F (35 8 °C), Max:97 5 °F (36 4 °C)    Temp:  [96 5 °F (35 8 °C)-97 5 °F (36 4 °C)] 96 8 °F (36 °C)  HR:  [72-93] 93  Resp:  [16-20] 20  BP: ()/(41-65) 105/49  SpO2:  [97 %-99 %] 98 %  Body mass index is 25 48 kg/m²  Input and Output Summary (last 24 hours): Intake/Output Summary (Last 24 hours) at 2020 1105  Last data filed at 2020 1249  Gross per 24 hour   Intake 500 ml   Output    Net 500 ml       Physical Exam:     Physical Exam  Vitals signs and nursing note reviewed  HENT:      Head: Normocephalic and atraumatic  Eyes:      Pupils: Pupils are equal, round, and reactive to light  Cardiovascular:      Rate and Rhythm: Normal rate and regular rhythm  Heart sounds: No murmur  No friction rub  No gallop  Pulmonary:      Effort: Pulmonary effort is normal       Breath sounds: Normal breath sounds  No wheezing or rales  Abdominal:      General: Bowel sounds are normal       Palpations: Abdomen is soft  Tenderness: There is no abdominal tenderness                 Additional Data:     Labs:    Results from last 7 days   Lab Units 20  0500   WBC Thousand/uL 8 10   HEMOGLOBIN g/dL 8 4* HEMATOCRIT % 28 1*   PLATELETS Thousands/uL 192   NEUTROS PCT % 76*   LYMPHS PCT % 14   MONOS PCT % 9   EOS PCT % 0     Results from last 7 days   Lab Units 08/27/20  0500 08/26/20  0948   POTASSIUM mmol/L 4 6 4 4   CHLORIDE mmol/L 105 106   CO2 mmol/L 26 28   BUN mg/dL 33* 31*   CREATININE mg/dL 1 36* 1 36*   CALCIUM mg/dL 8 2* 9 3   ALK PHOS U/L  --  63   ALT U/L  --  34   AST U/L  --  31     Results from last 7 days   Lab Units 08/27/20  0748   INR  1 22*                   * I Have Reviewed All Lab Data     Recent Cultures (last 7 days):             Last 24 Hours Medication List:   Current Facility-Administered Medications   Medication Dose Route Frequency Provider Last Rate    acetaminophen  650 mg Oral Q6H [address] Sujatha Nuñez, DANIEL      calcium carbonate  1,000 mg Oral Daily PRN Fleta Pilling, PA-ROLAND      docusate sodium  100 mg Oral BID Fleta Pilling, DANIEL      furosemide  40 mg Intravenous Once Yenni Tierney DO      HYDROmorphone  0 2 mg Intravenous Q4H PRN Fleta Pilling, PA-ROLAND      metoprolol succinate  12 5 mg Oral Daily Sujatha Nuñez PA-C      ondansetron  4 mg Intravenous Q6H PRN Fleta Pilling, PA-ROLAND      oxyCODONE  2 5 mg Oral Q4H PRN Fleta Pilling, PA-ROLAND      oxyCODONE  5 mg Oral Q4H PRN Sujatha Nuñez, PA-ROLAND      pantoprazole  40 mg Oral Daily Sujatha Nuñez, DANIEL      senna  1 tablet Oral HS PRN Fleta Pilling, PA-ROLAND           VTE Pharmacologic Prophylaxis:   Pharmacologic: per ortho      Current Length of Stay: 1 day(s)    Current Patient Status: Inpatient       Discharge Plan: will need STR    Code Status: Level 3 - DNAR and DNI           Today, Patient Was Seen By: Tianna Hdz DO    ** Please Note: Dictation voice to text software may have been used in the creation of this document   **

## 2020-08-27 NOTE — ANESTHESIA POSTPROCEDURE EVALUATION
Post-Op Assessment Note    CV Status:  Stable  Pain Score: 2    Pain management: adequate     Mental Status:  Alert and awake   Hydration Status:  Euvolemic   PONV Controlled:  Controlled   Airway Patency:  Patent      Post Op Vitals Reviewed: Yes      Staff: Anesthesiologist         No complications documented      /63 (08/27/20 1353)    Temp     Pulse 90 (08/27/20 1353)   Resp 12 (08/27/20 1353)    SpO2 100 % (08/27/20 1353)

## 2020-08-27 NOTE — PROGRESS NOTES
Progress Note - Cardiology   [place] Race 80 y o  female MRN: [id number]  Unit/Bed#: E2 -01 Encounter: [id number]        Problem List:  Principal Problem:    Right femoral fracture ([facility] [address] )  Active Problems:    Esophagitis, reflux    Fall    Elevated troponin    CKD (chronic kidney disease), stage III ([facility] )    Cardiomyopathy ([facility] )    Mitral regurgitation    Chronic systolic congestive heart failure (HCC)    Benign essential HTN    Anemia      Asessment:  1  Preoperative risk stratification  2  Acute right femoral intertrochanteric fracture  3  Mechanical fall  4  Non MI troponin elevation, 0 07  5  Unspecified cardiomyopathy, LVEF 25%  6  Chronic systolic heart failure   7  Moderate to severe mitral regurgitation  8  Hypertension    Plan/ Discussion:  Echocardiogram performed this morning with official review forthcoming  As mentioned in consultation yesterday she is going to be at least moderate and probably high risk for anesthesia and surgical intervention  · We again discussed the risk today and the patient wishes to proceed with surgery knowing that her other option would leave her essentially bed-bound with a very poor quality of life as well as high mortality rate  · Given severe cardiomyopathy would be best to minimize crystalloid fluids  · If giving blood transfusion would give IV Lasix at the same time 40 mg  · Strive for potassium > 4 0, magnesium > 2 0  · Official review of echocardiogram forthcoming  · To OR this afternoon    Subjective:  Feels well today  No chest pain or shortness of breath  Has some discomfort in her right leg      Vitals:  Vitals:    08/26/20 1359 08/27/20 0531   Weight: 72 kg (158 lb 11 7 oz) 71 6 kg (157 lb 13 6 oz)   ,  Vitals:    08/26/20 1932 08/26/20 2300 08/27/20 0531 08/27/20 0612   BP: (!) 86/56 100/65  106/54   BP Location: Left arm      Pulse: 91 79  93   Resp: 16 20  20   Temp: 97 5 °F (36 4 °C) (!) 96 5 °F (35 8 °C)  (!) 96 8 °F (36 °C)   TempSrc: Temporal Temporal Temporal   SpO2: 99% 97%  98%   Weight:   71 6 kg (157 lb 13 6 oz)        Exam:  General: Alert awake and oriented, no acute distress    Heart:  Regular rate and rhythm, no murmurs   Respiratory effort:  Breathing comfortably on room air    Lying nearly supine   Getting echocardiogram  Lungs:  Clear bilaterally without wheezing, rales, crackles   Lower Limbs:  No edema           Telemetry:       Normal sinus rhythm, , Heart Rate 70's    Medications:    Current Facility-Administered Medications:     acetaminophen (TYLENOL) tablet 650 mg, 650 mg, Oral, Q6H [address], Sujatha Nuñez PA-C, 650 mg at 08/27/20 0500    calcium carbonate (TUMS) chewable tablet 1,000 mg, 1,000 mg, Oral, Daily PRN, Andrew Delgado PA-C    docusate sodium (COLACE) capsule 100 mg, 100 mg, Oral, BID, Sujatha Nuñez PA-C, 100 mg at 08/26/20 1827    furosemide (LASIX) injection 40 mg, 40 mg, Intravenous, Once, Rubio Hansen DO    HYDROmorphone (DILAUDID) injection 0 2 mg, 0 2 mg, Intravenous, Q4H PRN, Andrew Delgado PA-C    metoprolol succinate (TOPROL-XL) 24 hr tablet 12 5 mg, 12 5 mg, Oral, Daily, Sujatha Nuñez PA-C, 12 5 mg at 08/27/20 0848    ondansetron (ZOFRAN) injection 4 mg, 4 mg, Intravenous, Q6H PRN, Sujatha Nuñez PA-C    oxyCODONE (ROXICODONE) IR tablet 2 5 mg, 2 5 mg, Oral, Q4H PRN, Sujatha Nuñez PA-C, 2 5 mg at 08/27/20 0456    oxyCODONE (ROXICODONE) IR tablet 5 mg, 5 mg, Oral, Q4H PRN, Sujatha Nuñez PA-C    pantoprazole (PROTONIX) EC tablet 40 mg, 40 mg, Oral, Daily, Sujatha Nuñez PA-C, 40 mg at 08/27/20 0848    senna (SENOKOT) tablet 8 6 mg, 1 tablet, Oral, HS PRN, Andrew Delgado PA-C      Labs/Data:        Results from last 7 days   Lab Units 08/27/20  0500 08/26/20  0948   WBC Thousand/uL 8 10 5 61   HEMOGLOBIN g/dL 8 4* 11 0*   HEMATOCRIT % 28 1* 36 0   PLATELETS Thousands/uL 192 216     Results from last 7 days   Lab Units 08/27/20  0500 08/26/20  1238 08/26/20  0948   POTASSIUM mmol/L 4 6  -- 4  4   CHLORIDE mmol/L 105  --  106   CO2 mmol/L 26  --  28   BUN mg/dL 33*  --  31*   TROPONIN I ng/mL  --  0 09* 0 07*

## 2020-08-27 NOTE — UTILIZATION REVIEW
Initial Clinical Review    Surgical procedure  intramedullary nail fixation secondary to hip fx   Age/Sex: 80 y o  female  Surgery Date: 08/27/2020  Procedure: INSERTION NAIL IM FEMUR ANTEGRADE (TROCHANTERIC) (Right Leg Upper)  Anesthesia: general with ASA Monitors  Operative Findings: Right Intertrochanteric Hip Fracture    08/26/2020  80 yof  With hx of GERD, HTN, CHF, severe cardiomyopathy and stage 3 CKD presented via EMS from assisted living facility s/p fall  Pt is complaining of hip pain  Upon examination pt noted with tenderness,  Deformity and edema to right hip  Xray of right hip  reveals fracture  Pt admitted to IP with diagnosis right femoral hip fracture  Plan: pain management, orthopedic surgery consult    POD#1 Progress Note:- surgery completed  08/27/2020  Pt tolerated the procedure well with no complications     Admission Orders: Date/Time/Statement:   Admission Orders (From admission, onward)     Ordered        08/26/20 1128  Inpatient Admission (expected length of stay for this patient Order details is greater than two midnights)  Once                   Orders Placed This Encounter   Procedures    Inpatient Admission (expected length of stay for this patient Order details is greater than two midnights)     Standing Status:   Standing     Number of Occurrences:   1     Order Specific Question:   Admitting Physician     Answer:   Ronell Seip [[id number]]     Order Specific Question:   Level of Care     Answer:   Med Surg [[id number]]     Order Specific Question:   Estimated length of stay     Answer:   More than 2 Midnights     Order Specific Question:   Certification     Answer:   I certify that inpatient services are medically necessary for this patient for a duration of greater than two midnights  See H&P and MD Progress Notes for additional information about the patient's course of treatment       Vital Signs: /50 (BP Location: Right arm)   Pulse 86   Temp 98 2 °F (36 8 °C) (Temporal)   Resp 12   Wt 71 6 kg (157 lb 13 6 oz)   SpO2 100%   BMI 25 48 kg/m²   Diet: Cardiac diet  Mobility: PT/OT, weight bearing restrictions  DVT Prophylaxis: scd's, sub q heparin  Medications/Pain Control: HYDROmorphone, 0 2 mg, Intravenous, Q4H PRN, oxyCODONE, 2 5 mg, Oral, Q4H PRN , oxyCODONE, 5 mg, Oral, Q4H PRN    Scheduled Medications:  acetaminophen, 650 mg, Oral, Q6H EDEN  cefazolin, 1,000 mg, Intravenous, Q8H  docusate sodium, 100 mg, Oral, BID  furosemide, 40 mg, Intravenous, Once  heparin (porcine), 5,000 Units, Subcutaneous, Q8H EDEN  metoprolol succinate, 12 5 mg, Oral, Daily  pantoprazole, 40 mg, Oral, Daily      Continuous IV Infusions:  sodium chloride, 125 mL/hr, Intravenous, Continuous      PRN Meds:  calcium carbonate, 1,000 mg, Oral, Daily PRN  HYDROmorphone, 0 2 mg, Intravenous, Q4H PRN x 2 08/27/2020  ondansetron, 4 mg, Intravenous, Q6H PRN x 2 8/27/2020  oxyCODONE, 2 5 mg, Oral, Q4H PRN x 1 8/27/2020  oxyCODONE, 5 mg, Oral, Q4H PRN  senna, 1 tablet, Oral, HS PRN          Network Utilization Review Department  [email or web address]  ATTENTION: Please call with any questions or concerns to [phone] and carefully listen to the prompts so that you are directed to the right person  All voicemails are confidential   Radha Richardson all requests for admission clinical reviews, approved or denied determinations and any other requests to dedicated fax number below belonging to the campus where the patient is receiving treatment   List of dedicated fax numbers for the Facilities:  FACILITY NAME UR FAX NUMBER   ADMISSION DENIALS (Administrative/Medical Necessity) [phone]   [address]  (Maternity/NICU/Pediatrics) [phone]   Connor Mac [phone]   Ari Garber [phone]   Radha Madrigal [phone]   [facility] [address] [facility] [phone]     [facility] [phone]   [address] [facility]  [phone] [address] [address] [address] [facility] [phone]

## 2020-08-27 NOTE — ANESTHESIA PREPROCEDURE EVALUATION
Procedure:  INSERTION NAIL IM FEMUR ANTEGRADE (TROCHANTERIC) (Right Leg Upper)    Relevant Problems   CARDIO   (+) Benign essential HTN   (+) Chronic systolic congestive heart failure (HCC)   (+) Hyperlipidemia   (+) Mitral regurgitation   (+) Murmur      GI/HEPATIC   (+) GERD (gastroesophageal reflux disease)      /RENAL   (+) CKD (chronic kidney disease), stage III (HCC)      HEMATOLOGY   (+) Anemia      MUSCULOSKELETAL   (+) Primary localized osteoarthritis of right hip      Other   (+) Cardiomyopathy ([address] )   (+) DNR (do not resuscitate)        Physical Exam    Airway    Mallampati score: II    Neck ROM: full     Dental   upper dentures,     Cardiovascular  Rhythm: regular, Rate: normal, Murmur,     Pulmonary  Breath sounds clear to auscultation,     Other Findings        Anesthesia Plan  ASA Score- 4     Anesthesia Type- general with ASA Monitors  Additional Monitors:   Airway Plan: ETT  Comment: Patient stated she does not want any chest compressions in OR  She also does not want any post op intubation, code, or chest compressions  I explained that if it is something that we do that causes an issue we must try to fix it          Plan Factors-Exercise tolerance (METS): <4 METS  Chart reviewed  EKG reviewed  Imaging results reviewed  Existing labs reviewed  Induction- intravenous  Postoperative Plan- Plan for postoperative opioid use  Informed Consent- Anesthetic plan and risks discussed with patient

## 2020-08-27 NOTE — ASSESSMENT & PLAN NOTE
Wt Readings from Last 3 Encounters:   08/27/20 71 6 kg (157 lb 13 6 oz)   02/17/20 71 8 kg (158 lb 3 2 oz)   10/15/19 69 1 kg (152 lb 6 4 oz)     She is euvolemic

## 2020-08-27 NOTE — PROGRESS NOTES
Progress Note - Orthopedics   Marletta Comfort 80 y o  female MRN: [id number]  Unit/Bed#: E2 -01 Encounter: [id number]    Assessment:  80-year-old female with right intertrochanteric hip fracture    Plan:  · Plan for OR this afternoon  · Medical and cardiac optimization  · NPO  · Hold chemical DVT prophylaxis  · Bedrest  · Pain control p r n  Subjective:  Patient seen and examined  States her hip pain is slightly improved compared to yesterday  States she is comfortable at rest   Denies distal numbness or tingling  Vitals: Blood pressure 106/54, pulse 93, temperature (!) 96 8 °F (36 °C), temperature source Temporal, resp  rate 20, weight 71 6 kg (157 lb 13 6 oz), SpO2 98 %, not currently breastfeeding  ,Body mass index is 25 48 kg/m²  Intake/Output Summary (Last 24 hours) at 8/27/2020 8969  Last data filed at 8/26/2020 1249  Gross per 24 hour   Intake 500 ml   Output    Net 500 ml       Invasive Devices     Peripheral Intravenous Line            Peripheral IV 08/26/20 Left Antecubital less than 1 day                Ortho Exam:   Right lower extremity:  Inspection- right leg externally rotated  No obvious ecchymosis or abrasions  Palpation- minimally+ TTP over groin  Thigh soft and compressible  ROM- deferred due to pain   + log roll  Neurovascular- sensation intact L4 through S1  Palpable posterior tibial pulse and toes warm, pink, and well perfused  AT/GS/EHL intact      Lab, Imaging and other studies:   CBC:   Lab Results   Component Value Date    WBC 8 10 08/27/2020    HGB 8 4 (L) 08/27/2020    HCT 28 1 (L) 08/27/2020     (H) 08/27/2020     08/27/2020    MCH 32 7 08/27/2020    MCHC 29 9 (L) 08/27/2020    RDW 13 2 08/27/2020    MPV 11 9 08/27/2020    NRBC 0 08/27/2020     CMP:   Lab Results   Component Value Date    SODIUM 142 08/27/2020     08/27/2020    CO2 26 08/27/2020    BUN 33 (H) 08/27/2020    CREATININE 1 36 (H) 08/27/2020    CALCIUM 8 2 (L) 08/27/2020    AST 31 08/26/2020    ALT 34 08/26/2020    ALKPHOS 63 08/26/2020    EGFR 34 08/27/2020     PT/INR:   Lab Results   Component Value Date    INR 1 22 (H) 08/27/2020

## 2020-08-28 PROBLEM — D62 ACUTE BLOOD LOSS ANEMIA: Status: ACTIVE | Noted: 2020-01-01

## 2020-08-28 NOTE — ASSESSMENT & PLAN NOTE
· X-ray right femur:  Acute displaced right femoral intertrochanteric fracture  · Consult orthopedics appreciated  · Cardiology consulted preoperatively, appreciate recommendations  · With acute blood loss anemia today  · Status post intramedullary fixation of right intertrochanteric hip fracture 8/27/20 by Dr John Lackey   · PT/OT weight-bearing as tolerated right lower extremity, pending evaluation  · Will consult PMR  · Will be here over the weekend as patient is and insurance authorization  · Patient is currently on heparin for DVT prophylaxis  · Geriatric pain protocol  · Patient will need to follow-up with Dr John Lackey in 2-3 weeks postoperatively  · Tiger text with orthopedic team  · Patient is now agreeable to blood transfusion, attending notified to obtain consent, will give a dose of IV Lasix 40 mg with blood transfusion today, nurse was notified of this, attending to order 1 unit PRBCs

## 2020-08-28 NOTE — ASSESSMENT & PLAN NOTE
· Cr 1 36 on admission  · Recent Cr closer to 1 11   · Holding lisinopril   · Will resume lasix as BP allows   · Avoid hypotension   · Creatinine overall stable since admission Cr 1 30   · monitor for retention   · Void trial with huagn removal today

## 2020-08-28 NOTE — PROGRESS NOTES
Progress Note - Darrin Goodpasture 12/8/1927, 80 y o  female MRN: [id number]  Unit/Bed#: E2 -01 Encounter: [id number]  Primary Care Provider: Buddy Fairbanks MD   Date and time admitted to hospital: 8/26/2020  9:06 AM       * Right femoral fracture [facility])  Assessment & Plan  · X-ray right femur:  Acute displaced right femoral intertrochanteric fracture  · Consult orthopedics appreciated  · Cardiology consulted preoperatively, appreciate recommendations  · With acute blood loss anemia today  · Status post intramedullary fixation of right intertrochanteric hip fracture 8/27/20 by Dr Gerber German   · PT/OT weight-bearing as tolerated right lower extremity, pending evaluation  · Will consult PMR  · Will be here over the weekend as patient is and insurance authorization  · Patient is currently on heparin for DVT prophylaxis  · Geriatric pain protocol  · Patient will need to follow-up with Dr Gerber German in 2-3 weeks postoperatively  · Tiger text with orthopedic team  · Patient is now agreeable to blood transfusion, attending notified to obtain consent, will give a dose of IV Lasix 40 mg with blood transfusion today, nurse was notified of this, attending to order 1 unit PRBCs      Acute blood loss anemia  Assessment & Plan  · Hemoglobin 7 5 postoperatively today  · Patient initially ambivalent for another blood transfusion as she states she is tired of getting "poked and prodded"  however she is now agreeable  · Notified attending to abstain blood consent for 1 unit PRBC today with IV Lasix 40 mg given with blood transfusion, I did discuss this with Cardiology  · Monitor hemoglobin      Lab Results   Component Value Date    HGB 7 5 (L) 08/28/2020    HGB 8 4 (L) 08/27/2020    HGB 11 0 (L) 08/26/2020    HGB 10 9 (L) 04/06/2019         Fall  Assessment & Plan  · needs STR  · Pending PT/OT evaluation  · Consult PMR    Esophagitis, reflux  Assessment & Plan  · Continue PPI     Elevated troponin  Assessment & Plan  · Non MI troponin elevation  · Seen in consultation with Cardiology  · No chest pain    Chronic systolic congestive heart failure ([address] )  Assessment & Plan  Wt Readings from Last 3 Encounters:   08/28/20 71 2 kg (156 lb 15 5 oz)   02/17/20 71 8 kg (158 lb 3 2 oz)   10/15/19 69 1 kg (152 lb 6 4 oz)     · Spoke with Cardiology  · Stop IV fluid hydration to prevent volume overload  · Given a dose of IV Lasix with blood transfusion today  · Continue close monitoring of volume status and respiratory status  · Echocardiogram 04/2019:  Ejection fraction 25%, moderate to severe mitral regurgitation, mild TR, mild ME   · Repeat echocardiogram 08/27/2020:  Ejection fraction 76%, grade 1 diastolic dysfunction  · Chest x-ray no acute cardiopulmonary disease  · Patient is maintained on Lasix 20 mg daily which is on hold currently      Cardiomyopathy [facility])  Assessment & Plan  · Severe cardiomyopathy with EF in 2018 at 25%   · Appreciate cardiology consult and preoperative evaluation  · Close monitoring volume status     CKD (chronic kidney disease), stage III ([facility])  Assessment & Plan  · Cr 1 36 on admission  · Recent Cr closer to 1 11   · Continue to hold lasix and lisinopril for now   · Creatinine overall stable since admission at 1 40  · monitor for retention   · BMP in AM       Mitral regurgitation  Assessment & Plan  · Moderate to severe MR on echo 2019   · No evidence of regurgitation on repeat echocardiogram    Benign essential HTN  Assessment & Plan  · Maintained on lasix, lisinopril and toprol XL   · Low to normotensive blood pressure  · Continue to hold lisinopril on Lasix for now  · Continue Toprol XL 12 5 mg daily with hold parameters  · Close monitoring of blood pressure       Thrombocytopenia  Monitor platelets      VTE Pharmacologic Prophylaxis:   Pharmacologic: Heparin  Mechanical VTE Prophylaxis in Place: Yes    Patient Centered Rounds: I have performed bedside rounds with nursing staff today      Discussions with Specialists or Other Care Team Provider:  [place] text is Orthopedics, case management, spoke with Cardiology    Education and Discussions with Family / Patient:  Spoke with patient at the bedside, left son a voice message    Time Spent for Care: 20 minutes  More than 50% of total time spent on counseling and coordination of care as described above  Current Length of Stay: 2 day(s)    Current Patient Status: Inpatient   Certification Statement: The patient will continue to require additional inpatient hospital stay due to Acute blood loss anemia, right hip fracture    Discharge Plan:  Pending stabilize hemoglobin and rehab placement, patient is in in strength authorization and will likely be here over the weekend    Code Status: Level 3 - DNAR and DNI    Subjective:   Patient is frustrated today with getting poked and prodded" and originally was ambivalent about getting a blood transfusion but now is agreeable  She denies any pain at rest in the right hip  She worked with PT/OT this morning  No lightheaded or dizziness  No chest pain or shortness of breath  No bowel movement yet but is passing gas  She still has a urinary catheter present  Objective:     Vitals:   Temp (24hrs), Av 7 °F (36 5 °C), Min:97 3 °F (36 3 °C), Max:98 2 °F (36 8 °C)    Temp:  [97 3 °F (36 3 °C)-98 2 °F (36 8 °C)] 98 2 °F (36 8 °C)  HR:  [70-97] 78  Resp:  [12-20] 18  BP: ()/(40-63) 112/40  SpO2:  [93 %-100 %] 99 %  Body mass index is 25 34 kg/m²  Input and Output Summary (last 24 hours): Intake/Output Summary (Last 24 hours) at 2020 0957  Last data filed at 2020 0551  Gross per 24 hour   Intake 1500 ml   Output 3125 ml   Net -1625 ml       Physical Exam:     Physical Exam  Vitals signs and nursing note reviewed  Constitutional:       General: She is not in acute distress  Comments: Appears weak and deconditioned   HENT:      Head: Normocephalic     Eyes:      Conjunctiva/sclera: Conjunctivae normal  Cardiovascular:      Rate and Rhythm: Normal rate and regular rhythm  Pulmonary:      Effort: Pulmonary effort is normal  No respiratory distress  Breath sounds: Normal breath sounds  No wheezing or rales  Abdominal:      General: Bowel sounds are normal       Palpations: Abdomen is soft  Tenderness: There is no abdominal tenderness  There is no guarding or rebound  Genitourinary:     Comments: Albarado catheter draining clear yellow urine  Musculoskeletal:         General: Tenderness (Right hip) present  Right lower leg: Edema present  Left lower leg: Edema present  Skin:     Coloration: Skin is pale  Comments: Dressings present on right lateral hip/upper leg   Neurological:      General: No focal deficit present  Mental Status: She is alert  Psychiatric:         Mood and Affect: Mood normal       Comments: Appears overwhelmed and tearful at times         Additional Data:     Labs:    Results from last 7 days   Lab Units 08/28/20  0519   WBC Thousand/uL 8 92   HEMOGLOBIN g/dL 7 5*   HEMATOCRIT % 23 6*   PLATELETS Thousands/uL 127*   NEUTROS PCT % 76*   LYMPHS PCT % 12*   MONOS PCT % 12   EOS PCT % 0     Results from last 7 days   Lab Units 08/28/20  0519  08/26/20  0948   SODIUM mmol/L 139   < > 143   POTASSIUM mmol/L 4 0   < > 4 4   CHLORIDE mmol/L 105   < > 106   CO2 mmol/L 26   < > 28   BUN mg/dL 25   < > 31*   CREATININE mg/dL 1 40*   < > 1 36*   ANION GAP mmol/L 8   < > 9   CALCIUM mg/dL 7 3*   < > 9 3   ALBUMIN g/dL  --   --  3 6   TOTAL BILIRUBIN mg/dL  --   --  0 52   ALK PHOS U/L  --   --  63   ALT U/L  --   --  34   AST U/L  --   --  31   GLUCOSE RANDOM mg/dL 131   < > 108    < > = values in this interval not displayed  Results from last 7 days   Lab Units 08/27/20  0748   INR  1 22*                       * I Have Reviewed All Lab Data Listed Above  * Additional Pertinent Lab Tests Reviewed:  All Labs Within Last 24 Hours Reviewed    Imaging:    Imaging Reports Reviewed Today Include:  Reviewed  Imaging Personally Reviewed by Myself Includes:      Recent Cultures (last 7 days):           Last 24 Hours Medication List:   Current Facility-Administered Medications   Medication Dose Route Frequency Provider Last Rate    acetaminophen  650 mg Oral Q6H [facility] Steven Garvin PA-C      bisacodyl  10 mg Rectal Daily PRN Dennys Church PA-C      calcium carbonate  1,000 mg Oral Daily PRN Steven Garvin PA-C      docusate sodium  100 mg Oral BID Steven Garvin PA-C      furosemide  40 mg Intravenous Once Dennys Church PA-C      heparin (porcine)  5,000 Units Subcutaneous Q8H [facility] Sujatha Nuñez PA-C      HYDROmorphone  0 2 mg Intravenous Q4H PRN Steven aGrvin PA-C      metoprolol succinate  12 5 mg Oral Daily Steven Garvin PA-C      ondansetron  4 mg Intravenous Q6H PRN Steven Garvin PA-C      oxyCODONE  2 5 mg Oral Q4H PRN Steven Garvin PA-C      oxyCODONE  5 mg Oral Q4H PRN Steven Garvin PA-C      pantoprazole  40 mg Oral Daily Steven Garvin PA-C      polyethylene glycol  17 g Oral Daily Dennys Church PA-C      senna  1 tablet Oral HS Dennys Church PA-C          Today, Patient Was Seen By: Dennys Church PA-C    ** Please Note: Dictation voice to text software may have been used in the creation of this document   **

## 2020-08-28 NOTE — DISCHARGE INSTRUCTIONS
POSTOPERATIVE INSTRUCTIONS    MEDICATIONS:  · Resume all home medications unless otherwise instructed by your surgeon  · Pain Medication:  Oxycodone 5 mg, 1-3 tablets every 3 hours as needed  · If you were given a regional anesthetic (nerve block), please begin taking the pain medication as soon as you get home, even if you have minimal or no pain  DO NOT WAIT FOR THE NERVE BLOCK TO WEAR OFF  · Possible side effects include nausea, constipation, and urinary retention  If you experience these side effects, please call our office for assistance  · Pain med refills are authorized only during office hours (8am-4pm, Mon-Fri)  · Anti-Inflammatory:  Resume your home anti-inflammatory medication  · Take with food  Stop if you experience nausea, reflux, or stomach pain  · Nausea Medication:  None  · Fill prescription ONLY if you expericnce severe nausea  · Blood Clot Prevention:  Lovenox 30 mg, 1 dose daily for 30 days  · Pump your foot up and down 20 times per hour while you are less mobile  WOUND CARE:  · Keep the dressing clean and dry  Light drainage may occur the first 2 days postop  · You may leave the surgical dressings on for 7 days after surgery  Then you can complete daily dry dressing changes  · Please call our office ([phone]) if you experience any of the following:  · Sudden increase in swelling, redness, or warmth at the surgical site  · Excessive incisional drainage that persists beyond the 3rd day after surgery  · Oral temperature greater than 101 degrees, not relieved with Tylenol  · Shortness of breath, chest pain, nausea, or any other concerning symptoms    SWELLING CONTROL:  · Cold Therapy:  Apply ice (20 min on, 20 min off) as often as you feel is necessary  · Elevation:  Elevate the entire leg above heart level as much as possible  · Compression:  Apply ACE wraps or a compression stocking as needed      RANGE OF MOTION:  · You are allowed FULL RANGE OF MOTION as tolerated  ACTIVITY:  · BEAR FULL WEIGHT AS TOLERATED on the operative leg  Use crutches to assist only as needed  · Using Crutches on Stairs:  Going up, lead with your "good" (nonoperative) leg  Going down, lead with your "bad" (operative) leg  Use a hand rail when available  · Quad Sets:  Sit or lie with your knee straight  Tighten your quadriceps (front thigh) muscle  Hold for 3 seconds, then relax  Repeat 20 times per hour while awake  PHYSICAL THERAPY:  · Begin therapy on THE DAY AFTER SURGERY  FOLLOW-UP APPOINTMENT:  · 3 weeks after surgery with:    Dr Irving Stacy, [address] [facility]  [address] [place] [address], [address] [place] [address], [facility], [address] [facility]  [phone] ([facility])  [phone] (After Hours)

## 2020-08-28 NOTE — PROGRESS NOTES
Informed Slim on call Jahaira Shea, about pt BP, pt denied dizziness, headache or lightheadedness  Alert and oriented X4  Will rechecked BP in 2 hours manually as per SLIM, no further orders placed  Will continue to monitor

## 2020-08-28 NOTE — PLAN OF CARE
Problem: PHYSICAL THERAPY ADULT  Goal: Performs mobility at highest level of function for planned discharge setting  See evaluation for individualized goals  Description: Treatment/Interventions: Functional transfer training, LE strengthening/ROM, Therapeutic exercise, Endurance training, Patient/family training, Equipment eval/education, Bed mobility, Gait training, Compensatory technique education, Continued evaluation, Spoke to nursing, OT  Equipment Recommended: Cyndi Mendez       See flowsheet documentation for full assessment, interventions and recommendations  Note: Prognosis: Good  Problem List: Decreased strength, Decreased endurance, Impaired balance, Decreased mobility, Decreased skin integrity, Pain  Assessment: Mariah Elliott is a 80 y o  female admitted to [facility] on 8/26/2020 for Right femoral fracture ([address] [address] ) s/p long TFN 8/27/20  Pt  has a past medical history of GERD (gastroesophageal reflux disease), Murmur (8/27/2020), and Right humeral fracture (7/20/2018)    PT was consulted and pt was seen on 8/28/2020 for mobility assessment and d/c planning  Pt presents WBAT RLE, multiple lines, high fall risk  Pt is currently functioning at a maximum assistance x2 level for bed mobility, moderate assistance x2 level for transfers, moderate assistance x2 level for ambulation with Rolling Walker  In unsupported sitting and supported standing noted L lean, likely to offset RLE discomfort  Verbal cues to self correct lean however pt require physical A to maintain midline upright posture secondary to pain, weakness  Pt demonstrated difficulty wt shifting to fully WB on RLE  During SLS on R the knee frequently buckled, require tactile input to prevent buckling for safety during amb  Verbal cues for technique including increased UE support and quick steps to limit SLS on R however pt w difficulty following through, again likely secondary to pain, fatigue, weakness    Pt will benefit from continued skilled IP PT to address the above mentioned impairments  in order to maximize recovery and increase functional independence when completing mobility and ADLs  Currently PT recommendations for DME include RW  At this time PT recommendations for d/c are STR  End of session pt resting in recliner, LEs elevated and ice to R hip  Chair alarm engaged and all needs in reach  Barriers to Discharge: Inaccessible home environment, Decreased caregiver support     PT Discharge Recommendation: [id number]     PT - OK to Discharge: Yes    See flowsheet documentation for full assessment

## 2020-08-28 NOTE — PROGRESS NOTES
Progress Note - Cardiology   Macario Simpson 80 y o  female MRN: [id number]  Unit/Bed#: E2 -01 Encounter: [id number]        Problem List:  Principal Problem:    Right femoral fracture ([facility] )  Active Problems:    Esophagitis, reflux    Fall    Elevated troponin    CKD (chronic kidney disease), stage III ([facility] )    Cardiomyopathy ([facility] )    Mitral regurgitation    Chronic systolic congestive heart failure (HCC)    Benign essential HTN    Anemia    Acute blood loss anemia      Asessment:  1  Preoperative risk stratification  2  Acute right femoral intertrochanteric fracture  3  Mechanical fall  4  Non MI troponin elevation, 0 07  5  Unspecified cardiomyopathy, LVEF 40%  6  Chronic systolic heart failure   7  Moderate to severe mitral regurgitation  8  Hypertension    Plan/ Discussion:  She is doing well this morning and is up out of bed sitting in the chair comfortably  She has minimal pain  Her breathing feels at baseline  She has no chest pain  Repeat echocardiogram yesterday showed improved LVEF up to 40% and no more mitral regurgitation  · Recommend holding IV fluids if okay with surgical team and encourage PO intake   · For potential blood transfusion today, would give 40 of IV Lasix at the same time  · Okay to hold metoprolol succinate for SBP < 110  · Can allow tele to     Subjective:  Feels well today  No chest pain or shortness of breath      Vitals:  Vitals:    20 0531 20 0600   Weight: 71 6 kg (157 lb 13 6 oz) 71 2 kg (156 lb 15 5 oz)   ,  Vitals:    20 0427 20 0600 20 0700 20 0924   BP: (!) 100/47  100/62 (!) 112/40   BP Location: Left arm  Right arm Right arm   Pulse:   78    Resp:   18    Temp:   98 2 °F (36 8 °C)    TempSrc:   Temporal    SpO2:   99%    Weight:  71 2 kg (156 lb 15 5 oz)         Exam:  General:  Alert awake and oriented, no acute distress  Heart:  Regular rate and rhythm, no murmurs  Respiratory effort:  Comfortable on room air  Lungs:  Clear bilaterally  Lower Limbs:  No edema           Telemetry:       Normal sinus rhythm, heart rate in the 80s    Medications:    Current Facility-Administered Medications:     acetaminophen (TYLENOL) tablet 650 mg, 650 mg, Oral, Q6H EDEN, Afsaneh Sanchez PA-C, 650 mg at 08/28/20 0505    calcium carbonate (TUMS) chewable tablet 1,000 mg, 1,000 mg, Oral, Daily PRN, Brendan Torres PA-C    docusate sodium (COLACE) capsule 100 mg, 100 mg, Oral, BID, Brendan Torres PA-C, 100 mg at 08/27/20 1804    furosemide (LASIX) injection 40 mg, 40 mg, Intravenous, Once, Brendan Torres PA-C    heparin (porcine) subcutaneous injection 5,000 Units, 5,000 Units, Subcutaneous, Q8H [address], 5,000 Units at 08/28/20 0506 **AND** [CANCELED] Platelet count, , , Once, Brendan Torres PA-C    HYDROmorphone (DILAUDID) injection 0 2 mg, 0 2 mg, Intravenous, Q4H PRN, Brendan Torres PA-C    metoprolol succinate (TOPROL-XL) 24 hr tablet 12 5 mg, 12 5 mg, Oral, Daily, Afsaneh Sanchez PA-C, 12 5 mg at 08/27/20 0848    ondansetron (ZOFRAN) injection 4 mg, 4 mg, Intravenous, Q6H PRN, Brendan Torres PA-C    oxyCODONE (ROXICODONE) IR tablet 2 5 mg, 2 5 mg, Oral, Q4H PRN, Brendan Torres PA-C, 2 5 mg at 08/27/20 0456    oxyCODONE (ROXICODONE) IR tablet 5 mg, 5 mg, Oral, Q4H PRN, Brendan Torres PA-C    pantoprazole (PROTONIX) EC tablet 40 mg, 40 mg, Oral, Daily, Brendan Torres PA-C, 40 mg at 08/27/20 0848    senna (SENOKOT) tablet 8 6 mg, 1 tablet, Oral, HS PRN, Brendan Torres PA-C      Labs/Data:        Results from last 7 days   Lab Units 08/28/20  0519 08/27/20  0500 08/26/20  0948   WBC Thousand/uL 8 92 8 10 5 61   HEMOGLOBIN g/dL 7 5* 8 4* 11 0*   HEMATOCRIT % 23 6* 28 1* 36 0   PLATELETS Thousands/uL 127* 192 216     Results from last 7 days   Lab Units 08/28/20  0519 08/27/20  0500 08/26/20  1238 08/26/20  0948   POTASSIUM mmol/L 4 0 4 6  --  4 4   CHLORIDE mmol/L 105 105  --  106   CO2 mmol/L 26 26  --  28   BUN mg/dL 25 33*  --  31*   TROPONIN I ng/mL  --   --  0 09* 0 07*

## 2020-08-28 NOTE — ASSESSMENT & PLAN NOTE
Wt Readings from Last 3 Encounters:   08/28/20 71 2 kg (156 lb 15 5 oz)   02/17/20 71 8 kg (158 lb 3 2 oz)   10/15/19 69 1 kg (152 lb 6 4 oz)     · Spoke with Cardiology  · Stop IV fluid hydration to prevent volume overload  · Given a dose of IV Lasix with blood transfusion on 8/28   · Continue close monitoring of volume status and respiratory status  · Echocardiogram 04/2019:  Ejection fraction 25%, moderate to severe mitral regurgitation, mild TR, mild AK   · Repeat echocardiogram 08/27/2020:  Ejection fraction 48%, grade 1 diastolic dysfunction  · Chest x-ray no acute cardiopulmonary disease  · Patient is maintained on Lasix 20 mg daily which I will resume today

## 2020-08-28 NOTE — OCCUPATIONAL THERAPY NOTE
Occupational Therapy Evaluation     Patient Name: Darleen FELIX'S Date: 8/28/2020  Problem List  Principal Problem:    Right femoral fracture ([address] )  Active Problems:    Esophagitis, reflux    Fall    Elevated troponin    CKD (chronic kidney disease), stage III (HCC)    Cardiomyopathy (HCC)    Mitral regurgitation    Chronic systolic congestive heart failure (HCC)    Benign essential HTN    Anemia    Acute blood loss anemia    Past Medical History  Past Medical History:   Diagnosis Date    GERD (gastroesophageal reflux disease)     Murmur 8/27/2020    Right humeral fracture 7/20/2018     Past Surgical History  Past Surgical History:   Procedure Laterality Date    HYSTERECTOMY  1973    Total    JOINT REPLACEMENT Right     knee    DE OPEN RX FEMUR FX+INTRAMED MORE Right 8/27/2020    Procedure: INSERTION NAIL IM FEMUR ANTEGRADE (TROCHANTERIC); Surgeon: Kathy Cruz MD;  Location: [facility];  Service: Orthopedics           08/28/20 5035   Note Type   Note type Eval only   Restrictions/Precautions   Weight Bearing Precautions Per Order Yes   RLE Weight Bearing Per Order WBAT   Other Precautions Chair Alarm; Bed Alarm;Multiple lines;Telemetry;WBS;Fall Risk;Pain   Pain Assessment   Pain Assessment Tool 0-10   Pain Score 5   Pain Location/Orientation Orientation: Right;Location: Hip   Effect of Pain on Daily Activities Increased pain with activity, limiting overall activity tolerance   Hospital Pain Intervention(s) Cold applied;Repositioned; Ambulation/increased activity; Emotional support; Rest   Multiple Pain Sites No   Home Living   Type of Home Assisted living  [facility]   Home Layout One level;Ramped entrance;Elevator  (0 HÉCTOR)   Bathroom Shower/Tub Walk-in shower   Bathroom Toilet Raised   Bathroom Equipment Grab bars in shower; Shower chair;Grab bars around toilet   [address]   Additional Comments Pt lives at [address] [place] [address]     Prior Function   Level of [place] Independent with ADLs and functional mobility; Needs assistance with IADLs   Lives With Facility staff   Receives Help From Family;Personal care attendant   ADL Assistance Independent  (Assist to wash back/hair for showers only )   IADLs Needs assistance   Falls in the last 6 months 1 to 4  (1 leading to admission)   Vocational Retired   Comments At baseline, pt was I w/ ADLs (assist to wash shower/back for showers only) and functional mobility/transfers w/ use of SPC to walk to the dining de oliveira only, required assist w/ IADLs, (-) , and reports 1 fall PTA  Lifestyle   Autonomy At baseline, pt was I w/ ADLs (assist to wash shower/back for showers only) and functional mobility/transfers w/ use of SPC to walk to the dining de oliveira only, required assist w/ IADLs, (-) , and reports 1 fall PTA  Reciprocal Relationships Son, facility staff    Service to Others Retired    Intrinsic Gratification Watching TV    Psychosocial   Psychosocial (WDL) WDL   ADL   Where Assessed Chair   Eating Assistance 5  Supervision/Setup   Grooming Assistance 5  Supervision/Setup   UB Bathing Assistance 5  Supervision/Setup   LB [address] 3  Moderate Assistance   UB Dressing Assistance 5  Supervision/Setup    [facility] 2  Maximal [address]  3  Moderate Assistance   Functional Assistance 3  Moderate Assistance   Bed Mobility   Supine to Sit 2  Maximal assistance   Additional items Assist x 2;HOB elevated; Bedrails; Increased time required;Verbal cues;LE management   Sit to Supine Unable to assess   Additional Comments Pt seated OOB in chair at end of session w/ chair alarm activated  Call bell and phone within reach  All needs met and pt reports no further questions for OT at this time   Transfers   Sit to Stand 3  Moderate assistance   Additional items Assist x 2; Increased time required;Verbal cues   Stand to Sit 3  Moderate assistance   Additional items Assist x 2;Armrests; Increased time required;Verbal cues   Additional Comments Cues for safe technique and hand placement   Functional Mobility   Functional Mobility 3  Moderate assistance   Additional Comments Assist x2; R LE buckling during mobility   Additional items Rolling walker   Balance   Static Sitting Fair   Dynamic Sitting Fair -   Static Standing Poor +   Dynamic Standing Poor   Ambulatory Poor   Activity Tolerance   Activity Tolerance Patient limited by pain; Patient limited by fatigue   Medical Staff Made Verdie Colder, PT   Nurse Made Aware yes; Lawanda Arthur RN   RUE Assessment   RUE Assessment X  (hx R humeral fracture; Limited end range shldr ROM)   RUE Strength   R Shoulder Flexion 3+/5   R Shoulder Extension 3+/5   R Wrist Flexion 4-/5   R Wrist Extension 4-/5   LUE Assessment   LUE Assessment WFL   LUE Strength   LUE Overall Strength Within Functional Limits - able to perform ADL tasks with strength  (4-/5 throughout)   Hand Function   Gross Motor Coordination Functional   Fine Motor Coordination Functional   Sensation   Light Touch No apparent deficits   Proprioception   Proprioception No apparent deficits   Vision-Basic Assessment   Current Vision Wears glasses all the time   Vision - Complex Assessment   Ocular Range of Motion [facility] Able to read clock/calendar on wall without difficulty   Perception   Inattention/Neglect Appears intact   Cognition   Overall Cognitive Status WFL   Arousal/Participation Alert; Cooperative   Attention Within functional limits   Orientation Level Oriented X4   Memory Within functional limits   Following Commands Follows one step commands without difficulty   Comments Pt pleasant and cooperative   Assessment   Limitation Decreased ADL status; Decreased UE strength;Decreased Safe judgement during ADL;Decreased cognition;Decreased endurance;Decreased self-care trans;Decreased high-level ADLs   Prognosis Fair   Assessment Pt is a 80 y o  female seen for OT evaluation s/p adm to [address] on 2020 s/p fall and c/o R hip pain  XR R Femur demonstrated R intertrochanteric femur fracture  Pt now s/p long TFN on 20  WBAT R LE  Comorbidities affecting pts functional performance include a significant PMH of GERD, HTN, combined systolic and diastolic CHF, severe cardiomyopathy, stage 3 CKD, and R humeral fracture  Pt with active OT orders and activity orders for Activity as tolerated  Pt lives at [address] [place] [address]  At baseline, pt was I w/ ADLs (assist to wash shower/back for showers only) and functional mobility/transfers w/ use of SPC to walk to the dining de oliveira only, required assist w/ IADLs, (-) , and reports 1 fall PTA  Upon evaluation, pt currently requires Supervision for UB ADLs, Max-Mod A for LB ADLs, Mod A for toileting, Max A of 2 for bed mobility, and Mod A of 2 for functional mobility/transfers 2* the following deficits impacting occupational performance: weakness, decreased strength, decreased balance, decreased tolerance and increased pain  These impairments, as well at pts difficulty performing ADLS limit pts ability to safely engage in all baseline areas of occupation  Pt scored overall 40/100 on the Barthel Index  Based on the aforementioned OT evaluation, functional performance deficits, and assessments, pt has been identified as a High complexity evaluation  Pt to continue to benefit from continued acute OT services during hospital stay to address defined deficits and to maximize level of functional independence in the following Occupational Performance areas: grooming, bathing/shower, toilet hygiene, dressing, medication management, health maintenance, functional mobility, community mobility, clothing management and social participation  From OT standpoint, recommend STR upon D/C   OT will continue to follow pt 3-5x/wk to address the following goals to  w/in 10-14 days:   Goals   Patient Goals To get stronger   LTG Time Frame 10-14   Long Term Goal Please refer to LTGs listed   Plan   Treatment Interventions ADL retraining;Functional transfer training;UE strengthening/ROM; Endurance training;Patient/family training;Equipment evaluation/education; Compensatory technique education; Activityengagement   Goal Expiration Date 09/11/20   OT Treatment Day 0   OT Frequency 3-5x/wk   Recommendation   OT Discharge Recommendation Post-Acute Rehabilitation Services   OT - OK to Discharge Yes  (when medically cleared to rehab)   Barthel Index   Feeding 10   Bathing 0   Grooming Score 5   Dressing Score 5   Bladder Score 0   Bowels Score 10   Toilet Use Score 5   Transfers (Bed/Chair) Score 5   Mobility (Level Surface) Score 0   Stairs Score 0   Barthel Index Score 40   Modified [place] Scale   Modified [place] Scale 4        GOALS    1  Pt will improve activity tolerance to G for min 30 min txment sessions for increase engagement in functional tasks    2  Pt will complete bed mobility at a Mod I level w/ G balance/safety demonstrated to decrease caregiver assistance required     3  Pt will complete UB/LB dressing/self care w/ mod I using adaptive device and DME as needed    4  Pt will complete bathing w/ Supervision w/ use of AE and DME as needed    5  Pt will complete toileting w/ mod I w/ G hygiene/thoroughness using DME as needed    6  Pt will improve functional transfers to Mod I on/off all surfaces using DME as needed w/ G balance/safety     7  Pt will improve functional mobility during ADL/IADL/leisure tasks to Mod I using DME as needed w/ G balance/safety     8  Pt will be attentive 100% of the time during ongoing cognitive assessment w/ G participation to assist w/ safe d/c planning/recommendations    9  Pt will demonstrate G carryover of pt/caregiver education and training as appropriate w/o cues w/ good tolerance to increase safety during functional tasks    10   Pt will increase BUE strength by 1MM grade via AROM/AAROM exercises to increase independence in ADLs and transfers    11   Pt will verbalize 3 potential fall hazards and identify appropriate compensatory techniques to decrease fall risk in home environment     Ede Cassidy, OTR/L

## 2020-08-28 NOTE — ASSESSMENT & PLAN NOTE
· Hemoglobin 7 5 postoperatively today  · Patient initially ambivalent for another blood transfusion as she states she is tired of getting "poked and prodded"  however she is now agreeable  · Notified attending to abstain blood consent for 1 unit PRBC today with IV Lasix 40 mg given with blood transfusion, I did discuss this with Cardiology  · Monitor hemoglobin      Lab Results   Component Value Date    HGB 7 5 (L) 08/28/2020    HGB 8 4 (L) 08/27/2020    HGB 11 0 (L) 08/26/2020    HGB 10 9 (L) 04/06/2019

## 2020-08-28 NOTE — ASSESSMENT & PLAN NOTE
· Hemoglobin 7 5 postoperatively   · Received PRBC in operating room and addition 1 unit PRBC on 8/28  · hgb 7 9 this AM   · Continue close monitoring of hgb and transfuse for hgb < 7 5     Lab Results   Component Value Date    HGB 7 5 (L) 08/28/2020    HGB 8 4 (L) 08/27/2020    HGB 11 0 (L) 08/26/2020    HGB 10 9 (L) 04/06/2019

## 2020-08-28 NOTE — ASSESSMENT & PLAN NOTE
· Cr 1 36 on admission  · Recent Cr closer to 1 11   · Continue to hold lasix and lisinopril for now   · Creatinine overall stable since admission at 1 40  · monitor for retention   · BMP in AM

## 2020-08-28 NOTE — ASSESSMENT & PLAN NOTE
· X-ray right femur:  Acute displaced right femoral intertrochanteric fracture  · Consult orthopedics appreciated  · Cardiology consulted preoperatively, appreciate recommendations  · With acute blood loss anemia   · Status post intramedullary fixation of right intertrochanteric hip fracture 8/27/20 by Dr Charan Tomas   · PT/OT weight-bearing as tolerated right lower extremity  · PMR consulted   · Will be here over the weekend as patient is an insurance authorization  · Patient is currently on heparin for DVT prophylaxis due to renal function   · Geriatric pain protocol  · Patient will need to follow-up with Dr Charan Tomas in 2-3 weeks postoperatively  · Encouraged IS use which was performed at bedside with me today and she demonstrated accurate use

## 2020-08-28 NOTE — PLAN OF CARE
Problem: OCCUPATIONAL THERAPY ADULT  Goal: Performs self-care activities at highest level of function for planned discharge setting  See evaluation for individualized goals  Description: Treatment Interventions: ADL retraining, Functional transfer training, UE strengthening/ROM, Endurance training, Patient/family training, Equipment evaluation/education, Compensatory technique education, Activityengagement          See flowsheet documentation for full assessment, interventions and recommendations  Note: Limitation: Decreased ADL status, Decreased UE strength, Decreased Safe judgement during ADL, Decreased cognition, Decreased endurance, Decreased self-care trans, Decreased high-level ADLs  Prognosis: Fair  Assessment: Pt is a 80 y o  female seen for OT evaluation s/p adm to [facility] on 8/26/2020 s/p fall and c/o R hip pain  XR R Femur demonstrated R intertrochanteric femur fracture  Pt now s/p long TFN on 8/28/20  WBAT R LE  Comorbidities affecting pts functional performance include a significant PMH of GERD, HTN, combined systolic and diastolic CHF, severe cardiomyopathy, stage 3 CKD, and R humeral fracture  Pt with active OT orders and activity orders for Activity as tolerated  Pt lives at [address] [place] [address]  At baseline, pt was I w/ ADLs (assist to wash shower/back for showers only) and functional mobility/transfers w/ use of SPC to walk to the dining de oliveira only, required assist w/ IADLs, (-) , and reports 1 fall PTA  Upon evaluation, pt currently requires Supervision for UB ADLs, Max-Mod A for LB ADLs, Mod A for toileting, Max A of 2 for bed mobility, and Mod A of 2 for functional mobility/transfers 2* the following deficits impacting occupational performance: weakness, decreased strength, decreased balance, decreased tolerance and increased pain  These impairments, as well at pts difficulty performing ADLS limit pts ability to safely engage in all baseline areas of occupation   Pt scored overall 40/100 on the Barthel Index  Based on the aforementioned OT evaluation, functional performance deficits, and assessments, pt has been identified as a High complexity evaluation  Pt to continue to benefit from continued acute OT services during hospital stay to address defined deficits and to maximize level of functional independence in the following Occupational Performance areas: grooming, bathing/shower, toilet hygiene, dressing, medication management, health maintenance, functional mobility, community mobility, clothing management and social participation  From OT standpoint, recommend STR upon D/C   OT will continue to follow pt 3-5x/wk to address the following goals to  w/in 10-14 days:     OT Discharge Recommendation: [id number]  OT - OK to Discharge: Yes(when medically cleared to rehab)

## 2020-08-28 NOTE — ASSESSMENT & PLAN NOTE
· Maintained on lasix, lisinopril and toprol XL   · Low to normotensive blood pressure  · Continue to hold lisinopril   · Resume oral lasix as BP allows   · Continue Toprol XL 12 5 mg daily with hold parameters  · Close monitoring of blood pressure

## 2020-08-28 NOTE — ASSESSMENT & PLAN NOTE
· Acute blood loss anemia as above   · Received blood in OR and additional 1 unit PRBC 8/28   · Continue to monitor   Lab Results   Component Value Date    HGB 7 5 (L) 08/28/2020    HGB 8 4 (L) 08/27/2020    HGB 11 0 (L) 08/26/2020    HGB 10 9 (L) 04/06/2019

## 2020-08-28 NOTE — CONSULTS
PHYSICAL MEDICINE AND REHABILITATION CONSULT NOTE  Nette Blue 80 y o  female MRN: [id number]  Unit/Bed#: E2 -01 Encounter: [id number]    Requested by (Physician/Service): AROLDO ESPINOSA DO  Reason for Consultation:  Assessment of rehabilitation needs  Reason for Hospitalization:  Hip pain [M25 559]  Elevated troponin [R79 89]  Right femoral fracture (HCC) [S72 91XA]  Closed comminuted intertrochanteric fracture of proximal femur, right, initial encounter ([place] [address] ) [X34 311Z]  Acute systolic (congestive) heart failure (HCC) [I50 21]  Nonrheumatic mitral valve regurgitation [I34 0]  Cardiomyopathy, unspecified type ([place] [address] ) [I42 9]  Rehabilitation Diagnosis: Rt hip fx    CC: Rt hip fx   History of Present Illness:  Nette Blue is a 80 y o  female who  has a past medical history of GERD (gastroesophageal reflux disease), Murmur (8/27/2020), and Right humeral fracture (7/20/2018)  who presented to the [facility] after fall on 8/26 and underwent surgical fixation on 8/27 by Dr Rekha Aguirre  Location: Rt hip   Quality: fracture  Severity: severe  Duration: 2 days  Timing: acute   Context: fall  Modifying factors: surgical fixation   Associating signs & symptoms: right hip pain       PM&R consulted for rehabilitation recommendations      Subjective : Patient without complaint currently     ROS: 10 point review of systems negative unless noted above     Hospital Complications/Comorbidities:   Complications: As above  Comorbidities: As above        Functional History:     Prior to Admission:     I PTA except for assistance with bathing per patient      Present:  Physical Therapy Occupational Therapy   Max x 2 bed mobility, mod x 2 tx, mod x 2 amb Mod-max ADLs      Past Medical History:   Past Surgical History:   Family History:     Past Medical History:   Diagnosis Date    GERD (gastroesophageal reflux disease)     Murmur 8/27/2020    Right humeral fracture 7/20/2018    Past Surgical History: Procedure Laterality Date    HYSTERECTOMY  1973    Total    JOINT REPLACEMENT Right     knee    OR OPEN RX FEMUR FX+INTRAMED MORE Right 8/27/2020    Procedure: INSERTION NAIL IM FEMUR ANTEGRADE (TROCHANTERIC); Surgeon: Simpson Olszewski, MD;  Location: [facility];  Service: Orthopedics     Family History   Problem Relation Age of Onset    Heart failure Mother     Coronary artery disease Father            Medications:    Current Facility-Administered Medications:     acetaminophen (TYLENOL) tablet 650 mg, 650 mg, Oral, Q6H [address], Afsaneh Sanchez PA-C, 650 mg at 08/28/20 1451    bisacodyl (DULCOLAX) rectal suppository 10 mg, 10 mg, Rectal, Daily PRN, Stephanie Birmingham PA-C    calcium carbonate (TUMS) chewable tablet 1,000 mg, 1,000 mg, Oral, Daily PRN, Rachael Jensen PA-C    docusate sodium (COLACE) capsule 100 mg, 100 mg, Oral, BID, Afsaneh Sanchez PA-C, 100 mg at 08/28/20 1751    heparin (porcine) subcutaneous injection 5,000 Units, 5,000 Units, Subcutaneous, Q8H [address], Sujatha Nuñez PA-C, 5,000 Units at 08/28/20 1451    HYDROmorphone (DILAUDID) injection 0 2 mg, 0 2 mg, Intravenous, Q4H PRN, Rachael Jensen PA-C    metoprolol succinate (TOPROL-XL) 24 hr tablet 12 5 mg, 12 5 mg, Oral, Daily, Afsaneh Sanchez PA-C, 12 5 mg at 08/28/20 0941    ondansetron (ZOFRAN) injection 4 mg, 4 mg, Intravenous, Q6H PRN, Rachael Jensen PA-C    oxyCODONE (ROXICODONE) IR tablet 2 5 mg, 2 5 mg, Oral, Q4H PRN, Rachael Jensen PA-C, 2 5 mg at 08/27/20 0456    oxyCODONE (ROXICODONE) IR tablet 5 mg, 5 mg, Oral, Q4H PRN, Rachael Jensen PA-C    pantoprazole (PROTONIX) EC tablet 40 mg, 40 mg, Oral, Daily, Rachael Jensen PA-C, 40 mg at 08/28/20 0941    polyethylene glycol (MIRALAX) packet 17 g, 17 g, Oral, Daily, Sujatha Nuñez PA-C    senna (SENOKOT) tablet 8 6 mg, 1 tablet, Oral, HS, Sujatha Nuñez PA-C    Allergies:    Allergies   Allergen Reactions    Sulfa Antibiotics Hives        Family Hx:  Family History   Problem Relation Age of Onset    Heart failure Mother     Coronary artery disease Father        Social History:    Social History     Socioeconomic History    Marital status: /Civil Union     Spouse name: None    Number of children: None    Years of education: None    Highest education level: None   Occupational History    Occupation: Retired   Social Needs    Financial resource strain: None    Food insecurity     Worry: None     Inability: None    Transportation needs     Medical: None     Non-medical: None   Tobacco Use    Smoking status: Never Smoker    Smokeless tobacco: Never Used    Tobacco comment: No secondhand smoke exposure   Substance and Sexual Activity    Alcohol use: Yes     Comment: Minimal consumption    Drug use: No    Sexual activity: Not Currently   Lifestyle    Physical activity     Days per week: None     Minutes per session: None    Stress: None   Relationships    Social connections     Talks on phone: None     Gets together: None     Attends [employer] service: None     Active member of club or organization: None     Attends meetings of clubs or organizations: None     Relationship status: None    Intimate partner violence     Fear of current or ex partner: None     Emotionally abused: None     Physically abused: None     Forced sexual activity: None   Other Topics Concern    None   Social History Narrative    None        Patient lives in [facility]    The living area: can live on one level    There Ramp to enter the home                Physical Exam:  Vital Signs:      Temp:  [97 5 °F (36 4 °C)-99 2 °F (37 3 °C)] 99 2 °F (37 3 °C)  HR:  [78-95] 78  Resp:  [18-20] 18  BP: ()/(40-62) 113/57   Intake/Output Summary (Last 24 hours) at 8/28/2020 1822  Last data filed at 8/28/2020 1756  Gross per 24 hour   Intake 346 5 ml   Output 2125 ml   Net -1778 5 ml        Laboratory:      Lab Results   Component Value Date    HGB 7 5 (L) 08/28/2020    HGB 11 6 (L) 10/18/2017    HCT 23 6 (L) 08/28/2020    HCT 35 1 10/18/2017    WBC 8 92 08/28/2020    WBC 5 2 10/18/2017     Lab Results   Component Value Date    BUN 25 08/28/2020    BUN 32 (H) 10/02/2019     10/18/2017    K 4 0 08/28/2020    K 4 2 10/02/2019     08/28/2020     10/02/2019    CREATININE 1 40 (H) 08/28/2020    CREATININE 0 84 10/18/2017     Lab Results   Component Value Date    PROTIME 15 2 (H) 08/27/2020    INR 1 22 (H) 08/27/2020        General: NAD  HEENT:  Eye: Normal external eye, conjunctiva, lids cornea  Ears: Normal external ears  Nose: Normal external nose, mucus membranes  Pulmonary: respirations unlabored   Cardiovascular: +S1/2  Abdomen: soft NT   Extremity: volume status currently stable   Neurologic: cranial nerves 2-12 intact, sensation grossly normal and no gross dysmetria on F-N testing B/L, 4-/5 UE B/L, 2/5 L hip flexion but offers resistance in her limited range, 4-/5 B/L dorsiflexion/plantar flexion   Psych: mood/affect currently stable       Imaging: Reviewed  Xr Hip/pelv 2-3 Vws Right If Performed    Result Date: 8/26/2020  Impression: Acute displaced right femoral intertrochanteric fracture  The study was marked in [facility] for immediate notification  Workstation performed: [id number]     Xr Femur 2 Vw Right    Result Date: 8/27/2020  Impression: Fluoroscopic guidance provided for surgical procedure  Please refer to the separate procedure notes for additional details  Workstation performed: [id number]     Xr Femur 2 Views Right    Result Date: 8/26/2020  Impression: Acute displaced right femoral intertrochanteric fracture  The study was marked in [facility] for immediate notification  Workstation performed: [id number]     Ct Head Without Contrast    Result Date: 8/26/2020  Impression: No acute intracranial abnormality  Workstation performed: [id number]     Ct Spine Cervical Without Contrast    Result Date: 8/26/2020  Impression: No cervical spine fracture or traumatic malalignment   Degenerative changes with mild canal narrowing at C2-C3 Mild left foraminal narrowing at C2-C3  Workstation performed: [id number]     Xr Chest 1 View    Result Date: 8/26/2020  Impression: No acute cardiopulmonary disease  Workstation performed: [id number]       Assessment and Recommendations:  Patient is a 79 yo female who presented to the [facility] after fall on 8/26 and underwent surgical fixation on 8/27 by Dr Greg Stacy  Impairments:  Impaired functional mobility and ability to perform ADL's      Recommendations:      - Continue PT/OT while inpatient  - recommend acute inpt rehab when medically cleared for dc from acute care       Thank you for allowing the PM&R service to participate in the care of this patient  We will continue to follow Fannie Painting's progress with you   Please do not hesitate to call with questions or concerns

## 2020-08-28 NOTE — PLAN OF CARE
Problem: Potential for Falls  Goal: Patient will remain free of falls  Description: INTERVENTIONS:  - Assess patient frequently for physical needs  -  Identify cognitive and physical deficits and behaviors that affect risk of falls    -  [place] fall precautions as indicated by assessment   - Educate patient/family on patient safety including physical limitations  - Instruct patient to call for assistance with activity based on assessment  - Modify environment to reduce risk of injury  - Consider OT/PT consult to assist with strengthening/mobility  Outcome: Progressing     Problem: Prexisting or High Potential for Compromised Skin Integrity  Goal: Skin integrity is maintained or improved  Description: INTERVENTIONS:  - Identify patients at risk for skin breakdown  - Assess and monitor skin integrity  - Assess and monitor nutrition and hydration status  - Monitor labs   - Assess for incontinence   - Turn and reposition patient  - Assist with mobility/ambulation  - Relieve pressure over bony prominences  - Avoid friction and shearing  - Provide appropriate hygiene as needed including keeping skin clean and dry  - Evaluate need for skin moisturizer/barrier cream  - Collaborate with interdisciplinary team   - Patient/family teaching  - Consider wound care consult   Outcome: Progressing     Problem: PAIN - ADULT  Goal: Verbalizes/displays adequate comfort level or baseline comfort level  Description: Interventions:  - Encourage patient to monitor pain and request assistance  - Assess pain using appropriate pain scale  - Administer analgesics based on type and severity of pain and evaluate response  - Implement non-pharmacological measures as appropriate and evaluate response  - Consider cultural and social influences on pain and pain management  - Notify physician/advanced practitioner if interventions unsuccessful or patient reports new pain  Outcome: Progressing     Problem: DISCHARGE PLANNING  Goal: Discharge to home or other facility with appropriate resources  Description: INTERVENTIONS:  - Identify barriers to discharge w/patient and caregiver  - Arrange for needed discharge resources and transportation as appropriate  - Identify discharge learning needs (meds, wound care, etc )  - Arrange for interpretive services to assist at discharge as needed  - Refer to Case Management Department for coordinating discharge planning if the patient needs post-hospital services based on physician/advanced practitioner order or complex needs related to functional status, cognitive ability, or social support system  Outcome: Progressing     Problem: Knowledge Deficit  Goal: Patient/family/caregiver demonstrates understanding of disease process, treatment plan, medications, and discharge instructions  Description: Complete learning assessment and assess knowledge base    Interventions:  - Provide teaching at level of understanding  - Provide teaching via preferred learning methods  Outcome: Progressing

## 2020-08-28 NOTE — ASSESSMENT & PLAN NOTE
Wt Readings from Last 3 Encounters:   08/28/20 71 2 kg (156 lb 15 5 oz)   02/17/20 71 8 kg (158 lb 3 2 oz)   10/15/19 69 1 kg (152 lb 6 4 oz)     · Spoke with Cardiology  · Stop IV fluid hydration to prevent volume overload  · Given a dose of IV Lasix with blood transfusion today  · Continue close monitoring of volume status and respiratory status  · Echocardiogram 04/2019:  Ejection fraction 25%, moderate to severe mitral regurgitation, mild TR, mild OK   · Repeat echocardiogram 08/27/2020:  Ejection fraction 90%, grade 1 diastolic dysfunction  · Chest x-ray no acute cardiopulmonary disease  · Patient is maintained on Lasix 20 mg daily which is on hold currently

## 2020-08-28 NOTE — CASE MANAGEMENT
LOS: 1 Day  Bundle: No  Unplanned Readmission Score: 19  30 Day Readmission: No    CM met with patient and son, Bright Barruser at bedside  Explained the role of CM  Obtained the following information from patient  Home: [address] [facility]  Lives With: Has a room at assisted living  ADL's: Independent, but needs assistance with showering  DME: Uses straight cane  Ambulation: Independent, previous fall   Transportation: Family or [employer] staff drive her  Pharmacy: [id number]  PCP: Michi Castro MD  [facility] AT [facility] Hx: [address] Hx: [facility]  POA/LW/AD: Daughter, Tasha SHIN  Transport at D/C: Family will drive, or wheelchair van to [facility]      CM reviewed d/c planning process including the following: identifying help at home, patient preferences for d/c planning needs, availability of treatment team to discuss questions or concerns patient and/or family may have regarding understanding medications and recognizing signs and symptoms once discharged  CM had lengthy conversation with patient and son, Bright Shafer  Explained possible d/c options  Patient initially wanted to go to [facility] if STR is needed, but after hearing about the no visitor policy situation due to COVID-19, the son and patient may be interested in [place] [facility] [place]  Patient does not want to go to [address] [place] [address]  Per patient, she fell in her backyard 2 years ago and broke her right wrist and shoulder  Following her hospitalization, she went to [facility] for rehab and then moved to [address] [place] [address] in [place]  She had a positive experience at [facility], but is unsure if she would be ok with no visitors  The son provided the option of the patient utilizing an iPad to connect with family if placed in a facility with a no visitor policy   Per son, Bright Shafer, he is a retired RN and would like to take charge of the medical information and will speak to his sister, Shola Kimball, who is the patient's POA, about [facility] versus [facility]  This is also dependent on [employer] having availability  DOMINIQUE spoke to Suzie liaison for [facility] and she will speak to son and patient about the program and what to expect  CM department will continue to follow

## 2020-08-28 NOTE — ASSESSMENT & PLAN NOTE
· Severe cardiomyopathy with EF in 2018 at 25%   · Appreciate cardiology consult and preoperative evaluation  · Close monitoring volume status

## 2020-08-28 NOTE — PROGRESS NOTES
Progress Note - Orthopedics   Timo Cisneros 80 y o  female MRN: [id number]  Unit/Bed#: E2 -01 Encounter: [id number]    Assessment:  80 y o  female s/p long TFN for right hip fracture  POD 1, ABLA    Plan:  · ABLA- post operative  Hgb 7 5 this morning  Patient hypotensive overnight but vitals have normalized  Mentioned to patient possibility of receiving 1 unit of blood  She refused for now but would like to discuss this with her son later  Notified SLIM  · Pain control prn  · PT/OT-WBAT right LE   · Lovenox/TEDs/SCDs for DVT prophylaxis  Patient will require DVT prophylaxis for 30 days post op  Could consider renal dosed lovenox  · Abx x 24h- ancef  · Ortho will follow  · Patient should follow up in the office with Dr Stanley Last 2-3 weeks post op  Subjective: Pt S&E  Resting comfortably  Patient states she has some groin pain but is comfortable at rest  States she has not been up with PT yet  Per nursing she was mildly hypotensive overnight and IVF have been running  Patient denies lightheadedness, dizziness, SOB, abd pain, distal numbness, or tingling  Vitals: Blood pressure 100/62, pulse 78, temperature 98 2 °F (36 8 °C), temperature source Temporal, resp  rate 18, weight 71 2 kg (156 lb 15 5 oz), SpO2 99 %, not currently breastfeeding  ,Body mass index is 25 34 kg/m²  Intake/Output Summary (Last 24 hours) at 8/28/2020 0726  Last data filed at 8/28/2020 0551  Gross per 24 hour   Intake 1500 ml   Output 3125 ml   Net -1625 ml       Invasive Devices     Peripheral Intravenous Line            Peripheral IV 08/26/20 Left Antecubital 1 day          Drain            Urethral Catheter Double-lumen; Latex 16 Fr  less than 1 day                Ortho Exam: rightLE:    Drsg: mepilex x 3 C/D/I   sensation grossly intact L4, L5, S1   palpable pedal pulse   EHL/AT/GS intact    Lab, Imaging and other studies:   CBC:   Lab Results   Component Value Date    WBC 8 92 08/28/2020    HGB 7 5 (L) 08/28/2020    HCT 23 6 (L) 08/28/2020     (H) 08/28/2020     (L) 08/28/2020    MCH 31 9 08/28/2020    MCHC 31 8 08/28/2020    RDW 17 5 (H) 08/28/2020    MPV 11 3 08/28/2020    NRBC 0 08/28/2020     CMP:   Lab Results   Component Value Date    SODIUM 139 08/28/2020     08/28/2020    CO2 26 08/28/2020    BUN 25 08/28/2020    CREATININE 1 40 (H) 08/28/2020    CALCIUM 7 3 (L) 08/28/2020    EGFR 33 08/28/2020

## 2020-08-28 NOTE — ASSESSMENT & PLAN NOTE
· Maintained on lasix, lisinopril and toprol XL   · Low to normotensive blood pressure  · Continue to hold lisinopril on Lasix for now  · Continue Toprol XL 12 5 mg daily with hold parameters  · Close monitoring of blood pressure

## 2020-08-29 NOTE — PLAN OF CARE
Problem: Potential for Falls  Goal: Patient will remain free of falls  Description: INTERVENTIONS:  - Assess patient frequently for physical needs  -  Identify cognitive and physical deficits and behaviors that affect risk of falls    -  [place] fall precautions as indicated by assessment   - Educate patient/family on patient safety including physical limitations  - Instruct patient to call for assistance with activity based on assessment  - Modify environment to reduce risk of injury  - Consider OT/PT consult to assist with strengthening/mobility  8/28/2020 2212 by Saman Antoine RN  Outcome: Progressing  8/28/2020 2211 by Saman Antoine RN  Outcome: Progressing     Problem: Prexisting or High Potential for Compromised Skin Integrity  Goal: Skin integrity is maintained or improved  Description: INTERVENTIONS:  - Identify patients at risk for skin breakdown  - Assess and monitor skin integrity  - Assess and monitor nutrition and hydration status  - Monitor labs   - Assess for incontinence   - Turn and reposition patient  - Assist with mobility/ambulation  - Relieve pressure over bony prominences  - Avoid friction and shearing  - Provide appropriate hygiene as needed including keeping skin clean and dry  - Evaluate need for skin moisturizer/barrier cream  - Collaborate with interdisciplinary team   - Patient/family teaching  - Consider wound care consult   8/28/2020 2212 by Saman Antoine RN  Outcome: Progressing  8/28/2020 2211 by Saman Antoine RN  Outcome: Progressing     Problem: PAIN - ADULT  Goal: Verbalizes/displays adequate comfort level or baseline comfort level  Description: Interventions:  - Encourage patient to monitor pain and request assistance  - Assess pain using appropriate pain scale  - Administer analgesics based on type and severity of pain and evaluate response  - Implement non-pharmacological measures as appropriate and evaluate response  - Consider cultural and social influences on pain and pain management  - Notify physician/advanced practitioner if interventions unsuccessful or patient reports new pain  8/28/2020 2212 by Jessica Lugo RN  Outcome: Progressing  8/28/2020 2211 by Jessica Lugo RN  Outcome: Progressing     Problem: DISCHARGE PLANNING  Goal: Discharge to home or other facility with appropriate resources  Description: INTERVENTIONS:  - Identify barriers to discharge w/patient and caregiver  - Arrange for needed discharge resources and transportation as appropriate  - Identify discharge learning needs (meds, wound care, etc )  - Arrange for interpretive services to assist at discharge as needed  - Refer to Case Management Department for coordinating discharge planning if the patient needs post-hospital services based on physician/advanced practitioner order or complex needs related to functional status, cognitive ability, or social support system  8/28/2020 2212 by Jessica Lugo RN  Outcome: Progressing  8/28/2020 2211 by Jessica Lugo RN  Outcome: Progressing     Problem: Knowledge Deficit  Goal: Patient/family/caregiver demonstrates understanding of disease process, treatment plan, medications, and discharge instructions  Description: Complete learning assessment and assess knowledge base    Interventions:  - Provide teaching at level of understanding  - Provide teaching via preferred learning methods  8/28/2020 2212 by Jessica Lugo RN  Outcome: Progressing  8/28/2020 2211 by Jessica Lugo RN  Outcome: Progressing     Problem: METABOLIC, FLUID AND ELECTROLYTES - ADULT  Goal: Electrolytes maintained within normal limits  Description: INTERVENTIONS:  - Monitor labs and assess patient for signs and symptoms of electrolyte imbalances  - Administer electrolyte replacement as ordered  - Monitor response to electrolyte replacements, including repeat lab results as appropriate  - Instruct patient on fluid and nutrition as appropriate  Outcome: Progressing  Goal: Fluid balance maintained  Description: INTERVENTIONS:  - Monitor labs   - Monitor I/O and WT  - Instruct patient on fluid and nutrition as appropriate  - Assess for signs & symptoms of volume excess or deficit  Outcome: Progressing     Problem: HEMATOLOGIC - ADULT  Goal: Maintains hematologic stability  Description: INTERVENTIONS  - Assess for signs and symptoms of bleeding or hemorrhage  - Monitor labs  - Administer supportive blood products/factors as ordered and appropriate  Outcome: Progressing     Problem: MUSCULOSKELETAL - ADULT  Goal: Maintain or return mobility to safest level of function  Description: INTERVENTIONS:  - Assess patient's ability to carry out ADLs; assess patient's baseline for ADL function and identify physical deficits which impact ability to perform ADLs (bathing, care of mouth/teeth, toileting, grooming, dressing, etc )  - Assess/evaluate cause of self-care deficits   - Assess range of motion  - Assess patient's mobility  - Assess patient's need for assistive devices and provide as appropriate  - Encourage maximum independence but intervene and supervise when necessary  - Involve family in performance of ADLs  - Assess for home care needs following discharge   - Consider OT consult to assist with ADL evaluation and planning for discharge  - Provide patient education as appropriate  Outcome: Progressing  Goal: Maintain proper alignment of affected body part  Description: INTERVENTIONS:  - Support, maintain and protect limb and body alignment  - Provide patient/ family with appropriate education  Outcome: Progressing

## 2020-08-29 NOTE — ASSESSMENT & PLAN NOTE
Wt Readings from Last 3 Encounters:   08/29/20 72 2 kg (159 lb 2 8 oz)   02/17/20 71 8 kg (158 lb 3 2 oz)   10/15/19 69 1 kg (152 lb 6 4 oz)     · Spoke with Cardiology  · Given a dose of IV Lasix with blood transfusion on 8/28   · Continue close monitoring of volume status and respiratory status  · Echocardiogram 04/2019:  Ejection fraction 25%, moderate to severe mitral regurgitation, mild TR, mild MS   · Repeat echocardiogram 08/27/2020:  Ejection fraction 98%, grade 1 diastolic dysfunction  · Chest x-ray no acute cardiopulmonary disease  · Patient is maintained on Lasix 20 mg daily which was resumed

## 2020-08-29 NOTE — ASSESSMENT & PLAN NOTE
· Acute blood loss anemia as above   · Received blood in OR and additional 1 unit PRBC 8/28   · Continue to monitor   Lab Results   Component Value Date    HGB 8 9 (L) 08/30/2020    HGB 7 9 (L) 08/29/2020    HGB 7 5 (L) 08/28/2020    HGB 8 4 (L) 08/27/2020

## 2020-08-29 NOTE — PHYSICAL THERAPY NOTE
Physical Therapy Treatment Note       08/29/20 1203   Pain Assessment   Pain Assessment Tool 0-10   Pain Score   (0 at rest pain with mobility 5)   Pain Location/Orientation Orientation: Right;Location: Groin   Hospital Pain Intervention(s) Repositioned;Cold applied; Ambulation/increased activity; Emotional support; Rest   Restrictions/Precautions   RLE Weight Bearing Per Order WBAT   Other Precautions Fall Risk;Pain; Chair Alarm;Telemetry   General   Chart Reviewed Yes   Family/Caregiver Present Yes  (daughter)   Cognition   Overall Cognitive Status WFL   Arousal/Participation Alert; Cooperative;Responsive   Attention Within functional limits   Orientation Level Oriented X4   Memory Within functional limits   Following Commands Follows multistep commands with increased time or repetition   Subjective   Subjective " i have no pain at rest, It hurst when I move "  Pt reports R groin pain  Bed Mobility   Supine to Sit 3  Moderate assistance   Additional items Assist x 1;Bedrails;HOB elevated; Increased time required;Verbal cues;LE management   Transfers   Sit to Stand 3  Moderate assistance   Additional items Assist x 1;Bedrails; Increased time required;Verbal cues   Stand to Sit 3  Moderate assistance   Additional items Assist x 1; Armrests; Increased time required;Verbal cues   Ambulation/Elevation   Gait pattern Poor UE support;R Knee Danny; Antalgic; Forward Flexion;Decreased foot clearance; Improper Weight shift;Decreased R stance; Short stride; Step to;Excessively slow   Gait Assistance 3  Moderate assist   Additional items Assist x 1;Verbal cues   Assistive Device Rolling walker   Distance 6' x1   Balance   Static Sitting Fair  (leans toward the L to off load r hip  )   Dynamic Sitting Fair -   Static Standing Poor +   Dynamic Standing Poor   Ambulatory Poor   Endurance Deficit   Endurance Deficit Yes   Endurance Deficit Description fatigue, pain   Activity Tolerance   Activity Tolerance Patient limited by pain;Patient limited by fatigue   Nurse Made Aware amber BLANCA   Exercises   Quad Sets Supine;10 reps;AROM; Bilateral   Heelslides Supine;10 reps;AAROM; Right   Hip Abduction Supine;10 reps;AAROM; Right   Hip Adduction Supine;10 reps;AAROM; Right   Knee AROM Short Arc Quad Supine;10 reps;AROM; Right   Ankle Pumps Supine;10 reps;AROM; Bilateral   Assessment   Prognosis Good   Problem List Decreased strength;Decreased range of motion;Decreased endurance; Impaired balance;Decreased mobility; Decreased skin integrity;Pain   Assessment Pt seen for PT interventions of bed mobility, there x, transfer and gait training and pt education  Pt performs supine R le a-aarom x 10 reps  Pt showing improved tolerance to activity and improved ability to perform bed mobility, transfers and ambulation  Less assistance for all mobility  Increased time and cues required for all mobility  Pt performs bed mobility, transfers and ambulation with mod assist X1  Pt progressed with ambulation distances to 6'x1  Pt's mobility is limited by pain and fatigue and generalized weakness  Gait deviations of forward flexed posture, decreased stride, step to antalgic, gait pattern and poor ue support  Pt remained seated out of bed in chair for lunch  Ice applied to r groin, lateral thigh  SCD's to bl le's  cahir alarm activated  Call bell and phone in reach  Recommend STR at d/c  Goals   Patient Goals " To get better "     STG Expiration Date 09/11/20   PT Treatment Day 1   Plan   Treatment/Interventions Functional transfer training;LE strengthening/ROM; Therapeutic exercise; Endurance training;Patient/family training;Equipment eval/education; Bed mobility;Gait training;Spoke to nursing;Family   Progress Slow progress, decreased activity tolerance   PT Frequency 5x/wk;7x/wk  (5-7x/week)   Recommendation   PT Discharge Recommendation Post-Acute Rehabilitation Services   PT - OK to Discharge Yes   Additional Comments STR       Aureliano Polo, PTA

## 2020-08-29 NOTE — PROGRESS NOTES
Progress Note - Timo Cisneros 12/8/1927, 80 y o  female MRN: [id number]  Unit/Bed#: E2 -01 Encounter: [id number]  Primary Care Provider: Anton Graff MD   Date and time admitted to hospital: 8/26/2020  9:06 AM    * Right femoral fracture [facility])  Assessment & Plan  · X-ray right femur:  Acute displaced right femoral intertrochanteric fracture  · Consult orthopedics appreciated  · Cardiology consulted preoperatively, appreciate recommendations  · With acute blood loss anemia   · Status post intramedullary fixation of right intertrochanteric hip fracture 8/27/20 by Dr Lizeth Alonso   · PT/OT weight-bearing as tolerated right lower extremity  · PMR consulted   · Will be here over the weekend as patient is an insurance authorization  · Patient is currently on heparin for DVT prophylaxis due to renal function   · Geriatric pain protocol  · Patient will need to follow-up with Dr Lizeth Alonso in 2-3 weeks postoperatively  · Encouraged IS use which was performed at bedside with me today and she demonstrated accurate use      Acute blood loss anemia  Assessment & Plan  · Hemoglobin 7 5 postoperatively   · Received PRBC in operating room and addition 1 unit PRBC on 8/28  · hgb 7 9 this AM   · Continue close monitoring of hgb and transfuse for hgb < 7 5     Lab Results   Component Value Date    HGB 7 5 (L) 08/28/2020    HGB 8 4 (L) 08/27/2020    HGB 11 0 (L) 08/26/2020    HGB 10 9 (L) 04/06/2019         Fall  Assessment & Plan  · needs STR      Esophagitis, reflux  Assessment & Plan  · Continue PPI     Elevated troponin  Assessment & Plan  · Non MI troponin elevation  · Seen in consultation with Cardiology  · No chest pain    Chronic systolic congestive heart failure ([address] )  Assessment & Plan  Wt Readings from Last 3 Encounters:   08/28/20 71 2 kg (156 lb 15 5 oz)   02/17/20 71 8 kg (158 lb 3 2 oz)   10/15/19 69 1 kg (152 lb 6 4 oz)     · Spoke with Cardiology  · Stop IV fluid hydration to prevent volume overload  · Given a dose of IV Lasix with blood transfusion on 8/28   · Continue close monitoring of volume status and respiratory status  · Echocardiogram 04/2019:  Ejection fraction 25%, moderate to severe mitral regurgitation, mild TR, mild GA   · Repeat echocardiogram 08/27/2020:  Ejection fraction 23%, grade 1 diastolic dysfunction  · Chest x-ray no acute cardiopulmonary disease  · Patient is maintained on Lasix 20 mg daily which I will resume today       Cardiomyopathy ([address] )  Assessment & Plan  · Severe cardiomyopathy with EF in 2018 at 25%   · Appreciate cardiology consult and preoperative evaluation  · Close monitoring volume status     CKD (chronic kidney disease), stage III (HCC)  Assessment & Plan  · Cr 1 36 on admission  · Recent Cr closer to 1 11   · Holding lisinopril   · Will resume lasix as BP allows   · Avoid hypotension   · Creatinine overall stable since admission Cr 1 30   · monitor for retention         Mitral regurgitation  Assessment & Plan  · Moderate to severe MR on echo 2019   · No evidence of regurgitation on repeat echocardiogram    Benign essential HTN  Assessment & Plan  · Maintained on lasix, lisinopril and toprol XL   · Low to normotensive blood pressure  · Continue to hold lisinopril   · Resume oral lasix as BP allows   · Continue Toprol XL 12 5 mg daily with hold parameters  · Close monitoring of blood pressure       Anemia  Assessment & Plan  · Acute blood loss anemia as above   · Received blood in OR and additional 1 unit PRBC 8/28   · Continue to monitor   Lab Results   Component Value Date    HGB 7 5 (L) 08/28/2020    HGB 8 4 (L) 08/27/2020    HGB 11 0 (L) 08/26/2020    HGB 10 9 (L) 04/06/2019           VTE Pharmacologic Prophylaxis:   Pharmacologic: Heparin  Mechanical VTE Prophylaxis in Place: Yes    Patient Centered Rounds: I have performed bedside rounds with nursing staff today        Education and Discussions with Family / Patient: spoke with patient at the bedside, spoke with patient's son on the phone and reviewed plan of care     Time Spent for Care: 20 minutes  More than 50% of total time spent on counseling and coordination of care as described above  Current Length of Stay: 3 day(s)    Current Patient Status: Inpatient   Certification Statement: The patient will continue to require additional inpatient hospital stay due to right femoral fracture     Discharge Plan: early next week to STR with insurance auth     Code Status: Level 3 - DNAR and DNI      Subjective:   Patient states pain is better today, she has right groin pain when lying flat  No chest pain or SOB  No other acute events over night  Albarado catheter will be removed today  Objective:     Vitals:   Temp (24hrs), Av 5 °F (36 9 °C), Min:97 7 °F (36 5 °C), Max:99 2 °F (37 3 °C)    Temp:  [97 7 °F (36 5 °C)-99 2 °F (37 3 °C)] 98 1 °F (36 7 °C)  HR:  [67-88] 80  Resp:  [18-20] 20  BP: (103-119)/(50-73) 113/55  SpO2:  [95 %-99 %] 95 %  Body mass index is 25 69 kg/m²  Input and Output Summary (last 24 hours): Intake/Output Summary (Last 24 hours) at 2020 1015  Last data filed at 2020 1756  Gross per 24 hour   Intake 346 5 ml   Output 1075 ml   Net -728 5 ml       Physical Exam:     Physical Exam  Vitals signs and nursing note reviewed  Constitutional:       General: She is not in acute distress  HENT:      Head: Normocephalic  Eyes:      Conjunctiva/sclera: Conjunctivae normal    Cardiovascular:      Rate and Rhythm: Normal rate and regular rhythm  Pulmonary:      Effort: Pulmonary effort is normal  No respiratory distress  Breath sounds: Normal breath sounds  No wheezing or rhonchi  Abdominal:      General: Bowel sounds are normal       Palpations: Abdomen is soft  Musculoskeletal:         General: Tenderness (right lateral hip) and deformity present  Right lower leg: Edema (trace) present  Left lower leg: Edema (trace) present  Neurological:      General: No focal deficit present        Mental Status: She is alert  Psychiatric:         Mood and Affect: Mood normal            Additional Data:     Labs:    Results from last 7 days   Lab Units 08/29/20  0449 08/28/20  0519   WBC Thousand/uL 9 19 8 92   HEMOGLOBIN g/dL 7 9* 7 5*   HEMATOCRIT % 25 5* 23 6*   PLATELETS Thousands/uL 129* 127*   NEUTROS PCT %  --  76*   LYMPHS PCT %  --  12*   MONOS PCT %  --  12   EOS PCT %  --  0     Results from last 7 days   Lab Units 08/29/20  0449  08/26/20  0948   SODIUM mmol/L 138   < > 143   POTASSIUM mmol/L 3 7   < > 4 4   CHLORIDE mmol/L 105   < > 106   CO2 mmol/L 26   < > 28   BUN mg/dL 30*   < > 31*   CREATININE mg/dL 1 30   < > 1 36*   ANION GAP mmol/L 7   < > 9   CALCIUM mg/dL 7 4*   < > 9 3   ALBUMIN g/dL  --   --  3 6   TOTAL BILIRUBIN mg/dL  --   --  0 52   ALK PHOS U/L  --   --  63   ALT U/L  --   --  34   AST U/L  --   --  31   GLUCOSE RANDOM mg/dL 91   < > 108    < > = values in this interval not displayed  Results from last 7 days   Lab Units 08/27/20  0748   INR  1 22*                     * I Have Reviewed All Lab Data Listed Above  * Additional Pertinent Lab Tests Reviewed:  All Labs Within Last 24 Hours Reviewed    Imaging:    Imaging Reports Reviewed Today Include: reivewed  Imaging Personally Reviewed by Myself Includes:      Recent Cultures (last 7 days):           Last 24 Hours Medication List:   Current Facility-Administered Medications   Medication Dose Route Frequency Provider Last Rate    acetaminophen  650 mg Oral Q6H [facility] Barry Claude, PA-C      bisacodyl  10 mg Rectal Daily PRN Felix House PA-C      calcium carbonate  1,000 mg Oral Daily PRN Barry Claude, PA-C      docusate sodium  100 mg Oral BID Barry Claude, PA-C      [START ON 8/30/2020] furosemide  20 mg Oral Daily Sujatha Nuñez PA-C      heparin (porcine)  5,000 Units Subcutaneous Q8H [facility] Sujatha Nuñez PA-C      HYDROmorphone  0 2 mg Intravenous Q4H PRN Barry Claude, PA-C      metoprolol succinate  12 5 mg Oral Daily Kathryn Cartagena DANIEL Sanchez      ondansetron  4 mg Intravenous Q6H PRN Shyrl Pata, DANIEL      oxyCODONE  2 5 mg Oral Q4H PRN Shyrl Pata, DANIEL      oxyCODONE  5 mg Oral Q4H PRN Shyrl Pata, DANIEL      pantoprazole  40 mg Oral Daily Shyrl Pata, DANIEL      polyethylene glycol  17 g Oral Daily Salud Jacinto PA-C      senna  1 tablet Oral HS Salud Jacinto PA-C          Today, Patient Was Seen By: Salud Jacinto PA-C    ** Please Note: Dictation voice to text software may have been used in the creation of this document   **

## 2020-08-29 NOTE — ASSESSMENT & PLAN NOTE
· Cr 1 36 on admission  · Recent Cr closer to 1 11   · Holding lisinopril   ·  resume lasix as BP allows   · Avoid hypotension   · Creatinine overall stable since admission Cr 1 30   · monitor for retention   · S/p huang removal on 8/29

## 2020-08-29 NOTE — PLAN OF CARE
Problem: PHYSICAL THERAPY ADULT  Goal: Performs mobility at highest level of function for planned discharge setting  See evaluation for individualized goals  Description: Treatment/Interventions: Functional transfer training, LE strengthening/ROM, Therapeutic exercise, Endurance training, Patient/family training, Equipment eval/education, Bed mobility, Gait training, Compensatory technique education, Continued evaluation, Spoke to nursing, OT  Equipment Recommended: Gerber Fernandez       See flowsheet documentation for full assessment, interventions and recommendations  Outcome: Progressing  Note: Prognosis: Good  Problem List: Decreased strength, Decreased range of motion, Decreased endurance, Impaired balance, Decreased mobility, Decreased skin integrity, Pain  Assessment: Pt seen for PT interventions of bed mobility, there x, transfer and gait training and pt education  Pt performs supine R le a-aarom x 10 reps  Pt showing improved tolerance to activity and improved ability to perform bed mobility, transfers and ambulation  Less assistance for all mobility  Increased time and cues required for all mobility  Pt performs bed mobility, transfers and ambulation with mod assist X1  Pt progressed with ambulation distances to 6'x1  Pt's mobility is limited by pain and fatigue and generalized weakness  Gait deviations of forward flexed posture, decreased stride, step to antalgic, gait pattern and poor ue support  Pt remained seated out of bed in chair for lunch  Ice applied to r groin, lateral thigh  SCD's to bl le's  cahir alarm activated  Call bell and phone in reach  Recommend STR at d/c    Barriers to Discharge: Inaccessible home environment, Decreased caregiver support     PT Discharge Recommendation: [id number]     PT - OK to Discharge: Yes    See flowsheet documentation for full assessment

## 2020-08-29 NOTE — ASSESSMENT & PLAN NOTE
· Hemoglobin 7 5 postoperatively   · Received PRBC in operating room and addition 1 unit PRBC on 8/28  · hgb 8 9  this AM   · Continue close monitoring of hgb and transfuse for hgb < 7 5     Lab Results   Component Value Date    HGB 8 9 (L) 08/30/2020    HGB 7 9 (L) 08/29/2020    HGB 7 5 (L) 08/28/2020    HGB 8 4 (L) 08/27/2020

## 2020-08-29 NOTE — PLAN OF CARE
Problem: Potential for Falls  Goal: Patient will remain free of falls  Description: INTERVENTIONS:  - Assess patient frequently for physical needs  -  Identify cognitive and physical deficits and behaviors that affect risk of falls    -  [place] fall precautions as indicated by assessment   - Educate patient/family on patient safety including physical limitations  - Instruct patient to call for assistance with activity based on assessment  - Modify environment to reduce risk of injury  - Consider OT/PT consult to assist with strengthening/mobility  8/29/2020 1253 by Todd Rothman RN  Outcome: Progressing  8/29/2020 1252 by Todd Rothman RN  Outcome: Progressing     Problem: Prexisting or High Potential for Compromised Skin Integrity  Goal: Skin integrity is maintained or improved  Description: INTERVENTIONS:  - Identify patients at risk for skin breakdown  - Assess and monitor skin integrity  - Assess and monitor nutrition and hydration status  - Monitor labs   - Assess for incontinence   - Turn and reposition patient  - Assist with mobility/ambulation  - Relieve pressure over bony prominences  - Avoid friction and shearing  - Provide appropriate hygiene as needed including keeping skin clean and dry  - Evaluate need for skin moisturizer/barrier cream  - Collaborate with interdisciplinary team   - Patient/family teaching  - Consider wound care consult   Outcome: Progressing     Problem: PAIN - ADULT  Goal: Verbalizes/displays adequate comfort level or baseline comfort level  Description: Interventions:  - Encourage patient to monitor pain and request assistance  - Assess pain using appropriate pain scale  - Administer analgesics based on type and severity of pain and evaluate response  - Implement non-pharmacological measures as appropriate and evaluate response  - Consider cultural and social influences on pain and pain management  - Notify physician/advanced practitioner if interventions unsuccessful or patient reports new pain  Outcome: Progressing     Problem: DISCHARGE PLANNING  Goal: Discharge to home or other facility with appropriate resources  Description: INTERVENTIONS:  - Identify barriers to discharge w/patient and caregiver  - Arrange for needed discharge resources and transportation as appropriate  - Identify discharge learning needs (meds, wound care, etc )  - Arrange for interpretive services to assist at discharge as needed  - Refer to Case Management Department for coordinating discharge planning if the patient needs post-hospital services based on physician/advanced practitioner order or complex needs related to functional status, cognitive ability, or social support system  Outcome: Progressing     Problem: Knowledge Deficit  Goal: Patient/family/caregiver demonstrates understanding of disease process, treatment plan, medications, and discharge instructions  Description: Complete learning assessment and assess knowledge base    Interventions:  - Provide teaching at level of understanding  - Provide teaching via preferred learning methods  Outcome: Progressing     Problem: METABOLIC, FLUID AND ELECTROLYTES - ADULT  Goal: Electrolytes maintained within normal limits  Description: INTERVENTIONS:  - Monitor labs and assess patient for signs and symptoms of electrolyte imbalances  - Administer electrolyte replacement as ordered  - Monitor response to electrolyte replacements, including repeat lab results as appropriate  - Instruct patient on fluid and nutrition as appropriate  Outcome: Progressing  Goal: Fluid balance maintained  Description: INTERVENTIONS:  - Monitor labs   - Monitor I/O and WT  - Instruct patient on fluid and nutrition as appropriate  - Assess for signs & symptoms of volume excess or deficit  Outcome: Progressing     Problem: HEMATOLOGIC - ADULT  Goal: Maintains hematologic stability  Description: INTERVENTIONS  - Assess for signs and symptoms of bleeding or hemorrhage  - Monitor labs  - Administer supportive blood products/factors as ordered and appropriate  Outcome: Progressing     Problem: MUSCULOSKELETAL - ADULT  Goal: Maintain or return mobility to safest level of function  Description: INTERVENTIONS:  - Assess patient's ability to carry out ADLs; assess patient's baseline for ADL function and identify physical deficits which impact ability to perform ADLs (bathing, care of mouth/teeth, toileting, grooming, dressing, etc )  - Assess/evaluate cause of self-care deficits   - Assess range of motion  - Assess patient's mobility  - Assess patient's need for assistive devices and provide as appropriate  - Encourage maximum independence but intervene and supervise when necessary  - Involve family in performance of ADLs  - Assess for home care needs following discharge   - Consider OT consult to assist with ADL evaluation and planning for discharge  - Provide patient education as appropriate  Outcome: Progressing  Goal: Maintain proper alignment of affected body part  Description: INTERVENTIONS:  - Support, maintain and protect limb and body alignment  - Provide patient/ family with appropriate education  Outcome: Progressing

## 2020-08-30 NOTE — PHYSICAL THERAPY NOTE
Physical Therapy Treatment Note       08/30/20 0856   Pain Assessment   Pain Assessment Tool 0-10   Pain Score 4   Pain Location/Orientation Orientation: Right;Location: Groin; Location: Hip   Hospital Pain Intervention(s) Repositioned;Cold applied; Ambulation/increased activity; Emotional support;Elevated; Rest   Restrictions/Precautions   RLE Weight Bearing Per Order WBAT   Other Precautions WBS; Fall Risk;Pain   General   Chart Reviewed Yes   Family/Caregiver Present No   Cognition   Overall Cognitive Status WFL   Subjective   Subjective " It's getting better "     Bed Mobility   Supine to Sit 4  Minimal assistance   Additional items Assist x 1;HOB elevated; Bedrails; Increased time required;Verbal cues;LE management   Transfers   Sit to Stand 3  Moderate assistance   Additional items Assist x 1; Armrests; Increased time required;Verbal cues   Stand to Sit 4  Minimal assistance   Additional items Assist x 1; Armrests; Increased time required;Verbal cues   Toilet transfer 3  Moderate assistance   Additional items Armrests; Verbal cues; Commode   Ambulation/Elevation   Gait pattern Forward Flexion;Decreased foot clearance;Decreased R stance; Short stride; Step to;Excessively slow; Improper Weight shift; Poor UE support   Gait Assistance 4  Minimal assist   Additional items Assist x 1;Verbal cues   Assistive Device Rolling walker   Distance 2' x1, 12' x1   Balance   Static Sitting Fair +   Dynamic Sitting Fair   Static Standing Fair -   Dynamic Standing Poor +   Ambulatory Poor +   Endurance Deficit   Endurance Deficit Yes   Endurance Deficit Description fatigue, pain   Activity Tolerance   Activity Tolerance Patient limited by pain; Patient limited by fatigue   Nurse Made Aware Ryan Dumont   Exercises   Quad Sets Supine;Bilateral  (x 12 reps)   Heelslides Sitting;AAROM; Right  (x 12 reps)   Glute Sets Supine;Bilateral  (x 12 reps  )   Hip Abduction Supine;AAROM; Right  (x12 reps  )   Hip Adduction Supine;AAROM; Right  (x 12 reps  ) Knee AROM Short Arc Quad Supine;AROM; Right  (x 12 reps  )   Ankle Pumps Supine;15 reps;AROM; Bilateral   Assessment   Prognosis Good   Problem List Decreased endurance;Decreased range of motion;Decreased strength; Impaired balance;Decreased mobility; Decreased skin integrity;Pain   Assessment Pt seen for PT interventions of bed mobility, there x, transfer and gait training and pt education   Pt performs supine R le a-aarom x 12 reps   Pt showing improved tolerance to activity and improved ability to perform bed mobility, transfers and ambulation  Less assistance for all mobility  Increased time and cues required for all mobility   Pt performs bed mobility with min A x1, transfers from bed  And BSC with mod A x1  and ambulation with min assist X1   Pt progressed with ambulation distances to 2'x1 and 12' x1   Pt's mobility is limited by pain and fatigue and generalized weakness   Gait deviations of forward flexed posture, decreased stride, step to antalgic, gait pattern and poor ue support   Pt remained seated out of bed in chair at conclusion of PT session   Ice applied to R groin, lateral thigh  SCD's to bl le's  Call bell and phone in reach   Recommend STR at d/c  Goals   Patient Goals " To go to rehab tomorrow "     STG Expiration Date 09/11/20   PT Treatment Day 1   Plan   Treatment/Interventions Functional transfer training;LE strengthening/ROM; Therapeutic exercise; Endurance training;Patient/family training;Equipment eval/education; Bed mobility;Gait training;Spoke to nursing   Progress Progressing toward goals   PT Frequency   (5-7x/week)   Recommendation   PT Discharge Recommendation Post-Acute Rehabilitation Services   PT - OK to Discharge Yes   Additional Comments STR        08/30/20 0856   Pain Assessment   Pain Assessment Tool 0-10   Pain Score 4   Pain Location/Orientation Orientation: Right;Location: Groin; Location: Hip   Hospital Pain Intervention(s) Repositioned;Cold applied; Ambulation/increased activity; Emotional support;Elevated; Rest   Restrictions/Precautions   RLE Weight Bearing Per Order WBAT   Other Precautions WBS; Fall Risk;Pain   General   Chart Reviewed Yes   Family/Caregiver Present No   Cognition   Overall Cognitive Status WFL   Subjective   Subjective " It's getting better "     Bed Mobility   Supine to Sit 4  Minimal assistance   Additional items Assist x 1;HOB elevated; Bedrails; Increased time required;Verbal cues;LE management   Transfers   Sit to Stand 3  Moderate assistance   Additional items Assist x 1; Armrests; Increased time required;Verbal cues   Stand to Sit 4  Minimal assistance   Additional items Assist x 1; Armrests; Increased time required;Verbal cues   Toilet transfer 3  Moderate assistance   Additional items Armrests; Verbal cues; Commode   Ambulation/Elevation   Gait pattern Forward Flexion;Decreased foot clearance;Decreased R stance; Short stride; Step to;Excessively slow; Improper Weight shift; Poor UE support   Gait Assistance 4  Minimal assist   Additional items Assist x 1;Verbal cues   Assistive Device Rolling walker   Distance 2' x1, 12' x1   Balance   Static Sitting Fair +   Dynamic Sitting Fair   Static Standing Fair -   Dynamic Standing Poor +   Ambulatory Poor +   Endurance Deficit   Endurance Deficit Yes   Endurance Deficit Description fatigue, pain   Activity Tolerance   Activity Tolerance Patient limited by pain; Patient limited by fatigue   Nurse Made Aware Kaur Liana   Exercises   Quad Sets Supine;Bilateral  (x 12 reps)   Heelslides Sitting;AAROM; Right  (x 12 reps)   Glute Sets Supine;Bilateral  (x 12 reps  )   Hip Abduction Supine;AAROM; Right  (x12 reps  )   Hip Adduction Supine;AAROM; Right  (x 12 reps  )   Knee AROM Short Arc Quad Supine;AROM; Right  (x 12 reps  )   Ankle Pumps Supine;15 reps;AROM; Bilateral   Assessment   Prognosis Good   Problem List Decreased endurance;Decreased range of motion;Decreased strength; Impaired balance;Decreased mobility; Decreased skin integrity;Pain   Assessment Pt seen for PT interventions of bed mobility, there x, transfer and gait training and pt education   Pt performs supine R le a-aarom x 12 reps   Pt showing improved tolerance to activity and improved ability to perform bed mobility, transfers and ambulation  Less assistance for all mobility  Increased time and cues required for all mobility   Pt performs bed mobility with min A x1, transfers from bed  And BSC with mod A x1  and ambulation with min assist X1   Pt progressed with ambulation distances to 2'x1 and 12' x1   Pt's mobility is limited by pain and fatigue and generalized weakness   Gait deviations of forward flexed posture, decreased stride, step to antalgic, gait pattern and poor ue support   Pt remained seated out of bed in chair at conclusion of PT session   Ice applied to R groin, lateral thigh  SCD's to bl le's  Call bell and phone in reach   Recommend STR at d/c  Goals   Patient Goals " To go to rehab tomorrow "     STG Expiration Date 09/11/20   PT Treatment Day 1   Plan   Treatment/Interventions Functional transfer training;LE strengthening/ROM; Therapeutic exercise; Endurance training;Patient/family training;Equipment eval/education; Bed mobility;Gait training;Spoke to nursing   Progress Progressing toward goals   PT Frequency   (5-7x/week)   Recommendation   PT Discharge Recommendation Post-Acute Rehabilitation Services   PT - OK to Discharge Yes   Additional Comments STR       Park Zendejas PTA

## 2020-08-30 NOTE — PLAN OF CARE
Problem: OCCUPATIONAL THERAPY ADULT  Goal: Performs self-care activities at highest level of function for planned discharge setting  See evaluation for individualized goals  Description: Treatment Interventions: ADL retraining, Functional transfer training, UE strengthening/ROM, Endurance training, Patient/family training, Equipment evaluation/education, Compensatory technique education, Activityengagement          See flowsheet documentation for full assessment, interventions and recommendations  Outcome: Progressing  Note: Limitation: Decreased ADL status, Decreased UE strength, Decreased Safe judgement during ADL, Decreased cognition, Decreased endurance, Decreased self-care trans, Decreased high-level ADLs  Prognosis: Fair  Assessment: Pt was seen for skilled OT with focus on completion of self care routine, review of fall prevention, RW safety and review of current plan of care  Pt OOB in bedside chair upon greeting this tx session  ADL routine while seated in bedside chair with basin set up  ADL routine as follows  Grooming: S UB Bathing: S UB Dressing: S LB Bathing: Mod A with increased A for michelle care instance due to limited balance with functional reach  LB Dressing: Mod A due to limited functional reach  Pt with 3/10 pain noted in affected R hip with activities  Toileting: Unable to assess  Bed Mobility: unable to assess  Pt OOB in bedside chair upon greeting this tx session  Transfers: Mod A for sit to stand at [facility] with retropulsive stance noted  Encouraged WB into front of Pt's feet to promote stabilization  See above levels of A required for all functional tasks  Pt may benefit from further rehab with focus on achieving optimal performance levels with all functional tasks   Continue to recommend Inpatient rehab     OT Discharge Recommendation: Post-Acute Rehabilitation Services  OT - OK to Discharge: Yes(when medically cleared  )

## 2020-08-30 NOTE — ASSESSMENT & PLAN NOTE
· Maintained on lasix, lisinopril and toprol XL   · Blood pressure 125/64   · Continue to hold lisinopril   · Resume oral lasix as BP allows   · Continue Toprol XL 12 5 mg daily with hold parameters  · Close monitoring of blood pressure

## 2020-08-30 NOTE — ASSESSMENT & PLAN NOTE
· Cr 1 36 on admission  · Recent Cr closer to 1 11   · Holding lisinopril   ·  resume lasix as BP allows   · Avoid hypotension   · Creatinine overall stable since admission Cr 1 30   · S/p huang removal on 8/29 without difficulty or retention

## 2020-08-30 NOTE — PLAN OF CARE
Problem: PHYSICAL THERAPY ADULT  Goal: Performs mobility at highest level of function for planned discharge setting  See evaluation for individualized goals  Description: Treatment/Interventions: Functional transfer training, LE strengthening/ROM, Therapeutic exercise, Endurance training, Patient/family training, Equipment eval/education, Bed mobility, Gait training, Compensatory technique education, Continued evaluation, Spoke to nursing, OT  Equipment Recommended: Summer Quarles       See flowsheet documentation for full assessment, interventions and recommendations  Outcome: Progressing  Note: Prognosis: Good  Problem List: Decreased strength, Decreased range of motion, Decreased endurance, Impaired balance, Decreased mobility, Decreased skin integrity, Pain  Assessment: Pt seen for PT interventions of bed mobility, there x, transfer and gait training and pt education  Pt performs supine R le a-aarom x 10 reps  Pt showing improved tolerance to activity and improved ability to perform bed mobility, transfers and ambulation  Less assistance for all mobility  Increased time and cues required for all mobility  Pt performs bed mobility, transfers and ambulation with mod assist X1  Pt progressed with ambulation distances to 6'x1  Pt's mobility is limited by pain and fatigue and generalized weakness  Gait deviations of forward flexed posture, decreased stride, step to antalgic, gait pattern and poor ue support  Pt remained seated out of bed in chair for lunch  Ice applied to r groin, lateral thigh  SCD's to bl le's  cahir alarm activated  Call bell and phone in reach  Recommend STR at d/c    Barriers to Discharge: Inaccessible home environment, Decreased caregiver support     PT Discharge Recommendation: [id number]     PT - OK to Discharge: Yes    See flowsheet documentation for full assessment

## 2020-08-30 NOTE — ASSESSMENT & PLAN NOTE
· X-ray right femur:  Acute displaced right femoral intertrochanteric fracture  · Consult orthopedics appreciated  · Cardiology consulted preoperatively, appreciate recommendations  · With acute blood loss anemia postoperatively   · Status post intramedullary fixation of right intertrochanteric hip fracture 8/27/20 by Dr Charan Tomas   · PT/OT/PMR, weight-bearing as tolerated right lower extremity  · Will be here over the weekend as patient is an insurance authorization  · Patient is currently on heparin for DVT prophylaxis due to renal function   · Geriatric pain protocol  · Patient will need to follow-up with Dr Charan Tomas in 2-3 weeks postoperatively  · Encouraged IS use which was performed at bedside with me today and she demonstrated accurate use    · Discharge to [address] [place] [address] today pending insurance auth

## 2020-08-30 NOTE — PROGRESS NOTES
Progress Note - Tim Hernandez 12/8/1927, 80 y o  female MRN: [id number]  Unit/Bed#: E2 -01 Encounter: [id number]  Primary Care Provider: Louisa Leong MD   Date and time admitted to hospital: 8/26/2020  9:06 AM    * Right femoral fracture [facility])  Assessment & Plan  · X-ray right femur:  Acute displaced right femoral intertrochanteric fracture  · Consult orthopedics appreciated  · Cardiology consulted preoperatively, appreciate recommendations  · With acute blood loss anemia postoperatively   · Status post intramedullary fixation of right intertrochanteric hip fracture 8/27/20 by Dr Alexandrea Ramos   · PT/OT/PMR, weight-bearing as tolerated right lower extremity  · Will be here over the weekend as patient is an insurance authorization  · Patient is currently on heparin for DVT prophylaxis due to renal function   · Geriatric pain protocol  · Patient will need to follow-up with Dr Alexandrea Ramos in 2-3 weeks postoperatively  · Encouraged IS use which was performed at bedside with me today and she demonstrated accurate use    · Discharge to [facility] hopefully Monday     Acute blood loss anemia  Assessment & Plan  · Hemoglobin 7 5 postoperatively   · Received PRBC in operating room and addition 1 unit PRBC on 8/28  · hgb 8 9  this AM   · Continue close monitoring of hgb and transfuse for hgb < 7 5     Lab Results   Component Value Date    HGB 8 9 (L) 08/30/2020    HGB 7 9 (L) 08/29/2020    HGB 7 5 (L) 08/28/2020    HGB 8 4 (L) 08/27/2020         Fall  Assessment & Plan  · needs STR      Esophagitis, reflux  Assessment & Plan  · Continue PPI     Elevated troponin  Assessment & Plan  · Non MI troponin elevation  · Seen in consultation with Cardiology  · No chest pain    Chronic systolic congestive heart failure ([facility] [address] )  Assessment & Plan  Wt Readings from Last 3 Encounters:   08/29/20 72 2 kg (159 lb 2 8 oz)   02/17/20 71 8 kg (158 lb 3 2 oz)   10/15/19 69 1 kg (152 lb 6 4 oz)     · Spoke with Cardiology  · Given a dose of IV Lasix with blood transfusion on 8/28   · Continue close monitoring of volume status and respiratory status  · Echocardiogram 04/2019:  Ejection fraction 25%, moderate to severe mitral regurgitation, mild TR, mild AK   · Repeat echocardiogram 08/27/2020:  Ejection fraction 74%, grade 1 diastolic dysfunction  · Chest x-ray no acute cardiopulmonary disease  · Patient is maintained on Lasix 20 mg daily which was resumed       Cardiomyopathy [facility])  Assessment & Plan  · Severe cardiomyopathy with EF in 2018 at 25%   · Seen by cardiology here   · Continue po lasix       CKD (chronic kidney disease), stage III ([facility] [address] )  Assessment & Plan  · Cr 1 36 on admission  · Recent Cr closer to 1 11   · Holding lisinopril   ·  resume lasix as BP allows   · Avoid hypotension   · Creatinine overall stable since admission Cr 1 30   · monitor for retention   · S/p huang removal on 8/29         Mitral regurgitation  Assessment & Plan  · Moderate to severe MR on echo 2019   · No evidence of regurgitation on repeat echocardiogram    Benign essential HTN  Assessment & Plan  · Maintained on lasix, lisinopril and toprol XL   · Low to normotensive blood pressure  · Continue to hold lisinopril   · Resume oral lasix as BP allows   · Continue Toprol XL 12 5 mg daily with hold parameters  · Close monitoring of blood pressure       Anemia  Assessment & Plan  · Acute blood loss anemia as above   · Received blood in OR and additional 1 unit PRBC 8/28   · Continue to monitor   Lab Results   Component Value Date    HGB 8 9 (L) 08/30/2020    HGB 7 9 (L) 08/29/2020    HGB 7 5 (L) 08/28/2020    HGB 8 4 (L) 08/27/2020           VTE Pharmacologic Prophylaxis:   Pharmacologic: Heparin  Mechanical VTE Prophylaxis in Place: Yes    Patient Centered Rounds: I have performed bedside rounds with nursing staff today        Education and Discussions with Family / Patient: spoke with patient at the bedside, spoke with patient's daughter at the bedside reviewed plan of care and answered all questions    Time Spent for Care: 20 minutes  More than 50% of total time spent on counseling and coordination of care as described above  Current Length of Stay: 4 day(s)    Current Patient Status: Inpatient   Certification Statement: The patient will continue to require additional inpatient hospital stay due to right femoral fracture     Discharge Plan: pending rehab placement hopefully Monday    Code Status: Level 3 - DNAR and DNI      Subjective:   Patient doing well today  No acute complaints or events overnight  Pain is well controlled  No chest pain or shortness of breath  No urinary complaints  She has voided without difficulty epic Albarado removal yesterday  She still has not had a bowel movement but denies abdominal pain  She admits to passing gas  She is ambivalent about increasing bowel regimen  She did decline her MiraLax this morning  She states if she does not have a bowel movement later today that she will take something now  Objective:     Vitals:   Temp (24hrs), Av 9 °F (36 6 °C), Min:97 4 °F (36 3 °C), Max:98 2 °F (36 8 °C)    Temp:  [97 4 °F (36 3 °C)-98 2 °F (36 8 °C)] 98 °F (36 7 °C)  HR:  [64-83] 80  Resp:  [18] 18  BP: (109-113)/(53-66) 110/53  SpO2:  [95 %-100 %] 96 %  Body mass index is 27 51 kg/m²  Input and Output Summary (last 24 hours): Intake/Output Summary (Last 24 hours) at 2020 0951  Last data filed at 2020 0900  Gross per 24 hour   Intake    Output 4579 ml   Net -4579 ml       Physical Exam:     Physical Exam  Vitals signs and nursing note reviewed  HENT:      Head: Normocephalic  Eyes:      Conjunctiva/sclera: Conjunctivae normal    Cardiovascular:      Rate and Rhythm: Normal rate and regular rhythm  Pulmonary:      Effort: Pulmonary effort is normal  No respiratory distress  Breath sounds: Normal breath sounds  No wheezing  Abdominal:      General: Bowel sounds are normal       Palpations: Abdomen is soft        Tenderness: There is no abdominal tenderness  Musculoskeletal:         General: Deformity (Dressing present over right lateral hip) present  Right lower leg: Edema present  Left lower leg: Edema present  Neurological:      General: No focal deficit present  Mental Status: She is alert  Psychiatric:         Mood and Affect: Mood normal            Additional Data:     Labs:    Results from last 7 days   Lab Units 08/30/20  0558  08/28/20  0519   WBC Thousand/uL 8 42   < > 8 92   HEMOGLOBIN g/dL 8 9*   < > 7 5*   HEMATOCRIT % 28 5*   < > 23 6*   PLATELETS Thousands/uL 156   < > 127*   NEUTROS PCT %  --   --  76*   LYMPHS PCT %  --   --  12*   MONOS PCT %  --   --  12   EOS PCT %  --   --  0    < > = values in this interval not displayed  Results from last 7 days   Lab Units 08/29/20  0449  08/26/20  0948   SODIUM mmol/L 138   < > 143   POTASSIUM mmol/L 3 7   < > 4 4   CHLORIDE mmol/L 105   < > 106   CO2 mmol/L 26   < > 28   BUN mg/dL 30*   < > 31*   CREATININE mg/dL 1 30   < > 1 36*   ANION GAP mmol/L 7   < > 9   CALCIUM mg/dL 7 4*   < > 9 3   ALBUMIN g/dL  --   --  3 6   TOTAL BILIRUBIN mg/dL  --   --  0 52   ALK PHOS U/L  --   --  63   ALT U/L  --   --  34   AST U/L  --   --  31   GLUCOSE RANDOM mg/dL 91   < > 108    < > = values in this interval not displayed  Results from last 7 days   Lab Units 08/27/20  0748   INR  1 22*                       * I Have Reviewed All Lab Data Listed Above  * Additional Pertinent Lab Tests Reviewed:  All Labs Within Last 24 Hours Reviewed    Imaging:    Imaging Reports Reviewed Today Include:   Imaging Personally Reviewed by Myself Includes:      Recent Cultures (last 7 days):           Last 24 Hours Medication List:   Current Facility-Administered Medications   Medication Dose Route Frequency Provider Last Rate    acetaminophen  650 mg Oral Q6H [address] Keyshawn Arzola PA-C      bisacodyl  10 mg Rectal Daily PRN Sia Monique PA-C      calcium carbonate  1,000 mg Oral Daily PRN Maeola Locks, PA-ROLAND      docusate sodium  100 mg Oral BID Maeola Locks, DANIEL      furosemide  20 mg Oral Daily Tatiana Paez PA-C      heparin (porcine)  5,000 Units Subcutaneous Q8H [address] Sujatha Nuñez, DANIEL      HYDROmorphone  0 2 mg Intravenous Q4H PRN Maeola Locks, PA-ROLAND      metoprolol succinate  12 5 mg Oral Daily Maeola Locks, DANIEL      ondansetron  4 mg Intravenous Q6H PRN Maeola Locks, DANIEL      oxyCODONE  2 5 mg Oral Q4H PRN Maeola Locks, PA-ROLAND      oxyCODONE  5 mg Oral Q4H PRN Maeola Locks, PA-ROLAND      pantoprazole  40 mg Oral Daily Maeola Locks, PA-ROLAND      polyethylene glycol  17 g Oral Daily Tatiana Paez PA-C      senna  1 tablet Oral HS Tatiana Paez PA-C          Today, Patient Was Seen By: Tatiana Paez PA-C    ** Please Note: Dictation voice to text software may have been used in the creation of this document   **

## 2020-08-30 NOTE — PLAN OF CARE
Problem: Potential for Falls  Goal: Patient will remain free of falls  Description: INTERVENTIONS:  - Assess patient frequently for physical needs  -  Identify cognitive and physical deficits and behaviors that affect risk of falls    -  [place] fall precautions as indicated by assessment   - Educate patient/family on patient safety including physical limitations  - Instruct patient to call for assistance with activity based on assessment  - Modify environment to reduce risk of injury  - Consider OT/PT consult to assist with strengthening/mobility  Outcome: Progressing     Problem: Prexisting or High Potential for Compromised Skin Integrity  Goal: Skin integrity is maintained or improved  Description: INTERVENTIONS:  - Identify patients at risk for skin breakdown  - Assess and monitor skin integrity  - Assess and monitor nutrition and hydration status  - Monitor labs   - Assess for incontinence   - Turn and reposition patient  - Assist with mobility/ambulation  - Relieve pressure over bony prominences  - Avoid friction and shearing  - Provide appropriate hygiene as needed including keeping skin clean and dry  - Evaluate need for skin moisturizer/barrier cream  - Collaborate with interdisciplinary team   - Patient/family teaching  - Consider wound care consult   Outcome: Progressing     Problem: PAIN - ADULT  Goal: Verbalizes/displays adequate comfort level or baseline comfort level  Description: Interventions:  - Encourage patient to monitor pain and request assistance  - Assess pain using appropriate pain scale  - Administer analgesics based on type and severity of pain and evaluate response  - Implement non-pharmacological measures as appropriate and evaluate response  - Consider cultural and social influences on pain and pain management  - Notify physician/advanced practitioner if interventions unsuccessful or patient reports new pain  Outcome: Progressing     Problem: DISCHARGE PLANNING  Goal: Discharge to home or other facility with appropriate resources  Description: INTERVENTIONS:  - Identify barriers to discharge w/patient and caregiver  - Arrange for needed discharge resources and transportation as appropriate  - Identify discharge learning needs (meds, wound care, etc )  - Refer to Case Management Department for coordinating discharge planning if the patient needs post-hospital services based on physician/advanced practitioner order or complex needs related to functional status, cognitive ability, or social support system  Outcome: Progressing     Problem: Knowledge Deficit  Goal: Patient/family/caregiver demonstrates understanding of disease process, treatment plan, medications, and discharge instructions  Description: Complete learning assessment and assess knowledge base    Interventions:  - Provide teaching at level of understanding  - Provide teaching via preferred learning methods  Outcome: Progressing     Problem: METABOLIC, FLUID AND ELECTROLYTES - ADULT  Goal: Electrolytes maintained within normal limits  Description: INTERVENTIONS:  - Monitor labs and assess patient for signs and symptoms of electrolyte imbalances  - Administer electrolyte replacement as ordered  - Monitor response to electrolyte replacements, including repeat lab results as appropriate  - Instruct patient on fluid and nutrition as appropriate  Outcome: Progressing  Goal: Fluid balance maintained  Description: INTERVENTIONS:  - Monitor labs   - Monitor I/O and WT  - Instruct patient on fluid and nutrition as appropriate  - Assess for signs & symptoms of volume excess or deficit  Outcome: Progressing     Problem: HEMATOLOGIC - ADULT  Goal: Maintains hematologic stability  Description: INTERVENTIONS  - Monitor labs  - Administer supportive blood products/factors as ordered and appropriate  Outcome: Progressing     Problem: MUSCULOSKELETAL - ADULT  Goal: Maintain or return mobility to safest level of function  Description: INTERVENTIONS:  - Assess patient's ability to carry out ADLs; assess patient's baseline for ADL function and identify physical deficits which impact ability to perform ADLs (bathing, care of mouth/teeth, toileting, grooming, dressing, etc )  - Assess/evaluate cause of self-care deficits   - Assess range of motion  - Assess patient's mobility  - Assess patient's need for assistive devices and provide as appropriate  - Encourage maximum independence but intervene and supervise when necessary  - Involve family in performance of ADLs  - Assess for home care needs following discharge   - Consider OT consult to assist with ADL evaluation and planning for discharge  - Provide patient education as appropriate  Outcome: Progressing  Goal: Maintain proper alignment of affected body part  Description: INTERVENTIONS:  - Support, maintain and protect limb and body alignment  - Provide patient/ family with appropriate education  Outcome: Progressing     Problem: COPING  Goal: Pt/Family able to verbalize concerns and demonstrate effective coping strategies  Description: INTERVENTIONS:  - Assist patient/family to identify coping skills, available support systems and cultural and spiritual values  - Provide emotional support, including active listening and acknowledgement of concerns of patient and caregivers  - Reduce environmental stimuli, as able  - Provide patient education  - Assess for spiritual pain/suffering and initiate spiritual care, including notification of Pastoral Care or nikki based community as needed  - Assess effectiveness of coping strategies  Outcome: Progressing     Problem: DECISION MAKING  Goal: Pt/Family able to effectively weigh alternatives and participate in decision making related to treatment and care  Description: INTERVENTIONS:  - Identify decision maker  - Determine when there are differences among patient's view, family's view, and healthcare provider's view of patient condition and care goals  - Facilitate patient/family articulation of goals for care  - Help patient/family identify pros/cons of alternative solutions  - Provide information as requested by patient/family  - Respect patient/family rights related to privacy and sharing information   - Serve as a liaison between patient, family and health care team  - Initiate consults as appropriate (Ethics Team, Palliative Care, Family Care Conference, etc )  Outcome: Progressing     Problem: GENITOURINARY - ADULT  Goal: Maintains or returns to baseline urinary function  Description: INTERVENTIONS:  - Assess urinary function  - Encourage oral fluids to ensure adequate hydration if ordered  - Administer IV fluids as ordered to ensure adequate hydration  - Administer ordered medications as needed  - Offer frequent toileting  - Follow urinary retention protocol if ordered  Outcome: Progressing  Goal: Absence of urinary retention  Description: INTERVENTIONS:  - Assess patient's ability to void and empty bladder  - Monitor I/O  - Bladder scan as needed  - Discuss with physician/AP medications to alleviate retention as needed  -Encourage pt to get out of bed and use commode to void    Outcome: Progressing

## 2020-08-30 NOTE — PLAN OF CARE
Problem: Potential for Falls  Goal: Patient will remain free of falls  Description: INTERVENTIONS:  - Assess patient frequently for physical needs  -  Identify cognitive and physical deficits and behaviors that affect risk of falls    -  [place] fall precautions as indicated by assessment   - Educate patient/family on patient safety including physical limitations  - Instruct patient to call for assistance with activity based on assessment  - Modify environment to reduce risk of injury  - Consider OT/PT consult to assist with strengthening/mobility  Outcome: Progressing     Problem: Prexisting or High Potential for Compromised Skin Integrity  Goal: Skin integrity is maintained or improved  Description: INTERVENTIONS:  - Identify patients at risk for skin breakdown  - Assess and monitor skin integrity  - Assess and monitor nutrition and hydration status  - Monitor labs   - Assess for incontinence   - Turn and reposition patient  - Assist with mobility/ambulation  - Relieve pressure over bony prominences  - Avoid friction and shearing  - Provide appropriate hygiene as needed including keeping skin clean and dry  - Evaluate need for skin moisturizer/barrier cream  - Collaborate with interdisciplinary team   - Patient/family teaching  - Consider wound care consult   Outcome: Progressing     Problem: PAIN - ADULT  Goal: Verbalizes/displays adequate comfort level or baseline comfort level  Description: Interventions:  - Encourage patient to monitor pain and request assistance  - Assess pain using appropriate pain scale  - Administer analgesics based on type and severity of pain and evaluate response  - Implement non-pharmacological measures as appropriate and evaluate response  - Consider cultural and social influences on pain and pain management  - Notify physician/advanced practitioner if interventions unsuccessful or patient reports new pain  Outcome: Progressing     Problem: DISCHARGE PLANNING  Goal: Discharge to home or other facility with appropriate resources  Description: INTERVENTIONS:  - Identify barriers to discharge w/patient and caregiver  - Arrange for needed discharge resources and transportation as appropriate  - Identify discharge learning needs (meds, wound care, etc )  - Refer to Case Management Department for coordinating discharge planning if the patient needs post-hospital services based on physician/advanced practitioner order or complex needs related to functional status, cognitive ability, or social support system  Outcome: Progressing     Problem: Knowledge Deficit  Goal: Patient/family/caregiver demonstrates understanding of disease process, treatment plan, medications, and discharge instructions  Description: Complete learning assessment and assess knowledge base    Interventions:  - Provide teaching at level of understanding  - Provide teaching via preferred learning methods  Outcome: Progressing     Problem: METABOLIC, FLUID AND ELECTROLYTES - ADULT  Goal: Electrolytes maintained within normal limits  Description: INTERVENTIONS:  - Monitor labs and assess patient for signs and symptoms of electrolyte imbalances  - Administer electrolyte replacement as ordered  - Monitor response to electrolyte replacements, including repeat lab results as appropriate  - Instruct patient on fluid and nutrition as appropriate  Outcome: Progressing  Goal: Fluid balance maintained  Description: INTERVENTIONS:  - Monitor labs   - Monitor I/O and WT  - Instruct patient on fluid and nutrition as appropriate  - Assess for signs & symptoms of volume excess or deficit  Outcome: Progressing     Problem: HEMATOLOGIC - ADULT  Goal: Maintains hematologic stability  Description: INTERVENTIONS  - Monitor labs  - Administer supportive blood products/factors as ordered and appropriate  Outcome: Progressing     Problem: MUSCULOSKELETAL - ADULT  Goal: Maintain or return mobility to safest level of function  Description: INTERVENTIONS:  - Assess patient's ability to carry out ADLs; assess patient's baseline for ADL function and identify physical deficits which impact ability to perform ADLs (bathing, care of mouth/teeth, toileting, grooming, dressing, etc )  - Assess/evaluate cause of self-care deficits   - Assess range of motion  - Assess patient's mobility  - Assess patient's need for assistive devices and provide as appropriate  - Encourage maximum independence but intervene and supervise when necessary  - Involve family in performance of ADLs  - Assess for home care needs following discharge   - Consider OT consult to assist with ADL evaluation and planning for discharge  - Provide patient education as appropriate  Outcome: Progressing  Goal: Maintain proper alignment of affected body part  Description: INTERVENTIONS:  - Support, maintain and protect limb and body alignment  - Provide patient/ family with appropriate education  Outcome: Progressing     Problem: COPING  Goal: Pt/Family able to verbalize concerns and demonstrate effective coping strategies  Description: INTERVENTIONS:  - Assist patient/family to identify coping skills, available support systems and cultural and spiritual values  - Provide emotional support, including active listening and acknowledgement of concerns of patient and caregivers  - Reduce environmental stimuli, as able  - Provide patient education  - Assess for spiritual pain/suffering and initiate spiritual care, including notification of Pastoral Care or nikki based community as needed  - Assess effectiveness of coping strategies  Outcome: Progressing     Problem: DECISION MAKING  Goal: Pt/Family able to effectively weigh alternatives and participate in decision making related to treatment and care  Description: INTERVENTIONS:  - Identify decision maker  - Determine when there are differences among patient's view, family's view, and healthcare provider's view of patient condition and care goals  - Facilitate patient/family articulation of goals for care  - Help patient/family identify pros/cons of alternative solutions  - Provide information as requested by patient/family  - Respect patient/family rights related to privacy and sharing information   - Serve as a liaison between patient, family and health care team  - Initiate consults as appropriate (Ethics Team, Palliative Care, Family Care Conference, etc )  Outcome: Progressing     Problem: GENITOURINARY - ADULT  Goal: Maintains or returns to baseline urinary function  Description: INTERVENTIONS:  - Assess urinary function  - Encourage oral fluids to ensure adequate hydration if ordered  - Administer IV fluids as ordered to ensure adequate hydration  - Administer ordered medications as needed  - Offer frequent toileting  - Follow urinary retention protocol if ordered  Outcome: Progressing  Goal: Absence of urinary retention  Description: INTERVENTIONS:  - Assess patient's ability to void and empty bladder  - Monitor I/O  - Bladder scan as needed  - Discuss with physician/AP medications to alleviate retention as needed  -Encourage pt to get out of bed and use commode to void    Outcome: Progressing     Problem: COPING  Goal: Will report anxiety at manageable levels  Description: INTERVENTIONS:  - Administer medication as ordered  - Teach and encourage coping skills  - Provide emotional support  - Assess patient/family for anxiety and ability to cope  Outcome: Completed

## 2020-08-30 NOTE — OCCUPATIONAL THERAPY NOTE
Occupational Therapy Treatment Note:         08/30/20 1004   Restrictions/Precautions   Weight Bearing Precautions Per Order Yes   RLE Weight Bearing Per Order WBAT   Other Precautions Fall Risk;Pain;WBS   Pain Assessment   Pain Assessment Tool 0-10   Pain Score 4   Pain Location/Orientation Orientation: Right;Location: Groin; Location: Hip   ADL   Where Assessed Chair   Grooming Assistance 5  Supervision/Setup   Grooming Deficit Setup   UB Bathing Assistance 5  Supervision/Setup   UB Bathing Deficit Setup   LB Bathing Assistance 3  Moderate Assistance   LB Bathing Deficit Steadying;Setup;Verbal cueing;Supervision/safety; Increased time to complete;Perineal area; Buttocks;Right lower leg including foot; Left lower leg including foot   LB Bathing Comments increased A due to limited functional noted  UB Dressing Assistance 5  Supervision/Setup   UB Dressing Deficit Setup   LB Dressing Assistance 3  Moderate Assistance   LB Dressing Deficit Steadying;Setup; Requires assistive device for steadying;Verbal cueing;Supervision/safety; Increased time to complete; Don/doff R sock; Don/doff L sock   Toileting Assistance  Unable to assess   Functional Standing Tolerance   Time 2 mins   Activity dynamic stand balance activity  Comments Pt with retropulsive stance noted  Bed Mobility   Supine to Sit Unable to assess   Additional Comments Pt OOB in bedside chair upon arrival      Transfers   Sit to Stand 3  Moderate assistance   Additional items Assist x 1; Armrests; Increased time required;Verbal cues   Stand to Sit 3  Moderate assistance   Additional items Assist x 1; Armrests; Increased time required;Verbal cues   Functional Mobility   Functional Mobility 3  Moderate assistance   Additional Comments x1   Additional items Rolling walker   Cognition   Overall Cognitive Status Impaired   Arousal/Participation Cooperative; Alert   Attention Attends with cues to redirect   Orientation Level Oriented X4   Memory Within functional limits   Following Commands Follows multistep commands with increased time or repetition   Additional Activities   Additional Activities Other (Comment)  (reviewed fall prevention/RW safety  )   Additional Activities Comments Pt with F understanding  further review may be required  Activity Tolerance   Activity Tolerance Patient limited by fatigue;Patient limited by pain   Medical Staff Made Aware reported all findings to nursing staff  Pt OOB in bedside chair upon termination of tx session  Assessment   Assessment Pt was seen for skilled OT with focus on completion of self care routine, review of fall prevention, RW safety and review of current plan of care  Pt OOB in bedside chair upon greeting this tx session  ADL routine while seated in bedside chair with basin set up  ADL routine as follows  Grooming: S UB Bathing: S UB Dressing: S LB Bathing: Mod A with increased A for michelle care instance due to limited balance with functional reach  LB Dressing: Mod A due to limited functional reach  Pt with 3/10 pain noted in affected R hip with activities  Toileting: Unable to assess  Bed Mobility: unable to assess  Pt OOB in bedside chair upon greeting this tx session  Transfers: Mod A for sit to stand at [facility] with retropulsive stance noted  Encouraged WB into front of Pt's feet to promote stabilization  See above levels of A required for all functional tasks  Pt may benefit from further rehab with focus on achieving optimal performance levels with all functional tasks  Continue to recommend Inpatient rehab   Plan   Treatment Interventions ADL retraining;Functional transfer training;UE strengthening/ROM; Endurance training;Cognitive reorientation   Goal Expiration Date 08/11/20   OT Treatment Day 1   OT Frequency 3-5x/wk   Recommendation   OT Discharge Recommendation Post-Acute Rehabilitation Services   OT - OK to Discharge Yes  (when medically cleared  )   Barthel Index   Feeding 10   Bathing 0   Grooming Score 5 Dressing Score 5   Bladder Score 0   Bowels Score 10   Toilet Use Score 5   Transfers (Bed/Chair) Score 5   Mobility (Level Surface) Score 0   Stairs Score 0   B[place] Index Score 40   Modified [place] Scale   Modified [place] Scale 4   John Cruz, [address]

## 2020-08-30 NOTE — ASSESSMENT & PLAN NOTE
· Hemoglobin 7 5 postoperatively   · Received PRBC in operating room and addition 1 unit PRBC on 8/28  · hgb 8 9    · Check CBC tomorrow if in house or at rehab   · Continue close monitoring of hgb and transfuse for hgb < 7 5     Lab Results   Component Value Date    HGB 8 9 (L) 08/30/2020    HGB 7 9 (L) 08/29/2020    HGB 7 5 (L) 08/28/2020    HGB 8 4 (L) 08/27/2020

## 2020-08-30 NOTE — PROGRESS NOTES
Shawna Gregg  80 y o   female  MR#: [id number]  8/30/2020    Post-op days: 3  Extremity: Right hip    Subjective:  Patient seen and examined  Sitting up in bed and eating breakfast   Pain is well controlled  Denies chest pain, shortness of breath, fevers or chills  No issues overnight  No bowel movement yet, but passing gas  She got up out of bed into a chair yesterday with physical therapy  Vitals:   Vitals:    08/30/20 0713   BP: 110/53   Pulse: 80   Resp: 18   Temp: 98 °F (36 7 °C)   SpO2: 96%       Exam:   AAO x 3, NAD  Right leg dressing intact, no drainage  Calf is soft and non-tender  Sensation intact to light touch  DP 2+  Moving ankle and toes    Labs:   WBC   Recent Labs     08/28/20  0519 08/29/20  0449 08/30/20  0558   WBC 8 92 9 19 8 42     H/H   Recent Labs     08/28/20  0519 08/29/20  0449 08/30/20  0558   HGB 7 5* 7 9* 8 9*   /  Recent Labs     08/28/20  0519 08/29/20  0449 08/30/20  0558   HCT 23 6* 25 5* 28 5*     Sed Rate No results for input(s): SEDRATE in the last 72 hours  CRP No results for input(s): CRP in the last 72 hours  Assessment:   S/p right hip TFN    Plan:   Pain control  OOB with PT  WBAT right leg with assistance  DVT prophylaxis: Heparin and mechanical  ABLA: 8 9 this AM  Stable  Continue to monitor vitals and H/H    D/C planning

## 2020-08-30 NOTE — ASSESSMENT & PLAN NOTE
Wt Readings from Last 3 Encounters:   08/30/20 77 3 kg (170 lb 6 7 oz)   02/17/20 71 8 kg (158 lb 3 2 oz)   10/15/19 69 1 kg (152 lb 6 4 oz)     · Spoke with Cardiology  · Given a dose of IV Lasix with blood transfusion on 8/28   · Continue close monitoring of volume status and respiratory status  · Echocardiogram 04/2019:  Ejection fraction 25%, moderate to severe mitral regurgitation, mild TR, mild VA   · Repeat echocardiogram 08/27/2020:  Ejection fraction 15%, grade 1 diastolic dysfunction  · Chest x-ray no acute cardiopulmonary disease  · Patient is maintained on Lasix 20 mg daily which was resumed

## 2020-08-31 NOTE — PROGRESS NOTES
Orthopaedic Surgery - Progress Note  Darrin Goodpasture (70 y o  female)   : 1927   MRN: [id number]  Date: 2020   Encounter: [id number]   Unit/Bed#: E2 -01    Assessment / Plan  Pod 4 status post right hip ORIF    · Continue with PT/OT as ordered  Patient is weight-bearing as tolerated on the right leg  · Hemoglobin has remained stable and there is no sign of active bleeding in the operative leg  · Continue with dry dressing changes as needed  · Okay to discharge to rehab from ortho standpoint plan outpatient follow-up with Dr Gerber German 2-3 weeks postoperatively  Subjective  80year-old female Pod 4 status post right hip ORIF  Patient doing well at this time her pain is controlled  She feels that she is able to move around better each day  She denies any distal numbness or tingling at this time  Vitals  Temp:  [97 1 °F (36 2 °C)-97 8 °F (36 6 °C)] 97 8 °F (36 6 °C)  HR:  [] 99  Resp:  [18-20] 20  BP: (116-125)/(59-69) 125/64  Body mass index is 27 72 kg/m²  I/O last 24 hours: In: -   Out: 1450 [Urine:1450]    Right Hip Exam  Alignment / Posture:  Normal resting hip posture  Inspection:  Mild to moderate right hip swelling with mild right thigh and lower leg ecchymosis  Incisions are healing well with no active drainage at this time  Palpation:  Mild tenderness at The michelle-incisional areas as expected  ROM:  Not tested  Strength:  5/5 AT, GSC, PT, and peroneals  Stability:  Not tested  Tests:  No pertinent positive or negative tests  Neurovascular:  Sensation intact in DP/SP/[place]/Sa/T nerve distributions  Sensation intact in all digital nerve distributions  Toes warm and perfused  Gait:  Not tested      Lab Results  (I have personally reviewed pertinent lab results )  Results from last 7 days   Lab Units 20  0558 20  0449 20  0519 20  0500 20  0948   WBC Thousand/uL 8 42 9 19 8 92 8 10 5 61   HEMOGLOBIN g/dL 8 9* 7 9* 7 5* 8 4* 11 0*   HEMATOCRIT % 28 5* 25 5* 23 6* 28 1* 36 0   PLATELETS Thousands/uL 156 129* 127* 192 216     Results from last 7 days   Lab Units 08/27/20  0748   PTT seconds 37   INR  1 22*     Results from last 7 days   Lab Units 08/29/20  0449 08/28/20  0519 08/27/20  0500 08/26/20  0948   POTASSIUM mmol/L 3 7 4 0 4 6 4 4   CHLORIDE mmol/L 105 105 105 106   CO2 mmol/L 26 26 26 28   BUN mg/dL 30* 25 33* 31*   CREATININE mg/dL 1 30 1 40* 1 36* 1 36*   EGFR ml/min/1 73sq m 36 33 34 34   CALCIUM mg/dL 7 4* 7 3* 8 2* 9 3   ALK PHOS U/L  --   --   --  63   ALT U/L  --   --   --  34   AST U/L  --   --   --  Madhavi 3554, DANIEL

## 2020-08-31 NOTE — PLAN OF CARE
Problem: OCCUPATIONAL THERAPY ADULT  Goal: Performs self-care activities at highest level of function for planned discharge setting  See evaluation for individualized goals  Description: Treatment Interventions: ADL retraining, Functional transfer training, UE strengthening/ROM, Endurance training, Patient/family training, Equipment evaluation/education, Compensatory technique education, Activityengagement          See flowsheet documentation for full assessment, interventions and recommendations  Outcome: Progressing  Note: Limitation: Decreased ADL status, Decreased UE strength, Decreased Safe judgement during ADL, Decreased cognition, Decreased endurance, Decreased self-care trans, Decreased high-level ADLs  Prognosis: Fair  Assessment: Arrived to patient seated in bedside chair with all needs in reach agreeable to OT session  Continues to be WBAT of RLE  Patient completed functional transfers with varying levels this date d/t fatigue and decreased endurance  Pt fatigues with easily challenging tasks and requires cues to correct for safety and sequencing of tasks  Patient with 1 LOB posteriorly in bathroom and mod A to correct  Patient declining dizziness and lightheadedness throughout whole session  Increased time required to complete toileting however no need to void this date  Grooming attempted in standing however unable to complete d/t fatigue, therefore grooming seated tasks completed s/p setup  Patient with questionable cognitive deficits versus hard of hearing - will continue to assess as able  Patient requesting to return to bed at end of session with bed alarm on, required max A x2 for sit to supine and resting comfortably in bed  Patient with pale look on face, BP taken and 139/89, HR 97, O2 98% on room air, reports no symptoms  RN and PTA Rigo Domínguez aware   Pt would benefit from continued skilled OT while in the hospital to achieve POC goals and ensure safe d/c       OT Discharge Recommendation: Post-Acute Rehabilitation Services  OT - OK to Discharge: Yes(to rehab when medically cleared)

## 2020-08-31 NOTE — UTILIZATION REVIEW
Continued Stay Review    Date:   8/31/20                          Current Patient Class:   Inpatient  Current Level of Care:    med surg    HPI:89 y o  female initially admitted on  8/26 with right hip fracture   Surgery Date: 08/27/2020  Procedure: INSERTION NAIL IM FEMUR ANTEGRADE (TROCHANTERIC) (Right Leg Upper)    Assessment/Plan:   8/31  POD # 4    Continue  PT/OT, pain control  Continue to monitor labs  Will need  STR  On d/c  Follow  Hemoglobin, transfused intra op and  1 U PRBC  On  8/28         Pertinent Labs/Diagnostic Results:   Results from last 7 days   Lab Units 08/31/20  1132   SARS-COV-2  Negative     Results from last 7 days   Lab Units 08/30/20  0558 08/29/20  0449 08/28/20  0519 08/27/20  0500 08/26/20  0948   WBC Thousand/uL 8 42 9 19 8 92 8 10 5 61   HEMOGLOBIN g/dL 8 9* 7 9* 7 5* 8 4* 11 0*   HEMATOCRIT % 28 5* 25 5* 23 6* 28 1* 36 0   PLATELETS Thousands/uL 156 129* 127* 192 216   NEUTROS ABS Thousands/µL  --   --  6 71 6 09 2 94         Results from last 7 days   Lab Units 08/29/20  0449 08/28/20  0519 08/27/20  0500 08/26/20  0948   SODIUM mmol/L 138 139 142 143   POTASSIUM mmol/L 3 7 4 0 4 6 4 4   CHLORIDE mmol/L 105 105 105 106   CO2 mmol/L 26 26 26 28   ANION GAP mmol/L 7 8 11 9   BUN mg/dL 30* 25 33* 31*   CREATININE mg/dL 1 30 1 40* 1 36* 1 36*   EGFR ml/min/1 73sq m 36 33 34 34   CALCIUM mg/dL 7 4* 7 3* 8 2* 9 3   MAGNESIUM mg/dL 2 1  --  2 3  --            Results from last 7 days   Lab Units 08/29/20  0449 08/28/20  0519 08/27/20  0500 08/26/20  0948   GLUCOSE RANDOM mg/dL 91 131 85 108               Vital Signs:   98 1 °F (36 7 °C)   80   20   114/60   98 %   None (Room air)   Lying      08/31/20 0810   97 8 °F (36 6 °C)   99   20   125/64   98 %   None (Room air)   Lying          Medications:   Scheduled Medications:  acetaminophen, 650 mg, Oral, Q6H EDEN  docusate sodium, 100 mg, Oral, BID  furosemide, 20 mg, Oral, Daily  heparin (porcine), 5,000 Units, Subcutaneous, Q8H [address]  metoprolol succinate, 12 5 mg, Oral, Daily  pantoprazole, 40 mg, Oral, Daily  polyethylene glycol, 17 g, Oral, Daily  senna, 1 tablet, Oral, HS      Continuous IV Infusions:     PRN Meds:  calcium carbonate, 1,000 mg, Oral, Daily PRN  HYDROmorphone, 0 2 mg, Intravenous, Q4H PRN  magnesium hydroxide, 30 mL, Oral, Daily PRN  ondansetron, 4 mg, Intravenous, Q6H PRN  oxyCODONE, 2 5 mg, Oral, Q4H PRN  oxyCODONE, 5 mg, Oral, Q4H PRN        Discharge Plan:    D    Network Utilization Review Department  [email or web address]  ATTENTION: Please call with any questions or concerns to [phone] and carefully listen to the prompts so that you are directed to the right person  All voicemails are confidential   Cesia Vargas all requests for admission clinical reviews, approved or denied determinations and any other requests to dedicated fax number below belonging to the campus where the patient is receiving treatment   List of dedicated fax numbers for the Facilities:  [address] [place] [address] DENIALS (Administrative/Medical Necessity) [phone]   [address] [place] [address] (Maternity/NICU/Pediatrics) [phone]   Cond[facility] Sas [phone]   Jamas Piggs [phone]   Komal Dress [phone]   [address]  [phone]   [address] [facility] [address] [place] [address] [phone]   [facility]  [phone]   [address] [facility]  [address] [facility] [address] [facility] [phone]

## 2020-08-31 NOTE — PHYSICAL THERAPY NOTE
Physical Therapy Treatment Note       08/31/20 1045   Pain Assessment   Pain Assessment Tool Pain Assessment not indicated - pt denies pain   Pain Score 4   Pain Location/Orientation Orientation: Right;Location: Groin   Hospital Pain Intervention(s) Repositioned;Cold applied; Ambulation/increased activity; Emotional support; Rest   Restrictions/Precautions   RLE Weight Bearing Per Order WBAT   Other Precautions Pain; Fall Risk;WBS;Hard of hearing;Bed Alarm; Chair Alarm   General   Chart Reviewed Yes   Family/Caregiver Present No   Subjective   Subjective " I had the best night's sleep last night since i've been here "     Bed Mobility   Sit to Supine 2  Maximal assistance   Additional items Assist x 2; Increased time required;Verbal cues;LE management   Additional Comments mod assist x2 for scooting toward center of bed and repositioning  Transfers   Sit to Stand 3  Moderate assistance  (from recliner)   Additional items Increased time required;Verbal cues;Armrests;Assist x 2   Stand to Sit 4  Minimal assistance   Additional items Assist x 2; Increased time required;Verbal cues   Toilet transfer 4  Minimal assistance   Additional items Assist x 1; Increased time required;Verbal cues; Commode;Standard toilet   Ambulation/Elevation   Gait pattern Decreased foot clearance; Forward Flexion; Improper Weight shift; Antalgic;Poor UE support;Decreased R stance; Short stride; Step to;Excessively slow; Inconsistent juan david   Gait Assistance 4  Minimal assist   Additional items Assist x 2;Assist x 1;Verbal cues  (min x1-2 for ftigue& safety  LOB x1 requirng mod to correct)   Assistive Device Rolling walker   Distance 15' x2   Balance   Static Sitting Fair +   Dynamic Sitting Fair   Static Standing Poor +   Dynamic Standing Poor +   Ambulatory Poor +   Endurance Deficit   Endurance Deficit Yes   Endurance Deficit Description fatigue, pain   Activity Tolerance   Activity Tolerance Patient limited by pain; Patient limited by fatigue Medical Staff Made Aware Ashok Armstrong OTR   Nurse Made Aware Elmer Muñoz RN   Exercises   Quad Sets Supine;15 reps;AROM; Bilateral   Heelslides Supine;AAROM;AROM; Right  (x 12 reps   )   Hip Flexion Sitting;AAROM; Right  (x 12 reps   )   Hip Abduction Supine;AAROM; Right  (x 12 reps   )   Hip Adduction Supine;AAROM; Right  (x12 reps   )   Knee AROM Short Arc Quad Supine;AROM; Right  (x 12 reps   )   Knee AROM Long Arc Quad Sitting;AROM; Right  (x 12 reps   )   Ankle Pumps Sitting;15 reps;AROM; Bilateral   Assessment   Prognosis Good   Problem List Decreased strength;Decreased range of motion;Decreased endurance; Impaired balance;Decreased mobility; Decreased safety awareness; Obesity; Decreased skin integrity;Pain   Assessment Pt seen for PT interventions as per PT POC  Pt performs seated and supine b/l le a-aarom x 12-15 reps to tolerance  Pt  Requiring increased assistance for transfers this session  Pt progressed with ambulation 15' x2 with min assist x1-2 depending on safety and fatigue  Pt demonstrated (+) posterior LOB x1 requiring mod assist x1 to correct  Pt performs toilet transfers with min assist x1 with verbal cues for handplacement and techniques  Max cues for turning and backing up to toilet and bed  Pt  Requires assist for management of rw when turning and backing up  Pt  Demonstrates gait deviations of forward flexed posture, decreased stride, step to antalgic, gait pattern and poor ue support  Pt requires verbal cues  for proper le and walker sequencing  Max assist x2 for transfers back to bed  Pt required frequent rest breaks t/o PT session due to fatigue  Pt's Bp upon  return to bed was 139/89, HR 97 BPM, and spo2 98% on RA  Goals   Patient Goals " to get better "   STG Expiration Date 09/11/20   PT Treatment Day 2   Plan   Treatment/Interventions Functional transfer training;LE strengthening/ROM; Therapeutic exercise; Endurance training;Patient/family training;Equipment eval/education; Bed mobility;Gait training;Spoke to nursing;OT   Progress Progressing toward goals   PT Frequency   (5-7x/week)   Recommendation   PT Discharge Recommendation Post-Acute Rehabilitation Services   PT - OK to Discharge Yes  (STR)        08/31/20 1045   Pain Assessment   Pain Assessment Tool Pain Assessment not indicated - pt denies pain   Pain Score 4   Pain Location/Orientation Orientation: Right;Location: [facility] Pain Intervention(s) Repositioned;Cold applied; Ambulation/increased activity; Emotional support; Rest   Restrictions/Precautions   RLE Weight Bearing Per Order WBAT   Other Precautions Pain; Fall Risk;WBS;Hard of hearing;Bed Alarm; Chair Alarm   General   Chart Reviewed Yes   Family/Caregiver Present No   Subjective   Subjective " I had the best night's sleep last night since i've been here "     Bed Mobility   Sit to Supine 2  Maximal assistance   Additional items Assist x 2; Increased time required;Verbal cues;LE management   Additional Comments mod assist x2 for scooting toward center of bed and repositioning  Transfers   Sit to Stand 3  Moderate assistance  (from recliner)   Additional items Increased time required;Verbal cues;Armrests;Assist x 2   Stand to Sit 4  Minimal assistance   Additional items Assist x 2; Increased time required;Verbal cues   Toilet transfer 4  Minimal assistance   Additional items Assist x 1; Increased time required;Verbal cues; Commode;Standard toilet   Ambulation/Elevation   Gait pattern Decreased foot clearance; Forward Flexion; Improper Weight shift; Antalgic;Poor UE support;Decreased R stance; Short stride; Step to;Excessively slow; Inconsistent juan david   Gait Assistance 4  Minimal assist   Additional items Assist x 2;Assist x 1;Verbal cues  (min x1-2 for ftigue& safety    LOB x1 requirng mod to correct)   Assistive Device Rolling walker   Distance 15' x2   Balance   Static Sitting Fair +   Dynamic Sitting Fair   Static Standing Poor +   Dynamic Standing Poor +   Ambulatory Poor +   Endurance Deficit   Endurance Deficit Yes   Endurance Deficit Description fatigue, pain   Activity Tolerance   Activity Tolerance Patient limited by pain; Patient limited by fatigue   Medical Staff 1 Jessica Yoder RN   Exercises   Quad Sets Supine;15 reps;AROM; Bilateral   Heelslides Supine;AAROM;AROM; Right  (x 12 reps   )   Hip Flexion Sitting;AAROM; Right  (x 12 reps   )   Hip Abduction Supine;AAROM; Right  (x 12 reps   )   Hip Adduction Supine;AAROM; Right  (x12 reps   )   Knee AROM Short Arc Quad Supine;AROM; Right  (x 12 reps   )   Knee AROM Long Arc Quad Sitting;AROM; Right  (x 12 reps   )   Ankle Pumps Sitting;15 reps;AROM; Bilateral   Assessment   Prognosis Good   Problem List Decreased strength;Decreased range of motion;Decreased endurance; Impaired balance;Decreased mobility; Decreased safety awareness; Obesity; Decreased skin integrity;Pain   Assessment Pt seen for PT interventions as per PT POC  Pt performs seated and supine b/l le a-aarom x 12-15 reps to tolerance  Pt  Requiring increased assistance for transfers this session  Pt progressed with ambulation 15' x2 with min assist x1-2 depending on safety and fatigue  Pt demonstrated (+) posterior LOB x1 requiring mod assist x1 to correct  Pt performs toilet transfers with min assist x1 with verbal cues for handplacement and techniques  Max cues for turning and backing up to toilet and bed  Pt  Requires assist for management of rw when turning and backing up  Pt  Demonstrates gait deviations of forward flexed posture, decreased stride, step to antalgic, gait pattern and poor ue support  Pt requires verbal cues  for proper le and walker sequencing  Max assist x2 for transfers back to bed  Pt required frequent rest breaks t/o PT session due to fatigue  Pt's Bp upon  return to bed was 139/89, HR 97 BPM, and spo2 98% on RA       Goals   Patient Goals " to get better "   STG Expiration Date 09/11/20   PT Treatment Day 2   Plan Treatment/Interventions Functional transfer training;LE strengthening/ROM; Therapeutic exercise; Endurance training;Patient/family training;Equipment eval/education; Bed mobility;Gait training;Spoke to nursing;OT   Progress Progressing toward goals   PT Frequency   (5-7x/week)   Recommendation   PT Discharge Recommendation Post-Acute Rehabilitation Services   PT - OK to Discharge Yes  (STR)       Abbey Enciso PTA

## 2020-08-31 NOTE — PLAN OF CARE
Problem: PHYSICAL THERAPY ADULT  Goal: Performs mobility at highest level of function for planned discharge setting  See evaluation for individualized goals  Description: Treatment/Interventions: Functional transfer training, LE strengthening/ROM, Therapeutic exercise, Endurance training, Patient/family training, Equipment eval/education, Bed mobility, Gait training, Compensatory technique education, Continued evaluation, Spoke to nursing, OT  Equipment Recommended: Penelope Lezama       See flowsheet documentation for full assessment, interventions and recommendations  Outcome: Progressing  Note: Prognosis: Good  Problem List: Decreased strength, Decreased range of motion, Decreased endurance, Impaired balance, Decreased mobility, Decreased safety awareness, Obesity, Decreased skin integrity, Pain  Assessment: Pt seen for PT interventions as per PT POC  Pt performs seated and supine b/l le a-aarom x 12-15 reps to tolerance  Pt  Requiring increased assistance for transfers this session  Pt progressed with ambulation 15' x2 with min assist x1-2 depending on safety and fatigue  Pt demonstrated (+) posterior LOB x1 requiring mod assist x1 to correct  Pt performs toilet transfers with min assist x1 with verbal cues for handplacement and techniques  Max cues for turning and backing up to toilet and bed  Pt  Requires assist for management of rw when turning and backing up  Pt  Demonstrates gait deviations of forward flexed posture, decreased stride, step to antalgic, gait pattern and poor ue support  Pt requires verbal cues  for proper le and walker sequencing  Max assist x2 for transfers back to bed  Pt required frequent rest breaks t/o PT session due to fatigue  Pt's Bp upon  return to bed was 139/89, HR 97 BPM, and spo2 98% on RA      Barriers to Discharge: Inaccessible home environment, Decreased caregiver support     PT Discharge Recommendation: [address]     PT - OK to Discharge: Yes    See flowsheet documentation for full assessment

## 2020-08-31 NOTE — CASE MANAGEMENT
The patient is listed as covid rule-out so this case management assistant (Sapphire Andrea) called the patient in order to review her medicare rights with her over the phone  This CMA reviewed with the patient that the emergency contact information on the patient's facesheet was correct, because the patient wanted us to make sure we had her son and daughter's phone numbers on file  The information was verified and the patient gave this CMA verbal consent to the understanding of her medicare rights

## 2020-08-31 NOTE — PROGRESS NOTES
Progress Note - Trang Bains 12/8/1927, 80 y o  female MRN: [id number]  Unit/Bed#: E2 -01 Encounter: [id number]  Primary Care Provider: Bailee Thomas MD   Date and time admitted to hospital: 8/26/2020  9:06 AM    * Right femoral fracture [facility])  Assessment & Plan  · X-ray right femur:  Acute displaced right femoral intertrochanteric fracture  · Consult orthopedics appreciated  · Cardiology consulted preoperatively, appreciate recommendations  · With acute blood loss anemia postoperatively   · Status post intramedullary fixation of right intertrochanteric hip fracture 8/27/20 by Dr Meaghan Hernandez   · PT/OT/PMR, weight-bearing as tolerated right lower extremity  · Will be here over the weekend as patient is an insurance authorization  · Patient is currently on heparin for DVT prophylaxis due to renal function   · Geriatric pain protocol  · Patient will need to follow-up with Dr Meaghan Hernandez in 2-3 weeks postoperatively  · Encouraged IS use which was performed at bedside with me today and she demonstrated accurate use    · Discharge to [facility] today pending insurance auth     Acute blood loss anemia  Assessment & Plan  · Hemoglobin 7 5 postoperatively   · Received PRBC in operating room and addition 1 unit PRBC on 8/28  · hgb 8 9    · Check CBC tomorrow if in house or at rehab   · Continue close monitoring of hgb and transfuse for hgb < 7 5     Lab Results   Component Value Date    HGB 8 9 (L) 08/30/2020    HGB 7 9 (L) 08/29/2020    HGB 7 5 (L) 08/28/2020    HGB 8 4 (L) 08/27/2020         Fall  Assessment & Plan  · needs STR      Esophagitis, reflux  Assessment & Plan  · Continue PPI     Elevated troponin  Assessment & Plan  · Non MI troponin elevation  · Seen in consultation with Cardiology  · No chest pain    Chronic systolic congestive heart failure [facility])  Assessment & Plan  Wt Readings from Last 3 Encounters:   08/30/20 77 3 kg (170 lb 6 7 oz)   02/17/20 71 8 kg (158 lb 3 2 oz)   10/15/19 69 1 kg (152 lb 6 4 oz)     · Spoke with Cardiology  · Given a dose of IV Lasix with blood transfusion on 8/28   · Continue close monitoring of volume status and respiratory status  · Echocardiogram 04/2019:  Ejection fraction 25%, moderate to severe mitral regurgitation, mild TR, mild LA   · Repeat echocardiogram 08/27/2020:  Ejection fraction 27%, grade 1 diastolic dysfunction  · Chest x-ray no acute cardiopulmonary disease  · Patient is maintained on Lasix 20 mg daily which was resumed       Cardiomyopathy [facility])  Assessment & Plan  · Severe cardiomyopathy with EF in 2018 at 25%   · Seen by cardiology here   · Continue po lasix       CKD (chronic kidney disease), stage III ([facility] [address] )  Assessment & Plan  · Cr 1 36 on admission  · Recent Cr closer to 1 11   · Holding lisinopril   ·  resume lasix as BP allows   · Avoid hypotension   · Creatinine overall stable since admission Cr 1 30   · S/p huang removal on 8/29 without difficulty or retention         Mitral regurgitation  Assessment & Plan  · Moderate to severe MR on echo 2019   · No evidence of regurgitation on repeat echocardiogram    Benign essential HTN  Assessment & Plan  · Maintained on lasix, lisinopril and toprol XL   · Blood pressure 125/64   · Continue to hold lisinopril   · Resume oral lasix as BP allows   · Continue Toprol XL 12 5 mg daily with hold parameters  · Close monitoring of blood pressure       Anemia  Assessment & Plan  · Acute blood loss anemia as above   · Received blood in OR and additional 1 unit PRBC 8/28   · Continue to monitor   Lab Results   Component Value Date    HGB 8 9 (L) 08/30/2020    HGB 7 9 (L) 08/29/2020    HGB 7 5 (L) 08/28/2020    HGB 8 4 (L) 08/27/2020           VTE Pharmacologic Prophylaxis:   Pharmacologic: Heparin  Mechanical VTE Prophylaxis in Place: Yes    Patient Centered Rounds: I have performed bedside rounds with nursing staff today      Discussions with Specialists or Other Care Team Provider: CM, Orthopedics     Education and Discussions with Family / Patient: spoke with patient at the bedside     Time Spent for Care: 20 minutes  More than 50% of total time spent on counseling and coordination of care as described above  Current Length of Stay: 5 day(s)    Current Patient Status: Inpatient   Certification Statement: The patient will continue to require additional inpatient hospital stay due to right hip fracture     Discharge Plan: hopefully later today pendign rehab placement and insurance auth     Code Status: Level 3 - DNAR and DNI      Subjective:   Doing well  No events overnight  Pain is controlled in right hip  No other pain  Had BM last evening  No urinary complaints  No chest pain or SOB  Objective:     Vitals:   Temp (24hrs), Av 4 °F (36 3 °C), Min:97 1 °F (36 2 °C), Max:97 8 °F (36 6 °C)    Temp:  [97 1 °F (36 2 °C)-97 8 °F (36 6 °C)] 97 8 °F (36 6 °C)  HR:  [] 99  Resp:  [18-20] 20  BP: (116-125)/(59-69) 125/64  SpO2:  [96 %-98 %] 98 %  Body mass index is 27 72 kg/m²  Input and Output Summary (last 24 hours): Intake/Output Summary (Last 24 hours) at 2020 0936  Last data filed at 2020 1075  Gross per 24 hour   Intake    Output 1200 ml   Net -1200 ml       Physical Exam:     Physical Exam  Vitals signs and nursing note reviewed  Constitutional:       General: She is not in acute distress  Appearance: She is not ill-appearing or toxic-appearing  HENT:      Head: Normocephalic  Eyes:      Conjunctiva/sclera: Conjunctivae normal    Cardiovascular:      Rate and Rhythm: Normal rate and regular rhythm  Pulmonary:      Effort: Pulmonary effort is normal  No respiratory distress  Breath sounds: Normal breath sounds  No wheezing or rales  Abdominal:      General: Bowel sounds are normal       Palpations: Abdomen is soft  Tenderness: There is no abdominal tenderness  Musculoskeletal:         General: Deformity (dressing on right lateral hip) present  Right lower leg: Edema present     Skin: General: Skin is warm  Neurological:      General: No focal deficit present  Mental Status: She is alert  Psychiatric:         Mood and Affect: Mood normal            Additional Data:     Labs:    Results from last 7 days   Lab Units 08/30/20  0558  08/28/20  0519   WBC Thousand/uL 8 42   < > 8 92   HEMOGLOBIN g/dL 8 9*   < > 7 5*   HEMATOCRIT % 28 5*   < > 23 6*   PLATELETS Thousands/uL 156   < > 127*   NEUTROS PCT %  --   --  76*   LYMPHS PCT %  --   --  12*   MONOS PCT %  --   --  12   EOS PCT %  --   --  0    < > = values in this interval not displayed  Results from last 7 days   Lab Units 08/29/20  0449  08/26/20  0948   SODIUM mmol/L 138   < > 143   POTASSIUM mmol/L 3 7   < > 4 4   CHLORIDE mmol/L 105   < > 106   CO2 mmol/L 26   < > 28   BUN mg/dL 30*   < > 31*   CREATININE mg/dL 1 30   < > 1 36*   ANION GAP mmol/L 7   < > 9   CALCIUM mg/dL 7 4*   < > 9 3   ALBUMIN g/dL  --   --  3 6   TOTAL BILIRUBIN mg/dL  --   --  0 52   ALK PHOS U/L  --   --  63   ALT U/L  --   --  34   AST U/L  --   --  31   GLUCOSE RANDOM mg/dL 91   < > 108    < > = values in this interval not displayed  Results from last 7 days   Lab Units 08/27/20  0748   INR  1 22*                       * I Have Reviewed All Lab Data Listed Above    * Additional Pertinent Lab Tests Reviewed: No New Labs Available For Today    Imaging:    Imaging Reports Reviewed Today Include:   Imaging Personally Reviewed by Myself Includes:      Recent Cultures (last 7 days):           Last 24 Hours Medication List:   Current Facility-Administered Medications   Medication Dose Route Frequency Provider Last Rate    acetaminophen  650 mg Oral Q6H [address] Levi Barnett PA-C      calcium carbonate  1,000 mg Oral Daily PRN Levi Barnett PA-C      docusate sodium  100 mg Oral BID Levi Barnett PA-C      furosemide  20 mg Oral Daily Sujatha Nuñez PA-C      heparin (porcine)  5,000 Units Subcutaneous Q8H [address] Sujatha Nuñez PA-C      HYDROmorphone  0 2 mg Intravenous Q4H PRN Pretty FernandoDANIEL vidales      magnesium hydroxide  30 mL Oral Daily PRN Trevor Yadav PA-C      metoprolol succinate  12 5 mg Oral Daily Pretty FernandoDANIEL vidales      ondansetron  4 mg Intravenous Q6H PRN Pretty FernandoDANIEL vidales      oxyCODONE  2 5 mg Oral Q4H PRN Pretty FernandoDANIEL      oxyCODONE  5 mg Oral Q4H PRN Pretty FernandoDANIEL      pantoprazole  40 mg Oral Daily Pretty FernandoDANIEL vidales      polyethylene glycol  17 g Oral Daily Trevor Yadav PA-C      senna  1 tablet Oral HS Trevor Yadav PA-C          Today, Patient Was Seen By: Trevor Yadav PA-C    ** Please Note: Dictation voice to text software may have been used in the creation of this document   **

## 2020-08-31 NOTE — PLAN OF CARE
Problem: Potential for Falls  Goal: Patient will remain free of falls  Description: INTERVENTIONS:  - Assess patient frequently for physical needs  -  Identify cognitive and physical deficits and behaviors that affect risk of falls    -  Porter fall precautions as indicated by assessment   - Educate patient/family on patient safety including physical limitations  - Instruct patient to call for assistance with activity based on assessment  - Modify environment to reduce risk of injury  - Consider OT/PT consult to assist with strengthening/mobility  Outcome: Progressing     Problem: Prexisting or High Potential for Compromised Skin Integrity  Goal: Skin integrity is maintained or improved  Description: INTERVENTIONS:  - Identify patients at risk for skin breakdown  - Assess and monitor skin integrity  - Assess and monitor nutrition and hydration status  - Monitor labs   - Assess for incontinence   - Turn and reposition patient  - Assist with mobility/ambulation  - Relieve pressure over bony prominences  - Avoid friction and shearing  - Provide appropriate hygiene as needed including keeping skin clean and dry  - Evaluate need for skin moisturizer/barrier cream  - Collaborate with interdisciplinary team   - Patient/family teaching  - Consider wound care consult   Outcome: Progressing     Problem: PAIN - ADULT  Goal: Verbalizes/displays adequate comfort level or baseline comfort level  Description: Interventions:  - Encourage patient to monitor pain and request assistance  - Assess pain using appropriate pain scale  - Administer analgesics based on type and severity of pain and evaluate response  - Implement non-pharmacological measures as appropriate and evaluate response  - Consider cultural and social influences on pain and pain management  - Notify physician/advanced practitioner if interventions unsuccessful or patient reports new pain  Outcome: Progressing     Problem: DISCHARGE PLANNING  Goal: Discharge to home or other facility with appropriate resources  Description: INTERVENTIONS:  - Identify barriers to discharge w/patient and caregiver  - Arrange for needed discharge resources and transportation as appropriate  - Identify discharge learning needs (meds, wound care, etc )  - Refer to Case Management Department for coordinating discharge planning if the patient needs post-hospital services based on physician/advanced practitioner order or complex needs related to functional status, cognitive ability, or social support system  Outcome: Progressing     Problem: Knowledge Deficit  Goal: Patient/family/caregiver demonstrates understanding of disease process, treatment plan, medications, and discharge instructions  Description: Complete learning assessment and assess knowledge base    Interventions:  - Provide teaching at level of understanding  - Provide teaching via preferred learning methods  Outcome: Progressing     Problem: METABOLIC, FLUID AND ELECTROLYTES - ADULT  Goal: Electrolytes maintained within normal limits  Description: INTERVENTIONS:  - Monitor labs and assess patient for signs and symptoms of electrolyte imbalances  - Administer electrolyte replacement as ordered  - Monitor response to electrolyte replacements, including repeat lab results as appropriate  - Instruct patient on fluid and nutrition as appropriate  Outcome: Progressing  Goal: Fluid balance maintained  Description: INTERVENTIONS:  - Monitor labs   - Monitor I/O and WT  - Instruct patient on fluid and nutrition as appropriate  - Assess for signs & symptoms of volume excess or deficit  Outcome: Progressing     Problem: HEMATOLOGIC - ADULT  Goal: Maintains hematologic stability  Description: INTERVENTIONS  - Monitor labs  - Administer supportive blood products/factors as ordered and appropriate  Outcome: Progressing     Problem: MUSCULOSKELETAL - ADULT  Goal: Maintain or return mobility to safest level of function  Description: INTERVENTIONS:  - Assess patient's ability to carry out ADLs; assess patient's baseline for ADL function and identify physical deficits which impact ability to perform ADLs (bathing, care of mouth/teeth, toileting, grooming, dressing, etc )  - Assess/evaluate cause of self-care deficits   - Assess range of motion  - Assess patient's mobility  - Assess patient's need for assistive devices and provide as appropriate  - Encourage maximum independence but intervene and supervise when necessary  - Involve family in performance of ADLs  - Assess for home care needs following discharge   - Consider OT consult to assist with ADL evaluation and planning for discharge  - Provide patient education as appropriate  Outcome: Progressing  Goal: Maintain proper alignment of affected body part  Description: INTERVENTIONS:  - Support, maintain and protect limb and body alignment  - Provide patient/ family with appropriate education  Outcome: Progressing     Problem: COPING  Goal: Pt/Family able to verbalize concerns and demonstrate effective coping strategies  Description: INTERVENTIONS:  - Assist patient/family to identify coping skills, available support systems and cultural and spiritual values  - Provide emotional support, including active listening and acknowledgement of concerns of patient and caregivers  - Reduce environmental stimuli, as able  - Provide patient education  - Assess for spiritual pain/suffering and initiate spiritual care, including notification of Pastoral Care or nikki based community as needed  - Assess effectiveness of coping strategies  Outcome: Progressing     Problem: DECISION MAKING  Goal: Pt/Family able to effectively weigh alternatives and participate in decision making related to treatment and care  Description: INTERVENTIONS:  - Identify decision maker  - Determine when there are differences among patient's view, family's view, and healthcare provider's view of patient condition and care goals  - Facilitate patient/family articulation of goals for care  - Help patient/family identify pros/cons of alternative solutions  - Provide information as requested by patient/family  - Respect patient/family rights related to privacy and sharing information   - Serve as a liaison between patient, family and health care team  - Initiate consults as appropriate (Ethics Team, Palliative Care, Family Care Conference, etc )  Outcome: Progressing     Problem: GENITOURINARY - ADULT  Goal: Maintains or returns to baseline urinary function  Description: INTERVENTIONS:  - Assess urinary function  - Encourage oral fluids to ensure adequate hydration if ordered  - Administer IV fluids as ordered to ensure adequate hydration  - Administer ordered medications as needed  - Offer frequent toileting  - Follow urinary retention protocol if ordered  Outcome: Progressing  Goal: Absence of urinary retention  Description: INTERVENTIONS:  - Assess patient's ability to void and empty bladder  - Monitor I/O  - Bladder scan as needed  - Discuss with physician/AP medications to alleviate retention as needed  -Encourage pt to get out of bed and use commode to void    Outcome: Progressing

## 2020-08-31 NOTE — OCCUPATIONAL THERAPY NOTE
[address] Treatment Note     Patient Name: Gwendolyn Shah  [id number] Date: 8/31/2020  Problem List  Principal Problem:    Right femoral fracture [facility])  Active Problems:    Esophagitis, reflux    Fall    Elevated troponin    CKD (chronic kidney disease), stage III (HCC)    Cardiomyopathy (HCC)    Mitral regurgitation    Chronic systolic congestive heart failure (HCC)    Benign essential HTN    Anemia    Acute blood loss anemia          08/31/20 1046   Restrictions/Precautions   Weight Bearing Precautions Per Order Yes   RLE Weight Bearing Per Order WBAT   Braces or Orthoses   (none)   Other Precautions Cognitive; Chair Alarm; Bed Alarm;WBS;Fall Risk;Pain;Hard of hearing   Lifestyle   Autonomy At baseline, pt was I w/ ADLs (assist to wash shower/back for showers only) and functional mobility/transfers w/ use of SPC to walk to the [employer] [place] only, required assist w/ IADLs, (-) , and reports 1 fall PTA  Reciprocal Relationships Son, facility staff    Service to Others Retired    Intrinsic Gratification Watching TV    Pain Assessment   Pain Assessment Tool 0-10   Pain Score 4   Pain Location/Orientation Orientation: Right;Location: Groin   Pain Onset/Description Onset: Ongoing;Frequency: Intermittent   Effect of Pain on Daily Activities limits adls and mobility   Patient's Stated Pain Goal No pain   Hospital Pain Intervention(s) Repositioned; Ambulation/increased activity; Emotional support; Rest   ADL   Grooming Assistance 5  Supervision/Setup   Grooming Comments setup seated; attempted to complete standing however unable to complete d/t fatigue   UB Dressing Assistance 4  Minimal Assistance   LB Dressing Assistance 3  Moderate Assistance   Toileting Assistance  3  Moderate Assistance   Functional Standing Tolerance   Time 2 mins   Activity 1 LOB posteriorly w/ ambulation to bathroom using RW, cues for correcting posture (retropulsive at times)   Bed Mobility   Sit to Supine 2  Maximal assistance   Additional items Assist x 2;Bedrails; Increased time required;Verbal cues;LE management   Additional Comments Mod Ax2 for scooting to middle of bed and repositioning  able to attempt and complete short bursts of hip bridging   Transfers   Sit to Stand 3  Moderate assistance   Additional items Assist x 2; Increased time required;Verbal cues;Armrests  (modAx2 from recliner, minAx1 from BSC/toilet)   Stand to Sit 4  Minimal assistance   Additional items Assist x 2; Increased time required;Verbal cues   Toilet transfer 4  Minimal assistance   Additional items Assist x 1; Increased time required;Verbal cues;Standard toilet;Commode  (standard toilet w/ BSC placed overtop (what she has at home))   Additional Comments all transfers completed with RW, cues for safety, hand placement and controlled descent  supine in bed at end of session with bed alarm on and all needs in reach   Functional Mobility   Functional Mobility 4  Minimal assistance   Additional Comments Ax1 progressed to min A x2 with fatigue  ambulated to/from bedside chair and EOB to standard toilet w/ BSC placed overtop  Additional items Rolling walker   Toilet Transfers   Toilet Transfer From Rolling walker   Toilet Transfer Type To and from   Toilet Transfer to Standard toilet  (with BSC placed overtop w/ b/l armrests)   Toilet Transfer Technique Ambulating   Toilet Transfers Minimal assistance  (x1 to min Ax2 with fatigue)   Cognition   Overall Cognitive Status Impaired   Arousal/Participation Cooperative; Alert   Attention Attends with cues to redirect   Orientation Level Oriented X4   Memory Within functional limits   Following Commands Follows one step commands with increased time or repetition   Comments cooperative throughout session  increased time and cues for sequencing noted at times  able to demonstrate proper understanding of call bell     Activity Tolerance   Activity Tolerance Patient limited by fatigue;Treatment limited secondary to medical complications (Comment)  (cognition)   Medical Staff Made Aware PREET Domínguez CM for DC planning, cleared to see per RN   Assessment   Assessment Arrived to patient seated in bedside chair with all needs in reach agreeable to OT session  Continues to be WBAT of RLE  Patient completed functional transfers with varying levels this date d/t fatigue and decreased endurance  Pt fatigues with easily challenging tasks and requires cues to correct for safety and sequencing of tasks  Patient with 1 LOB posteriorly in bathroom and mod A to correct  Patient declining dizziness and lightheadedness throughout whole session  Increased time required to complete toileting however no need to void this date  Grooming attempted in standing however unable to complete d/t fatigue, therefore grooming seated tasks completed s/p setup  Patient with questionable cognitive deficits versus hard of hearing - will continue to assess as able  Patient requesting to return to bed at end of session with bed alarm on, required max A x2 for sit to supine and resting comfortably in bed  Patient with pale look on face, BP taken and 139/89, HR 97, O2 98% on room air, reports no symptoms  RN and PREET Domínguez aware   Pt would benefit from continued skilled OT while in the hospital to achieve POC goals and ensure safe d/c     Plan   Goal Expiration Date 09/11/20   OT Treatment Day 2   OT Frequency 3-5x/wk   Recommendation   OT Discharge Recommendation Post-Acute Rehabilitation Services   OT - OK to Discharge Yes  (to rehab when medically cleared)         Katherine Martinez MS, OTR/L

## 2020-09-01 NOTE — ASSESSMENT & PLAN NOTE
· Hemoglobin 7 5 postoperatively   · Received PRBC in operating room and addition 1 unit PRBC on 8/28  · hgb 8 9    · hgb stable    · Continue close monitoring of hgb and transfuse for hgb < 7 5     Lab Results   Component Value Date    HGB 8 4 (L) 09/01/2020    HGB 8 9 (L) 08/30/2020    HGB 7 9 (L) 08/29/2020    HGB 7 5 (L) 08/28/2020

## 2020-09-01 NOTE — ASSESSMENT & PLAN NOTE
· X-ray right femur:  Acute displaced right femoral intertrochanteric fracture  · Consult orthopedics appreciated  · Cardiology consulted preoperatively, appreciate recommendations  · With acute blood loss anemia postoperatively   · Status post intramedullary fixation of right intertrochanteric hip fracture 8/27/20 by Dr Alexandrea Ramos   · PT/OT/PMR, weight-bearing as tolerated right lower extremity  · Will be here over the weekend as patient is an insurance authorization  · Patient is currently on heparin for DVT prophylaxis due to renal function   · Geriatric pain protocol  · Patient will need to follow-up with Dr Alexandrea Ramos in 2-3 weeks postoperatively  · Encouraged IS use which was performed at bedside with me today and she demonstrated accurate use    · Patient was denies at [facility], patient's family is trying for family appeal   · Follow up with CM once discharge planning arranged

## 2020-09-01 NOTE — PROGRESS NOTES
Progress Note - Izzy Asif 12/8/1927, 80 y o  female MRN: [id number]  Unit/Bed#: E2 -01 Encounter: [id number]  Primary Care Provider: Jean-Claude Cisse MD   Date and time admitted to hospital: 8/26/2020  9:06 AM    * Right femoral fracture [facility])  Assessment & Plan  · X-ray right femur:  Acute displaced right femoral intertrochanteric fracture  · Consult orthopedics appreciated  · Cardiology consulted preoperatively, appreciate recommendations  · With acute blood loss anemia postoperatively   · Status post intramedullary fixation of right intertrochanteric hip fracture 8/27/20 by Dr Rosemarie Soler   · PT/OT/PMR, weight-bearing as tolerated right lower extremity  · Will be here over the weekend as patient is an insurance authorization  · Patient is currently on heparin for DVT prophylaxis due to renal function   · Geriatric pain protocol  · Patient will need to follow-up with Dr Rosemarie Soler in 2-3 weeks postoperatively  · Encouraged IS use which was performed at bedside with me today and she demonstrated accurate use    · Patient was denies at [employer], patient's family is trying for family appeal   · Follow up with CM once discharge planning arranged     Acute blood loss anemia  Assessment & Plan  · Hemoglobin 7 5 postoperatively   · Received PRBC in operating room and addition 1 unit PRBC on 8/28  · hgb 8 9    · hgb stable    · Continue close monitoring of hgb and transfuse for hgb < 7 5     Lab Results   Component Value Date    HGB 8 4 (L) 09/01/2020    HGB 8 9 (L) 08/30/2020    HGB 7 9 (L) 08/29/2020    HGB 7 5 (L) 08/28/2020         Fall  Assessment & Plan  · needs rehab placement as above      Esophagitis, reflux  Assessment & Plan  · Continue PPI     Elevated troponin  Assessment & Plan  · Non MI troponin elevation  · Seen in consultation with Cardiology  · No chest pain    Chronic systolic congestive heart failure ([facility] [place] [address] )  Assessment & Plan  Wt Readings from Last 3 Encounters:   09/01/20 75 1 kg (165 lb 9 1 oz)   02/17/20 71 8 kg (158 lb 3 2 oz)   10/15/19 69 1 kg (152 lb 6 4 oz)     · Spoke with Cardiology  · Given a dose of IV Lasix with blood transfusion on 8/28   · Continue close monitoring of volume status and respiratory status  · Echocardiogram 04/2019:  Ejection fraction 25%, moderate to severe mitral regurgitation, mild TR, mild OK   · Repeat echocardiogram 08/27/2020:  Ejection fraction 27%, grade 1 diastolic dysfunction  · Chest x-ray no acute cardiopulmonary disease  · Patient is maintained on Lasix 20 mg daily which was resumed       Cardiomyopathy [facility])  Assessment & Plan  · Severe cardiomyopathy with EF in 2018 at 25%   · Seen by cardiology here   · Continue po lasix       CKD (chronic kidney disease), stage III ([facility])  Assessment & Plan  · Cr 1 36 on admission  · Recent Cr closer to 1 11   · Holding lisinopril   · Avoid hypotension   · Creatinine overall stable since admission Cr 1 30   · S/p huang removal on 8/29 without difficulty or retention         Mitral regurgitation  Assessment & Plan  · Moderate to severe MR on echo 2019   · No evidence of regurgitation on repeat echocardiogram    Benign essential HTN  Assessment & Plan  · Maintained on lasix, lisinopril and toprol XL   · Blood pressure  Stable    · Continue to hold lisinopril   · Resumed oral lasix as BP allows   · Continue Toprol XL 12 5 mg daily with hold parameters  · Close monitoring of blood pressure       Anemia  Assessment & Plan  · Acute blood loss anemia as above   · Received blood in OR and additional 1 unit PRBC 8/28   · Continue to monitor   Lab Results   Component Value Date    HGB 8 4 (L) 09/01/2020    HGB 8 9 (L) 08/30/2020    HGB 7 9 (L) 08/29/2020    HGB 7 5 (L) 08/28/2020           VTE Pharmacologic Prophylaxis:   Pharmacologic: Heparin  Mechanical VTE Prophylaxis in Place: Yes    Patient Centered Rounds: I have performed bedside rounds with nursing staff today      Discussions with Specialists or Other Care Team Provider: CM    Education and Discussions with Family / Patient: spoke with patient at the bedside, spoke with patient's son on the phone     Time Spent for Care: 20 minutes  More than 50% of total time spent on counseling and coordination of care as described above  Current Length of Stay: 6 day(s)    Current Patient Status: Inpatient   Certification Statement: The patient will continue to require additional inpatient hospital stay due to rigth femoral fracture     Discharge Plan: pendign placement     Code Status: Level 3 - DNAR and DNI      Subjective:   Patient is doing well today  Pain is controlled  No chest pain or shortness of breath  No abdominal pain  No urinary complaints  Did have a bowel movement previously  Objective:     Vitals:   Temp (24hrs), Av 9 °F (36 6 °C), Min:97 7 °F (36 5 °C), Max:98 1 °F (36 7 °C)    Temp:  [97 7 °F (36 5 °C)-98 1 °F (36 7 °C)] 97 7 °F (36 5 °C)  HR:  [] 100  Resp:  [18-20] 18  BP: (114-136)/(60-81) 136/81  SpO2:  [94 %-98 %] 94 %  Body mass index is 26 72 kg/m²  Input and Output Summary (last 24 hours): Intake/Output Summary (Last 24 hours) at 2020 1041  Last data filed at 2020 0501  Gross per 24 hour   Intake    Output 1050 ml   Net -1050 ml       Physical Exam:     Physical Exam  Vitals signs and nursing note reviewed  Constitutional:       General: She is not in acute distress  Appearance: She is not ill-appearing or toxic-appearing  HENT:      Head: Normocephalic  Eyes:      Conjunctiva/sclera: Conjunctivae normal    Cardiovascular:      Rate and Rhythm: Normal rate and regular rhythm  Pulmonary:      Effort: Pulmonary effort is normal  No respiratory distress  Breath sounds: Normal breath sounds  No wheezing or rales  Abdominal:      General: Bowel sounds are normal       Palpations: Abdomen is soft  Tenderness: There is no abdominal tenderness  There is no guarding or rebound  Musculoskeletal:         General: Tenderness present  Right lower leg: Edema present  Left lower leg: Edema (trace) present  Skin:     General: Skin is warm  Neurological:      Mental Status: She is alert and oriented to person, place, and time  Psychiatric:         Mood and Affect: Mood normal          Additional Data:     Labs:    Results from last 7 days   Lab Units 09/01/20  0521 08/30/20  0558  08/28/20  0519   WBC Thousand/uL  --  8 42   < > 8 92   HEMOGLOBIN g/dL 8 4* 8 9*   < > 7 5*   HEMATOCRIT % 26 9* 28 5*   < > 23 6*   PLATELETS Thousands/uL  --  156   < > 127*   NEUTROS PCT %  --   --   --  76*   LYMPHS PCT %  --   --   --  12*   MONOS PCT %  --   --   --  12   EOS PCT %  --   --   --  0    < > = values in this interval not displayed  Results from last 7 days   Lab Units 08/29/20  0449  08/26/20  0948   SODIUM mmol/L 138   < > 143   POTASSIUM mmol/L 3 7   < > 4 4   CHLORIDE mmol/L 105   < > 106   CO2 mmol/L 26   < > 28   BUN mg/dL 30*   < > 31*   CREATININE mg/dL 1 30   < > 1 36*   ANION GAP mmol/L 7   < > 9   CALCIUM mg/dL 7 4*   < > 9 3   ALBUMIN g/dL  --   --  3 6   TOTAL BILIRUBIN mg/dL  --   --  0 52   ALK PHOS U/L  --   --  63   ALT U/L  --   --  34   AST U/L  --   --  31   GLUCOSE RANDOM mg/dL 91   < > 108    < > = values in this interval not displayed  Results from last 7 days   Lab Units 08/27/20  0748   INR  1 22*                       * I Have Reviewed All Lab Data Listed Above  * Additional Pertinent Lab Tests Reviewed:  All Labs Within Last 24 Hours Reviewed    Imaging:    Imaging Reports Reviewed Today Include:   Imaging Personally Reviewed by Myself Includes:      Recent Cultures (last 7 days):           Last 24 Hours Medication List:   Current Facility-Administered Medications   Medication Dose Route Frequency Provider Last Rate    acetaminophen  650 mg Oral Q6H [address] Chris Diss, PATIP      calcium carbonate  1,000 mg Oral Daily PRN Chris Diss PA-ROLAND      docusate sodium  100 mg Oral BID Chris DissDANIEL      furosemide  20 mg Oral Daily Odalis Mercado PA-C      heparin (porcine)  5,000 Units Subcutaneous Q8H [address] Sujatha Nuñez PA-C      HYDROmorphone  0 2 mg Intravenous Q4H PRN Reford Reddish, DANIEL      magnesium hydroxide  30 mL Oral Daily PRN Odalis Mercado PA-C      metoprolol succinate  12 5 mg Oral Daily Reford Reddish, DANIEL      ondansetron  4 mg Intravenous Q6H PRN Reford Reddish, DANIEL      oxyCODONE  2 5 mg Oral Q4H PRN Reford Reddish, DANIEL      oxyCODONE  5 mg Oral Q4H PRN Reford Reddish, DANIEL      pantoprazole  40 mg Oral Daily Reford Reddish, DANIEL      polyethylene glycol  17 g Oral Daily Odalis Mercado PA-C      senna  1 tablet Oral HS Odalis Mercado PA-C          Today, Patient Was Seen By: Odalis Mercado PA-C    ** Please Note: Dictation voice to text software may have been used in the creation of this document   **

## 2020-09-01 NOTE — PHYSICAL THERAPY NOTE
09/01/20 1142   Pain Assessment   Pain Assessment Tool 0-10   Pain Score 2  (3/10 post activity)   Pain Location/Orientation Orientation: Right;Location: Groin   Pain Onset/Description Onset: Ongoing   Patient's Stated Pain Goal No pain   Hospital Pain Intervention(s) Repositioned; Ambulation/increased activity   Multiple Pain Sites No   Restrictions/Precautions   Weight Bearing Precautions Per Order Yes   RLE Weight Bearing Per Order WBAT   Other Precautions Cognitive; Chair Alarm;WBS;Fall Risk;Hard of hearing   General   Chart Reviewed Yes   Family/Caregiver Present No   Cognition   Overall Cognitive Status Impaired   Arousal/Participation Alert; Cooperative   Attention Attends with cues to redirect   Orientation Level Oriented X4   Memory Within functional limits   Following Commands Follows one step commands with increased time or repetition   Comments pt agreed to PT session   Subjective   Subjective "I feel better today "   Bed Mobility   Additional Comments pt OOB in recliner to begin & end session   Transfers   Sit to Stand 3  Moderate assistance   Additional items Assist x 1; Armrests; Increased time required;Verbal cues   Stand to Sit 3  Moderate assistance   Additional items Assist x 1; Armrests; Increased time required;Verbal cues   Stand pivot 3  Moderate assistance   Additional items Assist x 1; Increased time required;Verbal cues   Toilet transfer 3  Moderate assistance   Additional items Assist x 1; Increased time required;Verbal cues;Standard toilet;Commode  (BSC over standard toilet with hand rails)   Additional Comments VCs for safety throughout session   Ambulation/Elevation   Gait pattern Improper Weight shift; Antalgic; Forward Flexion;Decreased foot clearance;Poor UE support;Decreased R stance; Short stride; Step to;Excessively slow   Gait Assistance 4  Minimal assist   Additional items Assist x 1;Verbal cues; Tactile cues   Assistive Device Rolling walker   Distance 12 feet x 2   Stair Management Assistance Not tested   Balance   Static Sitting Fair +   Dynamic Sitting Fair   Static Standing Poor +   Dynamic Standing Poor +   Ambulatory Poor +   Endurance Deficit   Endurance Deficit Yes   Activity Tolerance   Activity Tolerance Patient limited by fatigue;Patient limited by pain  (SOB)   Medical Staff Made Aware Sujatha OTR   Exercises   Hip Flexion Sitting;20 reps;AROM; Bilateral   Knee AROM Long Arc Quad Sitting;20 reps;AROM; Bilateral   Ankle Pumps Sitting;20 reps;AROM; Bilateral   Assessment   Prognosis Good   Problem List Decreased strength;Decreased range of motion;Decreased endurance; Impaired balance;Decreased mobility; Decreased safety awareness; Obesity; Decreased skin integrity;Orthopedic restrictions;Pain   Assessment Pt seen for PT treatment session this date with interventions consisting of gait training w/ emphasis on improving pt's ability to ambulate level surfaces x 12 feet x 2 with min A provided by therapist with RW and therapeutic activity consisting of training: sit<>stand transfers and SPT & toilet transfers  Pt agreeable to PT treatment session upon arrival, pt found seated OOB in recliner, in no apparent distress  In comparison to previous session, pt with improvements in activity tolerance with decreased assistance needed  Post session: pt returned back to recliner, chair alarm engaged and all needs in reach SCDs active, set up for lunch  Continue to recommend STR at time of d/c in order to maximize pt's functional independence and safety w/ mobility  Pt continues to be functioning below baseline level, and remains limited 2* factors listed above and including decreased strength, endurance & safe functional mobility  PT will continue to see pt while here in order to address the deficits listed above and provide interventions consistent w/ POC in effort to achieve STGs     Barriers to Discharge Inaccessible home environment;Decreased caregiver support   Goals   Patient Goals to get better   PT Treatment Day 3   Plan   Treatment/Interventions Functional transfer training;LE strengthening/ROM; Therapeutic exercise; Endurance training;Patient/family training;Equipment eval/education; Bed mobility;Gait training;OT   Progress Progressing toward goals   PT Frequency   (5-7 x week)   Recommendation   PT Discharge Recommendation Post-Acute Rehabilitation Services   Equipment Recommended Walker   PT - OK to Discharge Yes  (when med cleared to STR)   Donnamaria Drain, PTA

## 2020-09-01 NOTE — OCCUPATIONAL THERAPY NOTE
[address]  Treatment Note     Patient Name: Mau Gonsalez  PFEJV'Y Date: 9/1/2020  Problem List  Principal Problem:    Right femoral fracture ([address] )  Active Problems:    Esophagitis, reflux    Fall    Elevated troponin    CKD (chronic kidney disease), stage III ([facility])    Cardiomyopathy (H[facility])    Mitral regurgitation    Chronic systolic congestive heart failure ([facility])    Benign essential HTN    Anemia    Acute blood loss anemia          09/01/20 1143   Restrictions/Precautions   Weight Bearing Precautions Per Order Yes   RLE Weight Bearing Per Order WBAT   Braces or Orthoses   (none)   Other Precautions Cognitive; Chair Alarm;WBS;Fall Risk;Pain;Hard of hearing   Lifestyle   Autonomy At baseline, pt was I w/ ADLs (assist to wash shower/back for showers only) and functional mobility/transfers w/ use of SPC to walk to the dining de oliveira only, required assist w/ IADLs, (-) , and reports 1 fall PTA  Reciprocal Relationships Son, facility staff    Service to Others Retired    Intrinsic Gratification Watching TV    Pain Assessment   Pain Assessment Tool 0-10   Pain Score 2   Pain Location/Orientation Orientation: Right;Location: Groin   Pain Onset/Description Onset: Ongoing;Frequency: Intermittent   Effect of Pain on Daily Activities limits ADLs and mobility/endurance  2/10 pain at rest, increased to 3/10 with activity   Patient's Stated Pain Goal No pain   Hospital Pain Intervention(s) Repositioned; Ambulation/increased activity; Emotional support; Rest   ADL   Eating Assistance 5  Supervision/Setup  (setup with tray @ end of session)   Grooming Assistance 5  Supervision/Setup  (washing hands seated, not tolerating standing s/p BM)   UB Dressing Assistance 4  Minimal Assistance   LB Dressing Assistance 2  Maximal Assistance  (don socks)   Toileting Assistance  2  Maximal Assistance  (thoroughness and hygiene)   Functional Standing Tolerance   Time 3 mins   Activity functional ambulation with RW to/from bathroom Comments mild SOB noted w/ ambulation, requiring increased time and seated rest breaks with cues for activity pacing and energy conservation   Bed Mobility   Additional Comments oob to chair on arrival therefore did not assess   Transfers   Sit to Stand 3  Moderate assistance   Additional items Assist x 1; Increased time required;Verbal cues;Armrests  (chair, BSC over toilet)   Stand to Sit 3  Moderate assistance   Additional items Assist x 1; Increased time required;Verbal cues;Armrests  (cues for controlled descent)   Stand pivot 3  Moderate assistance   Additional items Assist x 1; Increased time required;Verbal cues   Toilet transfer 3  Moderate assistance   Additional items Assist x 1; Increased time required;Verbal cues;Standard toilet;Commode  (standard toilet w/ BSC w/ rails)   Additional Comments all transfers completed with RW  improvements from yesterday  continues to require cues for hand placement, controlled descent and sequencing w/ stand pivot and RW mgmt   Functional Mobility   Functional Mobility 4  Minimal assistance   Additional Comments Ax1, to/from bathroom   Additional items Rolling walker   Toilet Transfers   Toilet Transfer From Rolling walker   Toilet Transfer Type To and from   Toilet Transfer to Standard bedside commode  (overtop of standard toilet (like she has at [employer]))   Toilet Transfer Technique Ambulating   Toilet Transfers Moderate assistance  (ax1)   Toilet Transfers Comments improvements from yesterday  cues for hand placement and safety with controlled descent   Cognition   Overall Cognitive Status Impaired   Arousal/Participation Cooperative; Alert   Attention Attends with cues to redirect   Orientation Level Oriented X4   Memory Within functional limits   Following Commands Follows one step commands with increased time or repetition   Comments cooperative throughout session  decreased safety awareness/insight/higher level executive functioning      Activity Tolerance Activity Tolerance Patient limited by fatigue;Treatment limited secondary to medical complications (Comment)  (cognition)   Medical Staff Made Aware PTA Bryce Abel Osmanydann to see per RIANNA Barron Body, aware of findings at end of session   Assessment   Assessment Arrived to patient seated in bedside chair agreeable to OT session  Patient pleasant and cooperative  Requesting to use bathroom  Improvements in the areas of functional transfers/mobility with RW, endurance, strength and ADL status this morning  Patient completing all functional transfers with mod A x1 and cues for hand placement, sequencing and RW mgmt  Patient then ambulated to/from bathroom with min A using RW  With fatigue, patient continuing to require min A x1 (required min A x2 for fatigue and safety yesterday)  No LOB noted with ambulation and ADLs this date  Patient requiring increased time for toileting this date on standard toilet w/ BSC placed overtop  BM completed and required max A for hygiene mgmt  Grooming completed seated in bedside chair s/p setup this date, not tolerating standing at sink to wash hands d/t fatigue after bowel movement  Patient then participated in minimal therapeutic exercises and activities with emphasis on BUE strengthening and balance  Seated in bedside chair with alarm on setup for lunch w/ tray at end of session   Pt would benefit from continued skilled OT while in the hospital to achieve POC goals and ensure safe d/c     Plan   Goal Expiration Date 09/11/20   OT Treatment Day 3   OT Frequency 3-5x/wk   Recommendation   OT Discharge Recommendation Post-Acute Rehabilitation Services   OT - OK to Discharge Yes  (to rehab when medically cleared)       Manyn Arguelles MS, OTR/L

## 2020-09-01 NOTE — CASE MANAGEMENT
Received update at the end of the day yesterday (8/31) from [facility] liaison, that patient is denied [place] admission  Printed sub-acute rehab options in [facility] [place] to present to family for secondary option  Called next of kin, daughter, Jose Angel Cormier, and explained the denial  She is interested in a family appeal, but was willing to receive the sub-acute list  I emailed her the list  Israel Riley this morning to see if any progress has been made regarding a family appeal versus pursuing a sub-acute option  She said she would call her brother, Yuriy Ramos to make a decision as to which route they would like to proceed  Gave her my number and she stated she would call me back   Department will continue to follow

## 2020-09-01 NOTE — PLAN OF CARE
Problem: PHYSICAL THERAPY ADULT  Goal: Performs mobility at highest level of function for planned discharge setting  See evaluation for individualized goals  Description: Treatment/Interventions: Functional transfer training, LE strengthening/ROM, Therapeutic exercise, Endurance training, Patient/family training, Equipment eval/education, Bed mobility, Gait training, Compensatory technique education, Continued evaluation, Spoke to nursing, OT  Equipment Recommended: Rodgers Cranker       See flowsheet documentation for full assessment, interventions and recommendations  Outcome: Progressing  Note: Prognosis: Good  Problem List: Decreased strength, Decreased range of motion, Decreased endurance, Impaired balance, Decreased mobility, Decreased safety awareness, Obesity, Decreased skin integrity, Orthopedic restrictions, Pain  Assessment: Pt seen for PT treatment session this date with interventions consisting of gait training w/ emphasis on improving pt's ability to ambulate level surfaces x 12 feet x 2 with min A provided by therapist with RW and therapeutic activity consisting of training: sit<>stand transfers and SPT & toilet transfers  Pt agreeable to PT treatment session upon arrival, pt found seated OOB in recliner, in no apparent distress  In comparison to previous session, pt with improvements in activity tolerance with decreased assistance needed  Post session: pt returned back to recliner, chair alarm engaged and all needs in reach SCDs active, set up for lunch  Continue to recommend STR at time of d/c in order to maximize pt's functional independence and safety w/ mobility  Pt continues to be functioning below baseline level, and remains limited 2* factors listed above and including decreased strength, endurance & safe functional mobility  PT will continue to see pt while here in order to address the deficits listed above and provide interventions consistent w/ POC in effort to achieve STGs    Barriers to Discharge: Inaccessible home environment, Decreased caregiver support     PT Discharge Recommendation: [id number]     PT - OK to Discharge: Yes(when med cleared to STR)    See flowsheet documentation for full assessment

## 2020-09-01 NOTE — ASSESSMENT & PLAN NOTE
· Acute blood loss anemia as above   · Received blood in OR and additional 1 unit PRBC 8/28   · Continue to monitor   Lab Results   Component Value Date    HGB 8 4 (L) 09/01/2020    HGB 8 9 (L) 08/30/2020    HGB 7 9 (L) 08/29/2020    HGB 7 5 (L) 08/28/2020

## 2020-09-01 NOTE — ASSESSMENT & PLAN NOTE
· Cr 1 36 on admission  · Recent Cr closer to 1 11   · Holding lisinopril   · Avoid hypotension   · Creatinine overall stable since admission Cr 1 30   · S/p huang removal on 8/29 without difficulty or retention

## 2020-09-01 NOTE — ASSESSMENT & PLAN NOTE
· Maintained on lasix, lisinopril and toprol XL   · Blood pressure  Stable    · Continue to hold lisinopril   · Resumed oral lasix as BP allows   · Continue Toprol XL 12 5 mg daily with hold parameters  · Close monitoring of blood pressure

## 2020-09-01 NOTE — PLAN OF CARE
Problem: Potential for Falls  Goal: Patient will remain free of falls  Description: INTERVENTIONS:  - Assess patient frequently for physical needs  -  Identify cognitive and physical deficits and behaviors that affect risk of falls    -  [place] fall precautions as indicated by assessment   - Educate patient/family on patient safety including physical limitations  - Instruct patient to call for assistance with activity based on assessment  - Modify environment to reduce risk of injury  - Consider OT/PT consult to assist with strengthening/mobility  Outcome: Progressing     Problem: Prexisting or High Potential for Compromised Skin Integrity  Goal: Skin integrity is maintained or improved  Description: INTERVENTIONS:  - Identify patients at risk for skin breakdown  - Assess and monitor skin integrity  - Assess and monitor nutrition and hydration status  - Monitor labs   - Assess for incontinence   - Turn and reposition patient  - Assist with mobility/ambulation  - Relieve pressure over bony prominences  - Avoid friction and shearing  - Provide appropriate hygiene as needed including keeping skin clean and dry  - Evaluate need for skin moisturizer/barrier cream  - Collaborate with interdisciplinary team   - Patient/family teaching  - Consider wound care consult   Outcome: Progressing     Problem: PAIN - ADULT  Goal: Verbalizes/displays adequate comfort level or baseline comfort level  Description: Interventions:  - Encourage patient to monitor pain and request assistance  - Assess pain using appropriate pain scale  - Administer analgesics based on type and severity of pain and evaluate response  - Implement non-pharmacological measures as appropriate and evaluate response  - Consider cultural and social influences on pain and pain management  - Notify physician/advanced practitioner if interventions unsuccessful or patient reports new pain  Outcome: Progressing     Problem: DISCHARGE PLANNING  Goal: Discharge to home or other facility with appropriate resources  Description: INTERVENTIONS:  - Identify barriers to discharge w/patient and caregiver  - Arrange for needed discharge resources and transportation as appropriate  - Identify discharge learning needs (meds, wound care, etc )  - Refer to Case Management Department for coordinating discharge planning if the patient needs post-hospital services based on physician/advanced practitioner order or complex needs related to functional status, cognitive ability, or social support system  Outcome: Progressing     Problem: Knowledge Deficit  Goal: Patient/family/caregiver demonstrates understanding of disease process, treatment plan, medications, and discharge instructions  Description: Complete learning assessment and assess knowledge base    Interventions:  - Provide teaching at level of understanding  - Provide teaching via preferred learning methods  Outcome: Progressing     Problem: METABOLIC, FLUID AND ELECTROLYTES - ADULT  Goal: Electrolytes maintained within normal limits  Description: INTERVENTIONS:  - Monitor labs and assess patient for signs and symptoms of electrolyte imbalances  - Administer electrolyte replacement as ordered  - Monitor response to electrolyte replacements, including repeat lab results as appropriate  - Instruct patient on fluid and nutrition as appropriate  Outcome: Progressing  Goal: Fluid balance maintained  Description: INTERVENTIONS:  - Monitor labs   - Monitor I/O and WT  - Instruct patient on fluid and nutrition as appropriate  - Assess for signs & symptoms of volume excess or deficit  Outcome: Progressing     Problem: HEMATOLOGIC - ADULT  Goal: Maintains hematologic stability  Description: INTERVENTIONS  - Monitor labs  - Administer supportive blood products/factors as ordered and appropriate  Outcome: Progressing     Problem: MUSCULOSKELETAL - ADULT  Goal: Maintain or return mobility to safest level of function  Description: INTERVENTIONS:  - Assess patient's ability to carry out ADLs; assess patient's baseline for ADL function and identify physical deficits which impact ability to perform ADLs (bathing, care of mouth/teeth, toileting, grooming, dressing, etc )  - Assess/evaluate cause of self-care deficits   - Assess range of motion  - Assess patient's mobility  - Assess patient's need for assistive devices and provide as appropriate  - Encourage maximum independence but intervene and supervise when necessary  - Involve family in performance of ADLs  - Assess for home care needs following discharge   - Consider OT consult to assist with ADL evaluation and planning for discharge  - Provide patient education as appropriate  Outcome: Progressing  Goal: Maintain proper alignment of affected body part  Description: INTERVENTIONS:  - Support, maintain and protect limb and body alignment  - Provide patient/ family with appropriate education  Outcome: Progressing     Problem: COPING  Goal: Pt/Family able to verbalize concerns and demonstrate effective coping strategies  Description: INTERVENTIONS:  - Assist patient/family to identify coping skills, available support systems and cultural and spiritual values  - Provide emotional support, including active listening and acknowledgement of concerns of patient and caregivers  - Reduce environmental stimuli, as able  - Provide patient education  - Assess for spiritual pain/suffering and initiate spiritual care, including notification of Pastoral Care or [place] based community as needed  - Assess effectiveness of coping strategies  Outcome: Progressing     Problem: DECISION MAKING  Goal: Pt/Family able to effectively weigh alternatives and participate in decision making related to treatment and care  Description: INTERVENTIONS:  - Identify decision maker  - Determine when there are differences among patient's view, family's view, and healthcare provider's view of patient condition and care goals  - Facilitate patient/family articulation of goals for care  - Help patient/family identify pros/cons of alternative solutions  - Provide information as requested by patient/family  - Respect patient/family rights related to privacy and sharing information   - Serve as a liaison between patient, family and health care team  - Initiate consults as appropriate (Ethics Team, Palliative Care, Family Care Conference, etc )  Outcome: Progressing     Problem: GENITOURINARY - ADULT  Goal: Maintains or returns to baseline urinary function  Description: INTERVENTIONS:  - Assess urinary function  - Encourage oral fluids to ensure adequate hydration if ordered  - Administer IV fluids as ordered to ensure adequate hydration  - Administer ordered medications as needed  - Offer frequent toileting  - Follow urinary retention protocol if ordered  Outcome: Progressing  Goal: Absence of urinary retention  Description: INTERVENTIONS:  - Assess patient's ability to void and empty bladder  - Monitor I/O  - Bladder scan as needed  - Discuss with physician/AP medications to alleviate retention as needed  -Encourage pt to get out of bed and use commode to void    Outcome: Progressing

## 2020-09-01 NOTE — PLAN OF CARE
Problem: OCCUPATIONAL THERAPY ADULT  Goal: Performs self-care activities at highest level of function for planned discharge setting  See evaluation for individualized goals  Description: Treatment Interventions: ADL retraining, Functional transfer training, UE strengthening/ROM, Endurance training, Patient/family training, Equipment evaluation/education, Compensatory technique education, Activityengagement          See flowsheet documentation for full assessment, interventions and recommendations  Outcome: Progressing  Note: Limitation: Decreased ADL status, Decreased UE strength, Decreased Safe judgement during ADL, Decreased cognition, Decreased endurance, Decreased self-care trans, Decreased high-level ADLs  Prognosis: Fair  Assessment: Arrived to patient seated in bedside chair agreeable to OT session  Patient pleasant and cooperative  Requesting to use bathroom  Improvements in the areas of functional transfers/mobility with RW, endurance, strength and ADL status this morning  Patient completing all functional transfers with mod A x1 and cues for hand placement, sequencing and RW mgmt  Patient then ambulated to/from bathroom with min A using RW  With fatigue, patient continuing to require min A x1 (required min A x2 for fatigue and safety yesterday)  No LOB noted with ambulation and ADLs this date  Patient requiring increased time for toileting this date on standard toilet w/ BSC placed overtop  BM completed and required max A for hygiene mgmt  Grooming completed seated in bedside chair s/p setup this date, not tolerating standing at sink to wash hands d/t fatigue after bowel movement  Patient then participated in minimal therapeutic exercises and activities with emphasis on BUE strengthening and balance  Seated in bedside chair with alarm on setup for lunch w/ tray at end of session   Pt would benefit from continued skilled OT while in the hospital to achieve POC goals and ensure safe d/c       OT Discharge Recommendation: Post-Acute Rehabilitation Services  OT - OK to Discharge: Yes(to rehab when medically cleared)

## 2020-09-01 NOTE — ASSESSMENT & PLAN NOTE
Wt Readings from Last 3 Encounters:   09/01/20 75 1 kg (165 lb 9 1 oz)   02/17/20 71 8 kg (158 lb 3 2 oz)   10/15/19 69 1 kg (152 lb 6 4 oz)     · Spoke with Cardiology  · Given a dose of IV Lasix with blood transfusion on 8/28   · Continue close monitoring of volume status and respiratory status  · Echocardiogram 04/2019:  Ejection fraction 25%, moderate to severe mitral regurgitation, mild TR, mild PA   · Repeat echocardiogram 08/27/2020:  Ejection fraction 62%, grade 1 diastolic dysfunction  · Chest x-ray no acute cardiopulmonary disease  · Patient is maintained on Lasix 20 mg daily which was resumed

## 2020-09-01 NOTE — CASE MANAGEMENT
Had a conversation with daughter, Melanie Laboy at patient's bedside  She confirmed that the family will no longer be pursuing an appeal to the [facility] denial and would like to place a referral for possible acceptance to [facility] or [address] [place] [address] in [facility] placed referrals for both locations in [place]  Will keep family apprised of referral decision

## 2020-09-02 NOTE — ASSESSMENT & PLAN NOTE
· X-ray right femur:  Acute displaced right femoral intertrochanteric fracture  · Consult orthopedics appreciated  · Cardiology consulted preoperatively, appreciate recommendations  · With acute blood loss anemia postoperatively   · Status post intramedullary fixation of right intertrochanteric hip fracture 8/27/20 by Dr Augusto Aragon   · PT/OT/PMR, weight-bearing as tolerated right lower extremity  · Will be here over the weekend as patient is an insurance authorization  · Patient is currently on heparin for DVT prophylaxis due to renal function   · Geriatric pain protocol  · Patient will need to follow-up with Dr Augusto Aragon in 2-3 weeks postoperatively  · Encouraged IS use which was performed at bedside with me today and she demonstrated accurate use    · Patient accepted at [facility] for rehab, patient and family agreeable

## 2020-09-02 NOTE — OCCUPATIONAL THERAPY NOTE
Occupational Therapy Treatment Note:         09/02/20 0910   Restrictions/Precautions   Weight Bearing Precautions Per Order Yes   RLE Weight Bearing Per Order WBAT   Other Precautions Cognitive; Chair Alarm; Bed Alarm;WBS;Pain; Fall Risk;Hard of hearing   Pain Assessment   Pain Assessment Tool 0-10   Pain Score 3   Pain Location/Orientation Orientation: Right;Orientation: Lower; Location: Hip;Location: Back   ADL   Where Assessed Chair   Grooming Assistance 5  Supervision/Setup   Grooming Deficit Setup;Verbal cueing; Increased time to complete;Supervision/safety; Wash/dry hands; Wash/dry face;Brushing hair   UB Bathing Assistance 5  Supervision/Setup   UB Bathing Deficit Setup;Verbal cueing;Supervision/safety; Increased time to complete;Perineal area; Buttocks; Left lower leg including foot;Right lower leg including foot   LB Bathing Assistance 4  Minimal Assistance   LB Bathing Deficit Steadying;Setup;Verbal cueing;Supervision/safety; Increased time to complete;Perineal area; Buttocks;Right lower leg including foot; Left lower leg including foot   LB Bathing Comments limited functional reach required    UB Dressing Assistance 5  Supervision/Setup   UB Dressing Deficit Setup   LB Dressing Assistance 4  Minimal Assistance   LB Dressing Deficit Steadying;Setup; Requires assistive device for steadying;Verbal cueing;Supervision/safety; Increased time to complete; Don/doff R sock; Don/doff L sock; Thread RLE into underwear; Thread LLE into underwear;Pull up over hips   LB Dressing Comments increased A due to limited functional reach  Toileting Assistance  4  Minimal Assistance   Toileting Deficit Setup;Steadying;Verbal cueing;Supervison/safety; Increased time to complete; Bedside commode;Perineal hygiene;Clothing management up;Clothing management down   Functional Standing Tolerance   Time 5 mins   Activity functioanl transfers  Comments Improved stand tolerance noted      Bed Mobility   Supine to Sit Unable to assess   Transfers   Sit to Stand 4  Minimal assistance   Additional items Assist x 1; Armrests; Increased time required;Verbal cues   Stand to Sit 4  Minimal assistance   Additional items Assist x 1; Armrests; Increased time required;Verbal cues   Stand pivot 4  Minimal assistance   Additional items Assist x 1; Increased time required;Verbal cues;Armrests   Toilet transfer 4  Minimal assistance   Additional items Assist x 1; Increased time required;Commode   Additional Comments Improved stand tolerance noted this tx session  Functional Mobility   Functional Mobility 4  Minimal assistance   Additional Comments x1   Additional items Rolling walker   Cognition   Overall Cognitive Status Impaired   Arousal/Participation Alert; Cooperative   Attention Attends with cues to redirect   Orientation Level Oriented X4   Memory Within functional limits   Following Commands Follows one step commands without difficulty   Comments cues for safety required with activities  Additional Activities   Additional Activities Other (Comment)  (reviewed current plan of care  )   Additional Activities Comments Pt reports having F understanding  Activity Tolerance   Activity Tolerance Patient tolerated treatment well   Medical Staff Made Aware Reported all findings to nursing staff  Assessment   Assessment Pt was seen for skilled OT with focus on completion of self care routine, self toileting, review of RW safety, fall prevention and review of current plan of care  Pt required Min A overall for toilet transfer to bedside commode  Increased a provided for clothing management and michelle anal hygiene do to limited functional reach/balance  Pt able to tolerate completion of ADL routine while seated with basin set up  ADL routine as follows  Grooming: S UB Bathing: S  UB Dressing: S  LB Bathing: Min A with increased A for michelle hygiene instance  LB Dressing: Min A to reach BLE and for clothing management instance    Toileting: Min A with increased A for michelle anal hygiene and clothing management instance  Bed Mobility: unable to assess  Transfers: Min A with cues for RW safety and appropriate hand placement/positioning with activity  Pt able to carry over reviewed techs with F quality noted  Further review will be required  See above levels of A required for all functional tasks  Pt may benefit from further rehab with focus on achieving optimal performance levels with all functional tasks  Plan   Treatment Interventions ADL retraining;Functional transfer training; Endurance training;Cognitive reorientation;Patient/family training   Goal Expiration Date 09/11/20   OT Treatment Day 4   OT Frequency 3-5x/wk   Recommendation   OT Discharge Recommendation Post-Acute Rehabilitation Services   OT - OK to Discharge Yes  (to rehab when medically cleared  )   Barthel Index   Feeding 10   Bathing 0   Grooming Score 5   Dressing Score 5   Bladder Score 0   Bowels Score 10   Toilet Use Score 5   Transfers (Bed/Chair) Score 5   Mobility (Level Surface) Score 0   Stairs Score 0   Barthel Index Score 40   Modified [place] Scale   Modified [place] Scale 4   Kalyan Osborn, [address]

## 2020-09-02 NOTE — PHYSICAL THERAPY NOTE
09/02/20 0900   Pain Assessment   Pain Assessment Tool 0-10   Pain Score 3  (with activity)   Pain Location/Orientation Orientation: Right;Orientation: Lower; Location: Back; Location: Hip   Pain Onset/Description Onset: Ongoing   Effect of Pain on Daily Activities limits mobility   Patient's Stated Pain Goal No pain   Hospital Pain Intervention(s) Medication (See MAR); Repositioned; Ambulation/increased activity   Restrictions/Precautions   Weight Bearing Precautions Per Order Yes   RLE Weight Bearing Per Order WBAT   Other Precautions Cognitive; Chair Alarm;WBS;Fall Risk;Pain;Hard of hearing   General   Chart Reviewed Yes   Family/Caregiver Present No   Cognition   Overall Cognitive Status Impaired   Arousal/Participation Alert; Cooperative   Attention Attends with cues to redirect   Orientation Level Oriented X4   Memory Within functional limits   Following Commands Follows one step commands with increased time or repetition   Comments pt agreed to PT session   Subjective   Subjective "I feel good today "   Bed Mobility   Additional Comments pt OOB on BSC to begin session, in recliner to end   Transfers   Sit to Stand 4  Minimal assistance   Additional items Assist x 1; Armrests; Increased time required;Verbal cues   Stand to Sit 4  Minimal assistance   Additional items Assist x 1; Armrests; Increased time required;Verbal cues   Stand pivot 4  Minimal assistance   Additional items Assist x 1; Increased time required;Verbal cues   Toilet transfer 4  Minimal assistance   Additional items Assist x 1; Armrests; Increased time required;Verbal cues; Commode   Additional Comments stood x 40 sec with RW min x 1 & VCs for erect posture  Sat at eOB x 25 min for sitting balance with VCs as needed & performed LE ex as noted   Ambulation/Elevation   Gait pattern Improper Weight shift; Antalgic; Forward Flexion;Decreased foot clearance;Decreased R stance; Inconsistent juan david; Short stride; Step to;Excessively slow   Gait Assistance 4 Minimal assist   Additional items Assist x 1;Verbal cues; Tactile cues   Assistive Device Rolling walker   Distance 30 feet x 1  (with 2 turns)   Stair Management Assistance Not tested   Balance   Static Sitting Fair +   Dynamic Sitting Fair   Static Standing Poor +   Dynamic Standing Poor +   Ambulatory Poor +   Endurance Deficit   Endurance Deficit Yes   Activity Tolerance   Activity Tolerance Patient limited by fatigue;Patient limited by pain  (SOB)   Medical Staff Made Aware Ryan Thakkar   Exercises   Hip Flexion Sitting;AAROM; Right;AROM; Left  (12 reps)   Hip Abduction Sitting;AAROM; Right;AROM; Left  (12 reps)   Hip Adduction Sitting;AAROM; Right;AROM; Left  (12 resp)   Knee AROM Long Arc Quad Sitting;AAROM; Right;AROM; Left  (12 reps)   Ankle Pumps Sitting;20 reps;AROM; Bilateral   Balance training  sitting, standing   Assessment   Prognosis Good   Problem List Decreased strength;Decreased range of motion;Decreased endurance; Impaired balance;Decreased mobility; Impaired judgement;Decreased safety awareness; Obesity; Decreased skin integrity;Orthopedic restrictions;Pain   Assessment Pt seen for PT treatment session this date with interventions consisting of gait training w/ emphasis on improving pt's ability to ambulate level surfaces x 30 feet with min A provided by therapist with RW, Therapeutic exercise consisting of: AROM and AAROM 12-20 reps each B LE in sitting position and therapeutic activity consisting of training: supine<>sit transfers, sit<>stand transfers, static sitting tolerance at EOB for 25 minutes w/ B UE support, static standing tolerance for less than 1 minutes w/ B UE support and toilet transfer  Pt agreeable to PT treatment session upon arrival, pt found on toilet in BR, in no apparent distress  In comparison to previous session, pt with improvements in amb distance, activity tolerance   Post session: pt returned back to recliner, chair alarm engaged and all needs in reach Continue to recommend STR at time of d/c in order to maximize pt's functional independence and safety w/ mobility  Pt continues to be functioning below baseline level, and remains limited 2* factors listed above and including decreased strength, endurance & safe functional mobility  PT will continue to see pt while here in order to address the deficits listed above and provide interventions consistent w/ POC in effort to achieve STGs  Barriers to Discharge Inaccessible home environment;Decreased caregiver support   Goals   Patient Goals to get better   PT Treatment Day 4   Plan   Treatment/Interventions Functional transfer training;LE strengthening/ROM; Therapeutic exercise; Endurance training;Patient/family training;Equipment eval/education; Bed mobility;Gait training;Spoke to nursing;OT   Progress Progressing toward goals   PT Frequency   (5-7 x week)   Recommendation   PT Discharge Recommendation Post-Acute Rehabilitation Services   Equipment Recommended Walker   PT - OK to Discharge Yes  (when med cleared to STR)   Rosalba Potter, PTA

## 2020-09-02 NOTE — PLAN OF CARE
Problem: PHYSICAL THERAPY ADULT  Goal: Performs mobility at highest level of function for planned discharge setting  See evaluation for individualized goals  Description: Treatment/Interventions: Functional transfer training, LE strengthening/ROM, Therapeutic exercise, Endurance training, Patient/family training, Equipment eval/education, Bed mobility, Gait training, Compensatory technique education, Continued evaluation, Spoke to nursing, OT  Equipment Recommended: Keagan Pickard       See flowsheet documentation for full assessment, interventions and recommendations  Outcome: Progressing  Note: Prognosis: Good  Problem List: Decreased strength, Decreased range of motion, Decreased endurance, Impaired balance, Decreased mobility, Impaired judgement, Decreased safety awareness, Obesity, Decreased skin integrity, Orthopedic restrictions, Pain  Assessment: Pt seen for PT treatment session this date with interventions consisting of gait training w/ emphasis on improving pt's ability to ambulate level surfaces x 30 feet with min A provided by therapist with RW, Therapeutic exercise consisting of: AROM and AAROM 12-20 reps each B LE in sitting position and therapeutic activity consisting of training: supine<>sit transfers, sit<>stand transfers, static sitting tolerance at EOB for 25 minutes w/ B UE support, static standing tolerance for less than 1 minutes w/ B UE support and toilet transfer  Pt agreeable to PT treatment session upon arrival, pt found on toilet in BR, in no apparent distress  In comparison to previous session, pt with improvements in amb distance, activity tolerance  Post session: pt returned back to recliner, chair alarm engaged and all needs in reach Continue to recommend STR at time of d/c in order to maximize pt's functional independence and safety w/ mobility   Pt continues to be functioning below baseline level, and remains limited 2* factors listed above and including decreased strength, endurance & safe functional mobility  PT will continue to see pt while here in order to address the deficits listed above and provide interventions consistent w/ POC in effort to achieve STGs  Barriers to Discharge: Inaccessible home environment, Decreased caregiver support     PT Discharge Recommendation: [id number]     PT - OK to Discharge: Yes(when med cleared to STR)    See flowsheet documentation for full assessment

## 2020-09-02 NOTE — ASSESSMENT & PLAN NOTE
Wt Readings from Last 3 Encounters:   09/02/20 75 6 kg (166 lb 10 7 oz)   02/17/20 71 8 kg (158 lb 3 2 oz)   10/15/19 69 1 kg (152 lb 6 4 oz)     · Spoke with Cardiology  · Given a dose of IV Lasix with blood transfusion on 8/28   · Continue close monitoring of volume status and respiratory status upon discharge   · Prior echocardiogram 04/2019:  Ejection fraction 25%, moderate to severe mitral regurgitation, mild TR, mild MN   · Repeat echocardiogram 08/27/2020:  Ejection fraction 31%, grade 1 diastolic dysfunction  · Chest x-ray no acute cardiopulmonary disease  · Patient is maintained on Lasix 20 mg daily which was resumed

## 2020-09-02 NOTE — ASSESSMENT & PLAN NOTE
· Maintained on lasix, lisinopril and toprol XL   · Blood pressure  Stable    · Resumed oral lasix as BP allows   · Continue Toprol XL 12 5 mg daily with hold parameters  · Close monitoring of blood pressure upon discharge

## 2020-09-02 NOTE — DISCHARGE SUMMARY
Discharge- Nury Pierce 12/8/1927, 80 y o  female MRN: [id number]    Unit/Bed#: E2 -01 Encounter: [id number]    Primary Care Provider: Keira Grigsby MD   Date and time admitted to hospital: 8/26/2020  9:06 AM        * Right femoral fracture [facility])  Assessment & Plan  · X-ray right femur:  Acute displaced right femoral intertrochanteric fracture  · Consult orthopedics appreciated  · Cardiology consulted preoperatively, appreciate recommendations  · With acute blood loss anemia postoperatively   · Status post intramedullary fixation of right intertrochanteric hip fracture 8/27/20 by Dr Damaris Mack   · PT/OT/PMR, weight-bearing as tolerated right lower extremity  · Will be here over the weekend as patient is an insurance authorization  · Patient is currently on heparin for DVT prophylaxis due to renal function   · Geriatric pain protocol  · Patient will need to follow-up with Dr Damaris Mack in 2-3 weeks postoperatively  · Encouraged IS use which was performed at bedside with me today and she demonstrated accurate use    · Patient accepted at [facility] for rehab, patient and family agreeable     Acute blood loss anemia  Assessment & Plan  · Hemoglobin 7 5 postoperatively   · Received PRBC in operating room and addition 1 unit PRBC on 8/28  · hgb 8 4  · hgb stable        Lab Results   Component Value Date    HGB 8 4 (L) 09/01/2020    HGB 8 9 (L) 08/30/2020    HGB 7 9 (L) 08/29/2020    HGB 7 5 (L) 08/28/2020         Benign essential HTN  Assessment & Plan  · Maintained on lasix, lisinopril and toprol XL   · Blood pressure  Stable    · Resumed oral lasix as BP allows   · Continue Toprol XL 12 5 mg daily with hold parameters  · Close monitoring of blood pressure upon discharge       Chronic systolic congestive heart failure (HCC)  Assessment & Plan  Wt Readings from Last 3 Encounters:   09/02/20 75 6 kg (166 lb 10 7 oz)   02/17/20 71 8 kg (158 lb 3 2 oz)   10/15/19 69 1 kg (152 lb 6 4 oz)     · Spoke with Cardiology  · Given a dose of IV Lasix with blood transfusion on    · Continue close monitoring of volume status and respiratory status upon discharge   · Prior echocardiogram 2019:  Ejection fraction 25%, moderate to severe mitral regurgitation, mild TR, mild [place]   · Repeat echocardiogram 2020:  Ejection fraction 44%, grade 1 diastolic dysfunction  · Chest x-ray no acute cardiopulmonary disease  · Patient is maintained on Lasix 20 mg daily which was resumed       Cardiomyopathy [facility])  Assessment & Plan  · Severe cardiomyopathy with EF in 2018 at 25%   · Seen by cardiology here   · Continue po lasix       CKD (chronic kidney disease), stage III ([facility] [address] )  Assessment & Plan  · Cr near baseline  · Monitor BMP on outpatient basis         Elevated troponin  Assessment & Plan  · Non MI troponin elevation  · Seen in consultation with Cardiology  · No chest pain    [address]  · needs rehab placement as above - accepted at [facility]       Esophagitis, reflux  Assessment & Plan  · Continue PPI       Discharging Physician / Practitioner: Ashley Dotson MD  PCP: Diana Hardin MD  Admission Date:   Admission Orders (From admission, onward)     Ordered        20 1128  Inpatient Admission (expected length of stay for this patient Order details is greater than two midnights)  Once                   Discharge Date: 20    Resolved Problems  Date Reviewed: 2020    None          Consultations During Hospital Stay:  · Orthopedic surgery, PT, OT, CM    Procedures Performed:   OPERATIVE REPORT     PATIENT NAME: Mariah Elliott   :  1927  MRN: [id number]  Pt Location: [facility] OR ROOM 02     SURGERY DATE: 2020     SURGEON(S) and ROLE:  Primary: Cathy Mason MD  Assisting: Roberto Lord PA-C     NOTE:  The presence of a physician assistant was necessary to help with patient positioning, surgical exposure, wound retraction, wound closure, and other key portions of the procedure    No qualified resident was available for this case         PREOPERATIVE DIAGNOSES:  Right Intertrochanteric Hip Fracture     POSTOPERATIVE DIAGNOSES:  Same as Preoperatvie Diagnoses     PROCEDURES:  Intramedullary Fixation of Right Intertrochanteric Hip Fracture        ANESTHESIA TYPE:  General endotracheal     ANESTHESIA STAFF:   Anesthesiologist: Dia Jewell DO  CRNA: Cindy Zamora CRNA     ESTIMATED BLOOD LOSS:  250 mL     PERIOPERATIVE ANTIBIOTICS:  cefazolin, 2 grams     IMPLANTS:  Synthes TFN (400x11 mm), 105 mm CM screw, 46 mm distal interlock screw    Significant Findings / Test Results:   Results from last 7 days   Lab Units 09/01/20  0521 08/30/20  0558   WBC Thousand/uL  --  8 42   HEMOGLOBIN g/dL 8 4* 8 9*   HEMATOCRIT % 26 9* 28 5*   PLATELETS Thousands/uL  --  156     Results from last 7 days   Lab Units 08/29/20  0449   SODIUM mmol/L 138   POTASSIUM mmol/L 3 7   CHLORIDE mmol/L 105   CO2 mmol/L 26   BUN mg/dL 30*   CREATININE mg/dL 1 30   CALCIUM mg/dL 7 4*     XR femur 2 vw right   Final Result by Robert Giraldo DO (08/27 1614)      Fluoroscopic guidance provided for surgical procedure  Please refer to the separate procedure notes for additional details  Workstation performed: [id number]         XR hip/pelv 2-3 vws right if performed   Final Result by Aggie Tirado MD (08/26 1020)      Acute displaced right femoral intertrochanteric fracture  The study was marked in [facility] for immediate notification  Workstation performed: [id number]         XR femur 2 views RIGHT   Final Result by Aggie Tirado MD (08/26 1020)      Acute displaced right femoral intertrochanteric fracture  The study was marked in [facility] for immediate notification  Workstation performed: [id number]         XR chest 1 view   Final Result by Aggie Tirado MD (08/26 1021)      No acute cardiopulmonary disease              Workstation performed: [id number]         CT head without contrast   Final Result by Lazarus Ortiz Keven Gaston MD (08/26 1013)      No acute intracranial abnormality  Workstation performed: [id number]         CT spine cervical without contrast   Final Result by Ovidio Zarate MD (08/26 1021)      No cervical spine fracture or traumatic malalignment  Degenerative changes with mild canal narrowing at C2-C3      Mild left foraminal narrowing at C2-C3  Workstation performed: [id number]             Incidental Findings:   · None      Test Results Pending at Discharge (will require follow up): · None     Outpatient Tests Requested:  · None    Complications:  None    Reason for Admission: fall hip pain    Hospital Course:     Mann Mason is a 80 y o  female patient with history of CHF, hypertension and GERD who originally presented to the hospital on 8/26/2020 due to mechanical fall, was found to have a right femoral fracture  The patient underwent an intramedullary fixation of right intertrochanteric hip fracture 8/27/20 by Dr Charan Tomas tolerating the procedure well  She was noted for postoperative anemia requiring blood transfusion with her hemoglobin remaining subsequently stable  The patient was noted for an elevated troponin and evaluated by Cardiology with her cardiac regimen adjusted  She remained hemodynamically stable throughout her hospitalization  The patient was evaluated by physical and occupational therapy and recommended rehab upon discharge  The patient was accepted at [facility] and the patient her family were agreeable with the plan  Please see above list of diagnoses and related plan for additional information  Condition at Discharge: good     Discharge Day Visit / Exam:     Subjective:  Patient seen and examined  She reports feeling well  She continues to improve with her ambulation and worked with Physical and Occupational therapy for 40 minutes today  She denies chest pain, shortness of breath or palpitations  Maintaining normal oral intake  Normal oxygen saturations on room air  Vitals: Blood Pressure: 136/80 (09/02/20 0952)  Pulse: 88 (09/02/20 0952)  Temperature: 97 6 °F (36 4 °C) (09/02/20 0952)  Temp Source: Temporal (09/02/20 4304)  Respirations: 16 (09/02/20 0952)  Weight - Scale: 75 6 kg (166 lb 10 7 oz) (09/02/20 0535)  SpO2: 99 % (09/02/20 0952)    Exam:     Physical Exam  Constitutional:       General: She is not in acute distress  HENT:      Head: Normocephalic and atraumatic  Nose: No congestion  Mouth/Throat:      Pharynx: No oropharyngeal exudate  Cardiovascular:      Rate and Rhythm: Normal rate and regular rhythm  Heart sounds: No murmur  Pulmonary:      Effort: Pulmonary effort is normal       Breath sounds: Normal breath sounds  Abdominal:      General: There is no distension  Musculoskeletal:      Right lower leg: Edema (trace ) present  Left lower leg: Edema (trace ) present  Skin:     General: Skin is warm and dry  Neurological:      General: No focal deficit present  Mental Status: She is oriented to person, place, and time  Psychiatric:         Mood and Affect: Mood normal          Discussion with Family: family was updated     Discharge instructions/Information to patient and family:   See after visit summary for information provided to patient and family  Provisions for Follow-Up Care:  See after visit summary for information related to follow-up care and any pertinent home health orders  Disposition:     Other [facility] at [address]:   · Not Applicable to this Patient - Not Applicable to this Patient    Planned Readmission: No     Discharge Statement:  I spent 35 minutes discharging the patient  This time was spent on the day of discharge  I had direct contact with the patient on the day of discharge   Greater than 50% of the total time was spent examining patient, answering all patient questions, arranging and discussing plan of care with patient as well as directly providing post-discharge instructions  Additional time then spent on discharge activities  Discharge Medications:  See after visit summary for reconciled discharge medications provided to patient and family        ** Please Note: This note has been constructed using a voice recognition system **

## 2020-09-02 NOTE — ASSESSMENT & PLAN NOTE
· Hemoglobin 7 5 postoperatively   · Received PRBC in operating room and addition 1 unit PRBC on 8/28  · hgb 8 4  · hgb stable        Lab Results   Component Value Date    HGB 8 4 (L) 09/01/2020    HGB 8 9 (L) 08/30/2020    HGB 7 9 (L) 08/29/2020    HGB 7 5 (L) 08/28/2020

## 2020-09-02 NOTE — CASE MANAGEMENT
CM spoke to [facility]  Patient approved for [facility] today  Reference [id number]  Level: RBB  Approved for 3 days  Review date: 9/3 with Asael Alberto  Fax # [phone]  Reported information to [place], admission liaison at [facility]  Able to receive patient ASAP  Wheelchair van transportation set up with [employer] [place]  Pickup at 21   Nurse notified  COVID negative within 48 hours  Family notified  Patient ready to d/c

## 2020-09-08 NOTE — TELEPHONE ENCOUNTER
When the patient was discharged from the hospital for medicine she was on heparin subcu for DVT prophylaxis which we normally recommend for 30 days postoperatively  From orthopedic standpoint she is okay to be on any blood thinner at this point  I did leave a message on voicemail for call back  Ultimately would leave DVT prophylaxis up to Medicine if they feel that she can renally tolerate it

## 2020-09-08 NOTE — TELEPHONE ENCOUNTER
Patient was not on any DVT prophylaxis per the PA from the [facility]  She did develop a DVT and they would like to start her on Eliquis treatment dosage which I did okay at this time  They did state that the incisions and leg were healing well at this time  She does have follow-up scheduled with us for next week for re-evaluation

## 2020-09-08 NOTE — TELEPHONE ENCOUNTER
Patient sees Dr Иван Gutierrez at [facility] rehab is calling in stating that this patient had surgery on 8/27 on her right leg  She is calling in wanting to know if you are able to place this patient on Eliquis since she is DVT, she is just wanting to make sure that this is ok due to bleeding reason  She is asking for a call back relating this as soon as possible        Call back# [phone]

## 2020-09-08 NOTE — TELEPHONE ENCOUNTER
Patient sees Dr Jaclyn Singleton from [facility] is calling to have a note from the Dr faxed over with a stop date for the Lovenox        CB: [address]: [phone]

## 2020-09-08 NOTE — TELEPHONE ENCOUNTER
Phone call from two nurses on the floor who take care of Fannie  She gained 7 lbs overnight, but no SOB, vital goods, asymptomatic  They were calling just to inform us that the physician at the [facility] gave her an extra dose of Lasix 40 mgs for tonight  Dr Jaime Barton is addressing a question about a DVT and Eliquis from her recent surgical procedure  This is just an Genjihan Juniper

## 2020-09-09 NOTE — TELEPHONE ENCOUNTER
Phone call from Ady Collier son, "who is an RN" and has Fannie at the [facility] for cardiac care  He is upset that there is no communication between the [facility] and Dr Harsh Mccoy  His mother has had a weight gain, of 7 lbs, that Yanira Galvez said did not happen overnight and should be attended to  However, Fannie's nurse at that facility, did call to inform the office that [address] was given extra lasix for this weight gain and a possible DVT was taken care of by surgeon, Dr Greg Galvez wants Dr Harsh Mccoy to be the physician overlooking her direct care daily  I told him that with their own doctor at the facility, that is not always possible, and that if she was brought in for her [place] visit, we could send recomendations back, and I offered to get her an appt  She does not have a HFU with us at this time  But Yanira Galvez wants Dr Harsh Mccoy to call and speak with the doctor there [facility], (he does not know his/her name), because that doctor has not offered to communicate with [facility]) Cardiology, as Yanira Galvez suggested   The son, also, wants a call from Dr Harsh Mccoy to discuss this personally with him   [phone]

## 2020-09-09 NOTE — TELEPHONE ENCOUNTER
Spoke w/ pt's son over the phone  I welcomed the pt's current rehab physicians to call me if there are any CV issues with Fannie  I recommended that he speak w/ the physicians there and express his concerns and his wish to keep me in the loop  He is agreeable

## 2020-09-11 NOTE — TELEPHONE ENCOUNTER
Spoke with the patient's son and confirm that the 1st visit was a wound check and to evaluate the patient's function  He also wanted to know when the next appointment will be which I did state 4 weeks after that we will get x-rays  He will call back if he has further questions

## 2020-09-11 NOTE — TELEPHONE ENCOUNTER
Patient sees Dr Rosemarie Soler    Patients son called in asking what Dr Laura Martinez of care for the  9/15     [phone]

## 2020-09-15 NOTE — PROGRESS NOTES
Orthopaedic Surgery - Office Note  Lisbet Guerra (89 y o  female)   : 1927   MRN: [id number]  Encounter Date: 9/15/2020    Chief Complaint   Patient presents with    Right Hip - Post-op       Assessment / Plan  Status post intramedullary fixation of a righ intertrochanteric hip fracture with appropriate progression    · WB with walker  · Home exercise program reviewed  · Continue outpatient PT at the [facility]  · Focus on progressing strength  · Focus on progressing ROM  · XR AT NEXT VISIT:  right hip, 4+ views  Return in about 6 weeks (around 10/27/2020)  History of Present Illness  Lisbet Guerra is a 80 y o  female who presents for follow-up evaluation, she is status post intramedullary fixationof a right intertrochanteric hip fracture performed on 2020  She presents to the clinic today with her son, Mary Anne Florez  She is currently in the [facility]  At the [facility]  about working with her 5-6 days a week 1 to 2 times a day with physical therapy  They have been working with her walking with a walker to the bathroom  She states her pain is well controlled at this time  The [place] senna did tolerate that she had a DVT, they did not alert [facility]'s orthopedic surgery about the DVT  She denies any further injury or trauma to her right leg  She denies any distal paresthesias  She denies any fevers or chills  Review of Systems  Pertinent items are noted in HPI  All other systems were reviewed and are negative  Physical Exam  /76   Pulse 98   Wt 75 3 kg (166 lb)   BMI 26 79 kg/m²   Cons: Appears well  No apparent distress  Psych: Alert  Oriented x3  Mood and affect normal   Eyes: PERRLA, EOMI  Resp: Normal effort  No audible wheezing or stridor  CV: Palpable pulse  No discernable arrhythmia  1+ LE edema  Lymph:  No palpable cervical, axillary, or inguinal lymphadenopathy  Skin: Warm  No palpable masses  No visible lesions  Neuro: Normal muscle tone    Normal and symmetric DTR's  Right Lower Extremity Exam  Alignment:  Normal resting hip posture  Inspection:  mild swelling  Incisions clean and dry  Palpation:  Right hip tenderness  ROM:  Hip ER 30  Hip IR 30  Knee Extension 0  Knee Flexion 95  Strength:  Hip Flexors 2/5  Hip Abductors 3/5  Able to actively extend knee against gravity  Stability:  No objective LE joint instablity  Neurovascular:  Sensation intact in DP/SP/Croft/Sa/T nerve distributions  2+ DP & PT pulses  Gait:  Nonweightbearing in a wheelchair    Studies Reviewed  No studies to review    Procedures  No procedures today  Medical, Surgical, Family, and Social History  The patient's medical history, family history, and social history, were reviewed and updated as appropriate  Past Medical History:   Diagnosis Date    GERD (gastroesophageal reflux disease)     Murmur 8/27/2020    Right humeral fracture 7/20/2018       Past Surgical History:   Procedure Laterality Date    HYSTERECTOMY  1973    Total    JOINT REPLACEMENT Right     knee    ME OPEN RX FEMUR FX+INTRAMED MORE Right 8/27/2020    Procedure: INSERTION NAIL IM FEMUR ANTEGRADE (TROCHANTERIC);   Surgeon: Farooq Fair MD;  Location: [facility];  Service: Orthopedics       Family History   Problem Relation Age of Onset    Heart failure Mother     Coronary artery disease Father        Social History     Occupational History    Occupation: Retired   Tobacco Use    Smoking status: Never Smoker    Smokeless tobacco: Never Used    Tobacco comment: No secondhand smoke exposure   Substance and Sexual Activity    Alcohol use: Yes     Comment: Minimal consumption    Drug use: No    Sexual activity: Not Currently       Allergies   Allergen Reactions    Sulfa Antibiotics Hives         Current Outpatient Medications:     acetaminophen (TYLENOL) 325 mg tablet, Take 2 tablets (650 mg total) by mouth every 6 (six) hours, Disp: 30 tablet, Rfl: 0    aspirin 81 mg chewable tablet, Chew 81 mg daily, Disp: , Rfl:     Biotin 1 MG CAPS, Take 1 capsule by mouth daily, Disp: , Rfl:     calcium carbonate (TUMS) 500 mg chewable tablet, Chew 2 tablets (1,000 mg total) daily as needed for indigestion or heartburn, Disp:  , Rfl: 0    cholecalciferol (VITAMIN D3) 1,000 units tablet, Take by mouth, Disp: , Rfl:     docusate sodium (COLACE) 100 mg capsule, Take 1 capsule (100 mg total) by mouth 2 (two) times a day, Disp: 10 capsule, Rfl: 0    furosemide (LASIX) 20 mg tablet, Take 1 tablet (20 mg total) by mouth daily, Disp:  , Rfl: 0    lisinopril (ZESTRIL) 2 5 mg tablet, Take 1 tablet (2 5 mg total) by mouth daily, Disp: 90 tablet, Rfl: 1    metoprolol succinate (TOPROL-XL) 25 mg 24 hr tablet, Take 0 5 tablets (12 5 mg total) by mouth daily, Disp:  , Rfl: 0    Multiple Vitamins-Minerals (PRESERVISION AREDS) CAPS, Take by mouth 2 (two) times a day , Disp: , Rfl:     pantoprazole (PROTONIX) 40 mg tablet, TAKE 1 TABLET BY MOUTH EVERY DAY, Disp: 30 tablet, Rfl: 0    polyethylene glycol (MIRALAX) 17 g packet, Take 17 g by mouth daily, Disp: 14 each, Rfl: 0    senna (SENOKOT) 8 6 mg, Take 1 tablet (8 6 mg total) by mouth daily at bedtime, Disp: 120 each, Rfl: 0      Brunilda Joy    Scribe Attestation    I,:   Juan Luis Barr am acting as a scribe while in the presence of the attending physician :        I,:   Kevin Ahn MD personally performed the services described in this documentation    as scribed in my presence :

## 2020-09-17 NOTE — TELEPHONE ENCOUNTER
Stephanie Unger, patient's son is calling asking to speak to Dr Rekha Aguirre about the lovenox order  Stephanie Unger is stating that it was never ordered post op & she ended up with a dvt  Stephanie Unger is concerned because there was supposed to be an order & the facility where the patient is told him that there was never lovenox ordered for this patient  Stephanie Unger would like to speak to the doctor only       [phone]

## 2020-09-28 NOTE — TELEPHONE ENCOUNTER
Patient's son is calling in requesting a call back from the dr  He would like to discuss patient's medication         Please advise,

## 2020-09-29 NOTE — TELEPHONE ENCOUNTER
I spoke with patient's son and Dr Joan Garcia did return his call yesterday and questions were answered

## 2020-10-30 NOTE — ED NOTES
Patient transported to [facility] [address], RN  07/20/18 [address] Counseling: I discussed with the patient the risks of ixekizumab including but not limited to immunosuppression, serious infections, worsening of inflammatory bowel disease and drug reactions.  The patient understands that monitoring is required including a PPD at baseline and must alert us or the primary physician if symptoms of infection or other concerning signs are noted.

## 2020-11-13 PROBLEM — Z86.718 HISTORY OF DVT (DEEP VEIN THROMBOSIS): Status: ACTIVE | Noted: 2020-01-01

## 2021-01-01 ENCOUNTER — APPOINTMENT (EMERGENCY)
Dept: RADIOLOGY | Facility: HOSPITAL | Age: 86
DRG: 291 | End: 2021-01-01
Payer: COMMERCIAL

## 2021-01-01 ENCOUNTER — TELEPHONE (OUTPATIENT)
Dept: FAMILY MEDICINE CLINIC | Facility: CLINIC | Age: 86
End: 2021-01-01

## 2021-01-01 ENCOUNTER — APPOINTMENT (EMERGENCY)
Dept: RADIOLOGY | Facility: HOSPITAL | Age: 86
DRG: 871 | End: 2021-01-01
Payer: COMMERCIAL

## 2021-01-01 ENCOUNTER — TELEPHONE (OUTPATIENT)
Dept: OTHER | Facility: OTHER | Age: 86
End: 2021-01-01

## 2021-01-01 ENCOUNTER — DOCUMENTATION (OUTPATIENT)
Dept: FAMILY MEDICINE CLINIC | Facility: CLINIC | Age: 86
End: 2021-01-01

## 2021-01-01 ENCOUNTER — APPOINTMENT (EMERGENCY)
Dept: CT IMAGING | Facility: HOSPITAL | Age: 86
DRG: 291 | End: 2021-01-01
Payer: COMMERCIAL

## 2021-01-01 ENCOUNTER — APPOINTMENT (INPATIENT)
Dept: RADIOLOGY | Facility: HOSPITAL | Age: 86
DRG: 871 | End: 2021-01-01
Payer: COMMERCIAL

## 2021-01-01 ENCOUNTER — HOSPITAL ENCOUNTER (INPATIENT)
Facility: HOSPITAL | Age: 86
LOS: 1 days | DRG: 291 | End: 2021-03-19
Attending: EMERGENCY MEDICINE | Admitting: STUDENT IN AN ORGANIZED HEALTH CARE EDUCATION/TRAINING PROGRAM
Payer: COMMERCIAL

## 2021-01-01 ENCOUNTER — TELEPHONE (OUTPATIENT)
Dept: CARDIOLOGY CLINIC | Facility: CLINIC | Age: 86
End: 2021-01-01

## 2021-01-01 ENCOUNTER — TRANSITIONAL CARE MANAGEMENT (OUTPATIENT)
Dept: FAMILY MEDICINE CLINIC | Facility: CLINIC | Age: 86
End: 2021-01-01

## 2021-01-01 ENCOUNTER — HOSPITAL ENCOUNTER (INPATIENT)
Facility: HOSPITAL | Age: 86
LOS: 4 days | Discharge: HOME | DRG: 871 | End: 2021-01-15
Attending: EMERGENCY MEDICINE | Admitting: INTERNAL MEDICINE
Payer: COMMERCIAL

## 2021-01-01 ENCOUNTER — TELEPHONE (OUTPATIENT)
Dept: PALLIATIVE MEDICINE | Facility: CLINIC | Age: 86
End: 2021-01-01

## 2021-01-01 ENCOUNTER — HOSPITAL ENCOUNTER (EMERGENCY)
Facility: HOSPITAL | Age: 86
Discharge: NURSING, REHAB OR LONG-TERM CARE | DRG: 871 | End: 2021-01-10
Attending: EMERGENCY MEDICINE | Admitting: EMERGENCY MEDICINE
Payer: COMMERCIAL

## 2021-01-01 ENCOUNTER — OFFICE VISIT (OUTPATIENT)
Dept: FAMILY MEDICINE CLINIC | Facility: CLINIC | Age: 86
End: 2021-01-01
Payer: COMMERCIAL

## 2021-01-01 ENCOUNTER — HOSPITAL ENCOUNTER (INPATIENT)
Facility: HOSPITAL | Age: 86
LOS: 3 days | Discharge: HOME HEALTH | DRG: 291 | End: 2021-02-26
Attending: EMERGENCY MEDICINE | Admitting: INTERNAL MEDICINE
Payer: COMMERCIAL

## 2021-01-01 VITALS
WEIGHT: 167.55 LBS | OXYGEN SATURATION: 92 % | BODY MASS INDEX: 26.93 KG/M2 | TEMPERATURE: 97.3 F | DIASTOLIC BLOOD PRESSURE: 58 MMHG | RESPIRATION RATE: 20 BRPM | SYSTOLIC BLOOD PRESSURE: 120 MMHG | HEIGHT: 66 IN | HEART RATE: 76 BPM

## 2021-01-01 VITALS
BODY MASS INDEX: 27.04 KG/M2 | TEMPERATURE: 98.9 F | DIASTOLIC BLOOD PRESSURE: 55 MMHG | RESPIRATION RATE: 22 BRPM | OXYGEN SATURATION: 95 % | HEART RATE: 108 BPM | SYSTOLIC BLOOD PRESSURE: 107 MMHG | WEIGHT: 167.55 LBS

## 2021-01-01 VITALS
DIASTOLIC BLOOD PRESSURE: 73 MMHG | HEIGHT: 66 IN | HEART RATE: 85 BPM | OXYGEN SATURATION: 94 % | RESPIRATION RATE: 18 BRPM | SYSTOLIC BLOOD PRESSURE: 119 MMHG | BODY MASS INDEX: 25.44 KG/M2 | TEMPERATURE: 97.5 F | WEIGHT: 158.29 LBS

## 2021-01-01 VITALS
HEART RATE: 88 BPM | WEIGHT: 160 LBS | DIASTOLIC BLOOD PRESSURE: 60 MMHG | SYSTOLIC BLOOD PRESSURE: 110 MMHG | HEIGHT: 66 IN | TEMPERATURE: 97 F | BODY MASS INDEX: 25.71 KG/M2

## 2021-01-01 VITALS
RESPIRATION RATE: 24 BRPM | OXYGEN SATURATION: 94 % | HEIGHT: 66 IN | TEMPERATURE: 96.8 F | WEIGHT: 161.16 LBS | DIASTOLIC BLOOD PRESSURE: 68 MMHG | BODY MASS INDEX: 25.9 KG/M2 | SYSTOLIC BLOOD PRESSURE: 142 MMHG | HEART RATE: 107 BPM

## 2021-01-01 DIAGNOSIS — R26.9 GAIT ABNORMALITY: ICD-10-CM

## 2021-01-01 DIAGNOSIS — J12.82 PNEUMONIA DUE TO COVID-19 VIRUS: ICD-10-CM

## 2021-01-01 DIAGNOSIS — I50.33 ACUTE ON CHRONIC DIASTOLIC CHF (CONGESTIVE HEART FAILURE) (HCC): ICD-10-CM

## 2021-01-01 DIAGNOSIS — R77.8 ELEVATED TROPONIN: ICD-10-CM

## 2021-01-01 DIAGNOSIS — I71.4 AAA (ABDOMINAL AORTIC ANEURYSM) (HCC): ICD-10-CM

## 2021-01-01 DIAGNOSIS — J12.82 PNEUMONIA DUE TO COVID-19 VIRUS: Primary | ICD-10-CM

## 2021-01-01 DIAGNOSIS — R54 FRAILTY: ICD-10-CM

## 2021-01-01 DIAGNOSIS — I13.0 HYPERTENSIVE HEART AND RENAL DISEASE WITH RENAL FAILURE, STAGE 1 THROUGH STAGE 4 OR UNSPECIFIED CHRONIC KIDNEY DISEASE, WITH HEART FAILURE (HCC): ICD-10-CM

## 2021-01-01 DIAGNOSIS — E78.2 MIXED HYPERLIPIDEMIA: ICD-10-CM

## 2021-01-01 DIAGNOSIS — I50.43 ACUTE ON CHRONIC COMBINED SYSTOLIC AND DIASTOLIC CHF (CONGESTIVE HEART FAILURE) (HCC): ICD-10-CM

## 2021-01-01 DIAGNOSIS — I50.23 ACUTE ON CHRONIC SYSTOLIC (CONGESTIVE) HEART FAILURE (HCC): ICD-10-CM

## 2021-01-01 DIAGNOSIS — R26.89 BALANCE DISORDER: ICD-10-CM

## 2021-01-01 DIAGNOSIS — R09.02 HYPOXIA: ICD-10-CM

## 2021-01-01 DIAGNOSIS — R07.9 CHEST PAIN: ICD-10-CM

## 2021-01-01 DIAGNOSIS — Z66 DNR (DO NOT RESUSCITATE): ICD-10-CM

## 2021-01-01 DIAGNOSIS — J90 BILATERAL PLEURAL EFFUSION: ICD-10-CM

## 2021-01-01 DIAGNOSIS — R53.81 PHYSICAL DECONDITIONING: ICD-10-CM

## 2021-01-01 DIAGNOSIS — R11.2 NAUSEA AND VOMITING: Primary | ICD-10-CM

## 2021-01-01 DIAGNOSIS — R13.10 DYSPHAGIA: ICD-10-CM

## 2021-01-01 DIAGNOSIS — I50.23 ACUTE ON CHRONIC SYSTOLIC CONGESTIVE HEART FAILURE (HCC): Primary | ICD-10-CM

## 2021-01-01 DIAGNOSIS — I50.9 CHF (CONGESTIVE HEART FAILURE) (HCC): ICD-10-CM

## 2021-01-01 DIAGNOSIS — J18.9 PNEUMONIA: ICD-10-CM

## 2021-01-01 DIAGNOSIS — R06.02 SHORTNESS OF BREATH: Primary | ICD-10-CM

## 2021-01-01 DIAGNOSIS — E87.2 LACTIC ACIDOSIS: ICD-10-CM

## 2021-01-01 DIAGNOSIS — U07.1 PNEUMONIA DUE TO COVID-19 VIRUS: Primary | ICD-10-CM

## 2021-01-01 DIAGNOSIS — E87.5 HYPERKALEMIA: ICD-10-CM

## 2021-01-01 DIAGNOSIS — N18.4 STAGE 4 CHRONIC KIDNEY DISEASE (HCC): ICD-10-CM

## 2021-01-01 DIAGNOSIS — I50.9 ACUTE EXACERBATION OF CHF (CONGESTIVE HEART FAILURE) (HCC): ICD-10-CM

## 2021-01-01 DIAGNOSIS — U07.1 PNEUMONIA DUE TO COVID-19 VIRUS: ICD-10-CM

## 2021-01-01 DIAGNOSIS — B37.2 CANDIDAL INTERTRIGO: ICD-10-CM

## 2021-01-01 LAB
ALBUMIN SERPL BCP-MCNC: 2.7 G/DL (ref 3.5–5)
ALBUMIN SERPL BCP-MCNC: 2.9 G/DL (ref 3.5–5)
ALBUMIN SERPL BCP-MCNC: 3 G/DL (ref 3.5–5)
ALBUMIN SERPL BCP-MCNC: 3.1 G/DL (ref 3.5–5)
ALBUMIN SERPL BCP-MCNC: 3.5 G/DL (ref 3.5–5)
ALBUMIN SERPL BCP-MCNC: 4.1 G/DL (ref 3.5–5)
ALP SERPL-CCNC: 109 U/L (ref 46–116)
ALP SERPL-CCNC: 59 U/L (ref 46–116)
ALP SERPL-CCNC: 65 U/L (ref 46–116)
ALP SERPL-CCNC: 65 U/L (ref 46–116)
ALP SERPL-CCNC: 77 U/L (ref 46–116)
ALP SERPL-CCNC: 80 U/L (ref 46–116)
ALT SERPL W P-5'-P-CCNC: 17 U/L (ref 12–78)
ALT SERPL W P-5'-P-CCNC: 18 U/L (ref 12–78)
ALT SERPL W P-5'-P-CCNC: 18 U/L (ref 12–78)
ALT SERPL W P-5'-P-CCNC: 20 U/L (ref 12–78)
ALT SERPL W P-5'-P-CCNC: 20 U/L (ref 12–78)
ALT SERPL W P-5'-P-CCNC: 25 U/L (ref 12–78)
ANION GAP SERPL CALCULATED.3IONS-SCNC: 10 MMOL/L (ref 4–13)
ANION GAP SERPL CALCULATED.3IONS-SCNC: 11 MMOL/L (ref 4–13)
ANION GAP SERPL CALCULATED.3IONS-SCNC: 12 MMOL/L (ref 4–13)
ANION GAP SERPL CALCULATED.3IONS-SCNC: 8 MMOL/L (ref 4–13)
ANION GAP SERPL CALCULATED.3IONS-SCNC: 8 MMOL/L (ref 4–13)
ANION GAP SERPL CALCULATED.3IONS-SCNC: 9 MMOL/L (ref 4–13)
APTT PPP: 33 SECONDS (ref 23–37)
AST SERPL W P-5'-P-CCNC: 19 U/L (ref 5–45)
AST SERPL W P-5'-P-CCNC: 20 U/L (ref 5–45)
AST SERPL W P-5'-P-CCNC: 39 U/L (ref 5–45)
AST SERPL W P-5'-P-CCNC: 48 U/L (ref 5–45)
AST SERPL W P-5'-P-CCNC: 54 U/L (ref 5–45)
AST SERPL W P-5'-P-CCNC: 55 U/L (ref 5–45)
ATRIAL RATE: 100 BPM
ATRIAL RATE: 101 BPM
ATRIAL RATE: 102 BPM
ATRIAL RATE: 110 BPM
ATRIAL RATE: 87 BPM
ATRIAL RATE: 95 BPM
ATRIAL RATE: 97 BPM
ATRIAL RATE: 99 BPM
BACTERIA UR CULT: NORMAL
BACTERIA UR QL AUTO: ABNORMAL /HPF
BACTERIA UR QL AUTO: ABNORMAL /HPF
BASE EX.OXY STD BLDV CALC-SCNC: 45.4 % (ref 60–80)
BASE EXCESS BLDV CALC-SCNC: 1.3 MMOL/L
BASOPHILS # BLD AUTO: 0 THOUSANDS/ΜL (ref 0–0.1)
BASOPHILS # BLD AUTO: 0 THOUSANDS/ΜL (ref 0–0.1)
BASOPHILS # BLD AUTO: 0.01 THOUSANDS/ΜL (ref 0–0.1)
BASOPHILS # BLD AUTO: 0.04 THOUSANDS/ΜL (ref 0–0.1)
BASOPHILS # BLD AUTO: 0.05 THOUSANDS/ΜL (ref 0–0.1)
BASOPHILS NFR BLD AUTO: 0 % (ref 0–1)
BASOPHILS NFR BLD AUTO: 1 % (ref 0–1)
BASOPHILS NFR BLD AUTO: 1 % (ref 0–1)
BILIRUB SERPL-MCNC: 0.34 MG/DL (ref 0.2–1)
BILIRUB SERPL-MCNC: 0.34 MG/DL (ref 0.2–1)
BILIRUB SERPL-MCNC: 0.37 MG/DL (ref 0.2–1)
BILIRUB SERPL-MCNC: 0.53 MG/DL (ref 0.2–1)
BILIRUB SERPL-MCNC: 0.53 MG/DL (ref 0.2–1)
BILIRUB SERPL-MCNC: 1.41 MG/DL (ref 0.2–1)
BILIRUB UR QL STRIP: NEGATIVE
BUN SERPL-MCNC: 23 MG/DL (ref 5–25)
BUN SERPL-MCNC: 24 MG/DL (ref 5–25)
BUN SERPL-MCNC: 26 MG/DL (ref 5–25)
BUN SERPL-MCNC: 27 MG/DL (ref 5–25)
BUN SERPL-MCNC: 28 MG/DL (ref 5–25)
BUN SERPL-MCNC: 32 MG/DL (ref 5–25)
BUN SERPL-MCNC: 32 MG/DL (ref 5–25)
BUN SERPL-MCNC: 34 MG/DL (ref 5–25)
CA-I BLD-SCNC: 1 MMOL/L (ref 1.12–1.32)
CALCIUM ALBUM COR SERPL-MCNC: 8.8 MG/DL (ref 8.3–10.1)
CALCIUM ALBUM COR SERPL-MCNC: 8.9 MG/DL (ref 8.3–10.1)
CALCIUM ALBUM COR SERPL-MCNC: 8.9 MG/DL (ref 8.3–10.1)
CALCIUM ALBUM COR SERPL-MCNC: 9 MG/DL (ref 8.3–10.1)
CALCIUM SERPL-MCNC: 7.9 MG/DL (ref 8.3–10.1)
CALCIUM SERPL-MCNC: 8 MG/DL (ref 8.3–10.1)
CALCIUM SERPL-MCNC: 8.1 MG/DL (ref 8.3–10.1)
CALCIUM SERPL-MCNC: 8.2 MG/DL (ref 8.3–10.1)
CALCIUM SERPL-MCNC: 8.2 MG/DL (ref 8.3–10.1)
CALCIUM SERPL-MCNC: 8.5 MG/DL (ref 8.3–10.1)
CALCIUM SERPL-MCNC: 8.7 MG/DL (ref 8.3–10.1)
CALCIUM SERPL-MCNC: 8.8 MG/DL (ref 8.3–10.1)
CALCIUM SERPL-MCNC: 8.8 MG/DL (ref 8.3–10.1)
CALCIUM SERPL-MCNC: 9.4 MG/DL (ref 8.3–10.1)
CHLORIDE SERPL-SCNC: 102 MMOL/L (ref 100–108)
CHLORIDE SERPL-SCNC: 103 MMOL/L (ref 100–108)
CHLORIDE SERPL-SCNC: 104 MMOL/L (ref 100–108)
CHLORIDE SERPL-SCNC: 105 MMOL/L (ref 100–108)
CHLORIDE SERPL-SCNC: 107 MMOL/L (ref 100–108)
CHOLEST SERPL-MCNC: 167 MG/DL (ref 50–200)
CK MB SERPL-MCNC: 1.3 % (ref 0–2.5)
CK MB SERPL-MCNC: 1.4 % (ref 0–2.5)
CK MB SERPL-MCNC: 2.3 NG/ML (ref 0–5)
CK MB SERPL-MCNC: 9 NG/ML (ref 0–5)
CK SERPL-CCNC: 175 U/L (ref 26–192)
CK SERPL-CCNC: 632 U/L (ref 26–192)
CLARITY UR: CLEAR
CO2 SERPL-SCNC: 23 MMOL/L (ref 21–32)
CO2 SERPL-SCNC: 25 MMOL/L (ref 21–32)
CO2 SERPL-SCNC: 26 MMOL/L (ref 21–32)
CO2 SERPL-SCNC: 27 MMOL/L (ref 21–32)
CO2 SERPL-SCNC: 28 MMOL/L (ref 21–32)
CO2 SERPL-SCNC: 29 MMOL/L (ref 21–32)
COLOR UR: YELLOW
CREAT SERPL-MCNC: 1.14 MG/DL (ref 0.6–1.3)
CREAT SERPL-MCNC: 1.14 MG/DL (ref 0.6–1.3)
CREAT SERPL-MCNC: 1.22 MG/DL (ref 0.6–1.3)
CREAT SERPL-MCNC: 1.28 MG/DL (ref 0.6–1.3)
CREAT SERPL-MCNC: 1.31 MG/DL (ref 0.6–1.3)
CREAT SERPL-MCNC: 1.32 MG/DL (ref 0.6–1.3)
CREAT SERPL-MCNC: 1.36 MG/DL (ref 0.6–1.3)
CREAT SERPL-MCNC: 1.52 MG/DL (ref 0.6–1.3)
CREAT SERPL-MCNC: 1.55 MG/DL (ref 0.6–1.3)
CREAT SERPL-MCNC: 1.65 MG/DL (ref 0.6–1.3)
CRP SERPL QL: 49.8 MG/L
CRP SERPL QL: 60.8 MG/L
CRP SERPL QL: 77.5 MG/L
D DIMER PPP FEU-MCNC: 0.96 UG/ML FEU
EOSINOPHIL # BLD AUTO: 0 THOUSAND/ΜL (ref 0–0.61)
EOSINOPHIL # BLD AUTO: 0.01 THOUSAND/ΜL (ref 0–0.61)
EOSINOPHIL # BLD AUTO: 0.04 THOUSAND/ΜL (ref 0–0.61)
EOSINOPHIL # BLD AUTO: 0.37 THOUSAND/ΜL (ref 0–0.61)
EOSINOPHIL NFR BLD AUTO: 0 % (ref 0–6)
EOSINOPHIL NFR BLD AUTO: 1 % (ref 0–6)
EOSINOPHIL NFR BLD AUTO: 6 % (ref 0–6)
ERYTHROCYTE [DISTWIDTH] IN BLOOD BY AUTOMATED COUNT: 13.5 % (ref 11.6–15.1)
ERYTHROCYTE [DISTWIDTH] IN BLOOD BY AUTOMATED COUNT: 13.5 % (ref 11.6–15.1)
ERYTHROCYTE [DISTWIDTH] IN BLOOD BY AUTOMATED COUNT: 13.7 % (ref 11.6–15.1)
ERYTHROCYTE [DISTWIDTH] IN BLOOD BY AUTOMATED COUNT: 13.7 % (ref 11.6–15.1)
ERYTHROCYTE [DISTWIDTH] IN BLOOD BY AUTOMATED COUNT: 13.9 % (ref 11.6–15.1)
ERYTHROCYTE [DISTWIDTH] IN BLOOD BY AUTOMATED COUNT: 15.6 % (ref 11.6–15.1)
ERYTHROCYTE [DISTWIDTH] IN BLOOD BY AUTOMATED COUNT: 16.3 % (ref 11.6–15.1)
FERRITIN SERPL-MCNC: 188 NG/ML (ref 8–388)
FERRITIN SERPL-MCNC: 330 NG/ML (ref 8–388)
FLUAV RNA RESP QL NAA+PROBE: NEGATIVE
FLUBV RNA RESP QL NAA+PROBE: NEGATIVE
G6PD BLD QN: 236 U/10E12 RBC (ref 127–427)
GFR SERPL CREATININE-BSD FRML MDRD: 27 ML/MIN/1.73SQ M
GFR SERPL CREATININE-BSD FRML MDRD: 29 ML/MIN/1.73SQ M
GFR SERPL CREATININE-BSD FRML MDRD: 29 ML/MIN/1.73SQ M
GFR SERPL CREATININE-BSD FRML MDRD: 34 ML/MIN/1.73SQ M
GFR SERPL CREATININE-BSD FRML MDRD: 35 ML/MIN/1.73SQ M
GFR SERPL CREATININE-BSD FRML MDRD: 35 ML/MIN/1.73SQ M
GFR SERPL CREATININE-BSD FRML MDRD: 36 ML/MIN/1.73SQ M
GFR SERPL CREATININE-BSD FRML MDRD: 38 ML/MIN/1.73SQ M
GFR SERPL CREATININE-BSD FRML MDRD: 42 ML/MIN/1.73SQ M
GFR SERPL CREATININE-BSD FRML MDRD: 42 ML/MIN/1.73SQ M
GLUCOSE SERPL-MCNC: 102 MG/DL (ref 65–140)
GLUCOSE SERPL-MCNC: 116 MG/DL (ref 65–140)
GLUCOSE SERPL-MCNC: 118 MG/DL (ref 65–140)
GLUCOSE SERPL-MCNC: 119 MG/DL (ref 65–140)
GLUCOSE SERPL-MCNC: 119 MG/DL (ref 65–140)
GLUCOSE SERPL-MCNC: 120 MG/DL (ref 65–140)
GLUCOSE SERPL-MCNC: 132 MG/DL (ref 65–140)
GLUCOSE SERPL-MCNC: 134 MG/DL (ref 65–140)
GLUCOSE SERPL-MCNC: 85 MG/DL (ref 65–140)
GLUCOSE SERPL-MCNC: 87 MG/DL (ref 65–140)
GLUCOSE SERPL-MCNC: 88 MG/DL (ref 65–140)
GLUCOSE SERPL-MCNC: 92 MG/DL (ref 65–140)
GLUCOSE UR STRIP-MCNC: NEGATIVE MG/DL
HCO3 BLDV-SCNC: 27.4 MMOL/L (ref 24–30)
HCT VFR BLD AUTO: 33.2 % (ref 34.8–46.1)
HCT VFR BLD AUTO: 33.5 % (ref 34.8–46.1)
HCT VFR BLD AUTO: 35 % (ref 34.8–46.1)
HCT VFR BLD AUTO: 36 % (ref 34.8–46.1)
HCT VFR BLD AUTO: 36.1 % (ref 34.8–46.1)
HCT VFR BLD AUTO: 36.9 % (ref 34.8–46.1)
HCT VFR BLD AUTO: 38 % (ref 34.8–46.1)
HDLC SERPL-MCNC: 61 MG/DL
HGB BLD-MCNC: 10.2 G/DL (ref 11.5–15.4)
HGB BLD-MCNC: 10.3 G/DL (ref 11.5–15.4)
HGB BLD-MCNC: 10.5 G/DL (ref 11.5–15.4)
HGB BLD-MCNC: 11.1 G/DL (ref 11.5–15.4)
HGB BLD-MCNC: 11.3 G/DL (ref 11.5–15.4)
HGB BLD-MCNC: 11.5 G/DL (ref 11.5–15.4)
HGB BLD-MCNC: 12.2 G/DL (ref 11.5–15.4)
HGB UR QL STRIP.AUTO: ABNORMAL
HGB UR QL STRIP.AUTO: NEGATIVE
HGB UR QL STRIP.AUTO: NEGATIVE
IMM GRANULOCYTES # BLD AUTO: 0.01 THOUSAND/UL (ref 0–0.2)
IMM GRANULOCYTES # BLD AUTO: 0.01 THOUSAND/UL (ref 0–0.2)
IMM GRANULOCYTES # BLD AUTO: 0.02 THOUSAND/UL (ref 0–0.2)
IMM GRANULOCYTES # BLD AUTO: 0.03 THOUSAND/UL (ref 0–0.2)
IMM GRANULOCYTES NFR BLD AUTO: 0 % (ref 0–2)
IMM GRANULOCYTES NFR BLD AUTO: 1 % (ref 0–2)
IMM GRANULOCYTES NFR BLD AUTO: 1 % (ref 0–2)
INR PPP: 1.17 (ref 0.84–1.19)
INR PPP: 1.3 (ref 0.84–1.19)
KETONES UR STRIP-MCNC: NEGATIVE MG/DL
LACTATE SERPL-SCNC: 1.7 MMOL/L (ref 0.5–2)
LACTATE SERPL-SCNC: 1.9 MMOL/L (ref 0.5–2)
LACTATE SERPL-SCNC: 2.1 MMOL/L (ref 0.5–2)
LACTATE SERPL-SCNC: 2.3 MMOL/L (ref 0.5–2)
LDLC SERPL CALC-MCNC: 98 MG/DL (ref 0–100)
LEUKOCYTE ESTERASE UR QL STRIP: ABNORMAL
LEUKOCYTE ESTERASE UR QL STRIP: ABNORMAL
LEUKOCYTE ESTERASE UR QL STRIP: NEGATIVE
LIPASE SERPL-CCNC: 103 U/L (ref 73–393)
LYMPHOCYTES # BLD AUTO: 0.52 THOUSANDS/ΜL (ref 0.6–4.47)
LYMPHOCYTES # BLD AUTO: 0.66 THOUSANDS/ΜL (ref 0.6–4.47)
LYMPHOCYTES # BLD AUTO: 0.7 THOUSANDS/ΜL (ref 0.6–4.47)
LYMPHOCYTES # BLD AUTO: 0.86 THOUSANDS/ΜL (ref 0.6–4.47)
LYMPHOCYTES # BLD AUTO: 0.87 THOUSANDS/ΜL (ref 0.6–4.47)
LYMPHOCYTES # BLD AUTO: 1.04 THOUSANDS/ΜL (ref 0.6–4.47)
LYMPHOCYTES # BLD AUTO: 1.84 THOUSANDS/ΜL (ref 0.6–4.47)
LYMPHOCYTES NFR BLD AUTO: 11 % (ref 14–44)
LYMPHOCYTES NFR BLD AUTO: 14 % (ref 14–44)
LYMPHOCYTES NFR BLD AUTO: 17 % (ref 14–44)
LYMPHOCYTES NFR BLD AUTO: 20 % (ref 14–44)
LYMPHOCYTES NFR BLD AUTO: 20 % (ref 14–44)
LYMPHOCYTES NFR BLD AUTO: 23 % (ref 14–44)
LYMPHOCYTES NFR BLD AUTO: 32 % (ref 14–44)
MAGNESIUM SERPL-MCNC: 2.1 MG/DL (ref 1.6–2.6)
MAGNESIUM SERPL-MCNC: 2.5 MG/DL (ref 1.6–2.6)
MCH RBC QN AUTO: 31.3 PG (ref 26.8–34.3)
MCH RBC QN AUTO: 31.7 PG (ref 26.8–34.3)
MCH RBC QN AUTO: 32.1 PG (ref 26.8–34.3)
MCH RBC QN AUTO: 32.4 PG (ref 26.8–34.3)
MCH RBC QN AUTO: 32.5 PG (ref 26.8–34.3)
MCH RBC QN AUTO: 32.8 PG (ref 26.8–34.3)
MCH RBC QN AUTO: 33 PG (ref 26.8–34.3)
MCHC RBC AUTO-ENTMCNC: 30 G/DL (ref 31.4–37.4)
MCHC RBC AUTO-ENTMCNC: 30.7 G/DL (ref 31.4–37.4)
MCHC RBC AUTO-ENTMCNC: 30.7 G/DL (ref 31.4–37.4)
MCHC RBC AUTO-ENTMCNC: 30.8 G/DL (ref 31.4–37.4)
MCHC RBC AUTO-ENTMCNC: 31.2 G/DL (ref 31.4–37.4)
MCHC RBC AUTO-ENTMCNC: 31.3 G/DL (ref 31.4–37.4)
MCHC RBC AUTO-ENTMCNC: 32.1 G/DL (ref 31.4–37.4)
MCV RBC AUTO: 103 FL (ref 82–98)
MCV RBC AUTO: 104 FL (ref 82–98)
MCV RBC AUTO: 105 FL (ref 82–98)
MCV RBC AUTO: 105 FL (ref 82–98)
MCV RBC AUTO: 107 FL (ref 82–98)
MONOCYTES # BLD AUTO: 0.37 THOUSAND/ΜL (ref 0.17–1.22)
MONOCYTES # BLD AUTO: 0.42 THOUSAND/ΜL (ref 0.17–1.22)
MONOCYTES # BLD AUTO: 0.47 THOUSAND/ΜL (ref 0.17–1.22)
MONOCYTES # BLD AUTO: 0.48 THOUSAND/ΜL (ref 0.17–1.22)
MONOCYTES # BLD AUTO: 0.52 THOUSAND/ΜL (ref 0.17–1.22)
MONOCYTES # BLD AUTO: 0.59 THOUSAND/ΜL (ref 0.17–1.22)
MONOCYTES # BLD AUTO: 0.81 THOUSAND/ΜL (ref 0.17–1.22)
MONOCYTES NFR BLD AUTO: 10 % (ref 4–12)
MONOCYTES NFR BLD AUTO: 10 % (ref 4–12)
MONOCYTES NFR BLD AUTO: 11 % (ref 4–12)
MONOCYTES NFR BLD AUTO: 13 % (ref 4–12)
MONOCYTES NFR BLD AUTO: 23 % (ref 4–12)
MONOCYTES NFR BLD AUTO: 7 % (ref 4–12)
MONOCYTES NFR BLD AUTO: 9 % (ref 4–12)
MUCOUS THREADS UR QL AUTO: ABNORMAL
NEUTROPHILS # BLD AUTO: 2 THOUSANDS/ΜL (ref 1.85–7.62)
NEUTROPHILS # BLD AUTO: 2.45 THOUSANDS/ΜL (ref 1.85–7.62)
NEUTROPHILS # BLD AUTO: 2.81 THOUSANDS/ΜL (ref 1.85–7.62)
NEUTROPHILS # BLD AUTO: 2.88 THOUSANDS/ΜL (ref 1.85–7.62)
NEUTROPHILS # BLD AUTO: 3.04 THOUSANDS/ΜL (ref 1.85–7.62)
NEUTROPHILS # BLD AUTO: 4.52 THOUSANDS/ΜL (ref 1.85–7.62)
NEUTROPHILS # BLD AUTO: 4.52 THOUSANDS/ΜL (ref 1.85–7.62)
NEUTS SEG NFR BLD AUTO: 52 % (ref 43–75)
NEUTS SEG NFR BLD AUTO: 57 % (ref 43–75)
NEUTS SEG NFR BLD AUTO: 63 % (ref 43–75)
NEUTS SEG NFR BLD AUTO: 68 % (ref 43–75)
NEUTS SEG NFR BLD AUTO: 74 % (ref 43–75)
NEUTS SEG NFR BLD AUTO: 76 % (ref 43–75)
NEUTS SEG NFR BLD AUTO: 79 % (ref 43–75)
NITRITE UR QL STRIP: NEGATIVE
NON-SQ EPI CELLS URNS QL MICRO: ABNORMAL /HPF
NON-SQ EPI CELLS URNS QL MICRO: ABNORMAL /HPF
NONHDLC SERPL-MCNC: 106 MG/DL
NRBC BLD AUTO-RTO: 0 /100 WBCS
NT-PROBNP SERPL-MCNC: ABNORMAL PG/ML
O2 CT BLDV-SCNC: 7.4 ML/DL
OTHER STN SPEC: ABNORMAL
P AXIS: 28 DEGREES
P AXIS: 52 DEGREES
P AXIS: 57 DEGREES
P AXIS: 61 DEGREES
P AXIS: 64 DEGREES
P AXIS: 72 DEGREES
P AXIS: 73 DEGREES
P AXIS: 9 DEGREES
PCO2 BLDV: 49.8 MM HG (ref 42–50)
PH BLDV: 7.36 [PH] (ref 7.3–7.4)
PH UR STRIP.AUTO: 5 [PH]
PH UR STRIP.AUTO: 5.5 [PH]
PH UR STRIP.AUTO: 5.5 [PH] (ref 4.5–8)
PLATELET # BLD AUTO: 165 THOUSANDS/UL (ref 149–390)
PLATELET # BLD AUTO: 166 THOUSANDS/UL (ref 149–390)
PLATELET # BLD AUTO: 182 THOUSANDS/UL (ref 149–390)
PLATELET # BLD AUTO: 189 THOUSANDS/UL (ref 149–390)
PLATELET # BLD AUTO: 197 THOUSANDS/UL (ref 149–390)
PLATELET # BLD AUTO: 203 THOUSANDS/UL (ref 149–390)
PLATELET # BLD AUTO: 204 THOUSANDS/UL (ref 149–390)
PLATELET # BLD AUTO: 212 THOUSANDS/UL (ref 149–390)
PLATELET # BLD AUTO: 240 THOUSANDS/UL (ref 149–390)
PMV BLD AUTO: 10.9 FL (ref 8.9–12.7)
PMV BLD AUTO: 11 FL (ref 8.9–12.7)
PMV BLD AUTO: 11.2 FL (ref 8.9–12.7)
PMV BLD AUTO: 11.4 FL (ref 8.9–12.7)
PMV BLD AUTO: 11.4 FL (ref 8.9–12.7)
PMV BLD AUTO: 11.7 FL (ref 8.9–12.7)
PMV BLD AUTO: 11.8 FL (ref 8.9–12.7)
PMV BLD AUTO: 11.8 FL (ref 8.9–12.7)
PMV BLD AUTO: 12.5 FL (ref 8.9–12.7)
PO2 BLDV: 24.8 MM HG (ref 35–45)
POTASSIUM SERPL-SCNC: 3.2 MMOL/L (ref 3.5–5.3)
POTASSIUM SERPL-SCNC: 3.6 MMOL/L (ref 3.5–5.3)
POTASSIUM SERPL-SCNC: 3.8 MMOL/L (ref 3.5–5.3)
POTASSIUM SERPL-SCNC: 3.9 MMOL/L (ref 3.5–5.3)
POTASSIUM SERPL-SCNC: 4 MMOL/L (ref 3.5–5.3)
POTASSIUM SERPL-SCNC: 4.1 MMOL/L (ref 3.5–5.3)
POTASSIUM SERPL-SCNC: 4.2 MMOL/L (ref 3.5–5.3)
POTASSIUM SERPL-SCNC: 5.6 MMOL/L (ref 3.5–5.3)
PR INTERVAL: 126 MS
PR INTERVAL: 126 MS
PR INTERVAL: 128 MS
PR INTERVAL: 128 MS
PR INTERVAL: 130 MS
PR INTERVAL: 134 MS
PR INTERVAL: 142 MS
PR INTERVAL: 144 MS
PROCALCITONIN SERPL-MCNC: 0.13 NG/ML
PROCALCITONIN SERPL-MCNC: 0.23 NG/ML
PROCALCITONIN SERPL-MCNC: <0.05 NG/ML
PROCALCITONIN SERPL-MCNC: <0.05 NG/ML
PROT SERPL-MCNC: 6.1 G/DL (ref 6.4–8.2)
PROT SERPL-MCNC: 6.7 G/DL (ref 6.4–8.2)
PROT SERPL-MCNC: 6.9 G/DL (ref 6.4–8.2)
PROT SERPL-MCNC: 7 G/DL (ref 6.4–8.2)
PROT SERPL-MCNC: 7.5 G/DL (ref 6.4–8.2)
PROT SERPL-MCNC: 7.9 G/DL (ref 6.4–8.2)
PROT UR STRIP-MCNC: NEGATIVE MG/DL
PROTHROMBIN TIME: 14.7 SECONDS (ref 11.6–14.5)
PROTHROMBIN TIME: 15.9 SECONDS (ref 11.6–14.5)
QRS AXIS: -1 DEGREES
QRS AXIS: -12 DEGREES
QRS AXIS: -19 DEGREES
QRS AXIS: -19 DEGREES
QRS AXIS: -4 DEGREES
QRS AXIS: -6 DEGREES
QRS AXIS: -8 DEGREES
QRS AXIS: 17 DEGREES
QRSD INTERVAL: 100 MS
QRSD INTERVAL: 102 MS
QRSD INTERVAL: 104 MS
QRSD INTERVAL: 108 MS
QRSD INTERVAL: 96 MS
QRSD INTERVAL: 96 MS
QT INTERVAL: 314 MS
QT INTERVAL: 320 MS
QT INTERVAL: 330 MS
QT INTERVAL: 336 MS
QT INTERVAL: 346 MS
QT INTERVAL: 356 MS
QT INTERVAL: 362 MS
QT INTERVAL: 370 MS
QTC INTERVAL: 402 MS
QTC INTERVAL: 404 MS
QTC INTERVAL: 407 MS
QTC INTERVAL: 441 MS
QTC INTERVAL: 446 MS
QTC INTERVAL: 452 MS
QTC INTERVAL: 471 MS
QTC INTERVAL: 474 MS
RBC # BLD AUTO: 3.14 MILLION/UL (ref 3.81–5.12)
RBC # BLD AUTO: 3.22 MILLION/UL (ref 3.81–5.12)
RBC # BLD AUTO: 3.35 MILLION/UL (ref 3.81–5.12)
RBC # BLD AUTO: 3.42 MILLION/UL (ref 3.81–5.12)
RBC # BLD AUTO: 3.48 X10E6/UL (ref 3.77–5.28)
RBC # BLD AUTO: 3.52 MILLION/UL (ref 3.81–5.12)
RBC # BLD AUTO: 3.55 MILLION/UL (ref 3.81–5.12)
RBC # BLD AUTO: 3.7 MILLION/UL (ref 3.81–5.12)
RBC #/AREA URNS AUTO: ABNORMAL /HPF
RBC #/AREA URNS AUTO: ABNORMAL /HPF
RSV RNA RESP QL NAA+PROBE: NEGATIVE
SARS-COV-2 RNA RESP QL NAA+PROBE: POSITIVE
SODIUM SERPL-SCNC: 138 MMOL/L (ref 136–145)
SODIUM SERPL-SCNC: 139 MMOL/L (ref 136–145)
SODIUM SERPL-SCNC: 141 MMOL/L (ref 136–145)
SODIUM SERPL-SCNC: 142 MMOL/L (ref 136–145)
SODIUM SERPL-SCNC: 143 MMOL/L (ref 136–145)
SODIUM SERPL-SCNC: 143 MMOL/L (ref 136–145)
SP GR UR STRIP.AUTO: 1.01 (ref 1–1.03)
SP GR UR STRIP.AUTO: 1.02 (ref 1–1.03)
SP GR UR STRIP.AUTO: 1.02 (ref 1–1.03)
T WAVE AXIS: 118 DEGREES
T WAVE AXIS: 161 DEGREES
T WAVE AXIS: 208 DEGREES
T WAVE AXIS: 228 DEGREES
T WAVE AXIS: 234 DEGREES
T WAVE AXIS: 244 DEGREES
T WAVE AXIS: 250 DEGREES
T WAVE AXIS: 251 DEGREES
TRIGL SERPL-MCNC: 38 MG/DL
TRIGL SERPL-MCNC: 60 MG/DL
TROPONIN I SERPL-MCNC: 0.14 NG/ML
TROPONIN I SERPL-MCNC: 0.15 NG/ML
TROPONIN I SERPL-MCNC: 0.16 NG/ML
TROPONIN I SERPL-MCNC: 5.25 NG/ML
TROPONIN I SERPL-MCNC: 5.87 NG/ML
TROPONIN I SERPL-MCNC: 6.7 NG/ML
TROPONIN I SERPL-MCNC: 6.83 NG/ML
TSH SERPL DL<=0.05 MIU/L-ACNC: 1.26 UIU/ML (ref 0.36–3.74)
UROBILINOGEN UR QL STRIP.AUTO: 0.2 E.U./DL
VENTRICULAR RATE: 101 BPM
VENTRICULAR RATE: 102 BPM
VENTRICULAR RATE: 110 BPM
VENTRICULAR RATE: 87 BPM
VENTRICULAR RATE: 95 BPM
VENTRICULAR RATE: 97 BPM
VENTRICULAR RATE: 98 BPM
VENTRICULAR RATE: 99 BPM
WBC # BLD AUTO: 3.54 THOUSAND/UL (ref 4.31–10.16)
WBC # BLD AUTO: 3.71 THOUSAND/UL (ref 4.31–10.16)
WBC # BLD AUTO: 3.84 THOUSAND/UL (ref 4.31–10.16)
WBC # BLD AUTO: 4.23 THOUSAND/UL (ref 4.31–10.16)
WBC # BLD AUTO: 5.8 THOUSAND/UL (ref 4.31–10.16)
WBC # BLD AUTO: 5.84 THOUSAND/UL (ref 4.31–10.16)
WBC # BLD AUTO: 6.08 THOUSAND/UL (ref 4.31–10.16)
WBC #/AREA URNS AUTO: ABNORMAL /HPF
WBC #/AREA URNS AUTO: ABNORMAL /HPF

## 2021-01-01 PROCEDURE — 82553 CREATINE MB FRACTION: CPT | Performed by: INTERNAL MEDICINE

## 2021-01-01 PROCEDURE — 97116 GAIT TRAINING THERAPY: CPT

## 2021-01-01 PROCEDURE — 36415 COLL VENOUS BLD VENIPUNCTURE: CPT | Performed by: EMERGENCY MEDICINE

## 2021-01-01 PROCEDURE — 85025 COMPLETE CBC W/AUTO DIFF WBC: CPT | Performed by: INTERNAL MEDICINE

## 2021-01-01 PROCEDURE — 84145 PROCALCITONIN (PCT): CPT | Performed by: INTERNAL MEDICINE

## 2021-01-01 PROCEDURE — 99232 SBSQ HOSP IP/OBS MODERATE 35: CPT

## 2021-01-01 PROCEDURE — 82948 REAGENT STRIP/BLOOD GLUCOSE: CPT

## 2021-01-01 PROCEDURE — 85025 COMPLETE CBC W/AUTO DIFF WBC: CPT | Performed by: EMERGENCY MEDICINE

## 2021-01-01 PROCEDURE — 82728 ASSAY OF FERRITIN: CPT | Performed by: PHYSICIAN ASSISTANT

## 2021-01-01 PROCEDURE — 84484 ASSAY OF TROPONIN QUANT: CPT | Performed by: INTERNAL MEDICINE

## 2021-01-01 PROCEDURE — 82955 ASSAY OF G6PD ENZYME: CPT | Performed by: PHYSICIAN ASSISTANT

## 2021-01-01 PROCEDURE — 82330 ASSAY OF CALCIUM: CPT | Performed by: PHYSICIAN ASSISTANT

## 2021-01-01 PROCEDURE — 82553 CREATINE MB FRACTION: CPT | Performed by: PHYSICIAN ASSISTANT

## 2021-01-01 PROCEDURE — 84484 ASSAY OF TROPONIN QUANT: CPT | Performed by: PHYSICIAN ASSISTANT

## 2021-01-01 PROCEDURE — 97163 PT EVAL HIGH COMPLEX 45 MIN: CPT

## 2021-01-01 PROCEDURE — 71045 X-RAY EXAM CHEST 1 VIEW: CPT

## 2021-01-01 PROCEDURE — 97166 OT EVAL MOD COMPLEX 45 MIN: CPT

## 2021-01-01 PROCEDURE — NC001 PR NO CHARGE: Performed by: PHYSICIAN ASSISTANT

## 2021-01-01 PROCEDURE — 84478 ASSAY OF TRIGLYCERIDES: CPT | Performed by: PHYSICIAN ASSISTANT

## 2021-01-01 PROCEDURE — 99239 HOSP IP/OBS DSCHRG MGMT >30: CPT | Performed by: STUDENT IN AN ORGANIZED HEALTH CARE EDUCATION/TRAINING PROGRAM

## 2021-01-01 PROCEDURE — 85049 AUTOMATED PLATELET COUNT: CPT | Performed by: INTERNAL MEDICINE

## 2021-01-01 PROCEDURE — 99232 SBSQ HOSP IP/OBS MODERATE 35: CPT | Performed by: PHYSICIAN ASSISTANT

## 2021-01-01 PROCEDURE — 86140 C-REACTIVE PROTEIN: CPT | Performed by: INTERNAL MEDICINE

## 2021-01-01 PROCEDURE — 92610 EVALUATE SWALLOWING FUNCTION: CPT

## 2021-01-01 PROCEDURE — 82805 BLOOD GASES W/O2 SATURATION: CPT | Performed by: EMERGENCY MEDICINE

## 2021-01-01 PROCEDURE — 99232 SBSQ HOSP IP/OBS MODERATE 35: CPT | Performed by: INTERNAL MEDICINE

## 2021-01-01 PROCEDURE — 82550 ASSAY OF CK (CPK): CPT | Performed by: PHYSICIAN ASSISTANT

## 2021-01-01 PROCEDURE — 97110 THERAPEUTIC EXERCISES: CPT

## 2021-01-01 PROCEDURE — 99222 1ST HOSP IP/OBS MODERATE 55: CPT | Performed by: INTERNAL MEDICINE

## 2021-01-01 PROCEDURE — 83880 ASSAY OF NATRIURETIC PEPTIDE: CPT | Performed by: PHYSICIAN ASSISTANT

## 2021-01-01 PROCEDURE — 83880 ASSAY OF NATRIURETIC PEPTIDE: CPT | Performed by: EMERGENCY MEDICINE

## 2021-01-01 PROCEDURE — 93010 ELECTROCARDIOGRAM REPORT: CPT | Performed by: INTERNAL MEDICINE

## 2021-01-01 PROCEDURE — 99285 EMERGENCY DEPT VISIT HI MDM: CPT

## 2021-01-01 PROCEDURE — 85730 THROMBOPLASTIN TIME PARTIAL: CPT | Performed by: EMERGENCY MEDICINE

## 2021-01-01 PROCEDURE — 99291 CRITICAL CARE FIRST HOUR: CPT

## 2021-01-01 PROCEDURE — 93005 ELECTROCARDIOGRAM TRACING: CPT

## 2021-01-01 PROCEDURE — 70450 CT HEAD/BRAIN W/O DYE: CPT

## 2021-01-01 PROCEDURE — 96376 TX/PRO/DX INJ SAME DRUG ADON: CPT

## 2021-01-01 PROCEDURE — 80048 BASIC METABOLIC PNL TOTAL CA: CPT | Performed by: PHYSICIAN ASSISTANT

## 2021-01-01 PROCEDURE — 80053 COMPREHEN METABOLIC PANEL: CPT | Performed by: INTERNAL MEDICINE

## 2021-01-01 PROCEDURE — 86140 C-REACTIVE PROTEIN: CPT | Performed by: PHYSICIAN ASSISTANT

## 2021-01-01 PROCEDURE — 96375 TX/PRO/DX INJ NEW DRUG ADDON: CPT

## 2021-01-01 PROCEDURE — 80053 COMPREHEN METABOLIC PANEL: CPT | Performed by: FAMILY MEDICINE

## 2021-01-01 PROCEDURE — 99223 1ST HOSP IP/OBS HIGH 75: CPT | Performed by: INTERNAL MEDICINE

## 2021-01-01 PROCEDURE — 84145 PROCALCITONIN (PCT): CPT | Performed by: EMERGENCY MEDICINE

## 2021-01-01 PROCEDURE — 36415 COLL VENOUS BLD VENIPUNCTURE: CPT | Performed by: PHYSICIAN ASSISTANT

## 2021-01-01 PROCEDURE — 99285 EMERGENCY DEPT VISIT HI MDM: CPT | Performed by: EMERGENCY MEDICINE

## 2021-01-01 PROCEDURE — 83880 ASSAY OF NATRIURETIC PEPTIDE: CPT | Performed by: FAMILY MEDICINE

## 2021-01-01 PROCEDURE — 36415 COLL VENOUS BLD VENIPUNCTURE: CPT | Performed by: FAMILY MEDICINE

## 2021-01-01 PROCEDURE — 83735 ASSAY OF MAGNESIUM: CPT | Performed by: INTERNAL MEDICINE

## 2021-01-01 PROCEDURE — 80061 LIPID PANEL: CPT | Performed by: INTERNAL MEDICINE

## 2021-01-01 PROCEDURE — 85025 COMPLETE CBC W/AUTO DIFF WBC: CPT | Performed by: FAMILY MEDICINE

## 2021-01-01 PROCEDURE — 85379 FIBRIN DEGRADATION QUANT: CPT | Performed by: INTERNAL MEDICINE

## 2021-01-01 PROCEDURE — 96365 THER/PROPH/DIAG IV INF INIT: CPT

## 2021-01-01 PROCEDURE — 80053 COMPREHEN METABOLIC PANEL: CPT | Performed by: EMERGENCY MEDICINE

## 2021-01-01 PROCEDURE — 85610 PROTHROMBIN TIME: CPT | Performed by: PHYSICIAN ASSISTANT

## 2021-01-01 PROCEDURE — 81001 URINALYSIS AUTO W/SCOPE: CPT

## 2021-01-01 PROCEDURE — 99239 HOSP IP/OBS DSCHRG MGMT >30: CPT | Performed by: INTERNAL MEDICINE

## 2021-01-01 PROCEDURE — 96374 THER/PROPH/DIAG INJ IV PUSH: CPT

## 2021-01-01 PROCEDURE — 83880 ASSAY OF NATRIURETIC PEPTIDE: CPT | Performed by: INTERNAL MEDICINE

## 2021-01-01 PROCEDURE — 83605 ASSAY OF LACTIC ACID: CPT | Performed by: EMERGENCY MEDICINE

## 2021-01-01 PROCEDURE — 84443 ASSAY THYROID STIM HORMONE: CPT | Performed by: INTERNAL MEDICINE

## 2021-01-01 PROCEDURE — 80048 BASIC METABOLIC PNL TOTAL CA: CPT | Performed by: INTERNAL MEDICINE

## 2021-01-01 PROCEDURE — 84484 ASSAY OF TROPONIN QUANT: CPT | Performed by: EMERGENCY MEDICINE

## 2021-01-01 PROCEDURE — XW033E5 INTRODUCTION OF REMDESIVIR ANTI-INFECTIVE INTO PERIPHERAL VEIN, PERCUTANEOUS APPROACH, NEW TECHNOLOGY GROUP 5: ICD-10-PCS | Performed by: INTERNAL MEDICINE

## 2021-01-01 PROCEDURE — 83605 ASSAY OF LACTIC ACID: CPT | Performed by: INTERNAL MEDICINE

## 2021-01-01 PROCEDURE — 85025 COMPLETE CBC W/AUTO DIFF WBC: CPT | Performed by: PHYSICIAN ASSISTANT

## 2021-01-01 PROCEDURE — 0241U HB NFCT DS VIR RESP RNA 4 TRGT: CPT | Performed by: EMERGENCY MEDICINE

## 2021-01-01 PROCEDURE — 87086 URINE CULTURE/COLONY COUNT: CPT

## 2021-01-01 PROCEDURE — 84145 PROCALCITONIN (PCT): CPT | Performed by: PHYSICIAN ASSISTANT

## 2021-01-01 PROCEDURE — 81003 URINALYSIS AUTO W/O SCOPE: CPT | Performed by: PHYSICIAN ASSISTANT

## 2021-01-01 PROCEDURE — 83735 ASSAY OF MAGNESIUM: CPT | Performed by: PHYSICIAN ASSISTANT

## 2021-01-01 PROCEDURE — RECHECK: Performed by: STUDENT IN AN ORGANIZED HEALTH CARE EDUCATION/TRAINING PROGRAM

## 2021-01-01 PROCEDURE — 70490 CT SOFT TISSUE NECK W/O DYE: CPT

## 2021-01-01 PROCEDURE — 82728 ASSAY OF FERRITIN: CPT | Performed by: INTERNAL MEDICINE

## 2021-01-01 PROCEDURE — 99285 EMERGENCY DEPT VISIT HI MDM: CPT | Performed by: PHYSICIAN ASSISTANT

## 2021-01-01 PROCEDURE — 80048 BASIC METABOLIC PNL TOTAL CA: CPT | Performed by: EMERGENCY MEDICINE

## 2021-01-01 PROCEDURE — 84484 ASSAY OF TROPONIN QUANT: CPT | Performed by: FAMILY MEDICINE

## 2021-01-01 PROCEDURE — 99231 SBSQ HOSP IP/OBS SF/LOW 25: CPT | Performed by: PHYSICIAN ASSISTANT

## 2021-01-01 PROCEDURE — 83605 ASSAY OF LACTIC ACID: CPT | Performed by: PHYSICIAN ASSISTANT

## 2021-01-01 PROCEDURE — 99284 EMERGENCY DEPT VISIT MOD MDM: CPT | Performed by: EMERGENCY MEDICINE

## 2021-01-01 PROCEDURE — 99495 TRANSJ CARE MGMT MOD F2F 14D: CPT | Performed by: FAMILY MEDICINE

## 2021-01-01 PROCEDURE — 87040 BLOOD CULTURE FOR BACTERIA: CPT | Performed by: EMERGENCY MEDICINE

## 2021-01-01 PROCEDURE — 99233 SBSQ HOSP IP/OBS HIGH 50: CPT

## 2021-01-01 PROCEDURE — 81001 URINALYSIS AUTO W/SCOPE: CPT | Performed by: EMERGENCY MEDICINE

## 2021-01-01 PROCEDURE — 85610 PROTHROMBIN TIME: CPT | Performed by: EMERGENCY MEDICINE

## 2021-01-01 PROCEDURE — 82550 ASSAY OF CK (CPK): CPT | Performed by: INTERNAL MEDICINE

## 2021-01-01 PROCEDURE — 83690 ASSAY OF LIPASE: CPT | Performed by: EMERGENCY MEDICINE

## 2021-01-01 PROCEDURE — 80053 COMPREHEN METABOLIC PANEL: CPT | Performed by: PHYSICIAN ASSISTANT

## 2021-01-01 PROCEDURE — 99239 HOSP IP/OBS DSCHRG MGMT >30: CPT | Performed by: PHYSICIAN ASSISTANT

## 2021-01-01 PROCEDURE — 74176 CT ABD & PELVIS W/O CONTRAST: CPT

## 2021-01-01 PROCEDURE — 85049 AUTOMATED PLATELET COUNT: CPT | Performed by: PHYSICIAN ASSISTANT

## 2021-01-01 RX ORDER — POTASSIUM CHLORIDE 20 MEQ/1
40 TABLET, EXTENDED RELEASE ORAL DAILY
Qty: 60 TABLET | Refills: 0 | Status: SHIPPED | OUTPATIENT
Start: 2021-01-01 | End: 2021-01-01 | Stop reason: ALTCHOICE

## 2021-01-01 RX ORDER — ATORVASTATIN CALCIUM 40 MG/1
40 TABLET, FILM COATED ORAL
Qty: 30 TABLET | Refills: 0 | Status: SHIPPED | OUTPATIENT
Start: 2021-01-01 | End: 2021-01-01 | Stop reason: ALTCHOICE

## 2021-01-01 RX ORDER — NYSTATIN 100000 [USP'U]/G
POWDER TOPICAL 4 TIMES DAILY
Qty: 15 G | Refills: 2 | Status: SHIPPED | OUTPATIENT
Start: 2021-01-01

## 2021-01-01 RX ORDER — AMOXICILLIN 500 MG/1
1000 CAPSULE ORAL 3 TIMES DAILY
Qty: 42 CAPSULE | Refills: 0 | Status: SHIPPED | OUTPATIENT
Start: 2021-01-01 | End: 2021-01-01 | Stop reason: SDUPTHER

## 2021-01-01 RX ORDER — FUROSEMIDE 10 MG/ML
20 INJECTION INTRAMUSCULAR; INTRAVENOUS
Status: DISCONTINUED | OUTPATIENT
Start: 2021-01-01 | End: 2021-01-01

## 2021-01-01 RX ORDER — SENNOSIDES 8.6 MG
1 TABLET ORAL
Status: DISCONTINUED | OUTPATIENT
Start: 2021-01-01 | End: 2021-01-01 | Stop reason: HOSPADM

## 2021-01-01 RX ORDER — DOXYCYCLINE HYCLATE 100 MG/1
100 CAPSULE ORAL ONCE
Status: COMPLETED | OUTPATIENT
Start: 2021-01-01 | End: 2021-01-01

## 2021-01-01 RX ORDER — ASPIRIN 300 MG/1
300 SUPPOSITORY RECTAL ONCE
Status: COMPLETED | OUTPATIENT
Start: 2021-01-01 | End: 2021-01-01

## 2021-01-01 RX ORDER — POTASSIUM CHLORIDE 20 MEQ/1
40 TABLET, EXTENDED RELEASE ORAL ONCE
Status: COMPLETED | OUTPATIENT
Start: 2021-01-01 | End: 2021-01-01

## 2021-01-01 RX ORDER — METOPROLOL SUCCINATE 25 MG/1
12.5 TABLET, EXTENDED RELEASE ORAL DAILY
Status: DISCONTINUED | OUTPATIENT
Start: 2021-01-01 | End: 2021-01-01 | Stop reason: HOSPADM

## 2021-01-01 RX ORDER — DEXAMETHASONE SODIUM PHOSPHATE 4 MG/ML
6 INJECTION, SOLUTION INTRA-ARTICULAR; INTRALESIONAL; INTRAMUSCULAR; INTRAVENOUS; SOFT TISSUE EVERY 24 HOURS
Status: DISCONTINUED | OUTPATIENT
Start: 2021-01-01 | End: 2021-01-01 | Stop reason: HOSPADM

## 2021-01-01 RX ORDER — METOPROLOL TARTRATE 5 MG/5ML
2.5 INJECTION INTRAVENOUS ONCE
Status: COMPLETED | OUTPATIENT
Start: 2021-01-01 | End: 2021-01-01

## 2021-01-01 RX ORDER — MELATONIN
2000 DAILY
Status: DISCONTINUED | OUTPATIENT
Start: 2021-01-01 | End: 2021-01-01 | Stop reason: HOSPADM

## 2021-01-01 RX ORDER — FAMOTIDINE 20 MG/1
20 TABLET, FILM COATED ORAL DAILY
Status: DISCONTINUED | OUTPATIENT
Start: 2021-01-01 | End: 2021-01-01 | Stop reason: HOSPADM

## 2021-01-01 RX ORDER — FUROSEMIDE 40 MG/1
40 TABLET ORAL DAILY
Status: DISCONTINUED | OUTPATIENT
Start: 2021-01-01 | End: 2021-01-01 | Stop reason: HOSPADM

## 2021-01-01 RX ORDER — ONDANSETRON 2 MG/ML
4 INJECTION INTRAMUSCULAR; INTRAVENOUS EVERY 6 HOURS PRN
Status: DISCONTINUED | OUTPATIENT
Start: 2021-01-01 | End: 2021-01-01 | Stop reason: HOSPADM

## 2021-01-01 RX ORDER — PANTOPRAZOLE SODIUM 40 MG/1
40 TABLET, DELAYED RELEASE ORAL
Status: DISCONTINUED | OUTPATIENT
Start: 2021-01-01 | End: 2021-01-01 | Stop reason: HOSPADM

## 2021-01-01 RX ORDER — DEXTROSE AND SODIUM CHLORIDE 5; .9 G/100ML; G/100ML
50 INJECTION, SOLUTION INTRAVENOUS CONTINUOUS
Status: DISCONTINUED | OUTPATIENT
Start: 2021-01-01 | End: 2021-01-01

## 2021-01-01 RX ORDER — AMOXICILLIN 250 MG/1
1000 CAPSULE ORAL ONCE
Status: COMPLETED | OUTPATIENT
Start: 2021-01-01 | End: 2021-01-01

## 2021-01-01 RX ORDER — FUROSEMIDE 10 MG/ML
20 INJECTION INTRAMUSCULAR; INTRAVENOUS
Status: COMPLETED | OUTPATIENT
Start: 2021-01-01 | End: 2021-01-01

## 2021-01-01 RX ORDER — POTASSIUM CHLORIDE 20 MEQ/1
40 TABLET, EXTENDED RELEASE ORAL DAILY
Status: DISCONTINUED | OUTPATIENT
Start: 2021-01-01 | End: 2021-01-01 | Stop reason: HOSPADM

## 2021-01-01 RX ORDER — AMOXICILLIN 500 MG/1
1000 CAPSULE ORAL 3 TIMES DAILY
Qty: 42 CAPSULE | Refills: 0 | Status: SHIPPED | OUTPATIENT
Start: 2021-01-01 | End: 2021-01-01 | Stop reason: HOSPADM

## 2021-01-01 RX ORDER — ASCORBIC ACID 500 MG
1000 TABLET ORAL EVERY 12 HOURS SCHEDULED
Status: DISCONTINUED | OUTPATIENT
Start: 2021-01-01 | End: 2021-01-01 | Stop reason: HOSPADM

## 2021-01-01 RX ORDER — FUROSEMIDE 20 MG/1
20 TABLET ORAL DAILY
Status: DISCONTINUED | OUTPATIENT
Start: 2021-01-01 | End: 2021-01-01 | Stop reason: HOSPADM

## 2021-01-01 RX ORDER — ATORVASTATIN CALCIUM 40 MG/1
40 TABLET, FILM COATED ORAL
Status: DISCONTINUED | OUTPATIENT
Start: 2021-01-01 | End: 2021-01-01 | Stop reason: HOSPADM

## 2021-01-01 RX ORDER — ALBUMIN (HUMAN) 12.5 G/50ML
25 SOLUTION INTRAVENOUS ONCE
Status: COMPLETED | OUTPATIENT
Start: 2021-01-01 | End: 2021-01-01

## 2021-01-01 RX ORDER — FUROSEMIDE 40 MG/1
40 TABLET ORAL DAILY
Qty: 30 TABLET | Refills: 0 | Status: SHIPPED | OUTPATIENT
Start: 2021-01-01 | End: 2021-03-29

## 2021-01-01 RX ORDER — ALBUTEROL SULFATE 2.5 MG/3ML
5 SOLUTION RESPIRATORY (INHALATION) ONCE
Status: COMPLETED | OUTPATIENT
Start: 2021-01-01 | End: 2021-01-01

## 2021-01-01 RX ORDER — ASPIRIN 81 MG/1
324 TABLET, CHEWABLE ORAL ONCE
Status: COMPLETED | OUTPATIENT
Start: 2021-01-01 | End: 2021-01-01

## 2021-01-01 RX ORDER — OXYBUTYNIN CHLORIDE 5 MG/1
5 TABLET, EXTENDED RELEASE ORAL DAILY
Status: DISCONTINUED | OUTPATIENT
Start: 2021-01-01 | End: 2021-01-01 | Stop reason: HOSPADM

## 2021-01-01 RX ORDER — FUROSEMIDE 10 MG/ML
20 INJECTION INTRAMUSCULAR; INTRAVENOUS
Status: DISCONTINUED | OUTPATIENT
Start: 2021-01-01 | End: 2021-01-01 | Stop reason: HOSPADM

## 2021-01-01 RX ORDER — TOLTERODINE 4 MG/1
4 CAPSULE, EXTENDED RELEASE ORAL DAILY
COMMUNITY

## 2021-01-01 RX ORDER — ATORVASTATIN CALCIUM 40 MG/1
40 TABLET, FILM COATED ORAL
Qty: 14 TABLET | Refills: 0 | Status: ON HOLD | OUTPATIENT
Start: 2021-01-01 | End: 2021-01-01

## 2021-01-01 RX ORDER — ZINC SULFATE 50(220)MG
220 CAPSULE ORAL DAILY
Status: DISCONTINUED | OUTPATIENT
Start: 2021-01-01 | End: 2021-01-01 | Stop reason: HOSPADM

## 2021-01-01 RX ORDER — FUROSEMIDE 10 MG/ML
40 INJECTION INTRAMUSCULAR; INTRAVENOUS
Status: DISCONTINUED | OUTPATIENT
Start: 2021-01-01 | End: 2021-01-01

## 2021-01-01 RX ORDER — METOCLOPRAMIDE HYDROCHLORIDE 5 MG/ML
10 INJECTION INTRAMUSCULAR; INTRAVENOUS ONCE
Status: COMPLETED | OUTPATIENT
Start: 2021-01-01 | End: 2021-01-01

## 2021-01-01 RX ORDER — DOXYCYCLINE HYCLATE 100 MG/1
100 CAPSULE ORAL EVERY 12 HOURS
Status: DISCONTINUED | OUTPATIENT
Start: 2021-01-01 | End: 2021-01-01

## 2021-01-01 RX ORDER — HEPARIN SODIUM 5000 [USP'U]/ML
5000 INJECTION, SOLUTION INTRAVENOUS; SUBCUTANEOUS EVERY 8 HOURS SCHEDULED
Status: DISCONTINUED | OUTPATIENT
Start: 2021-01-01 | End: 2021-01-01 | Stop reason: HOSPADM

## 2021-01-01 RX ORDER — SODIUM CHLORIDE 9 MG/ML
3 INJECTION INTRAVENOUS
Status: DISCONTINUED | OUTPATIENT
Start: 2021-01-01 | End: 2021-01-01 | Stop reason: HOSPADM

## 2021-01-01 RX ORDER — ZINC SULFATE 50(220)MG
220 CAPSULE ORAL DAILY
Qty: 3 CAPSULE | Refills: 0 | Status: SHIPPED | OUTPATIENT
Start: 2021-01-01 | End: 2021-01-01

## 2021-01-01 RX ORDER — FUROSEMIDE 10 MG/ML
40 INJECTION INTRAMUSCULAR; INTRAVENOUS ONCE
Status: COMPLETED | OUTPATIENT
Start: 2021-01-01 | End: 2021-01-01

## 2021-01-01 RX ORDER — ONDANSETRON 2 MG/ML
4 INJECTION INTRAMUSCULAR; INTRAVENOUS ONCE
Status: COMPLETED | OUTPATIENT
Start: 2021-01-01 | End: 2021-01-01

## 2021-01-01 RX ORDER — ONDANSETRON 2 MG/ML
INJECTION INTRAMUSCULAR; INTRAVENOUS
Status: COMPLETED
Start: 2021-01-01 | End: 2021-01-01

## 2021-01-01 RX ORDER — ACETAMINOPHEN 325 MG/1
650 TABLET ORAL EVERY 4 HOURS PRN
Status: DISCONTINUED | OUTPATIENT
Start: 2021-01-01 | End: 2021-01-01 | Stop reason: HOSPADM

## 2021-01-01 RX ORDER — ASPIRIN 81 MG/1
81 TABLET, CHEWABLE ORAL DAILY
Status: DISCONTINUED | OUTPATIENT
Start: 2021-01-01 | End: 2021-01-01 | Stop reason: HOSPADM

## 2021-01-01 RX ORDER — METOCLOPRAMIDE HYDROCHLORIDE 5 MG/ML
10 INJECTION INTRAMUSCULAR; INTRAVENOUS EVERY 6 HOURS PRN
Status: DISCONTINUED | OUTPATIENT
Start: 2021-01-01 | End: 2021-01-01 | Stop reason: HOSPADM

## 2021-01-01 RX ORDER — HYDROMORPHONE HCL/PF 1 MG/ML
0.2 SYRINGE (ML) INJECTION EVERY 6 HOURS PRN
Status: DISCONTINUED | OUTPATIENT
Start: 2021-01-01 | End: 2021-01-01 | Stop reason: HOSPADM

## 2021-01-01 RX ORDER — HYDROCORTISONE 25 MG/G
CREAM TOPICAL 2 TIMES DAILY
Status: DISCONTINUED | OUTPATIENT
Start: 2021-01-01 | End: 2021-01-01 | Stop reason: HOSPADM

## 2021-01-01 RX ORDER — ASPIRIN 81 MG/1
324 TABLET, CHEWABLE ORAL ONCE
Status: DISCONTINUED | OUTPATIENT
Start: 2021-01-01 | End: 2021-01-01 | Stop reason: HOSPADM

## 2021-01-01 RX ORDER — MULTIVITAMIN/IRON/FOLIC ACID 18MG-0.4MG
1 TABLET ORAL DAILY
Status: DISCONTINUED | OUTPATIENT
Start: 2021-01-01 | End: 2021-01-01 | Stop reason: HOSPADM

## 2021-01-01 RX ORDER — POTASSIUM CHLORIDE 3 G/15ML
40 SOLUTION ORAL DAILY
Qty: 450 ML | Refills: 5 | Status: SHIPPED | OUTPATIENT
Start: 2021-01-01 | End: 2021-09-07

## 2021-01-01 RX ADMIN — OXYCODONE HYDROCHLORIDE AND ACETAMINOPHEN 1000 MG: 500 TABLET ORAL at 08:06

## 2021-01-01 RX ADMIN — DEXAMETHASONE SODIUM PHOSPHATE 6 MG: 4 INJECTION, SOLUTION INTRAMUSCULAR; INTRAVENOUS at 16:00

## 2021-01-01 RX ADMIN — METOPROLOL SUCCINATE 12.5 MG: 25 TABLET, EXTENDED RELEASE ORAL at 08:05

## 2021-01-01 RX ADMIN — FUROSEMIDE 20 MG: 10 INJECTION, SOLUTION INTRAMUSCULAR; INTRAVENOUS at 08:54

## 2021-01-01 RX ADMIN — HEPARIN SODIUM 5000 UNITS: 5000 INJECTION INTRAVENOUS; SUBCUTANEOUS at 13:42

## 2021-01-01 RX ADMIN — FUROSEMIDE 40 MG: 10 INJECTION, SOLUTION INTRAMUSCULAR; INTRAVENOUS at 09:00

## 2021-01-01 RX ADMIN — DOXYCYCLINE 100 MG: 100 CAPSULE ORAL at 11:44

## 2021-01-01 RX ADMIN — CEFTRIAXONE SODIUM 1000 MG: 10 INJECTION, POWDER, FOR SOLUTION INTRAVENOUS at 11:46

## 2021-01-01 RX ADMIN — ALBUMIN (HUMAN) 25 G: 0.25 INJECTION, SOLUTION INTRAVENOUS at 15:49

## 2021-01-01 RX ADMIN — HEPARIN SODIUM 5000 UNITS: 5000 INJECTION INTRAVENOUS; SUBCUTANEOUS at 21:51

## 2021-01-01 RX ADMIN — POTASSIUM CHLORIDE 40 MEQ: 1500 TABLET, EXTENDED RELEASE ORAL at 09:17

## 2021-01-01 RX ADMIN — ATORVASTATIN CALCIUM 40 MG: 40 TABLET, FILM COATED ORAL at 21:18

## 2021-01-01 RX ADMIN — ZINC SULFATE 220 MG (50 MG) CAPSULE 220 MG: CAPSULE at 08:04

## 2021-01-01 RX ADMIN — FUROSEMIDE 20 MG: 10 INJECTION, SOLUTION INTRAMUSCULAR; INTRAVENOUS at 10:49

## 2021-01-01 RX ADMIN — OXYBUTYNIN CHLORIDE 5 MG: 5 TABLET, EXTENDED RELEASE ORAL at 08:06

## 2021-01-01 RX ADMIN — OXYCODONE HYDROCHLORIDE AND ACETAMINOPHEN 1000 MG: 500 TABLET ORAL at 09:03

## 2021-01-01 RX ADMIN — FAMOTIDINE 20 MG: 20 TABLET ORAL at 08:06

## 2021-01-01 RX ADMIN — HEPARIN SODIUM 5000 UNITS: 5000 INJECTION INTRAVENOUS; SUBCUTANEOUS at 05:04

## 2021-01-01 RX ADMIN — ONDANSETRON 4 MG: 2 INJECTION INTRAMUSCULAR; INTRAVENOUS at 13:26

## 2021-01-01 RX ADMIN — DOXYCYCLINE 100 MG: 100 CAPSULE ORAL at 08:06

## 2021-01-01 RX ADMIN — DEXAMETHASONE SODIUM PHOSPHATE 6 MG: 4 INJECTION, SOLUTION INTRAMUSCULAR; INTRAVENOUS at 16:34

## 2021-01-01 RX ADMIN — HYDROCORTISONE 2.5% 1 APPLICATION: 25 CREAM TOPICAL at 18:42

## 2021-01-01 RX ADMIN — REMDESIVIR 200 MG: 100 INJECTION, POWDER, LYOPHILIZED, FOR SOLUTION INTRAVENOUS at 17:23

## 2021-01-01 RX ADMIN — Medication 2000 UNITS: at 08:54

## 2021-01-01 RX ADMIN — METOPROLOL SUCCINATE 12.5 MG: 25 TABLET, EXTENDED RELEASE ORAL at 09:00

## 2021-01-01 RX ADMIN — OXYCODONE HYDROCHLORIDE AND ACETAMINOPHEN 1000 MG: 500 TABLET ORAL at 20:35

## 2021-01-01 RX ADMIN — PANTOPRAZOLE SODIUM 40 MG: 40 TABLET, DELAYED RELEASE ORAL at 06:37

## 2021-01-01 RX ADMIN — HYDROCORTISONE 2.5%: 25 CREAM TOPICAL at 08:00

## 2021-01-01 RX ADMIN — ATORVASTATIN CALCIUM 40 MG: 40 TABLET, FILM COATED ORAL at 21:43

## 2021-01-01 RX ADMIN — HYDROCORTISONE 2.5% 1 APPLICATION: 25 CREAM TOPICAL at 08:12

## 2021-01-01 RX ADMIN — FUROSEMIDE 40 MG: 10 INJECTION, SOLUTION INTRAMUSCULAR; INTRAVENOUS at 10:01

## 2021-01-01 RX ADMIN — FUROSEMIDE 40 MG: 40 TABLET ORAL at 12:22

## 2021-01-01 RX ADMIN — METOROPROLOL TARTRATE 2.5 MG: 5 INJECTION, SOLUTION INTRAVENOUS at 11:47

## 2021-01-01 RX ADMIN — METOPROLOL SUCCINATE 12.5 MG: 25 TABLET, EXTENDED RELEASE ORAL at 08:06

## 2021-01-01 RX ADMIN — OXYBUTYNIN CHLORIDE 5 MG: 5 TABLET, EXTENDED RELEASE ORAL at 08:54

## 2021-01-01 RX ADMIN — HEPARIN SODIUM 5000 UNITS: 5000 INJECTION INTRAVENOUS; SUBCUTANEOUS at 05:45

## 2021-01-01 RX ADMIN — METOPROLOL SUCCINATE 12.5 MG: 25 TABLET, EXTENDED RELEASE ORAL at 08:55

## 2021-01-01 RX ADMIN — FUROSEMIDE 40 MG: 10 INJECTION, SOLUTION INTRAMUSCULAR; INTRAVENOUS at 13:30

## 2021-01-01 RX ADMIN — ASPIRIN 81 MG: 81 TABLET, CHEWABLE ORAL at 09:00

## 2021-01-01 RX ADMIN — SENNOSIDES 8.6 MG: 8.6 TABLET ORAL at 21:51

## 2021-01-01 RX ADMIN — ATORVASTATIN CALCIUM 40 MG: 40 TABLET, FILM COATED ORAL at 21:48

## 2021-01-01 RX ADMIN — OXYBUTYNIN CHLORIDE 5 MG: 5 TABLET, EXTENDED RELEASE ORAL at 08:05

## 2021-01-01 RX ADMIN — METOPROLOL SUCCINATE 12.5 MG: 25 TABLET, EXTENDED RELEASE ORAL at 09:17

## 2021-01-01 RX ADMIN — ZINC SULFATE 220 MG (50 MG) CAPSULE 220 MG: CAPSULE at 09:02

## 2021-01-01 RX ADMIN — OXYCODONE HYDROCHLORIDE AND ACETAMINOPHEN 1000 MG: 500 TABLET ORAL at 21:43

## 2021-01-01 RX ADMIN — DEXAMETHASONE SODIUM PHOSPHATE 6 MG: 4 INJECTION, SOLUTION INTRAMUSCULAR; INTRAVENOUS at 16:10

## 2021-01-01 RX ADMIN — REMDESIVIR 100 MG: 100 INJECTION, POWDER, LYOPHILIZED, FOR SOLUTION INTRAVENOUS at 17:46

## 2021-01-01 RX ADMIN — POTASSIUM CHLORIDE 40 MEQ: 1500 TABLET, EXTENDED RELEASE ORAL at 12:22

## 2021-01-01 RX ADMIN — SENNOSIDES 8.6 MG: 8.6 TABLET ORAL at 21:43

## 2021-01-01 RX ADMIN — HEPARIN SODIUM 5000 UNITS: 5000 INJECTION INTRAVENOUS; SUBCUTANEOUS at 06:10

## 2021-01-01 RX ADMIN — FAMOTIDINE 20 MG: 20 TABLET ORAL at 08:04

## 2021-01-01 RX ADMIN — DEXTROSE AND SODIUM CHLORIDE 50 ML/HR: 5; .9 INJECTION, SOLUTION INTRAVENOUS at 13:40

## 2021-01-01 RX ADMIN — ASPIRIN 81 MG: 81 TABLET, CHEWABLE ORAL at 10:01

## 2021-01-01 RX ADMIN — HEPARIN SODIUM 5000 UNITS: 5000 INJECTION INTRAVENOUS; SUBCUTANEOUS at 05:13

## 2021-01-01 RX ADMIN — SENNOSIDES 8.6 MG: 8.6 TABLET ORAL at 21:18

## 2021-01-01 RX ADMIN — PANTOPRAZOLE SODIUM 40 MG: 40 TABLET, DELAYED RELEASE ORAL at 05:44

## 2021-01-01 RX ADMIN — SENNOSIDES 8.6 MG: 8.6 TABLET ORAL at 22:02

## 2021-01-01 RX ADMIN — ASPIRIN 81 MG: 81 TABLET, CHEWABLE ORAL at 09:17

## 2021-01-01 RX ADMIN — ATORVASTATIN CALCIUM 40 MG: 40 TABLET, FILM COATED ORAL at 21:30

## 2021-01-01 RX ADMIN — AMOXICILLIN 1000 MG: 250 CAPSULE ORAL at 17:12

## 2021-01-01 RX ADMIN — DEXAMETHASONE SODIUM PHOSPHATE 6 MG: 4 INJECTION, SOLUTION INTRAMUSCULAR; INTRAVENOUS at 17:12

## 2021-01-01 RX ADMIN — ATORVASTATIN CALCIUM 40 MG: 40 TABLET, FILM COATED ORAL at 21:51

## 2021-01-01 RX ADMIN — SENNOSIDES 8.6 MG: 8.6 TABLET ORAL at 21:36

## 2021-01-01 RX ADMIN — ASPIRIN 324 MG: 81 TABLET, CHEWABLE ORAL at 05:43

## 2021-01-01 RX ADMIN — ALBUTEROL SULFATE 5 MG: 2.5 SOLUTION RESPIRATORY (INHALATION) at 10:10

## 2021-01-01 RX ADMIN — HYDROMORPHONE HYDROCHLORIDE 0.2 MG: 1 INJECTION, SOLUTION INTRAMUSCULAR; INTRAVENOUS; SUBCUTANEOUS at 05:37

## 2021-01-01 RX ADMIN — FUROSEMIDE 40 MG: 10 INJECTION, SOLUTION INTRAMUSCULAR; INTRAVENOUS at 04:35

## 2021-01-01 RX ADMIN — ONDANSETRON 4 MG: 2 INJECTION INTRAMUSCULAR; INTRAVENOUS at 11:56

## 2021-01-01 RX ADMIN — FUROSEMIDE 40 MG: 10 INJECTION, SOLUTION INTRAMUSCULAR; INTRAVENOUS at 18:42

## 2021-01-01 RX ADMIN — Medication 2000 UNITS: at 08:04

## 2021-01-01 RX ADMIN — OXYBUTYNIN CHLORIDE 5 MG: 5 TABLET, EXTENDED RELEASE ORAL at 09:03

## 2021-01-01 RX ADMIN — HEPARIN SODIUM 5000 UNITS: 5000 INJECTION INTRAVENOUS; SUBCUTANEOUS at 21:18

## 2021-01-01 RX ADMIN — HEPARIN SODIUM 5000 UNITS: 5000 INJECTION INTRAVENOUS; SUBCUTANEOUS at 15:48

## 2021-01-01 RX ADMIN — ZINC SULFATE 220 MG (50 MG) CAPSULE 220 MG: CAPSULE at 08:06

## 2021-01-01 RX ADMIN — REMDESIVIR 100 MG: 100 INJECTION, POWDER, LYOPHILIZED, FOR SOLUTION INTRAVENOUS at 17:54

## 2021-01-01 RX ADMIN — FAMOTIDINE 20 MG: 20 TABLET ORAL at 08:55

## 2021-01-01 RX ADMIN — METOPROLOL SUCCINATE 12.5 MG: 25 TABLET, EXTENDED RELEASE ORAL at 10:01

## 2021-01-01 RX ADMIN — HEPARIN SODIUM 5000 UNITS: 5000 INJECTION INTRAVENOUS; SUBCUTANEOUS at 22:02

## 2021-01-01 RX ADMIN — FUROSEMIDE 20 MG: 10 INJECTION, SOLUTION INTRAMUSCULAR; INTRAVENOUS at 17:14

## 2021-01-01 RX ADMIN — PANTOPRAZOLE SODIUM 40 MG: 40 TABLET, DELAYED RELEASE ORAL at 05:04

## 2021-01-01 RX ADMIN — HEPARIN SODIUM 5000 UNITS: 5000 INJECTION INTRAVENOUS; SUBCUTANEOUS at 21:30

## 2021-01-01 RX ADMIN — FAMOTIDINE 20 MG: 20 TABLET ORAL at 09:02

## 2021-01-01 RX ADMIN — Medication 2000 UNITS: at 09:03

## 2021-01-01 RX ADMIN — Medication 2000 UNITS: at 08:06

## 2021-01-01 RX ADMIN — FUROSEMIDE 40 MG: 10 INJECTION, SOLUTION INTRAMUSCULAR; INTRAVENOUS at 16:13

## 2021-01-01 RX ADMIN — HEPARIN SODIUM 5000 UNITS: 5000 INJECTION INTRAVENOUS; SUBCUTANEOUS at 06:38

## 2021-01-01 RX ADMIN — OXYCODONE HYDROCHLORIDE AND ACETAMINOPHEN 1000 MG: 500 TABLET ORAL at 08:54

## 2021-01-01 RX ADMIN — HEPARIN SODIUM 5000 UNITS: 5000 INJECTION INTRAVENOUS; SUBCUTANEOUS at 13:58

## 2021-01-01 RX ADMIN — FUROSEMIDE 20 MG: 10 INJECTION, SOLUTION INTRAMUSCULAR; INTRAVENOUS at 16:34

## 2021-01-01 RX ADMIN — CEFTRIAXONE SODIUM 1000 MG: 10 INJECTION, POWDER, FOR SOLUTION INTRAVENOUS at 12:06

## 2021-01-01 RX ADMIN — METOCLOPRAMIDE 10 MG: 5 INJECTION, SOLUTION INTRAMUSCULAR; INTRAVENOUS at 15:19

## 2021-01-01 RX ADMIN — FUROSEMIDE 40 MG: 10 INJECTION, SOLUTION INTRAMUSCULAR; INTRAVENOUS at 18:49

## 2021-01-01 RX ADMIN — ATORVASTATIN CALCIUM 40 MG: 40 TABLET, FILM COATED ORAL at 22:02

## 2021-01-01 RX ADMIN — OXYCODONE HYDROCHLORIDE AND ACETAMINOPHEN 1000 MG: 500 TABLET ORAL at 22:01

## 2021-01-01 RX ADMIN — FUROSEMIDE 20 MG: 20 TABLET ORAL at 08:54

## 2021-01-01 RX ADMIN — HEPARIN SODIUM 5000 UNITS: 5000 INJECTION INTRAVENOUS; SUBCUTANEOUS at 15:35

## 2021-01-01 RX ADMIN — HYDROCORTISONE 2.5% 1 APPLICATION: 25 CREAM TOPICAL at 18:48

## 2021-01-01 RX ADMIN — PIPERACILLIN AND TAZOBACTAM 2.25 G: 2; .25 INJECTION, POWDER, LYOPHILIZED, FOR SOLUTION INTRAVENOUS at 13:34

## 2021-01-01 RX ADMIN — FUROSEMIDE 20 MG: 10 INJECTION, SOLUTION INTRAMUSCULAR; INTRAVENOUS at 08:06

## 2021-01-01 RX ADMIN — OXYCODONE HYDROCHLORIDE AND ACETAMINOPHEN 1000 MG: 500 TABLET ORAL at 08:05

## 2021-01-01 RX ADMIN — FUROSEMIDE 20 MG: 10 INJECTION, SOLUTION INTRAMUSCULAR; INTRAVENOUS at 16:01

## 2021-01-01 RX ADMIN — ATORVASTATIN CALCIUM 40 MG: 40 TABLET, FILM COATED ORAL at 21:36

## 2021-01-01 RX ADMIN — ASPIRIN 300 MG: 300 SUPPOSITORY RECTAL at 16:02

## 2021-01-01 RX ADMIN — SENNOSIDES 8.6 MG: 8.6 TABLET ORAL at 21:29

## 2021-01-01 RX ADMIN — IPRATROPIUM BROMIDE 0.5 MG: 0.5 SOLUTION RESPIRATORY (INHALATION) at 10:10

## 2021-01-01 RX ADMIN — ZINC SULFATE 220 MG (50 MG) CAPSULE 220 MG: CAPSULE at 08:54

## 2021-01-01 RX ADMIN — DOXYCYCLINE 100 MG: 100 CAPSULE ORAL at 20:23

## 2021-01-01 RX ADMIN — OXYCODONE HYDROCHLORIDE AND ACETAMINOPHEN 1000 MG: 500 TABLET ORAL at 20:23

## 2021-01-01 RX ADMIN — HEPARIN SODIUM 5000 UNITS: 5000 INJECTION INTRAVENOUS; SUBCUTANEOUS at 21:49

## 2021-01-01 RX ADMIN — REMDESIVIR 100 MG: 100 INJECTION, POWDER, LYOPHILIZED, FOR SOLUTION INTRAVENOUS at 17:00

## 2021-01-10 NOTE — ED PROVIDER NOTES
History  Chief Complaint   Patient presents with    Shortness of Breath     per EMS pt recenlty dx with pneumonia sent home on abx, per family pt not doing well with po meds so they called EMS, pt c/o sob with exertion     This is a 80-year-old female with a history of chronic kidney disease, CHF, hyperlipidemia who presents with possible pneumonia on outpatient chest x-ray  Patient comes from assisted living  Nursing staff spoke with the assisted living  The patient had a stat chest x-ray performed today because she has been experiencing a mild cough and her oxygen saturation was in the low 90s  X-ray revealed a possible right upper lobe pneumonia  The physician on-call for the facility told staff to send her to the emergency department because she may need IV antibiotics  Currently, the patient denies any symptoms  Denies fever/chills, nausea/vomiting, lightheadedness/dizziness, numbness/weakness, headache, change in vision, URI symptoms, neck pain, chest pain, palpitations, shortness of breath, cough, back pain, flank pain, abdominal pain, diarrhea, hematochezia, melena, dysuria, hematuria, abnormal vaginal discharge/bleeding  Patient is currently saturating well on room air  She is speaking in full sentences  3:42 PM  patient is ambulatory saturation is 90-91%  She is asymptomatic  Patient is high risk according to our criteria, but does maintain her saturation above 90%  Will encourage a follow-up as an outpatient within the next 24 hours  Will recommend vitamin D3, vitamin-C, multivitamin  Strict return precautions given  Prior to Admission Medications   Prescriptions Last Dose Informant Patient Reported? Taking?    Biotin 1 MG CAPS  Self Yes No   Sig: Take 1 capsule by mouth daily   Multiple Vitamins-Minerals (PRESERVISION AREDS) CAPS  Self Yes No   Sig: Take by mouth 2 (two) times a day    acetaminophen (TYLENOL) 325 mg tablet   No No   Sig: Take 2 tablets (650 mg total) by mouth every 6 (six) hours   aspirin 81 mg chewable tablet   Yes No   Sig: Chew 81 mg daily   calcium carbonate (TUMS) 500 mg chewable tablet   No No   Sig: Chew 2 tablets (1,000 mg total) daily as needed for indigestion or heartburn   cholecalciferol (VITAMIN D3) 1,000 units tablet  Self Yes No   Sig: Take by mouth   docusate sodium (COLACE) 100 mg capsule   No No   Sig: Take 1 capsule (100 mg total) by mouth 2 (two) times a day   furosemide (LASIX) 20 mg tablet   No No   Sig: Take 1 tablet (20 mg total) by mouth daily   hydrocortisone (ANUSOL-HC) 2 5 % rectal cream   No No   Sig: Apply topically 2 (two) times a day   lisinopril (ZESTRIL) 2 5 mg tablet   No No   Sig: Take 1 tablet (2 5 mg total) by mouth daily   metoprolol succinate (TOPROL-XL) 25 mg 24 hr tablet   No No   Sig: Take 0 5 tablets (12 5 mg total) by mouth daily   pantoprazole (PROTONIX) 40 mg tablet   No No   Sig: TAKE 1 TABLET BY MOUTH EVERY DAY   phenylephrine-shark liver oil-mineral oil-petrolatum (PREPARATION H) 0 25-3-14-71 9 % rectal ointment   No No   Sig: Insert into the rectum 2 (two) times a day as needed for hemorrhoids   polyethylene glycol (MIRALAX) 17 g packet   No No   Sig: Take 17 g by mouth daily   senna (SENOKOT) 8 6 mg   No No   Sig: Take 1 tablet (8 6 mg total) by mouth daily at bedtime      Facility-Administered Medications: None       Past Medical History:   Diagnosis Date    GERD (gastroesophageal reflux disease)     Murmur 8/27/2020    Right humeral fracture 7/20/2018       Past Surgical History:   Procedure Laterality Date    HYSTERECTOMY  1973    Total    JOINT REPLACEMENT Right     knee    CO OPEN RX FEMUR FX+INTRAMED MORE Right 8/27/2020    Procedure: INSERTION NAIL IM FEMUR ANTEGRADE (TROCHANTERIC);   Surgeon: Susan Henley MD;  Location: [facility];  Service: Orthopedics       Family History   Problem Relation Age of Onset    Heart failure Mother     Coronary artery disease Father      I have reviewed and agree with the history as documented  E-Cigarette/Vaping    E-Cigarette Use Never User      E-Cigarette/Vaping Substances     Social History     Tobacco Use    Smoking status: Never Smoker    Smokeless tobacco: Never Used    Tobacco comment: No secondhand smoke exposure   Substance Use Topics    Alcohol use: Yes     Comment: Minimal consumption    Drug use: No       Review of Systems   Constitutional: Negative for chills and fever  HENT: Negative for rhinorrhea, sore throat and trouble swallowing  Eyes: Negative for photophobia and visual disturbance  Respiratory: Negative for cough, chest tightness and shortness of breath  Cardiovascular: Negative for chest pain, palpitations and leg swelling  Gastrointestinal: Negative for abdominal pain, blood in stool, diarrhea, nausea and vomiting  Endocrine: Negative for polyuria  Genitourinary: Negative for dysuria, flank pain, hematuria, vaginal bleeding and vaginal discharge  Musculoskeletal: Negative for back pain and neck pain  Skin: Negative for color change and rash  Allergic/Immunologic: Negative for immunocompromised state  Neurological: Negative for dizziness, weakness, light-headedness, numbness and headaches  All other systems reviewed and are negative  Physical Exam  Physical Exam  Constitutional:       General: She is not in acute distress  Appearance: Normal appearance  She is well-developed  HENT:      Mouth/Throat:      Pharynx: Uvula midline  Eyes:      Conjunctiva/sclera: Conjunctivae normal       Pupils: Pupils are equal, round, and reactive to light  Neck:      Thyroid: No thyroid mass or thyromegaly  Trachea: Trachea normal    Cardiovascular:      Rate and Rhythm: Normal rate and regular rhythm  Pulses: Normal pulses  Heart sounds: Normal heart sounds  No murmur  Pulmonary:      Effort: Pulmonary effort is normal       Breath sounds: Normal breath sounds  Comments: Saturating well on room air    No conversational dyspnea  Abdominal:      General: Bowel sounds are normal       Palpations: Abdomen is soft  Tenderness: There is no abdominal tenderness  There is no guarding or rebound  Skin:     General: Skin is warm and dry  Neurological:      Mental Status: She is alert  Psychiatric:         Speech: Speech normal          Behavior: Behavior normal  Behavior is cooperative  Thought Content: Thought content normal          Vital Signs  ED Triage Vitals [01/10/21 1155]   Temperature Pulse Respirations Blood Pressure SpO2   98 9 °F (37 2 °C) 86 20 111/53 95 %      Temp Source Heart Rate Source Patient Position - Orthostatic VS BP Location FiO2 (%)   Temporal Monitor -- -- --      Pain Score       --           Vitals:    01/10/21 1155   BP: 111/53   Pulse: 86         Visual Acuity      ED Medications  Medications   amoxicillin (AMOXIL) capsule 1,000 mg (has no administration in time range)       Diagnostic Studies  Results Reviewed     Procedure Component Value Units Date/Time    COVID19, Influenza A/B, RSV PCR, [facility] [[id number]]  (Abnormal) Collected: 01/10/21 1321    Lab Status: Final result Specimen: Nares from Nasopharyngeal Swab Updated: 01/10/21 1441     SARS-CoV-2 Positive     INFLUENZA A PCR Negative     INFLUENZA B PCR Negative     RSV PCR Negative    Narrative: This test has been authorized by FDA under an EUA (Emergency Use Assay) for use by authorized laboratories  Clinical caution and judgement should be used with the interpretation of these results with consideration of the clinical impression and other laboratory testing  Testing reported as "Positive" or "Negative" has been proven to be accurate according to standard laboratory validation requirements  All testing is performed with control materials showing appropriate reactivity at standard intervals      Basic metabolic panel [[id number]]  (Abnormal) Collected: 01/10/21 1325    Lab Status: Final result Specimen: Blood from Arm, Left Updated: 01/10/21 1351     Sodium 143 mmol/L      Potassium 3 9 mmol/L      Chloride 107 mmol/L      CO2 28 mmol/L      ANION GAP 8 mmol/L      BUN 24 mg/dL      Creatinine 1 22 mg/dL      Glucose 92 mg/dL      Calcium 8 2 mg/dL      eGFR 38 ml/min/1 73sq m     Narrative:      [place] guidelines for Chronic Kidney Disease (CKD):     Stage 1 with normal or high GFR (GFR > 90 mL/min/1 73 square meters)    Stage 2 Mild CKD (GFR = 60-89 mL/min/1 73 square meters)    Stage 3A Moderate CKD (GFR = 45-59 mL/min/1 73 square meters)    Stage 3B Moderate CKD (GFR = 30-44 mL/min/1 73 square meters)    Stage 4 Severe CKD (GFR = 15-29 mL/min/1 73 square meters)    Stage 5 End Stage CKD (GFR <15 mL/min/1 73 square meters)  Note: GFR calculation is accurate only with a steady state creatinine    Blood gas, venous [[id number]]  (Abnormal) Collected: 01/10/21 1321    Lab Status: Final result Specimen: Blood from Arm, Left Updated: 01/10/21 1344     pH, Ezra 7 358     pCO2, Ezra 49 8 mm Hg      pO2, Ezra 24 8 mm Hg      HCO3, Ezra 27 4 mmol/L      Base Excess, Ezra 1 3 mmol/L      O2 Content, Ezra 7 4 ml/dL      O2 HGB, VENOUS 45 4 %     CBC and differential [[id number]]  (Abnormal) Collected: 01/10/21 1321    Lab Status: Final result Specimen: Blood from Arm, Left Updated: 01/10/21 1332     WBC 3 54 Thousand/uL      RBC 3 14 Million/uL      Hemoglobin 10 3 g/dL      Hematocrit 33 5 %       fL      MCH 32 8 pg      MCHC 30 7 g/dL      RDW 13 9 %      MPV 11 0 fL      Platelets 977 Thousands/uL      nRBC 0 /100 WBCs      Neutrophils Relative 57 %      Immat GRANS % 0 %      Lymphocytes Relative 20 %      Monocytes Relative 23 %      Eosinophils Relative 0 %      Basophils Relative 0 %      Neutrophils Absolute 2 00 Thousands/µL      Immature Grans Absolute 0 01 Thousand/uL      Lymphocytes Absolute 0 70 Thousands/µL      Monocytes Absolute 0 81 Thousand/µL      Eosinophils Absolute 0 01 Thousand/µL      Basophils Absolute 0 01 Thousands/µL                  XR chest 1 view portable   ED Interpretation by Buffy Malik MD (01/10 1444)   RUL infiltrate? Procedures  Procedures         ED Course  ED Course as of Duglas 10 1547   Albino Scale Duglas 10, 2021   1337 Stable anemia   Hemoglobin(!): 10 3                                           [facility]  Number of Diagnoses or Management Options  Diagnosis management comments: This is a well-appearing 80-year-old female who presents with possible pneumonia  Will check labs, EKG, chest x-ray  Ambulatory pulse ox  Disposition pending results  Disposition  Final diagnoses:   Pneumonia due to COVID-19 virus   Pneumonia     Time reflects when diagnosis was documented in both MDM as applicable and the Disposition within this note     Time User Action Codes Description Comment    1/10/2021  3:44 PM Yemi Daily P Add [U07 1,  J12 82] Pneumonia due to COVID-19 virus     1/10/2021  3:44 PM Car Tierney Add [J18 9] Pneumonia       ED Disposition     ED Disposition Condition Date/Time Comment    Discharge Stable Sun Duglas 10, 2021  3:44 PM Gume Ibanez discharge to home/self care              Follow-up Information     Follow up With Specialties Details Why Contact Info Additional Information    Dariel Levy MD Family Medicine Schedule an appointment as soon as possible for a visit  within 24 hours for follow up [address] [place] [address] [phone]       [address]  Emergency Department Emergency Medicine Go to  If symptoms worsen [facility] [id number]  [facility] [address] ED, [address], [place], [address]          Patient's Medications   Discharge Prescriptions    AMOXICILLIN (AMOXIL) 500 MG CAPSULE    Take 2 capsules (1,000 mg total) by mouth 3 (three) times a day for 7 days       Start Date: 1/10/2021 End Date: 1/17/2021       Order Dose: 1,000 mg       Quantity: 42 capsule Refills: 0     No discharge procedures on file      PDMP Review     None          ED Provider  Electronically Signed by           Agata Ku MD  01/10/21 5983

## 2021-01-10 NOTE — PROGRESS NOTES
Spoke with Thania Ac this morning approximately 830 patient with decreased breath sounds and wheezing  Patient vitals are stable does not appear in distress has history of bronchitis  Patient was tested for covid 12/31/20 it was negative  Provided verbal order for stat xr chest pa lat  received call from Thania Ac with x ray results at 1116  Patient has RUL pneumonia  She confirmed there are two additional residents whom tested positive for covid  Given physical exam findings from nurse at [facility] recommend transport of patient to ED for eval and management possible pneumonia covid related

## 2021-01-10 NOTE — ED NOTES
Pt is able to ambulate with walker independently  Ambulatory SpO2 is 90-91% on RA        Karan Nunez RN  01/10/21 9963

## 2021-01-10 NOTE — ED NOTES
jair contacted to set up transportation back to [facility]   Will call back with  time     Diane Guerra  01/10/21 9254

## 2021-01-10 NOTE — ED NOTES
Mary at [address] [facility] aware of pt's disposition at this time          Ny Taylor RN  01/10/21 8580

## 2021-01-10 NOTE — ED NOTES
Fartun nurse from [address] [place] [address] updated on pt's status and care plan at this time          Martine Thapa RN  01/10/21 7255

## 2021-01-10 NOTE — TELEPHONE ENCOUNTER
[facility] called back and said results are in and she needs to talk again to Dejuan Barbosa to discuss results  "She has some wheezing in bilateral lungs and a junky cough    I would like a stat chest x-ray ordered "

## 2021-01-10 NOTE — ED NOTES
With pt's authorization Pt's son was updated on pt's conditio and treatment plan at this time          Rocio Barlow RN  01/10/21 6452

## 2021-01-10 NOTE — ED NOTES
[facility] aware of pts disposition and treatment plan at this time          Freedom Shah RN  01/10/21 4152

## 2021-01-10 NOTE — ED NOTES
Report called to [facility], spoke to [employer]  Facility aware of transportation time  Also son Jaxson Betts called to update him on transfer time          Neyda Hills RN  01/10/21 6817

## 2021-01-10 NOTE — DISCHARGE INSTRUCTIONS
Start taking:    Vitamin D3 2000 IU daily  Vitamin C 1 gram, 2 times per day  Multivitamin daily    Monitor your oxygen saturation and return to the emergency department if it drops below 90% or you develop severe respiratory symptoms

## 2021-01-11 PROBLEM — D62 ACUTE BLOOD LOSS ANEMIA: Status: RESOLVED | Noted: 2020-01-01 | Resolved: 2021-01-01

## 2021-01-11 PROBLEM — J12.82 PNEUMONIA DUE TO COVID-19 VIRUS: Status: ACTIVE | Noted: 2021-01-01

## 2021-01-11 PROBLEM — J96.01 ACUTE RESPIRATORY FAILURE WITH HYPOXIA (HCC): Status: ACTIVE | Noted: 2021-01-01

## 2021-01-11 PROBLEM — I50.43 ACUTE ON CHRONIC COMBINED SYSTOLIC AND DIASTOLIC CHF (CONGESTIVE HEART FAILURE) (HCC): Status: ACTIVE | Noted: 2021-01-01

## 2021-01-11 PROBLEM — I21.A1 TYPE 2 MI (MYOCARDIAL INFARCTION) (HCC): Status: ACTIVE | Noted: 2021-01-01

## 2021-01-11 PROBLEM — U07.1 PNEUMONIA DUE TO COVID-19 VIRUS: Status: ACTIVE | Noted: 2021-01-01

## 2021-01-11 NOTE — TELEPHONE ENCOUNTER
Pts son called stating patient is going to ED from [address] [place] [address] because of difficulty breathing/dx positive COVID pnuemonia  He is asking for a 20 mg prn lasix order be sent to [address] [place] [address] prior to being transferred to assist with her SOB  Explained that if being transported by EMS and they are concerned about her status they will get orders to give IV lasix  He also wants Dr Loree Shrestha involved in her care and told him I will make him aware of the situation and if cardiology is needed we would be consulted

## 2021-01-11 NOTE — ASSESSMENT & PLAN NOTE
Lab Results   Component Value Date    EGFR 42 01/11/2021    EGFR 38 01/10/2021    EGFR 42 10/29/2020    CREATININE 1 14 01/11/2021    CREATININE 1 22 01/10/2021    CREATININE 1 13 10/29/2020   Renal function at baseline, avoid nephrotoxins

## 2021-01-11 NOTE — ASSESSMENT & PLAN NOTE
Acute respiratory failure with hypoxia, secondary to COVID-19 pneumonia      Initiate treatment per mild treatment protocol    Check procalcitonin level, trend inflammatory markers daily    IV steroids, will initiate IV antibiotics pending procalcitonin levels x2    IV remdesivir

## 2021-01-11 NOTE — ED NOTES
Patients son Chioma Castillo called at this time, updated on plan of care, requesting call back with updates as they become available        Monica Putnam RN  01/11/21 1016

## 2021-01-11 NOTE — ED NOTES
[facility] contacted, medication list to be faxed shortly       Errol Valdovinos, RIANNA  01/11/21 7990

## 2021-01-11 NOTE — ED PROVIDER NOTES
History  Chief Complaint   Patient presents with    Shortness of Breath     seen here yesterday for covid and d/c  patient feeling worse and increasing weakness/SOB  family would like patient seen by cardiology     This is a 27-year-old female patient was seen here yesterday diagnosed with pneumonia  She did have a diagnosis of positive COVID  This morning she woke up and felt increased fatigue and shortness of breath  When she arrived she was approximately 86-87% on room air  Was placed on 2 liters/minute is now up to 98%  The nurse who is caring for her saw her yesterday she was much more alert and in good spirits yesterday  Patient is alert but states she has not feel well  States that she has had some chills no documented fever  No headache blurred vision double vision does have a cough that is nonproductive denies congestion positive shortness breath no chest pain no nausea vomiting diarrhea abdominal pain nothing makes it better or worse  At this time patient will have septic workup and will be admitted for hypoxia secondary to COVID pneumonia          Prior to Admission Medications   Prescriptions Last Dose Informant Patient Reported? Taking?    Multiple Vitamins-Minerals (PRESERVISION AREDS) CAPS 1/11/2021 at Unknown time Self Yes Yes   Sig: Take by mouth 2 (two) times a day    acetaminophen (TYLENOL) 325 mg tablet   No Yes   Sig: Take 2 tablets (650 mg total) by mouth every 6 (six) hours   amoxicillin (AMOXIL) 500 mg capsule 1/11/2021 at Unknown time  No Yes   Sig: Take 2 capsules (1,000 mg total) by mouth 3 (three) times a day for 7 days   aspirin 81 mg chewable tablet 1/11/2021 at Unknown time  Yes Yes   Sig: Chew 81 mg daily   cholecalciferol (VITAMIN D3) 1,000 units tablet 1/11/2021 at Unknown time Self Yes Yes   Sig: Take by mouth   furosemide (LASIX) 20 mg tablet 1/11/2021 at Unknown time  No Yes   Sig: Take 1 tablet (20 mg total) by mouth daily   hydrocortisone (ANUSOL-HC) 2 5 % rectal cream No Yes   Sig: Apply topically 2 (two) times a day   metoprolol succinate (TOPROL-XL) 25 mg 24 hr tablet 1/11/2021 at Unknown time  No Yes   Sig: Take 0 5 tablets (12 5 mg total) by mouth daily   pantoprazole (PROTONIX) 40 mg tablet 1/11/2021 at Unknown time  No Yes   Sig: TAKE 1 TABLET BY MOUTH EVERY DAY   phenylephrine-shark liver oil-mineral oil-petrolatum (PREPARATION H) 0 25-3-14-71 9 % rectal ointment   No Yes   Sig: Insert into the rectum 2 (two) times a day as needed for hemorrhoids   senna (SENOKOT) 8 6 mg 1/11/2021 at Unknown time  No Yes   Sig: Take 1 tablet (8 6 mg total) by mouth daily at bedtime   tolterodine (DETROL LA) 4 mg 24 hr capsule 1/11/2021 at Unknown time  Yes Yes   Sig: Take 4 mg by mouth daily      Facility-Administered Medications: None       Past Medical History:   Diagnosis Date    GERD (gastroesophageal reflux disease)     Murmur 8/27/2020    Right humeral fracture 7/20/2018       Past Surgical History:   Procedure Laterality Date    HYSTERECTOMY  1973    Total    JOINT REPLACEMENT Right     knee    MT OPEN RX FEMUR FX+INTRAMED MORE Right 8/27/2020    Procedure: INSERTION NAIL IM FEMUR ANTEGRADE (TROCHANTERIC); Surgeon: Mushtaq Raymond MD;  Location: [facility] OR;  Service: Orthopedics       Family History   Problem Relation Age of Onset    Heart failure Mother     Coronary artery disease Father      I have reviewed and agree with the history as documented  E-Cigarette/Vaping    E-Cigarette Use Never User      E-Cigarette/Vaping Substances     Social History     Tobacco Use    Smoking status: Never Smoker    Smokeless tobacco: Never Used    Tobacco comment: No secondhand smoke exposure   Substance Use Topics    Alcohol use: Yes     Comment: Minimal consumption    Drug use: No       Review of Systems   Constitutional: Positive for chills  Negative for fatigue and fever  HENT: Negative for congestion and hearing loss  Eyes: Negative for photophobia and pain  Respiratory: Positive for cough and shortness of breath  Negative for chest tightness  Cardiovascular: Negative for chest pain and leg swelling  Gastrointestinal: Negative for abdominal pain, diarrhea and nausea  Endocrine: Negative for polydipsia and polyphagia  Genitourinary: Negative for dysuria and frequency  Musculoskeletal: Negative for arthralgias and gait problem  Skin: Negative for pallor and rash  Allergic/Immunologic: Negative for environmental allergies and food allergies  Neurological: Negative for dizziness and headaches  Psychiatric/Behavioral: Negative for agitation and confusion  Physical Exam  Physical Exam  Vitals signs and nursing note reviewed  Constitutional:       Appearance: She is well-developed  HENT:      Head: Normocephalic and atraumatic  Right Ear: External ear normal       Left Ear: External ear normal       Nose: Nose normal    Eyes:      Conjunctiva/sclera: Conjunctivae normal       Pupils: Pupils are equal, round, and reactive to light  Neck:      Musculoskeletal: Normal range of motion and neck supple  Cardiovascular:      Rate and Rhythm: Normal rate and regular rhythm  Pulmonary:      Effort: Respiratory distress present  Breath sounds: Wheezing and rhonchi present  Abdominal:      General: Bowel sounds are normal       Palpations: Abdomen is soft  Tenderness: There is no abdominal tenderness  Skin:     General: Skin is warm  Neurological:      Mental Status: She is alert     Psychiatric:         Behavior: Behavior normal          Vital Signs  ED Triage Vitals   Temperature Pulse Respirations Blood Pressure SpO2   01/11/21 0937 01/11/21 0937 01/11/21 0937 01/11/21 0937 01/11/21 0937   99 4 °F (37 4 °C) (!) 119 (!) 28 131/73 92 %      Temp src Heart Rate Source Patient Position - Orthostatic VS BP Location FiO2 (%)   -- 01/11/21 0937 01/11/21 1051 01/11/21 1051 --    Monitor Lying Left arm       Pain Score       01/11/21 1227       No Pain           Vitals:    01/11/21 0937 01/11/21 1051 01/11/21 1226 01/11/21 1253   BP: 131/73 126/65  109/55   Pulse: (!) 119 (!) 111 98 98   Patient Position - Orthostatic VS:  Lying  Lying         Visual Acuity      ED Medications  Medications   aspirin chewable tablet 324 mg (324 mg Oral Not Given 1/11/21 1143)   albuterol inhalation solution 5 mg (5 mg Nebulization Given 1/11/21 1010)   ipratropium (ATROVENT) 0 02 % inhalation solution 0 5 mg (0 5 mg Nebulization Given 1/11/21 1010)   doxycycline hyclate (VIBRAMYCIN) capsule 100 mg (100 mg Oral Given 1/11/21 1144)   ceftriaxone (ROCEPHIN) 1 g/50 mL in dextrose IVPB (0 mg Intravenous Stopped 1/11/21 1216)   metoprolol (LOPRESSOR) injection 2 5 mg (2 5 mg Intravenous Given 1/11/21 1147)       Diagnostic Studies  Results Reviewed     Procedure Component Value Units Date/Time    Troponin I [[id number]] Collected: 01/11/21 1325    Lab Status:  In process Specimen: Blood from Arm, Left Updated: 01/11/21 1337    Protime-INR [[id number]]  (Abnormal) Collected: 01/11/21 1001    Lab Status: Final result Specimen: Blood from Arm, Left Updated: 01/11/21 1150     Protime 14 7 seconds      INR 1 17    NT-BNP PRO [[id number]]  (Abnormal) Collected: 01/11/21 1001    Lab Status: Final result Specimen: Blood from Arm, Left Updated: 01/11/21 1136     NT-proBNP 16,276 pg/mL     C-reactive protein [[id number]]  (Abnormal) Collected: 01/11/21 1001    Lab Status: Final result Specimen: Blood from Arm, Left Updated: 01/11/21 1115     CRP 49 8 mg/L     CKMB [[id number]]  (Normal) Collected: 01/11/21 1001    Lab Status: Final result Specimen: Blood from Arm, Left Updated: 01/11/21 1115     CK-MB Index 1 3 %      CK-MB 2 3 ng/mL     Magnesium [[id number]]  (Normal) Collected: 01/11/21 1001    Lab Status: Final result Specimen: Blood from Arm, Left Updated: 01/11/21 1115     Magnesium 2 1 mg/dL     Triglycerides [[id number]]  (Normal) Collected: 01/11/21 1001    Lab Status: Final result Specimen: Blood from Arm, Left Updated: 01/11/21 1115     Triglycerides 60 mg/dL     CK (with reflex to MB) [[id number]]  (Normal) Collected: 01/11/21 1001    Lab Status: Final result Specimen: Blood from Arm, Left Updated: 01/11/21 1112     Total  U/L     Troponin I [[id number]]  (Abnormal) Collected: 01/11/21 1001    Lab Status: Final result Specimen: Blood from Arm, Left Updated: 01/11/21 1057     Troponin I 5 87 ng/mL     Comprehensive metabolic panel [[id number]]  (Abnormal) Collected: 01/11/21 1001    Lab Status: Final result Specimen: Blood from Arm, Left Updated: 01/11/21 1048     Sodium 139 mmol/L      Potassium 4 1 mmol/L      Chloride 102 mmol/L      CO2 25 mmol/L      ANION GAP 12 mmol/L      BUN 23 mg/dL      Creatinine 1 14 mg/dL      Glucose 116 mg/dL      Calcium 8 1 mg/dL      Corrected Calcium 8 8 mg/dL      AST 48 U/L      ALT 17 U/L      Alkaline Phosphatase 77 U/L      Total Protein 7 0 g/dL      Albumin 3 1 g/dL      Total Bilirubin 0 53 mg/dL      eGFR 42 ml/min/1 73sq m     Narrative:      Radha guidelines for Chronic Kidney Disease (CKD):     Stage 1 with normal or high GFR (GFR > 90 mL/min/1 73 square meters)    Stage 2 Mild CKD (GFR = 60-89 mL/min/1 73 square meters)    Stage 3A Moderate CKD (GFR = 45-59 mL/min/1 73 square meters)    Stage 3B Moderate CKD (GFR = 30-44 mL/min/1 73 square meters)    Stage 4 Severe CKD (GFR = 15-29 mL/min/1 73 square meters)    Stage 5 End Stage CKD (GFR <15 mL/min/1 73 square meters)  Note: GFR calculation is accurate only with a steady state creatinine    Lactic acid [[id number]]  (Normal) Collected: 01/11/21 1001    Lab Status: Final result Specimen: Blood from Arm, Left Updated: 01/11/21 1041     LACTIC ACID 1 7 mmol/L     Narrative:      Result may be elevated if tourniquet was used during collection      Calcium, ionized [[id number]]  (Abnormal) Collected: 01/11/21 1001    Lab Status: Final result Specimen: Blood from Arm, Left Updated: 01/11/21 1024     Calcium, Ionized 1 00 mmol/L     CBC and differential [[id number]]  (Abnormal) Collected: 01/11/21 1001    Lab Status: Final result Specimen: Blood from Arm, Left Updated: 01/11/21 1017     WBC 5 80 Thousand/uL      RBC 3 42 Million/uL      Hemoglobin 11 1 g/dL      Hematocrit 36 0 %       fL      MCH 32 5 pg      MCHC 30 8 g/dL      RDW 13 7 %      MPV 11 4 fL      Platelets 739 Thousands/uL      nRBC 0 /100 WBCs      Neutrophils Relative 79 %      Immat GRANS % 0 %      Lymphocytes Relative 11 %      Monocytes Relative 10 %      Eosinophils Relative 0 %      Basophils Relative 0 %      Neutrophils Absolute 4 52 Thousands/µL      Immature Grans Absolute 0 02 Thousand/uL      Lymphocytes Absolute 0 66 Thousands/µL      Monocytes Absolute 0 59 Thousand/µL      Eosinophils Absolute 0 00 Thousand/µL      Basophils Absolute 0 01 Thousands/µL     Procalcitonin with AM Reflex [[id number]] Collected: 01/11/21 1001    Lab Status: In process Specimen: Blood from Arm, Left Updated: 01/11/21 1014    Ferritin [[id number]] Collected: 01/11/21 1001    Lab Status: In process Specimen: Blood from Arm, Left Updated: 01/11/21 1014    Glucose 6 phosphate dehydrogenase [[id number]] Collected: 01/11/21 1001    Lab Status: In process Specimen: Blood from Arm, Left Updated: 01/11/21 1013    UA w Reflex to Microscopic w Reflex to Culture [[id number]]     Lab Status: No result Specimen: Urine                  XR chest 1 view portable   Final Result by Lamberto Marino MD (01/11 1034)      Worsening right upper lobe infiltrate                  Workstation performed: [id number]                    Procedures  Procedures         ED Course  ED Course as of Jan 11 1350   Mon Jan 11, 2021   1109 Discussed with Dr Akbar Zacarias in detail patient's troponin 5 87 no chest pain no ST elevation  Patient does have historically low EF    It was decided at this time patient was to receive aspirin and 2 5 mg of Lopressor for rate control  Cardiology will be consulted   Troponin I(!): 5 84   1113 Initial EKG interpreted by me 110 beats per minute sinus tachycardia no ST elevation occasional unifocal PVC mild ST depression in lateral leads as before as in inferior leads as before  Normal axis       36 Spoke with son discussed case with him  He requested that her cardiologist Dr Jacob Reynaga be consulted  He would also like a call from Dr Jacob Reynaga  Consult was placed by me and I contacted him via tiger text  MDM    Disposition  Final diagnoses:   Pneumonia due to COVID-19 virus   Hypoxia   Elevated troponin     Time reflects when diagnosis was documented in both MDM as applicable and the Disposition within this note     Time User Action Codes Description Comment    1/11/2021 11:11 AM Esvin Gray [U07 1,  J12 82] Pneumonia due to COVID-19 virus     1/11/2021 11:11 AM Esvin Gray [R09 02] Hypoxia     1/11/2021 11:12 AM Esvin Gray [R77 8] Elevated troponin       ED Disposition     ED Disposition Condition Date/Time Comment    Admit Stable Mon Jan 11, 2021 11:11 AM Case was discussed with Dr Rosaenne Oakley and the patient's admission status was agreed to be Admission Status: inpatient status to the service of Dr Roseanne Oakley   Follow-up Information    None         Patient's Medications   Discharge Prescriptions    No medications on file     No discharge procedures on file      PDMP Review     None          ED Provider  Electronically Signed by           Ashvin Shen PA-C  01/11/21 9420

## 2021-01-11 NOTE — ASSESSMENT & PLAN NOTE
Type 2 MI secondary to acute COVID pneumonia and suspected acute on chronic systolic and diastolic CHF  Cardiology input appreciated, patient refused aspirin therapy

## 2021-01-11 NOTE — ASSESSMENT & PLAN NOTE
Wt Readings from Last 3 Encounters:   01/11/21 78 5 kg (173 lb 1 oz)   01/10/21 76 kg (167 lb 8 8 oz)   11/13/20 75 8 kg (167 lb)     Lasix 20 mg IV b i d  ordered by Cardiology, monitor daily weights, fluid restriction

## 2021-01-11 NOTE — H&P
H&P- Mónica [facility] 12/8/1927, 80 y o  female MRN: [id number]    Unit/Bed#: ED 21 Encounter: [id number]    Primary Care Provider: Flynn Ortiz MD   Date and time admitted to hospital: 1/11/2021  9:33 AM        * Acute respiratory failure with hypoxia [facility])  Assessment & Plan  Acute respiratory failure with hypoxia, secondary to COVID-19 pneumonia  Initiate treatment per mild treatment protocol    Check procalcitonin level, trend inflammatory markers daily    IV steroids, will initiate IV antibiotics pending procalcitonin levels x2    IV remdesivir    Pneumonia due to COVID-19 virus  Assessment & Plan  Treatment as above    Acute on chronic combined systolic and diastolic CHF (congestive heart failure) ([facility])  Assessment & Plan  Wt Readings from Last 3 Encounters:   01/11/21 78 5 kg (173 lb 1 oz)   01/10/21 76 kg (167 lb 8 8 oz)   11/13/20 75 8 kg (167 lb)     Lasix 20 mg IV b i d  ordered by Cardiology, monitor daily weights, fluid restriction  Type 2 MI (myocardial infarction) ([facility] [address] )  Assessment & Plan  Type 2 MI secondary to acute COVID pneumonia and suspected acute on chronic systolic and diastolic CHF  Cardiology input appreciated, patient refused aspirin therapy  CKD (chronic kidney disease), stage III  Assessment & Plan  Lab Results   Component Value Date    EGFR 42 01/11/2021    EGFR 38 01/10/2021    EGFR 42 10/29/2020    CREATININE 1 14 01/11/2021    CREATININE 1 22 01/10/2021    CREATININE 1 13 10/29/2020   Renal function at baseline, avoid nephrotoxins    DNR (do not resuscitate)  Assessment & Plan  DNR DNI status confirmed    Cardiomyopathy [facility])  Assessment & Plan  Likely ischemic cardiomyopathy, known EF 40%        VTE Prophylaxis: Heparin  / sequential compression device   Code Status:  DNR DNI  POLST: There is no POLST form on file for this patient (pre-hospital)  Discussion with family:  Patient's son updated via telephone    Anticipated Length of Stay:  Patient will be admitted on an Inpatient basis with an anticipated length of stay of  greater than 2 midnights  Justification for Hospital Stay:  Acute respiratory failure with hypoxia, secondary to COVID-19 pneumonia    Total Time for Visit, including Counseling / Coordination of Care: 1 hour  Greater than 50% of this total time spent on direct patient counseling and coordination of care  Chief Complaint:   Lethargy, increased shortness of breath    History of Present Illness:    Gioia Schaumann is a 80 y o  female who presents with new onset of hypoxia, shortness of breath and weakness/fatigue  Patient seen in the emergency room on 1/10/2021, diagnosed with COVID, overnight with increasing weakness, shortness of breath  Patient denies any chest pain, while on oxygen in the emergency room she denies any shortness of breath, pulse ox 96-98% on 2 L via nasal cannula  Review of Systems:    Review of Systems   All other systems reviewed and are negative  Past Medical and Surgical History:     Past Medical History:   Diagnosis Date    GERD (gastroesophageal reflux disease)     Murmur 8/27/2020    Right humeral fracture 7/20/2018       Past Surgical History:   Procedure Laterality Date    HYSTERECTOMY  1973    Total    JOINT REPLACEMENT Right     knee    ME OPEN RX FEMUR FX+INTRAMED MORE Right 8/27/2020    Procedure: INSERTION NAIL IM FEMUR ANTEGRADE (TROCHANTERIC); Surgeon: Ortiz Haddad MD;  Location: [facility];  Service: Orthopedics       Meds/Allergies:    Prior to Admission medications    Medication Sig Start Date End Date Taking?  Authorizing Provider   acetaminophen (TYLENOL) 325 mg tablet Take 2 tablets (650 mg total) by mouth every 6 (six) hours 9/2/20  Yes Haris Vargas MD   amoxicillin (AMOXIL) 500 mg capsule Take 2 capsules (1,000 mg total) by mouth 3 (three) times a day for 7 days 1/10/21 1/17/21 Yes Jose Haddad MD   aspirin 81 mg chewable tablet Chew 81 mg daily   Yes Historical Provider, MD cholecalciferol (VITAMIN D3) 1,000 units tablet Take by mouth 1/15/18  Yes Historical Provider, MD   furosemide (LASIX) 20 mg tablet Take 1 tablet (20 mg total) by mouth daily 9/2/20  Yes Faisal Fuentes MD   hydrocortisone (ANUSOL-HC) 2 5 % rectal cream Apply topically 2 (two) times a day 10/29/20  Yes Kati Wesley DO   metoprolol succinate (TOPROL-XL) 25 mg 24 hr tablet Take 0 5 tablets (12 5 mg total) by mouth daily 9/2/20  Yes Faisal Fuentes MD   Multiple Vitamins-Minerals (PRESERVISION AREDS) CAPS Take by mouth 2 (two) times a day  1/15/18  Yes Historical Provider, MD   pantoprazole (PROTONIX) 40 mg tablet TAKE 1 TABLET BY MOUTH EVERY DAY 8/7/20  Yes Lavella Medicine, MD   phenylephrine-shark liver oil-mineral oil-petrolatum (PREPARATION H) 0 25-3-14-71 9 % rectal ointment Insert into the rectum 2 (two) times a day as needed for hemorrhoids 10/29/20  Yes Kati Wesley DO   senna (SENOKOT) 8 6 mg Take 1 tablet (8 6 mg total) by mouth daily at bedtime 9/2/20  Yes Faisal Fuentes MD   tolterodine (DETROL LA) 4 mg 24 hr capsule Take 4 mg by mouth daily   Yes Historical Provider, MD   Biotin 1 MG CAPS Take 1 capsule by mouth daily  1/11/21  Historical Provider, MD   calcium carbonate (TUMS) 500 mg chewable tablet Chew 2 tablets (1,000 mg total) daily as needed for indigestion or heartburn  Patient not taking: Reported on 1/10/2021 9/2/20 1/11/21  Faisal Fuentes MD   docusate sodium (COLACE) 100 mg capsule Take 1 capsule (100 mg total) by mouth 2 (two) times a day 9/2/20 1/11/21  Faisal Fuentes MD   lisinopril (ZESTRIL) 2 5 mg tablet Take 1 tablet (2 5 mg total) by mouth daily 6/19/20 1/11/21  Nadege Chris DO   polyethylene glycol (MIRALAX) 17 g packet Take 17 g by mouth daily 9/2/20 1/11/21  Faisal Fuentes MD     I have reviewed home medications with patient personally  Allergies:    Allergies   Allergen Reactions    Sulfa Antibiotics Hives       Social History:     Marital Status: /Civil [place]   Substance Use History:   Social History     Substance and Sexual Activity   Alcohol Use Yes    Comment: Minimal consumption     Social History     Tobacco Use   Smoking Status Never Smoker   Smokeless Tobacco Never Used   Tobacco Comment    No secondhand smoke exposure     Social History     Substance and Sexual Activity   Drug Use No       Family History:    non-contributory    Physical Exam:     Vitals:   Blood Pressure: (!) 89/51 (01/11/21 1626)  Pulse: 93 (01/11/21 1626)  Temperature: 97 6 °F (36 4 °C) (01/11/21 1436)  Temp Source: Temporal (01/11/21 1436)  Respirations: 20 (01/11/21 1626)  Height: 5' 6" (167 6 cm) (01/11/21 1226)  Weight - Scale: 78 5 kg (173 lb 1 oz) (01/11/21 1226)  SpO2: 97 % (01/11/21 1626)    Physical Exam  Vitals signs and nursing note reviewed  Constitutional:       Appearance: Normal appearance  HENT:      Head: Normocephalic and atraumatic  Right Ear: External ear normal       Left Ear: External ear normal    Neck:      Musculoskeletal: Normal range of motion  Cardiovascular:      Rate and Rhythm: Normal rate  Pulmonary:      Effort: Pulmonary effort is normal  No respiratory distress  Abdominal:      General: Bowel sounds are normal  There is no distension  Palpations: Abdomen is soft  Tenderness: There is no abdominal tenderness  Musculoskeletal:      Right lower leg: Edema present  Left lower leg: Edema present  Skin:     General: Skin is warm and dry  Coloration: Skin is pale  Skin is not jaundiced  Findings: No bruising or lesion  Neurological:      General: No focal deficit present  Mental Status: She is alert  Mental status is at baseline  Psychiatric:         Mood and Affect: Mood normal          Thought Content: Thought content normal          Judgment: Judgment normal        Lab Results: I have personally reviewed pertinent reports        Results from last 7 days   Lab Units 01/11/21  1001   WBC Thousand/uL 5 80 HEMOGLOBIN g/dL 11 1*   HEMATOCRIT % 36 0   PLATELETS Thousands/uL 203   NEUTROS PCT % 79*   LYMPHS PCT % 11*   MONOS PCT % 10   EOS PCT % 0     Results from last 7 days   Lab Units 01/11/21  1001   SODIUM mmol/L 139   POTASSIUM mmol/L 4 1   CHLORIDE mmol/L 102   CO2 mmol/L 25   BUN mg/dL 23   CREATININE mg/dL 1 14   ANION GAP mmol/L 12   CALCIUM mg/dL 8 1*   ALBUMIN g/dL 3 1*   TOTAL BILIRUBIN mg/dL 0 53   ALK PHOS U/L 77   ALT U/L 17   AST U/L 48*   GLUCOSE RANDOM mg/dL 116     Results from last 7 days   Lab Units 01/11/21  1001   INR  1 17             Results from last 7 days   Lab Units 01/11/21  1001   LACTIC ACID mmol/L 1 7   PROCALCITONIN ng/ml <0 05       Imaging: I have personally reviewed pertinent reports  XR chest 1 view portable   Final Result by Justa Almanza MD (01/11 1034)      Worsening right upper lobe infiltrate                  Workstation performed: [id number]             Allscripts / Epic Records Reviewed: Yes     ** Please Note: This note has been constructed using a voice recognition system   **

## 2021-01-11 NOTE — CONSULTS
Consult - Cardiology   Formerly [facility] 80 y o  female MRN: [id number]  Unit/Bed#: ED 21 Encounter: [id number]        Reason For Consult:  Elevated troponin                 ASSESSMENT:  1  Acute COVID-19 pneumonia:   -positive test on 1/10/21   -CXR 1/11/21 with RUL infiltrate  2  Acute hypoxic respiratory failure due to #1:   -O2 sat mid 80s on arrival   -presently 97% on 3 L nasal cannula  3  Elevated troponin 5 87 on arrival:   -EKG normal sinus rhythm, no acute ischemic changes   -no obvious change from prior EKGs   -no chest pain  4  Unspecified cardiomyopathy, LVEF 40% in August 2020:   -patient historically has declined ischemic workup  5  Chronic systolic CHF  6  Right hip fracture in August 2020, s/p ORIF  7  DVT in September 2020 while at rehab, off of Eliquis due to bleeding    PLAN/ DISCUSSION:     Serial EKGs are not suggestive of ischemia and have no major changes compared to prior  It is certainly plausible that she has some underlying CAD given her advanced age as well as cardiomyopathy  She has historically declined invasive cardiac procedures (i e  Catheterization)  · She has not reported chest pain and came to the emergency department due to difficulty breathing  This is likely secondary to her pneumonia  · Elevation in cardiac enzymes represents non MI troponin elevation secondary to acute hypoxic respiratory failure and COVID-19 pneumonia    · No evidence of ACS by history or with EKGs, unlikely that this is a primary cardiac event    · Ongoing treatment for pneumonia    · Continue aspirin, Lasix, metoprolol    · With elevated proBNP and lab work relatively stable may consider short course of empiric IV diuretics    · Exam deferred for now, will confer with primary cardiologist on the case    History Of Present Illness:  Patient was not personally seen examined today in order to conserve PPE and reduce exposure during the global coronavirus pandemic   Will discuss the case by her primary cardiologist with whom I am rounding today  This is a 80-year-old female who is cared for by Dr Laly Cannon of our group  She has a cardiac history significant for unspecified cardiomyopathy with EF of 81%, chronic systolic heart failure, and dyslipidemia  She is a resident of [facility]  We have most recently seen her in August of 2020 at which time she presented with an acute right hip fracture  We saw her for preoperative risk stratification at this time  She underwent ORIF on this admission and was subsequently discharged to rehab  While at rehab she reportedly developed a DVT  She was placed on Eliquis  Unfortunately she had significant bleeding from hemorrhoids while she was on anticoagulation  Due to this issue and after a detailed discussion with her primary care provider a collective decision was made between the patient, her son, and her PCP to hold Eliquis understanding that this would of run the risk of a PE  Repeat lower extremity ultrasound in November of 2020 showed no further DVT  On 01/10/2021 the patient came to [facility] emergency department for shortness of breath  She was positive for COVID  Her chest x-ray looked like it had a right upper lobe infiltrate  As she was maintaining normal oxygen saturations on room air and was relatively comfortable she was discharged home  Unfortunately on 01/11/2021 the patient was noted to have worsening respiratory distress at the nursing facility  She returned to the emergency department  Chest x-ray showed evolving right upper lobe infiltrate  At this point she was hypoxic in the mid to upper 80s on room air  She is being admitted to the hospital for acute hypoxic respiratory failure due to COVID-19 pneumonia  A troponin was drawn while she was in the ED which was elevated to 5 87 for which reason we are asked to see her in consultation      Past Medical History:        Past Medical History:   Diagnosis Date    GERD (gastroesophageal reflux disease)     Murmur 8/27/2020    Right humeral fracture 7/20/2018      Past Surgical History:   Procedure Laterality Date    HYSTERECTOMY  1973    Total    JOINT REPLACEMENT Right     knee    DE OPEN RX FEMUR FX+INTRAMED MORE Right 8/27/2020    Procedure: INSERTION NAIL IM FEMUR ANTEGRADE (TROCHANTERIC); Surgeon: Macario Minaya MD;  Location: [facility];  Service: Orthopedics        Allergy:        Allergies   Allergen Reactions    Sulfa Antibiotics Hives       Medications:       Prior to Admission medications    Medication Sig Start Date End Date Taking?  Authorizing Provider   acetaminophen (TYLENOL) 325 mg tablet Take 2 tablets (650 mg total) by mouth every 6 (six) hours 9/2/20   Johnie Laws MD   amoxicillin (AMOXIL) 500 mg capsule Take 2 capsules (1,000 mg total) by mouth 3 (three) times a day for 7 days 1/10/21 1/17/21  Buffy Malik MD   aspirin 81 mg chewable tablet Chew 81 mg daily    Historical Provider, MD   Biotin 1 MG CAPS Take 1 capsule by mouth daily    Historical Provider, MD   calcium carbonate (TUMS) 500 mg chewable tablet Chew 2 tablets (1,000 mg total) daily as needed for indigestion or heartburn  Patient not taking: Reported on 1/10/2021 9/2/20   Johnie Laws MD   cholecalciferol (VITAMIN D3) 1,000 units tablet Take by mouth 1/15/18   Historical Provider, MD   docusate sodium (COLACE) 100 mg capsule Take 1 capsule (100 mg total) by mouth 2 (two) times a day 9/2/20   Johnie Laws MD   furosemide (LASIX) 20 mg tablet Take 1 tablet (20 mg total) by mouth daily 9/2/20   Johnie aLws MD   hydrocortisone (ANUSOL-HC) 2 5 % rectal cream Apply topically 2 (two) times a day 10/29/20   Wanna Libman , DO   lisinopril (ZESTRIL) 2 5 mg tablet Take 1 tablet (2 5 mg total) by mouth daily 6/19/20   Nadege Chris DO   metoprolol succinate (TOPROL-XL) 25 mg 24 hr tablet Take 0 5 tablets (12 5 mg total) by mouth daily 9/2/20   Johnie Laws MD Multiple Vitamins-Minerals (PRESERVISION AREDS) CAPS Take by mouth 2 (two) times a day  1/15/18   Historical Provider, MD   pantoprazole (PROTONIX) 40 mg tablet TAKE 1 TABLET BY MOUTH EVERY DAY 8/7/20   Jen Ruiz MD   phenylephrine-shark liver oil-mineral oil-petrolatum (PREPARATION H) 0 25-3-14-71 9 % rectal ointment Insert into the rectum 2 (two) times a day as needed for hemorrhoids 10/29/20   Michaela Humphries DO   polyethylene glycol (MIRALAX) 17 g packet Take 17 g by mouth daily 9/2/20   Laith Fowler MD   senna (SENOKOT) 8 6 mg Take 1 tablet (8 6 mg total) by mouth daily at bedtime 9/2/20   Laith Fowler MD       Family History:     Family History   Problem Relation Age of Onset    Heart failure Mother     Coronary artery disease Father         Social History:       Social History     Socioeconomic History    Marital status: /Civil Union     Spouse name: None    Number of children: None    Years of education: None    Highest education level: None   Occupational History    Occupation: Retired   Social Needs    Financial resource strain: None    Food insecurity     Worry: None     Inability: None    Transportation needs     Medical: None     Non-medical: None   Tobacco Use    Smoking status: Never Smoker    Smokeless tobacco: Never Used    Tobacco comment: No secondhand smoke exposure   Substance and Sexual Activity    Alcohol use: Yes     Comment: Minimal consumption    Drug use: No    Sexual activity: Not Currently   Lifestyle    Physical activity     Days per week: None     Minutes per session: None    Stress: None   Relationships    Social connections     Talks on phone: None     Gets together: None     Attends [employer] service: None     Active member of club or organization: None     Attends meetings of clubs or organizations: None     Relationship status: None    Intimate partner violence     Fear of current or ex partner: None     Emotionally abused: None Physically abused: None     Forced sexual activity: None   Other Topics Concern    None   Social History Narrative    None       ROS:  Review of systems not performed    Exam:  Exam deferred for now  Will confer with primary cardiologist on the case    DATA:      ECG:   Sinus rhythm with Premature supraventricular complexes  Nonspecific ST-t wave changes unchanged from prior record  Abnormal ECG                    Telemetry: tachycardic  HR low 100's                Weights: Wt Readings from Last 3 Encounters:   01/11/21 78 5 kg (173 lb 1 oz)   01/10/21 76 kg (167 lb 8 8 oz)   11/13/20 75 8 kg (167 lb)   , Body mass index is 27 93 kg/m²           Lab Studies:    Results from last 7 days   Lab Units 01/11/21  1001   CK TOTAL U/L 175   TROPONIN I ng/mL 5 87*   CK MB INDEX % 1 3        Results from last 7 days   Lab Units 01/11/21  1001   TRIGLYCERIDES mg/dL 60     Results from last 7 days   Lab Units 01/11/21  1001 01/10/21  1321   WBC Thousand/uL 5 80 3 54*   HEMOGLOBIN g/dL 11 1* 10 3*   HEMATOCRIT % 36 0 33 5*   PLATELETS Thousands/uL 203 182   ,   Results from last 7 days   Lab Units 01/11/21  1001 01/10/21  1325   POTASSIUM mmol/L 4 1 3 9   CHLORIDE mmol/L 102 107   CO2 mmol/L 25 28   BUN mg/dL 23 24   CREATININE mg/dL 1 14 1 22   CALCIUM mg/dL 8 1* 8 2*   ALK PHOS U/L 77  --    ALT U/L 17  --    AST U/L 48*  --

## 2021-01-12 NOTE — ED NOTES
RN called for report  Told nurse is in another room at this time and will call back when ready        Néstor Puentes RN  01/11/21 2219

## 2021-01-12 NOTE — ED NOTES
Report given to floor   Can take pt up in 15min per [facility]     Thanh Moya, RN  01/11/21 8631

## 2021-01-12 NOTE — QUICK NOTE
Pt not seen/examined  Chart, blood work reviewed  1  Acute COVID pneumonia (RUL)  2  Acute systolic and diastolic CHF  3  Type 2 MI secondary to above  4  Cardiomyopathy, likely ischemic, LVEF 40%  · Continue IV lasix one more day and recheck NTproBNP and BMP tomorrow  If renal fx continues to decline, hold diuretic  Would recheck CXR tomorrow to re-evaluate infiltrate and pulmonary vascular congestion--will give better idea of volume status

## 2021-01-12 NOTE — UTILIZATION REVIEW
Initial Clinical Review    Admission: Date/Time/Statement:   Admission Orders (From admission, onward)     Ordered        01/11/21 1113  Inpatient Admission  Once                   Orders Placed This Encounter   Procedures    Inpatient Admission     Standing Status:   Standing     Number of Occurrences:   1     Order Specific Question:   Admitting Physician     Answer:   Argenis Mccormick     Order Specific Question:   Level of Care     Answer:   Med Surg [16]     Order Specific Question:   Estimated length of stay     Answer:   More than 2 Midnights     Order Specific Question:   Certification     Answer:   I certify that inpatient services are medically necessary for this patient for a duration of greater than two midnights  See H&P and MD Progress Notes for additional information about the patient's course of treatment  ED Arrival Information     Expected Arrival Acuity Means of Arrival Escorted By Service Admission Type    - 1/11/2021 09:33 Emergent Ambulance [address] Hospitalist Emergency    Arrival Complaint    covid        Chief Complaint   Patient presents with    Shortness of Breath     seen here yesterday for covid and d/c  patient feeling worse and increasing weakness/SOB  family would like patient seen by cardiology     Assessment/Plan: 79 yo female presents to ED from [facility] with increased fatigue and SOB this am, + cough, chills  She was seen in ED 1/10 and diagnosed with covid, was maintaining andrés and discharged home  On arrival today:  tachycardic, tachypneic, + resp distress, wheezing/rhonchi;  sats 86-87 % and placed on O2 @ 2l  Elevated NTproBNP 16,276, Trop 5 87, CRP,  CXR shows Worsening right upper lobe infiltrate     Admitted to Inpatient with Acute Respiratory Failure w/ Hypoxia and Pneumonia due to COVID-19  Plan is for Tx under mild protocol with IV Decardon, IV Remdesivir, IV Rocephin, po doxycycline, vitamins c,d,& zinc, trend inflammatory markers    Lasix orderd by Cardio for acute on chronic heart failure  Type 2 MI secondary to acute COVID pneumonia and suspected acute on chronic systolic and diastolic CHF  Pt refused asa  Per Cardio: Pneumonia primary culprit of clinical presentation  NTproBNP significantly elevated from baseline and pulmonary vascular congestion seen on CXR (personally reviewed)  Recommend lasix 20 IV BID--first dose now  Elevated troponin with slight ST depressions laterally suggestive of type 2 MI in setting of COVID-19 pneumonia    Diurese, focus on BP/lipid control and decreasing cardiac strain/stress          ED Triage Vitals   Temperature Pulse Respirations Blood Pressure SpO2   01/11/21 0937 01/11/21 0937 01/11/21 0937 01/11/21 0937 01/11/21 0937   99 4 °F (37 4 °C) (!) 119 (!) 28 131/73 92 %      Temp Source Heart Rate Source Patient Position - Orthostatic VS BP Location FiO2 (%)   01/11/21 1436 01/11/21 0937 01/11/21 1051 01/11/21 1051 --   Temporal Monitor Lying Left arm       Pain Score       01/11/21 1227       No Pain          Wt Readings from Last 1 Encounters:   01/11/21 78 5 kg (173 lb 1 oz)     Additional Vital Signs:   01/12/21 08:03:18  97 1 °F (36 2 °C)   85  20  111/56  74  96 %           01/11/21 23:10:20  97 7 °F (36 5 °C)  88    112/55  74  95 %           01/11/21 2200    90  22  104/55  72  95 %      Nasal cannula  Sitting   01/11/21 2100    82  22  101/60  75  97 %  28  2 L/min  Nasal cannula  Sitting   01/11/21 2023    89  20  97/56    96 %  32  3 L/min  Nasal cannula  Sitting   01/11/21 1815    84  20  105/55  76  96 %           01/11/21 1803    90  18  105/55    97 %      None (Room air)  Lying   01/11/21 1745    92  25Abnormal   93/50  65  97 %           01/11/21 1645    88  20  88/52Abnormal   65  98 %           01/11/21 1626    93  20  89/51Abnormal     97 %  36  4 L/min       01/11/21 1557    94  16  87/51Abnormal     98 %  36  4 L/min  Nasal cannula  Lying   01/11/21 1436  97 6 °F (36 4 °C)  102  24Abnormal   102/55  74  99 %  32  3 L/min  Nasal cannula  Lying   01/11/21 1253    98  24Abnormal   109/55  76  98 %  28  2 L/min  Nasal cannula  Lying   01/11/21 1226    98  26Abnormal       97 %  32  3 L/min       01/11/21 1051    111Abnormal   24Abnormal   126/65  87  97 %  32  3 L/min  Nasal cannula  Lying       Pertinent Labs/Diagnostic Test Results:   Results from last 7 days   Lab Units 01/10/21  1321   SARS-COV-2  Positive*     Lab Units 01/12/21  0602 01/11/21  2138 01/11/21  1001   WBC Thousand/uL 3 71*  --  5 80   HEMOGLOBIN g/dL 10 5*  --  11 1*   HEMATOCRIT % 35 0  --  36 0   PLATELETS Thousands/uL 165 166 203   NEUTROS ABS Thousands/µL 2 81  --  4 52     Lab Units 01/12/21  0840 01/11/21  1001   SODIUM mmol/L 141 139   POTASSIUM mmol/L 4 0 4 1   CHLORIDE mmol/L 105 102   CO2 mmol/L 26 25   ANION GAP mmol/L 10 12   BUN mg/dL 27* 23   CREATININE mg/dL 1 32* 1 14   EGFR ml/min/1 73sq m 35 42   CALCIUM mg/dL 8 0* 8 1*   CALCIUM, IONIZED mmol/L  --  1 00*   MAGNESIUM mg/dL 2 5 2 1     Results from last 7 days   Lab Units 01/12/21  0840 01/11/21  1001   AST U/L 54* 48*   ALT U/L 18 17   ALK PHOS U/L 65 77   TOTAL PROTEIN g/dL 6 7 7 0   ALBUMIN g/dL 2 9* 3 1*   TOTAL BILIRUBIN mg/dL 0 34 0 53     Lab Units 01/12/21  0840 01/11/21  1001   GLUCOSE RANDOM mg/dL 119 116     Results from last 7 days   Lab Units 01/12/21  0840 01/11/21  1001   CK TOTAL U/L 632* 175   CK MB INDEX % 1 4 1 3   CK MB ng/mL 9 0* 2 3     Results from last 7 days   Lab Units 01/12/21  0602 01/11/21  1710 01/11/21  1325 01/11/21  1001   TROPONIN I ng/mL 5 25* 6 70* 6 83* 5 87*     Results from last 7 days   Lab Units 01/11/21  1001   PROTIME seconds 14 7*   INR  1 17     Results from last 7 days   Lab Units 01/12/21  0840   TSH 3RD GENERATON uIU/mL 1 256     Results from last 7 days   Lab Units 01/12/21  0602 01/11/21  1001   PROCALCITONIN ng/ml 0 23 <0 05     Results from last 7 days   Lab Units 01/11/21  1001 LACTIC ACID mmol/L 1 7     Results from last 7 days   Lab Units 01/11/21  1001   NT-PRO BNP pg/mL 16,276*     Results from last 7 days   Lab Units 01/11/21  1001   FERRITIN ng/mL 188     Results from last 7 days   Lab Units 01/12/21  0840 01/11/21  1001   CRP mg/L 77 5* 49 8*     Results from last 7 days   Lab Units 01/11/21  2137   CLARITY UA  Clear   COLOR UA  Yellow   SPEC GRAV UA  1 015   PH UA  5 0   GLUCOSE UA mg/dl Negative   KETONES UA mg/dl Negative   BLOOD UA  Negative   PROTEIN UA mg/dl Negative   NITRITE UA  Negative   BILIRUBIN UA  Negative   UROBILINOGEN UA E U /dl 0 2   LEUKOCYTES UA  Negative     Results from last 7 days   Lab Units 01/10/21  1321   INFLUENZA A PCR  Negative   INFLUENZA B PCR  Negative   RSV PCR  Negative     1/11 CXR: Worsening right upper lobe infiltrate    1/11 EKG:  sinus tachycardia no ST elevation, occasional unifocal PVC, mild ST depression in lateral leads as before as in inferior leads as before    Normal axis      ED Treatment:   Medication Administration from 01/11/2021 0933 to 01/11/2021 2258       Date/Time Order Dose Route Action     01/11/2021 1010 albuterol inhalation solution 5 mg 5 mg Nebulization Given     01/11/2021 1010 ipratropium (ATROVENT) 0 02 % inhalation solution 0 5 mg 0 5 mg Nebulization Given     01/11/2021 1144 doxycycline hyclate (VIBRAMYCIN) capsule 100 mg 100 mg Oral Given     01/11/2021 1146 ceftriaxone (ROCEPHIN) 1 g/50 mL in dextrose IVPB 1,000 mg Intravenous New Bag     01/11/2021 1147 metoprolol (LOPRESSOR) injection 2 5 mg 2 5 mg Intravenous Given     01/11/2021 1714 furosemide (LASIX) injection 20 mg 20 mg Intravenous Given     01/11/2021 2136 senna (SENOKOT) tablet 8 6 mg 8 6 mg Oral Given     01/11/2021 2023 ascorbic acid (VITAMIN C) tablet 1,000 mg 1,000 mg Oral Given     01/11/2021 1723 remdesivir (Veklury) 200 mg in sodium chloride 0 9 % 250 mL IVPB 200 mg Intravenous New Bag     01/11/2021 2023 doxycycline hyclate (VIBRAMYCIN) capsule 100 mg 100 mg Oral Given     01/11/2021 2136 atorvastatin (LIPITOR) tablet 40 mg 40 mg Oral Given     01/11/2021 1712 dexamethasone (DECADRON) injection 6 mg 6 mg Intravenous Given        Past Medical History:   Diagnosis Date    GERD (gastroesophageal reflux disease)     Murmur 8/27/2020    Right humeral fracture 7/20/2018     Present on Admission:   CKD (chronic kidney disease), stage III   DNR (do not resuscitate)   Acute respiratory failure with hypoxia ([facility])   Type 2 MI (myocardial infarction) ([facility] )   Pneumonia due to COVID-19 virus   Acute on chronic combined systolic and diastolic CHF (congestive heart failure) ([facility])   Cardiomyopathy ([facility] )      Admitting Diagnosis: Hypoxia [R09 02]  Elevated troponin [R77 8]  Acute on chronic combined systolic and diastolic CHF (congestive heart failure) ([facility]) [I50 43]  COVID toes [U07 1, R23 8]  Pneumonia due to COVID-19 virus [U07 1, J12 82]  Age/Sex: 80 y o  female     Admission Orders:  Scheduled Medications:  ascorbic acid, 1,000 mg, Oral, Q12H [address]  aspirin, 324 mg, Oral, Once  atorvastatin, 40 mg, Oral, HS  cefTRIAXone, 1,000 mg, Intravenous, Q24H  cholecalciferol, 2,000 Units, Oral, Daily  dexamethasone, 6 mg, Intravenous, Q24H  doxycycline hyclate, 100 mg, Oral, Q12H  famotidine, 20 mg, Oral, Daily  furosemide, 20 mg, Intravenous, BID (diuretic)  heparin (porcine), 5,000 Units, Subcutaneous, Q8H [address]  metoprolol succinate, 12 5 mg, Oral, Daily  zinc sulfate, 220 mg, Oral, Daily    Followed by  Karis Sebastian ON 1/19/2021] multivitamin-minerals, 1 tablet, Oral, Daily  oxybutynin, 5 mg, Oral, Daily  remdesivir, 100 mg, Intravenous, Q24H  senna, 1 tablet, Oral, HS    Continuous IV Infusions: n/a  PRN Meds:  acetaminophen, 650 mg, Oral, Q4H PRN    TELEMETRY  SCDs  IP CONSULT TO CARDIOLOGY  IP CONSULT TO NUTRITION SERVICES    Network Utilization Review Department  ATTENTION: Please call with any questions or concerns to [phone] and carefully listen to the prompts so that you are directed to the right person  All voicemails are confidential   Woody Doyle all requests for admission clinical reviews, approved or denied determinations and any other requests to dedicated fax number below belonging to the campus where the patient is receiving treatment   List of dedicated fax numbers for the Facilities:  [address] DENIALS (Administrative/Medical Necessity) [phone]   [address] (Maternity/NICU/Pediatrics) [phone]   [address] Marino Chou 2029 (  [address]) [address] [place] [address] [address] [place] [address] [phone]

## 2021-01-12 NOTE — PLAN OF CARE
Problem: Potential for Falls  Goal: Patient will remain free of falls  Description: INTERVENTIONS:  - Assess patient frequently for physical needs  -  Identify cognitive and physical deficits and behaviors that affect risk of falls    -  [place] fall precautions as indicated by assessment   - Educate patient/family on patient safety including physical limitations  - Instruct patient to call for assistance with activity based on assessment  - Modify environment to reduce risk of injury  - Consider OT/PT consult to assist with strengthening/mobility  Outcome: Progressing     Problem: PAIN - ADULT  Goal: Verbalizes/displays adequate comfort level or baseline comfort level  Description: Interventions:  - Encourage patient to monitor pain and request assistance  - Assess pain using appropriate pain scale  - Administer analgesics based on type and severity of pain and evaluate response  - Implement non-pharmacological measures as appropriate and evaluate response  - Consider cultural and social influences on pain and pain management  - Notify physician/advanced practitioner if interventions unsuccessful or patient reports new pain  Outcome: Progressing     Problem: INFECTION - ADULT  Goal: Absence or prevention of progression during hospitalization  Description: INTERVENTIONS:  - Assess and monitor for signs and symptoms of infection  - Monitor lab/diagnostic results  - Monitor all insertion sites, i e  indwelling lines, tubes, and drains  - Monitor endotracheal if appropriate and nasal secretions for changes in amount and color  - [place] appropriate cooling/warming therapies per order  - Administer medications as ordered  - Instruct and encourage patient and family to use good hand hygiene technique  - Identify and instruct in appropriate isolation precautions for identified infection/condition  Outcome: Progressing     Problem: SAFETY ADULT  Goal: Patient will remain free of falls  Description: INTERVENTIONS:  - Assess patient frequently for physical needs  -  Identify cognitive and physical deficits and behaviors that affect risk of falls    -  [place] fall precautions as indicated by assessment   - Educate patient/family on patient safety including physical limitations  - Instruct patient to call for assistance with activity based on assessment  - Modify environment to reduce risk of injury  - Consider OT/PT consult to assist with strengthening/mobility  Outcome: Progressing  Goal: Maintain or return to baseline ADL function  Description: INTERVENTIONS:  -  Assess patient's ability to carry out ADLs; assess patient's baseline for ADL function and identify physical deficits which impact ability to perform ADLs (bathing, care of mouth/teeth, toileting, grooming, dressing, etc )  - Assess/evaluate cause of self-care deficits   - Assess range of motion  - Assess patient's mobility; develop plan if impaired  - Assess patient's need for assistive devices and provide as appropriate  - Encourage maximum independence but intervene and supervise when necessary  - Involve family in performance of ADLs  - Assess for home care needs following discharge   - Consider OT consult to assist with ADL evaluation and planning for discharge  - Provide patient education as appropriate  Outcome: Progressing  Goal: Maintain or return mobility status to optimal level  Description: INTERVENTIONS:  - Assess patient's baseline mobility status (ambulation, transfers, stairs, etc )    - Identify cognitive and physical deficits and behaviors that affect mobility  - Identify mobility aids required to assist with transfers and/or ambulation (gait belt, sit-to-stand, lift, walker, cane, etc )  - [place] fall precautions as indicated by assessment  - Record patient progress and toleration of activity level on Mobility SBAR; progress patient to next Phase/Stage  - Instruct patient to call for assistance with activity based on assessment  - Consider rehabilitation consult to assist with strengthening/weightbearing, etc   Outcome: Progressing     Problem: DISCHARGE PLANNING  Goal: Discharge to home or other facility with appropriate resources  Description: INTERVENTIONS:  - Identify barriers to discharge w/patient and caregiver  - Arrange for needed discharge resources and transportation as appropriate  - Identify discharge learning needs (meds, wound care, etc )  - Arrange for interpretive services to assist at discharge as needed  - Refer to Case Management Department for coordinating discharge planning if the patient needs post-hospital services based on physician/advanced practitioner order or complex needs related to functional status, cognitive ability, or social support system  Outcome: Progressing     Problem: Knowledge Deficit  Goal: Patient/family/caregiver demonstrates understanding of disease process, treatment plan, medications, and discharge instructions  Description: Complete learning assessment and assess knowledge base    Interventions:  - Provide teaching at level of understanding  - Provide teaching via preferred learning methods  Outcome: Progressing

## 2021-01-12 NOTE — ED NOTES
Floor RN called back stating she just got the last admission so she needs time  Will call back when ready  Hospital supervisor made aware        Rocky Rossi RN  01/11/21 [id number]

## 2021-01-12 NOTE — PROGRESS NOTES
Progress Note - Ella Macdonald 12/8/1927, 80 y o  female MRN: [id number]    Unit/Bed#: [address] [address] 206-02 Encounter: [id number]    Primary Care Provider: Dot Tolbert MD   Date and time admitted to hospital: 1/11/2021  9:33 AM        * Pneumonia due to COVID-19 virus  Assessment & Plan  Stable currently on room air  On day 2 of remdesivir, dexamethasone, Lipitor,, vitamin D3 Pepcid, vitamin-C  Trend inflammatory markers  Overall stable  Family updated    Acute on chronic combined systolic and diastolic CHF (congestive heart failure) ([facility])  Assessment & Plan  Wt Readings from Last 3 Encounters:   01/11/21 78 5 kg (173 lb 1 oz)   01/10/21 76 kg (167 lb 8 8 oz)   11/13/20 75 8 kg (167 lb)     With suspected underlying ischemic cardiomyopathy, exacerbated by acute viral pneumonia  Continue IV Lasix for 1 more day per Cardiology  Continue Toprol XL 12 5 mg daily    Type 2 MI (myocardial infarction) ([facility] [address] )  Assessment & Plan  None MI troponin elevation secondary to acute COVID infection and congestive heart failure  No ACS  Continue Lipitor 40 mg daily    CKD (chronic kidney disease), stage III  Assessment & Plan  Recheck CMP in a m  Given IV diuretics      VTE Pharmacologic Prophylaxis:   Pharmacologic: Heparin  Mechanical VTE Prophylaxis in Place: Yes    Patient Centered Rounds: I have performed bedside rounds with nursing staff today  Discussions with Specialists or Other Care Team Provider: none    Education and Discussions with Family / Patient: spoke with daughter    Time Spent for Care: 20 minutes  More than 50% of total time spent on counseling and coordination of care as described above      Current Length of Stay: 1 day(s)    Current Patient Status: Inpatient   Certification Statement: The patient will continue to require additional inpatient hospital stay due to none    Discharge Plan: to be determined    Code Status: Level 3 - DNAR and DNI      Subjective:   Off O2  Denies SOB  Denies CP    Objective: Vitals:   Temp (24hrs), Av 5 °F (36 4 °C), Min:97 1 °F (36 2 °C), Max:97 7 °F (36 5 °C)    Temp:  [97 1 °F (36 2 °C)-97 7 °F (36 5 °C)] 97 7 °F (36 5 °C)  HR:  [82-90] 84  Resp:  [20-22] 20  BP: ()/(55-60) 110/58  SpO2:  [94 %-97 %] 95 %  Body mass index is 27 93 kg/m²  Input and Output Summary (last 24 hours): Intake/Output Summary (Last 24 hours) at 2021 1837  Last data filed at 2021 0900  Gross per 24 hour   Intake 240 ml   Output    Net 240 ml       Physical Exam:     Physical Exam  Constitutional:       Appearance: She is not diaphoretic  HENT:      Head: Normocephalic and atraumatic  Nose: No rhinorrhea  Eyes:      General: No scleral icterus  Neck:      Musculoskeletal: Neck supple  Cardiovascular:      Rate and Rhythm: Regular rhythm  Heart sounds: No murmur  No gallop  Pulmonary:      Effort: Pulmonary effort is normal  No respiratory distress  Breath sounds: No wheezing or rales  Comments: Fine crackles at bases  Abdominal:      General: Abdomen is flat  Palpations: Abdomen is soft  Musculoskeletal:      Right lower leg: No edema  Left lower leg: No edema  Skin:     General: Skin is warm and dry  Neurological:      Mental Status: She is alert  Mental status is at baseline     Psychiatric:         Mood and Affect: Mood normal          Behavior: Behavior normal        Additional Data:     Labs:    Results from last 7 days   Lab Units 21  0602   WBC Thousand/uL 3 71*   HEMOGLOBIN g/dL 10 5*   HEMATOCRIT % 35 0   PLATELETS Thousands/uL 165   NEUTROS PCT % 76*   LYMPHS PCT % 14   MONOS PCT % 10   EOS PCT % 0     Results from last 7 days   Lab Units 21  0840   SODIUM mmol/L 141   POTASSIUM mmol/L 4 0   CHLORIDE mmol/L 105   CO2 mmol/L 26   BUN mg/dL 27*   CREATININE mg/dL 1 32*   ANION GAP mmol/L 10   CALCIUM mg/dL 8 0*   ALBUMIN g/dL 2 9*   TOTAL BILIRUBIN mg/dL 0 34   ALK PHOS U/L 65   ALT U/L 18   AST U/L 54*   GLUCOSE RANDOM mg/dL 119     Results from last 7 days   Lab Units 01/11/21  1001   INR  1 17             Results from last 7 days   Lab Units 01/12/21  0602 01/11/21  1001   LACTIC ACID mmol/L  --  1 7   PROCALCITONIN ng/ml 0 23 <0 05           * I Have Reviewed All Lab Data Listed Above  * Additional Pertinent Lab Tests Reviewed: All Labs Within Last 24 Hours Reviewed    Imaging:    Imaging Reports Reviewed Today Include: none      Recent Cultures (last 7 days):           Last 24 Hours Medication List:   Current Facility-Administered Medications   Medication Dose Route Frequency Provider Last Rate    acetaminophen  650 mg Oral Q4H PRN Rachel Hammer DO      ascorbic acid  1,000 mg Oral Q12H [address] , DO      aspirin  324 mg Oral Once Cindy Taveras PA-C      atorvastatin  40 mg Oral HS Rachel Hammer DO      cholecalciferol  2,000 Units Oral Daily Rachel Hammer DO      dexamethasone  6 mg Intravenous Q24H Rachel Hammer DO      famotidine  20 mg Oral Daily Rachel Hammer DO      furosemide  20 mg Intravenous BID (diuretic) Nevillejul DO Aries      heparin (porcine)  5,000 Units Subcutaneous [facility] Rachel Hammer DO      metoprolol succinate  12 5 mg Oral Daily Rachel Hammer DO      zinc sulfate  220 mg Oral Daily Rachel Hammer DO      Followed by   [place] [facility] ON 1/19/2021] multivitamin-minerals  1 tablet Oral Daily Rachel Hammer DO      oxybutynin  5 mg Oral Daily [place]      remdesivir  100 mg Intravenous Q24H Rachel Hammer DO      senna  1 tablet Oral HS Rachel Hammer DO          Today, Patient Was Seen By: Ankita Ortiz MD    ** Please Note: Dictation voice to text software may have been used in the creation of this document   **

## 2021-01-12 NOTE — ASSESSMENT & PLAN NOTE
Stable currently on room air  On day 2 of remdesivir, dexamethasone, Lipitor,, vitamin D3 Pepcid, vitamin-C  Trend inflammatory markers  Overall stable  Family updated

## 2021-01-12 NOTE — ASSESSMENT & PLAN NOTE
Wt Readings from Last 3 Encounters:   01/11/21 78 5 kg (173 lb 1 oz)   01/10/21 76 kg (167 lb 8 8 oz)   11/13/20 75 8 kg (167 lb)     With suspected underlying ischemic cardiomyopathy, exacerbated by acute viral pneumonia  Continue IV Lasix for 1 more day per Cardiology  Continue Toprol XL 12 5 mg daily

## 2021-01-12 NOTE — CONSULTS
Consulted for CHF diet education  Unable to visit PT d/t COVID restriction, tried calling PT but no answer  Per RN PT eating well at meals with no concerns, most likely meeting needs, will continue to monitor po intake  PT not currently appropriate for diet education   Continue current diet of cardiac, 1500mL fluid restriction per MD

## 2021-01-12 NOTE — ASSESSMENT & PLAN NOTE
None MI troponin elevation secondary to acute COVID infection and congestive heart failure  No ACS  Continue Lipitor 40 mg daily

## 2021-01-13 PROBLEM — U07.1 SEPSIS DUE TO COVID-19 (HCC): Status: ACTIVE | Noted: 2021-01-01

## 2021-01-13 PROBLEM — A41.89 SEPSIS DUE TO COVID-19 (HCC): Status: ACTIVE | Noted: 2021-01-01

## 2021-01-13 NOTE — PROGRESS NOTES
Progress Note - Cardiology   Milad Cartwright 80 y o  female MRN: [id number]  Unit/Bed#: [facility] 2 -02 Encounter: [id number]        Problem List:  Principal Problem:    Pneumonia due to COVID-19 virus  Active Problems:    CKD (chronic kidney disease), stage III    Cardiomyopathy ([facility] [address] )    DNR (do not resuscitate)    Type 2 MI (myocardial infarction) ([facility] )    Acute on chronic combined systolic and diastolic CHF (congestive heart failure) ([facility] )      Asessment:  1  Acute COVID-19 pneumonia  2  Acute on chronic systolic and diastolic heart failure  3  Non MI troponin elevation secondary to the above  4  Cardiomyopathy, likely ischemic with an EF of 40%    Plan/ Discussion:  Chart reviewed  Patient's oxygen requirements are decreasing and she is saturating well on room air  Creatinine is stable at 1 3 today  Down 4 lbs by bed scale  I/O difficult to measure, but indicate net negative - 1L  · Give 1 more dose of IV Lasix this afternoon then change to oral Lasix tomorrow    · Repeat NT proBNP tomorrow    · Consider repeat chest x-ray both to re-evaluate pneumonia as well resolution of CHF    · Continue Toprol XL for cardiomyopathy    · Ongoing treatment per primary team for COVID-19 pneumonia    Subjective:  Patient not personally seen examined today in order to conserve PPE as well as minimize staff exposure during the covid pandemic    Her chart was extensively reviewed including lab work, pertinent imaging studies, cardiac progress notes, medications, and vital signs    Vitals:  Vitals:    01/11/21 1226 01/13/21 0600   Weight: 78 5 kg (173 lb 1 oz) 76 9 kg (169 lb 8 5 oz)   ,  Vitals:    01/12/21 2052 01/12/21 2100 01/13/21 0600 01/13/21 0752   BP: 131/75   105/64   BP Location:    Right arm   Pulse: 90   88   Resp: 18   17   Temp: 98 5 °F (36 9 °C)   97 8 °F (36 6 °C)   TempSrc:    Oral   SpO2: 94% 93%  93%   Weight:   76 9 kg (169 lb 8 5 oz)    Height:           Exam:  Deferred           Telemetry:       Normal sinus rhythm, , Heart Rate 80  Bouts of atrial tachycardia noted    Medications:    Current Facility-Administered Medications:     acetaminophen (TYLENOL) tablet 650 mg, 650 mg, Oral, Q4H PRN, Jaycee Hedge, DO    ascorbic acid (VITAMIN C) tablet 1,000 mg, 1,000 mg, Oral, Q12H EDEN, Jaycee Hedge, DO, 1,000 mg at 01/13/21 0903    aspirin chewable tablet 324 mg, 324 mg, Oral, Once, Esvin Gray PA-C    atorvastatin (LIPITOR) tablet 40 mg, 40 mg, Oral, HS, Jaycee Hedge, DO, 40 mg at 01/12/21 2143    cholecalciferol (VITAMIN D3) tablet 2,000 Units, 2,000 Units, Oral, Daily, Jaycee Hedge, DO, 2,000 Units at 01/13/21 0903    dexamethasone (DECADRON) injection 6 mg, 6 mg, Intravenous, Q24H, Jaycee Hedge, DO, 6 mg at 01/12/21 1600    famotidine (PEPCID) tablet 20 mg, 20 mg, Oral, Daily, Jaycee Hedge, DO, 20 mg at 01/13/21 0902    furosemide (LASIX) injection 20 mg, 20 mg, Intravenous, BID (diuretic), Rujul Chris, DO, 20 mg at 01/13/21 1049    heparin (porcine) subcutaneous injection 5,000 Units, 5,000 Units, Subcutaneous, Q8H [address] **AND** [COMPLETED] Platelet count, , , Once, Jaycee Hedge, DO    metoprolol succinate (TOPROL-XL) 24 hr tablet 12 5 mg, 12 5 mg, Oral, Daily, Jaycee Hedge, DO, 12 5 mg at 01/12/21 0806    zinc sulfate (ZINCATE) capsule 220 mg, 220 mg, Oral, Daily, 220 mg at 01/13/21 0902 **FOLLOWED BY** [START ON 1/19/2021] multivitamin-minerals (CENTRUM ADULTS) tablet 1 tablet, 1 tablet, Oral, Daily, Jaycee Hedge, DO    oxybutynin (DITROPAN-XL) 24 hr tablet 5 mg, 5 mg, Oral, Daily, Jaycee Hedge, DO, 5 mg at 01/13/21 9252    [COMPLETED] remdesivir (Veklury) 200 mg in sodium chloride 0 9 % 250 mL IVPB, 200 mg, Intravenous, Q24H, Stopped at 01/11/21 1802 **FOLLOWED BY** remdesivir (Veklury) 100 mg in sodium chloride 0 9 % 250 mL IVPB, 100 mg, Intravenous, Q24H, Jaycee Dunlap DO, 100 mg at 01/12/21 1700    senna (SENOKOT) tablet 8 6 mg, 1 tablet, Oral, HS, Negro Rodriguez DO, 8 6 mg at 01/12/21 2143      Labs/Data:        Results from last 7 days   Lab Units 01/13/21  0514 01/12/21  0602 01/11/21  2138 01/11/21  1001   WBC Thousand/uL 4 23* 3 71*  --  5 80   HEMOGLOBIN g/dL 11 5 10 5*  --  11 1*   HEMATOCRIT % 36 9 35 0  --  36 0   PLATELETS Thousands/uL 197 165 166 203     Results from last 7 days   Lab Units 01/13/21  0514 01/12/21  0840 01/12/21  0602 01/11/21  1710 01/11/21  1325 01/11/21  1001   POTASSIUM mmol/L 3 6 4 0  --   --   --  4 1   CHLORIDE mmol/L 105 105  --   --   --  102   CO2 mmol/L 26 26  --   --   --  25   BUN mg/dL 32* 27*  --   --   --  23   CK TOTAL U/L  --  632*  --   --   --  175   TROPONIN I ng/mL  --   --  5 25* 6 70* 6 83* 5 87*   CK MB INDEX %  --  1 4  --   --   --  1 3

## 2021-01-13 NOTE — ASSESSMENT & PLAN NOTE
Wt Readings from Last 3 Encounters:   01/13/21 76 9 kg (169 lb 8 5 oz)   01/10/21 76 kg (167 lb 8 8 oz)   11/13/20 75 8 kg (167 lb)     With suspected underlying ischemic cardiomyopathy, exacerbated by acute viral pneumonia  Continue IV Lasix for today per Cardiology and switch to PO diuretics tomorrow  Check CXR in am   Continue Toprol XL 12 5 mg daily

## 2021-01-13 NOTE — PLAN OF CARE
Problem: Potential for Falls  Goal: Patient will remain free of falls  Description: INTERVENTIONS:  - Assess patient frequently for physical needs  -  Identify cognitive and physical deficits and behaviors that affect risk of falls    -  [place] fall precautions as indicated by assessment   - Educate patient/family on patient safety including physical limitations  - Instruct patient to call for assistance with activity based on assessment  - Modify environment to reduce risk of injury  - Consider OT/PT consult to assist with strengthening/mobility  Outcome: Progressing     Problem: PAIN - ADULT  Goal: Verbalizes/displays adequate comfort level or baseline comfort level  Description: Interventions:  - Encourage patient to monitor pain and request assistance  - Assess pain using appropriate pain scale  - Administer analgesics based on type and severity of pain and evaluate response  - Implement non-pharmacological measures as appropriate and evaluate response  - Consider cultural and social influences on pain and pain management  - Notify physician/advanced practitioner if interventions unsuccessful or patient reports new pain  Outcome: Progressing     Problem: INFECTION - ADULT  Goal: Absence or prevention of progression during hospitalization  Description: INTERVENTIONS:  - Assess and monitor for signs and symptoms of infection  - Monitor lab/diagnostic results  - Monitor all insertion sites, i e  indwelling lines, tubes, and drains  - Monitor endotracheal if appropriate and nasal secretions for changes in amount and color  - [place] appropriate cooling/warming therapies per order  - Administer medications as ordered  - Instruct and encourage patient and family to use good hand hygiene technique  - Identify and instruct in appropriate isolation precautions for identified infection/condition  Outcome: Progressing     Problem: SAFETY ADULT  Goal: Patient will remain free of falls  Description: INTERVENTIONS:  - Assess patient frequently for physical needs  -  Identify cognitive and physical deficits and behaviors that affect risk of falls    -  [place] fall precautions as indicated by assessment   - Educate patient/family on patient safety including physical limitations  - Instruct patient to call for assistance with activity based on assessment  - Modify environment to reduce risk of injury  - Consider OT/PT consult to assist with strengthening/mobility  Outcome: Progressing  Goal: Maintain or return to baseline ADL function  Description: INTERVENTIONS:  -  Assess patient's ability to carry out ADLs; assess patient's baseline for ADL function and identify physical deficits which impact ability to perform ADLs (bathing, care of mouth/teeth, toileting, grooming, dressing, etc )  - Assess/evaluate cause of self-care deficits   - Assess range of motion  - Assess patient's mobility; develop plan if impaired  - Assess patient's need for assistive devices and provide as appropriate  - Encourage maximum independence but intervene and supervise when necessary  - Involve family in performance of ADLs  - Assess for home care needs following discharge   - Consider OT consult to assist with ADL evaluation and planning for discharge  - Provide patient education as appropriate  Outcome: Progressing  Goal: Maintain or return mobility status to optimal level  Description: INTERVENTIONS:  - Assess patient's baseline mobility status (ambulation, transfers, stairs, etc )    - Identify cognitive and physical deficits and behaviors that affect mobility  - Identify mobility aids required to assist with transfers and/or ambulation (gait belt, sit-to-stand, lift, walker, cane, etc )  - [place] fall precautions as indicated by assessment  - Record patient progress and toleration of activity level on Mobility SBAR; progress patient to next Phase/Stage  - Instruct patient to call for assistance with activity based on assessment  - Consider rehabilitation consult to assist with strengthening/weightbearing, etc   Outcome: Progressing     Problem: DISCHARGE PLANNING  Goal: Discharge to home or other facility with appropriate resources  Description: INTERVENTIONS:  - Identify barriers to discharge w/patient and caregiver  - Arrange for needed discharge resources and transportation as appropriate  - Identify discharge learning needs (meds, wound care, etc )  - Arrange for interpretive services to assist at discharge as needed  - Refer to Case Management Department for coordinating discharge planning if the patient needs post-hospital services based on physician/advanced practitioner order or complex needs related to functional status, cognitive ability, or social support system  Outcome: Progressing     Problem: Knowledge Deficit  Goal: Patient/family/caregiver demonstrates understanding of disease process, treatment plan, medications, and discharge instructions  Description: Complete learning assessment and assess knowledge base  Interventions:  - Provide teaching at level of understanding  - Provide teaching via preferred learning methods  Outcome: Progressing     Problem: Nutrition/Hydration-ADULT  Goal: Nutrient/Hydration intake appropriate for improving, restoring or maintaining nutritional needs  Description: Monitor and assess patient's nutrition/hydration status for malnutrition  Collaborate with interdisciplinary team and initiate plan and interventions as ordered  Monitor patient's weight and dietary intake as ordered or per policy  Utilize nutrition screening tool and intervene as necessary  Determine patient's food preferences and provide high-protein, high-caloric foods as appropriate       INTERVENTIONS:  - Monitor oral intake, urinary output, labs, and treatment plans  - Assess nutrition and hydration status and recommend course of action  - Evaluate amount of meals eaten  - Assist patient with eating if necessary   - Allow adequate time for meals  - Recommend/ encourage appropriate diets, oral nutritional supplements, and vitamin/mineral supplements  - Order, calculate, and assess calorie counts as needed  - Recommend, monitor, and adjust tube feedings and TPN/PPN based on assessed needs  - Assess need for intravenous fluids  - Provide specific nutrition/hydration education as appropriate  - Include patient/family/caregiver in decisions related to nutrition  Outcome: Progressing

## 2021-01-13 NOTE — ASSESSMENT & PLAN NOTE
Stable currently on room air  On day 3 of remdesivir, dexamethasone, Lipitor, vitamin D3 Pepcid, vitamin-C  Improving inflammatory markers  Overall stable

## 2021-01-13 NOTE — PHYSICAL THERAPY NOTE
Physical Therapy Evaluation     Patient's Name: Amberly Mtz    Admitting Diagnosis  Hypoxia [R09 02]  Elevated troponin [R77 8]  Acute on chronic combined systolic and diastolic CHF (congestive heart failure) ([facility]) [I50 43]  COVID toes [U07 1, R23 8]  Pneumonia due to COVID-19 virus [U07 1, J12 82]    Problem List  Patient Active Problem List   Diagnosis    Esophagitis, reflux    Frailty    Hyperkalemia    Hyperlipidemia    Primary localized osteoarthritis of right hip    Vitamin D deficiency    Fracture of distal end of right radius    Fall    Age related osteoporosis    GERD (gastroesophageal reflux disease)    Elevated troponin    CKD (chronic kidney disease), stage III    Acute systolic (congestive) heart failure ([facility])    Cardiomyopathy ([facility])    Mitral regurgitation    DNR (do not resuscitate)    Chronic systolic congestive heart failure ([facility] )    Right femoral fracture ([facility])    Benign essential HTN    Anemia    Murmur    History of DVT (deep vein thrombosis)    Type 2 MI (myocardial infarction) ([facility] )    Pneumonia due to COVID-19 virus    Acute on chronic combined systolic and diastolic CHF (congestive heart failure) ([facility])       Past Medical History  Past Medical History:   Diagnosis Date    GERD (gastroesophageal reflux disease)     Murmur 8/27/2020    Right humeral fracture 7/20/2018       Past Surgical History  Past Surgical History:   Procedure Laterality Date    HYSTERECTOMY  1973    Total    JOINT REPLACEMENT Right     knee    ME OPEN RX FEMUR FX+INTRAMED MORE Right 8/27/2020    Procedure: INSERTION NAIL IM FEMUR ANTEGRADE (TROCHANTERIC);   Surgeon: Dorothy Boucher MD;  Location: [facility];  Service: Orthopedics        01/13/21 6950   Note Type   Note type Evaluation   Pain Assessment   Pain Assessment Tool 0-10   Pain Score No Pain   Home Living   Home Layout One level   Bathroom Shower/Tub Walk-in shower   [id number] [facility] Equipment Walker   Additional Comments Pt  from [employer] [facility] [employer] [facility]   Prior Function   Level of [place] Needs assistance with IADLs  (pt reports independent short distance ambulation, A with ADL)   Lives With Facility staff   Receives Help From Family   ADL Assistance Needs assistance   IADLs Needs assistance   Falls in the last 6 months 1 to 4   Comments "I fell recently and broke my R hip, I have been walking with therapy down the [place] and back"   Restrictions/Precautions   Other Precautions O2;Fall Risk;Pain;Bed Alarm; Chair Alarm;Telemetry   Cognition   Overall Cognitive Status WFL   Arousal/Participation Alert   Orientation Level Oriented X4   Memory Within functional limits   Following Commands Follows one step commands without difficulty   RLE Assessment   RLE Assessment   (4-/5 grossly)   LLE Assessment   LLE Assessment   (4-/5 grossly)   Bed Mobility   Supine to Sit 4  Minimal assistance   Additional items Assist x 1; Increased time required;Verbal cues   Sit to Supine 4  Minimal assistance   Additional items Assist x 1; Increased time required;Verbal cues   Additional Comments Pt  able to sit EOB with fair balance with No LOB  During first ambulation trial on RA pt  dropped to 88% with ambulation x 20', on 2nd trial s/p rest break pt  SPO2 remained at 92% s/p ambulation trial     Transfers   Sit to Stand 4  Minimal assistance   Additional items Assist x 1; Increased time required;Verbal cues   Stand to Sit 4  Minimal assistance   Additional items Assist x 1; Increased time required;Verbal cues   Additional Comments Pt  unsteady on her feet intermittantly, recommend A x1 for all mobility  Pt  reached to move pad on bed and had x 1 instance LOB with pt  able to self recover  Pt  is a high falls risk  Pt  returned to bed, alarm intact, all needs in reach  Ambulation/Elevation   Gait pattern Decreased foot clearance; Foward flexed; Inconsistent juan david; Short stride   Gait Assistance 4  Minimal assist   Additional items Assist x 1   Assistive Device Rolling walker   Distance 20'x2   Balance   Static Sitting Fair +   Dynamic Sitting Fair   Static Standing Fair -   Dynamic Standing Poor +   Ambulatory Poor +   Endurance Deficit   Endurance Deficit Yes   Endurance Deficit Description fatigue, SOB  Pt  with some desaturation on RA with activity intermittently  Activity Tolerance   Activity Tolerance Patient limited by fatigue   Nurse Made Aware RN ok to see   Assessment   Prognosis Good   Problem List Decreased strength;Decreased endurance; Impaired balance;Decreased mobility; Decreased safety awareness   Assessment Pt is 80 y o  female seen for PT evaluation s/p admit to [facility] on 1/11/2021 w/ Pneumonia due to COVID-19 virus  PT consulted to assess pt's functional mobility and d/c needs  Order placed for PT eval and tx, w/ activity order  Comorbidities affecting pt's physical performance at time of assessment include: R hip ORIF 8/2020, T2 MI, Pneumonia due to COVID, CHF, fall hx  PTA, pt was ambulating short distances independently with assist for ADLS and IADLs    Personal factors affecting pt at time of IE include: ambulating w/ assistive device, inability to perform IADLs, inability to perform ADLs and inability to live alone  Please find objective findings from PT assessment regarding body systems outlined above with impairments and limitations including weakness, impaired balance, decreased endurance, gait deviations, pain, decreased functional mobility tolerance, decreased safety awareness, fall risk and SOB upon exertion  The following objective measures performed on IE also reveal limitations: Barthel Index: 55/100  Pt's clinical presentation is currently unstable/unpredictable seen in pt's presentation  Pt to benefit from continued PT tx to address deficits as defined above and maximize level of functional independent mobility and consistency   From PT/mobility standpoint, recommendation at time of d/c would be return to long term with increased support with A x1 for mobility recommended, HHPT and RW use pending progress in order to facilitate return to PLOF  Barriers to Discharge None  (recommend return to long term with increased support)   Goals   Patient Goals to go home and do my exercises   [facility] Expiration Date 01/27/21   Short Term Goal #1 1  Pt will complete bed mobility with Mod I to increase functional mobility  2  Pt will complete sit to stand transfers with Mod I to increase functional mobility  3  Pt will ambulate 150 ft with RW with Mod I without LOB 4  Pt will increase B/L LE strength by 1 grade to facilitate improved functional mobility with decreased risk of falls  5  Pt will increase standing balance to fair in order to decrease risk of falls  PT Treatment Day 0   Plan   Treatment/Interventions Therapeutic exercise; Endurance training;Bed mobility;Gait training;LE strengthening/ROM; Functional transfer training;Patient/family training;Equipment eval/education;Spoke to nursing   PT Frequency   (4-5x/wk)   Recommendation   PT Discharge Recommendation Other (Comment)  (return to [facility] with increased social support, HHPT)   Equipment Recommended Walker   PT - OK to Discharge Yes  (to return to [facility] when medically stable with HHPT/inc support)   Barthel Index   Feeding 10   Bathing 0   Grooming Score 5   Dressing Score 5   Bladder Score 10   Bowels Score 10   Toilet Use Score 5   Transfers (Bed/Chair) Score 10   Mobility (Level Surface) Score 0   Stairs Score 0   Barthel Index Score 55         Robles Moreno, PT

## 2021-01-13 NOTE — PLAN OF CARE
Problem: PHYSICAL THERAPY ADULT  Goal: Performs mobility at highest level of function for planned discharge setting  See evaluation for individualized goals  Description: Treatment/Interventions: Therapeutic exercise, Endurance training, Bed mobility, Gait training, LE strengthening/ROM, Functional transfer training, Patient/family training, Equipment eval/education, Spoke to nursing  Equipment Recommended: Monique Mtz       See flowsheet documentation for full assessment, interventions and recommendations  Outcome: Progressing  Note: Prognosis: Good  Problem List: Decreased strength, Decreased endurance, Impaired balance, Decreased mobility, Decreased safety awareness  Assessment: Pt is 80 y o  female seen for PT evaluation s/p admit to [address] on 1/11/2021 w/ Pneumonia due to COVID-19 virus  PT consulted to assess pt's functional mobility and d/c needs  Order placed for PT eval and tx, w/ activity order  Comorbidities affecting pt's physical performance at time of assessment include: R hip ORIF 8/2020, T2 MI, Pneumonia due to COVID, CHF, fall hx  PTA, pt was ambulating short distances independently with assist for ADLS and IADLs    Personal factors affecting pt at time of IE include: ambulating w/ assistive device, inability to perform IADLs, inability to perform ADLs and inability to live alone  Please find objective findings from PT assessment regarding body systems outlined above with impairments and limitations including weakness, impaired balance, decreased endurance, gait deviations, pain, decreased functional mobility tolerance, decreased safety awareness, fall risk and SOB upon exertion  The following objective measures performed on IE also reveal limitations: Barthel Index: 55/100  Pt's clinical presentation is currently unstable/unpredictable seen in pt's presentation   Pt to benefit from continued PT tx to address deficits as defined above and maximize level of functional independent mobility and consistency  From PT/mobility standpoint, recommendation at time of d/c would be return to [facility] with increased support with A x1 for mobility recommended, HHPT and RW use pending progress in order to facilitate return to PLOF  Barriers to Discharge: None(recommend return to [facility] with increased support)     PT Discharge Recommendation: Other (Comment)(return to halfway with increased social support, HHPT)     PT - OK to Discharge: Yes(to return to [facility] when medically stable with HHPT/inc support)    See flowsheet documentation for full assessment

## 2021-01-13 NOTE — ASSESSMENT & PLAN NOTE
Likely ischemic cardiomyopathy, known EF 40%  Continue Toprol XL 12 5 mg daily  Switch to Lasix 20 mg p o   Daily tomorrow  Not on Ace or Arb presumably due to kidney disease

## 2021-01-13 NOTE — CASE MANAGEMENT
GMLOS: 4             LOS 2  BUNDLED? Not documented  UNPLANNED READMISSION LEVEL: low  30 DAY READMISSION? No    Pt admitted found to have Covid PNA and CHF currently stable on RA- it's noted by therapy of her desaturating (no % given to what level) with ambulation- may benefit from Home O2 study prior to discharge as pt wasn't on O2 PTA  Pt lives at [address] [place] [address] with CM calling to ascertain prior LOF  Pt is independent with all aspects of her ADL's with supervision per facility policy for showers  She ambulated with a RW independently  Reviewed current LOF and it is stated they are able to have pt return, asking for order for PT/OT eval/tx with same placed on AVS- [facility] [place] is used within their facility  Discussed should O2 be needed if this would be an issue and it's asked to use [employer] as they use company at the [address]- ask that equipment would be put in place if needed prior to pt's return  Pt's PCP is Dr Carmen Rutledge with them using [facility] for prescriptions  It is asked that the AVS be faxed to them at [phone]  Pt will need transportation back to facility as they do not allow family to do so at present  It is also stated if pt unable to d/c Friday that she not return until Monday due to staffing concerns on weekends  CM call to pt's daughter Saravanan Combs her of anticipated d/c for Friday understanding that should this not occur it wouldn't happen until Monday- IMM#2 discussed with verbal signature provided-form completed and to be scanned into EHR  Discussed above information as informed by senior living with her confirming the information  Advance Care Planning     POA for medical/financial is Crow Ayers [phone] (H) or [phone] (C) OR Bebeto Schilling [phone] (M) [phone] (H)  Margarita Blount states they are equal POA's and wishes any detailed medical decisions be made by Genevieve Lopez as he is a nurse and knows what to ask- EHR updated to reflect same    No copy of paperwork on chart with Margarita Blount to take to PCP office when scheduled appointment      Discussed [address] not being a paid benefit by insurance and verbally agrees to [facility] expense asking to use [place] if possible as insurance contracts with them giving a discount- will attempt same at time of d/c  No further questions/concerns at this time  Will continue to follow to assist with dc poc

## 2021-01-13 NOTE — PLAN OF CARE
Problem: Potential for Falls  Goal: Patient will remain free of falls  Description: INTERVENTIONS:  - Assess patient frequently for physical needs  -  Identify cognitive and physical deficits and behaviors that affect risk of falls    -  [place] fall precautions as indicated by assessment   - Educate patient/family on patient safety including physical limitations  - Instruct patient to call for assistance with activity based on assessment  - Modify environment to reduce risk of injury  - Consider OT/PT consult to assist with strengthening/mobility  Outcome: Progressing     Problem: PAIN - ADULT  Goal: Verbalizes/displays adequate comfort level or baseline comfort level  Description: Interventions:  - Encourage patient to monitor pain and request assistance  - Assess pain using appropriate pain scale  - Administer analgesics based on type and severity of pain and evaluate response  - Implement non-pharmacological measures as appropriate and evaluate response  - Consider cultural and social influences on pain and pain management  - Notify physician/advanced practitioner if interventions unsuccessful or patient reports new pain  Outcome: Progressing     Problem: INFECTION - ADULT  Goal: Absence or prevention of progression during hospitalization  Description: INTERVENTIONS:  - Assess and monitor for signs and symptoms of infection  - Monitor lab/diagnostic results  - Monitor all insertion sites, i e  indwelling lines, tubes, and drains  - Monitor endotracheal if appropriate and nasal secretions for changes in amount and color  - [place] appropriate cooling/warming therapies per order  - Administer medications as ordered  - Instruct and encourage patient and family to use good hand hygiene technique  - Identify and instruct in appropriate isolation precautions for identified infection/condition  Outcome: Progressing     Problem: SAFETY ADULT  Goal: Patient will remain free of falls  Description: INTERVENTIONS:  - Assess patient frequently for physical needs  -  Identify cognitive and physical deficits and behaviors that affect risk of falls    -  [place] fall precautions as indicated by assessment   - Educate patient/family on patient safety including physical limitations  - Instruct patient to call for assistance with activity based on assessment  - Modify environment to reduce risk of injury  - Consider OT/PT consult to assist with strengthening/mobility  Outcome: Progressing  Goal: Maintain or return to baseline ADL function  Description: INTERVENTIONS:  -  Assess patient's ability to carry out ADLs; assess patient's baseline for ADL function and identify physical deficits which impact ability to perform ADLs (bathing, care of mouth/teeth, toileting, grooming, dressing, etc )  - Assess/evaluate cause of self-care deficits   - Assess range of motion  - Assess patient's mobility; develop plan if impaired  - Assess patient's need for assistive devices and provide as appropriate  - Encourage maximum independence but intervene and supervise when necessary  - Involve family in performance of ADLs  - Assess for home care needs following discharge   - Consider OT consult to assist with ADL evaluation and planning for discharge  - Provide patient education as appropriate  Outcome: Progressing  Goal: Maintain or return mobility status to optimal level  Description: INTERVENTIONS:  - Assess patient's baseline mobility status (ambulation, transfers, stairs, etc )    - Identify cognitive and physical deficits and behaviors that affect mobility  - Identify mobility aids required to assist with transfers and/or ambulation (gait belt, sit-to-stand, lift, walker, cane, etc )  - [place] fall precautions as indicated by assessment  - Record patient progress and toleration of activity level on Mobility SBAR; progress patient to next Phase/Stage  - Instruct patient to call for assistance with activity based on assessment  - Consider rehabilitation consult to assist with strengthening/weightbearing, etc   Outcome: Progressing     Problem: DISCHARGE PLANNING  Goal: Discharge to home or other facility with appropriate resources  Description: INTERVENTIONS:  - Identify barriers to discharge w/patient and caregiver  - Arrange for needed discharge resources and transportation as appropriate  - Identify discharge learning needs (meds, wound care, etc )  - Arrange for interpretive services to assist at discharge as needed  - Refer to Case Management Department for coordinating discharge planning if the patient needs post-hospital services based on physician/advanced practitioner order or complex needs related to functional status, cognitive ability, or social support system  Outcome: Progressing     Problem: Knowledge Deficit  Goal: Patient/family/caregiver demonstrates understanding of disease process, treatment plan, medications, and discharge instructions  Description: Complete learning assessment and assess knowledge base  Interventions:  - Provide teaching at level of understanding  - Provide teaching via preferred learning methods  Outcome: Progressing     Problem: Nutrition/Hydration-ADULT  Goal: Nutrient/Hydration intake appropriate for improving, restoring or maintaining nutritional needs  Description: Monitor and assess patient's nutrition/hydration status for malnutrition  Collaborate with interdisciplinary team and initiate plan and interventions as ordered  Monitor patient's weight and dietary intake as ordered or per policy  Utilize nutrition screening tool and intervene as necessary  Determine patient's food preferences and provide high-protein, high-caloric foods as appropriate       INTERVENTIONS:  - Monitor oral intake, urinary output, labs, and treatment plans  - Assess nutrition and hydration status and recommend course of action  - Evaluate amount of meals eaten  - Assist patient with eating if necessary   - Allow adequate time for meals  - Recommend/ encourage appropriate diets, oral nutritional supplements, and vitamin/mineral supplements  - Order, calculate, and assess calorie counts as needed  - Recommend, monitor, and adjust tube feedings and TPN/PPN based on assessed needs  - Assess need for intravenous fluids  - Provide specific nutrition/hydration education as appropriate  - Include patient/family/caregiver in decisions related to nutrition  Outcome: Progressing     Problem: Potential for Falls  Goal: Patient will remain free of falls  Description: INTERVENTIONS:  - Assess patient frequently for physical needs  -  Identify cognitive and physical deficits and behaviors that affect risk of falls    -  [place] fall precautions as indicated by assessment   - Educate patient/family on patient safety including physical limitations  - Instruct patient to call for assistance with activity based on assessment  - Modify environment to reduce risk of injury  - Consider OT/PT consult to assist with strengthening/mobility  Outcome: Progressing     Problem: PAIN - ADULT  Goal: Verbalizes/displays adequate comfort level or baseline comfort level  Description: Interventions:  - Encourage patient to monitor pain and request assistance  - Assess pain using appropriate pain scale  - Administer analgesics based on type and severity of pain and evaluate response  - Implement non-pharmacological measures as appropriate and evaluate response  - Consider cultural and social influences on pain and pain management  - Notify physician/advanced practitioner if interventions unsuccessful or patient reports new pain  Outcome: Progressing     Problem: INFECTION - ADULT  Goal: Absence or prevention of progression during hospitalization  Description: INTERVENTIONS:  - Assess and monitor for signs and symptoms of infection  - Monitor lab/diagnostic results  - Monitor all insertion sites, i e  indwelling lines, tubes, and drains  - Monitor endotracheal if appropriate and nasal secretions for changes in amount and color  - [place] appropriate cooling/warming therapies per order  - Administer medications as ordered  - Instruct and encourage patient and family to use good hand hygiene technique  - Identify and instruct in appropriate isolation precautions for identified infection/condition  Outcome: Progressing     Problem: SAFETY ADULT  Goal: Patient will remain free of falls  Description: INTERVENTIONS:  - Assess patient frequently for physical needs  -  Identify cognitive and physical deficits and behaviors that affect risk of falls    -  [place] fall precautions as indicated by assessment   - Educate patient/family on patient safety including physical limitations  - Instruct patient to call for assistance with activity based on assessment  - Modify environment to reduce risk of injury  - Consider OT/PT consult to assist with strengthening/mobility  Outcome: Progressing  Goal: Maintain or return to baseline ADL function  Description: INTERVENTIONS:  -  Assess patient's ability to carry out ADLs; assess patient's baseline for ADL function and identify physical deficits which impact ability to perform ADLs (bathing, care of mouth/teeth, toileting, grooming, dressing, etc )  - Assess/evaluate cause of self-care deficits   - Assess range of motion  - Assess patient's mobility; develop plan if impaired  - Assess patient's need for assistive devices and provide as appropriate  - Encourage maximum independence but intervene and supervise when necessary  - Involve family in performance of ADLs  - Assess for home care needs following discharge   - Consider OT consult to assist with ADL evaluation and planning for discharge  - Provide patient education as appropriate  Outcome: Progressing  Goal: Maintain or return mobility status to optimal level  Description: INTERVENTIONS:  - Assess patient's baseline mobility status (ambulation, transfers, stairs, etc )    - Identify cognitive and physical deficits and behaviors that affect mobility  - Identify mobility aids required to assist with transfers and/or ambulation (gait belt, sit-to-stand, lift, walker, cane, etc )  - [place] fall precautions as indicated by assessment  - Record patient progress and toleration of activity level on Mobility SBAR; progress patient to next Phase/Stage  - Instruct patient to call for assistance with activity based on assessment  - Consider rehabilitation consult to assist with strengthening/weightbearing, etc   Outcome: Progressing     Problem: DISCHARGE PLANNING  Goal: Discharge to home or other facility with appropriate resources  Description: INTERVENTIONS:  - Identify barriers to discharge w/patient and caregiver  - Arrange for needed discharge resources and transportation as appropriate  - Identify discharge learning needs (meds, wound care, etc )  - Arrange for interpretive services to assist at discharge as needed  - Refer to Case Management Department for coordinating discharge planning if the patient needs post-hospital services based on physician/advanced practitioner order or complex needs related to functional status, cognitive ability, or social support system  Outcome: Progressing     Problem: Knowledge Deficit  Goal: Patient/family/caregiver demonstrates understanding of disease process, treatment plan, medications, and discharge instructions  Description: Complete learning assessment and assess knowledge base  Interventions:  - Provide teaching at level of understanding  - Provide teaching via preferred learning methods  Outcome: Progressing     Problem: Nutrition/Hydration-ADULT  Goal: Nutrient/Hydration intake appropriate for improving, restoring or maintaining nutritional needs  Description: Monitor and assess patient's nutrition/hydration status for malnutrition  Collaborate with interdisciplinary team and initiate plan and interventions as ordered    Monitor patient's weight and dietary intake as ordered or per policy  Utilize nutrition screening tool and intervene as necessary  Determine patient's food preferences and provide high-protein, high-caloric foods as appropriate       INTERVENTIONS:  - Monitor oral intake, urinary output, labs, and treatment plans  - Assess nutrition and hydration status and recommend course of action  - Evaluate amount of meals eaten  - Assist patient with eating if necessary   - Allow adequate time for meals  - Recommend/ encourage appropriate diets, oral nutritional supplements, and vitamin/mineral supplements  - Order, calculate, and assess calorie counts as needed  - Recommend, monitor, and adjust tube feedings and TPN/PPN based on assessed needs  - Assess need for intravenous fluids  - Provide specific nutrition/hydration education as appropriate  - Include patient/family/caregiver in decisions related to nutrition  Outcome: Progressing

## 2021-01-14 NOTE — ASSESSMENT & PLAN NOTE
Likely ischemic cardiomyopathy, known EF 40%    Continue Toprol XL 12 5 mg daily  Switched to Lasix 20 mg p o  daily  Not on Ace or Arb presumably due to kidney disease

## 2021-01-14 NOTE — PLAN OF CARE
Problem: OCCUPATIONAL THERAPY ADULT  Goal: Performs self-care activities at highest level of function for planned discharge setting  See evaluation for individualized goals  Description: Treatment Interventions: ADL retraining, Functional transfer training, UE strengthening/ROM, Endurance training, Patient/family training, Equipment evaluation/education, Compensatory technique education, Energy conservation, Activityengagement          See flowsheet documentation for full assessment, interventions and recommendations  Note: Limitation: Decreased ADL status, Decreased UE strength, Decreased Safe judgement during ADL, Decreased endurance, Decreased self-care trans, Decreased high-level ADLs  Prognosis: Good  Assessment: Pt is a 80 y o  female seen for OT evaluation s/p adm to [facility] on 1/11/2021 w/ hypoxia, SOB, and weakness/fatigue and dx'd w/ Pneumonia due to COVID-19 virus, acute on chronic systolic and diastolic CHF, type 2 MI, and sepsis 2* COVID-19  Comorbidities affecting pts functional performance include a significant PMH of GERD, murmur, R intertrochanteric femur fx s/p long TFN (8/28/20) and R humeral fracture  Pt with active OT orders and activity orders for Up as tolerated  Pt lives at [address] [place] [address]  At baseline, pt required assist w/ ADLs and IADLs, Mod I for functional mobility/transfers w/ use of RW, (-) , and reports 1 fall PTA  Upon evaluation, pt currently requires Supervision for UB ADLs, Mod-Min A for LB ADLs, Min A for toileting, and Min A for functional mobility/transfers 2* the following deficits impacting occupational performance: weakness, decreased strength, decreased balance, decreased tolerance and decreased safety awareness  These impairments, as well at pts difficulty performing ADLS and limited insight into deficits limit pts ability to safely engage in all baseline areas of occupation  Pt scored overall 55/100 on the Bart[place] Index   Based on the aforementioned OT evaluation, functional performance deficits, and assessments, pt has been identified as a Moderate complexity evaluation  Pt to continue to benefit from continued acute OT services during hospital stay to address defined deficits and to maximize level of functional independence in the following Occupational Performance areas: grooming, bathing/shower, toilet hygiene, dressing, medication management, health maintenance, functional mobility, community mobility, clothing management and social participation  From OT standpoint, recommend return to [facility] with continued OT upon D/C   OT will continue to follow pt 3-5x/wk to address the following goals to  w/in 10-14 days:     OT Discharge Recommendation: Home with skilled therapy(Return to [facility] with continued OT)  OT - OK to Discharge: Yes(when medically cleared)

## 2021-01-14 NOTE — PROGRESS NOTES
Progress Note - Cardiology   Lexy Luna 80 y o  female MRN: [id number]  Unit/Bed#: [place] [address] -01 Encounter: [id number]        Problem List:  Principal Problem:    Pneumonia due to COVID-19 virus  Active Problems:    CKD (chronic kidney disease), stage III    Cardiomyopathy ([facility] )    DNR (do not resuscitate)    Type 2 MI (myocardial infarction) ([facility] )    Acute on chronic combined systolic and diastolic CHF (congestive heart failure) ([facility] )    Sepsis due to COVID-19 ([facility] )      Asessment:  1  Acute COVID-19 pneumonia  2  Acute on chronic systolic and diastolic heart failure  3  Non MI troponin elevation secondary to the above  4  Cardiomyopathy, likely ischemic with an EF of 40%    Plan/ Discussion:  Weight is improving and she has lost 7 lb over the last 48 hours or so  Creatinine is also improving  Chest x-ray looks better although still has bilateral airspace disease  This may be related to her COVID-19 pneumonia  ProBNP is increased which seems disproportionate to the overall clinical picture  · Give 1 more dose of IV Lasix this afternoon and then continue oral Lasix tomorrow    · Continue Toprol XL for cardiomyopathy    · Ongoing treatment per primary team for COVID-19 pneumonia    Subjective:  Patient not personally seen examined today in order to conserve PPE as well as minimize staff exposure during the covid pandemic    Her chart was extensively reviewed including lab work, pertinent imaging studies, cardiac progress notes, medications, and vital signs    Vitals:  Vitals:    01/13/21 0600 01/14/21 0600   Weight: 76 9 kg (169 lb 8 5 oz) 75 7 kg (166 lb 14 2 oz)   ,  Vitals:    01/13/21 2020 01/14/21 0600 01/14/21 0832 01/14/21 1410   BP: 116/65  97/68 94/66   BP Location:    Right arm   Pulse: 83  90 82   Resp: 18   17   Temp: 97 6 °F (36 4 °C)  (!) 96 3 °F (35 7 °C) (!) 97 3 °F (36 3 °C)   TempSrc:    Oral   SpO2: 92%  92% 96%   Weight:  75 7 kg (166 lb 14 2 oz)     Height:           Exam:  Deferred Telemetry:           Medications:    Current Facility-Administered Medications:     acetaminophen (TYLENOL) tablet 650 mg, 650 mg, Oral, Q4H PRN, Jonnie Roche, DO    ascorbic acid (VITAMIN C) tablet 1,000 mg, 1,000 mg, Oral, Q12H EDEN, Jonnie Roche, DO, 1,000 mg at 01/14/21 0805    aspirin chewable tablet 324 mg, 324 mg, Oral, Once, Esvin Gray PA-C    atorvastatin (LIPITOR) tablet 40 mg, 40 mg, Oral, HS, Jonnie Roche, DO, 40 mg at 01/13/21 2202    cholecalciferol (VITAMIN D3) tablet 2,000 Units, 2,000 Units, Oral, Daily, Jonnie Roche, DO, 2,000 Units at 01/14/21 0804    dexamethasone (DECADRON) injection 6 mg, 6 mg, Intravenous, Q24H, Jonnie Roche, DO, 6 mg at 01/13/21 1634    famotidine (PEPCID) tablet 20 mg, 20 mg, Oral, Daily, Jonnie Roche, DO, 20 mg at 01/14/21 0804    furosemide (LASIX) tablet 20 mg, 20 mg, Oral, Daily, Tasneem Hawkins PA-C    heparin (porcine) subcutaneous injection 5,000 Units, 5,000 Units, Subcutaneous, Q8H [address], 5,000 Units at 01/14/21 1342 **AND** [COMPLETED] Platelet count, , , Once, Jonnie Roche, DO    metoprolol succinate (TOPROL-XL) 24 hr tablet 12 5 mg, 12 5 mg, Oral, Daily, Jonnie Roche, DO, 12 5 mg at 01/14/21 0805    zinc sulfate (ZINCATE) capsule 220 mg, 220 mg, Oral, Daily, 220 mg at 01/14/21 0804 **FOLLOWED BY** [START ON 1/19/2021] multivitamin-minerals (CENTRUM ADULTS) tablet 1 tablet, 1 tablet, Oral, Daily, Jonnie Roche, DO    oxybutynin (DITROPAN-XL) 24 hr tablet 5 mg, 5 mg, Oral, Daily, Jonnie Roche, DO, 5 mg at 01/14/21 0805    [COMPLETED] remdesivir (Veklury) 200 mg in sodium chloride 0 9 % 250 mL IVPB, 200 mg, Intravenous, Q24H, Stopped at 01/11/21 1802 **FOLLOWED BY** remdesivir (Veklury) 100 mg in sodium chloride 0 9 % 250 mL IVPB, 100 mg, Intravenous, Q24H, Jonnie Roche, DO, 100 mg at 01/13/21 1746    senna (SENOKOT) tablet 8 6 mg, 1 tablet, Oral, HS, Jonnie Roche, DO, 8 6 mg at 01/13/21 2202      Labs/Data:        Results from last 7 days   Lab Units 01/14/21  0608 01/13/21  0514 01/12/21  0602   WBC Thousand/uL 3 84* 4 23* 3 71*   HEMOGLOBIN g/dL 11 3* 11 5 10 5*   HEMATOCRIT % 36 1 36 9 35 0   PLATELETS Thousands/uL 204 197 165     Results from last 7 days   Lab Units 01/14/21  0609 01/13/21  0514 01/12/21  0840 01/12/21  0602 01/11/21  1710 01/11/21  1325 01/11/21  1001   POTASSIUM mmol/L 3 6 3 6 4 0  --   --   --  4 1   CHLORIDE mmol/L 104 105 105  --   --   --  102   CO2 mmol/L 26 26 26  --   --   --  25   BUN mg/dL 32* 32* 27*  --   --   --  23   CK TOTAL U/L  --   --  632*  --   --   --  175   TROPONIN I ng/mL  --   --   --  5 25* 6 70* 6 83* 5 87*   CK MB INDEX %  --   --  1 4  --   --   --  1 3

## 2021-01-14 NOTE — ASSESSMENT & PLAN NOTE
Stable currently on room air  On day 4 of remdesivir, dexamethasone, Lipitor, vitamin D3 Pepcid, vitamin-C  Improving inflammatory markers  Overall stable

## 2021-01-14 NOTE — ASSESSMENT & PLAN NOTE
Wt Readings from Last 3 Encounters:   01/14/21 75 7 kg (166 lb 14 2 oz)   01/10/21 76 kg (167 lb 8 8 oz)   11/13/20 75 8 kg (167 lb)     With suspected underlying ischemic cardiomyopathy, exacerbated by acute viral pneumonia  Continue IV Lasix for today per Cardiology and switch to PO diuretics tomorrow  Repeat CXR improving   Continue Toprol XL 12 5 mg daily

## 2021-01-14 NOTE — DISCHARGE INSTR - OTHER ORDERS
Have physical and occupational therapy evaluate and treat pt upon return to assisted living facility

## 2021-01-14 NOTE — OCCUPATIONAL THERAPY NOTE
Occupational Therapy Evaluation     Patient Name: Pema Clemons  Today's Date: 1/14/2021  Problem List  Principal Problem:    Pneumonia due to COVID-19 virus  Active Problems:    CKD (chronic kidney disease), stage III    Cardiomyopathy ([address] )    DNR (do not resuscitate)    Type 2 MI (myocardial infarction) ([facility] [address] )    Acute on chronic combined systolic and diastolic CHF (congestive heart failure) ([facility] [address] )    Sepsis due to COVID-19 [facility])    Past Medical History  Past Medical History:   Diagnosis Date    GERD (gastroesophageal reflux disease)     Murmur 8/27/2020    Right humeral fracture 7/20/2018     Past Surgical History  Past Surgical History:   Procedure Laterality Date    HYSTERECTOMY  1973    Total    JOINT REPLACEMENT Right     knee    ND OPEN RX FEMUR FX+INTRAMED MORE Right 8/27/2020    Procedure: INSERTION NAIL IM FEMUR ANTEGRADE (TROCHANTERIC); Surgeon: Chris Russ MD;  Location: [facility];  Service: Orthopedics           01/14/21 1157   Note Type   Note type Evaluation   Restrictions/Precautions   Weight Bearing Precautions Per Order No   Other Precautions Contact/isolation; Airborne/isolation; Fall Risk;Telemetry; Bed Alarm  ([place])   Pain Assessment   Pain Assessment Tool Pain Assessment not indicated - pt denies pain   Pain Score No Pain   Home Living   Type of Home Assisted living  [facility])   Home Layout One level   Bathroom Shower/Tub Walk-in shower   Bathroom Toilet Standard   Bathroom Equipment Grab bars in shower; Shower chair;Commode   [address] [place]   Additional Comments Pt lives at [address] [place] [address]  Prior Function   Level of [place] Needs assistance with ADLs and functional mobility; Needs assistance with IADLs   Lives With Facility staff   Receives Help From Personal care attendant; Family   ADL Assistance Needs assistance  (assist w/ bathing; Assist w/ dressing PRN)   IADLs Needs assistance   Falls in the last 6 months 1 to 4 Vocational Retired   Comments At baseline, pt required assist w/ ADLs and IADLs, Mod I for functional mobility/transfers w/ use of RW, (-) , and reports 1 fall PTA   Lifestyle   Autonomy At baseline, pt required assist w/ ADLs and IADLs, Mod I for functional mobility/transfers w/ use of RW, (-) , and reports 1 fall PTA   Reciprocal Relationships Dtr, son   Service to Others Retired   Intrinsic Gratification Watching TV   Psychosocial   Psychosocial ([address]) WDL   ADL   Where Assessed Chair   Eating Assistance 5  Supervision/Setup   Grooming Assistance 5  Supervision/Setup   UB [address] 5  Supervision/Setup   LB [address] 4  Minimal Assistance   [address] 5  Supervision/Setup    [facility] [address] 3  Moderate Assistance   [address]  4  [address] 4  Minimal Assistance   Bed Mobility   Supine to Sit Unable to assess   Sit to Supine Unable to assess   Additional Comments N/A  Pt seated OOB in chair at start/end of session  Call bell and phone within reach  All needs met and pt reports no further questions for OT at this time   Transfers   Sit to Stand 4  Minimal assistance   Additional items Assist x 1; Armrests; Increased time required;Verbal cues   Stand to Sit 4  Minimal assistance   Additional items Assist x 1; Armrests; Increased time required;Verbal cues   Additional Comments Cues for safe technique and hand placement with decreased carryover for hand placement on stand>sit   Functional Mobility   Functional Mobility 4  Minimal assistance   Additional Comments Assist x1; Resting SPO2: 94% on RA, SPO2 post-mobility: 86% on RA, increasing to 92% on RA with seated rest break   Additional items Rolling walker   Balance   Static Sitting Fair +   Dynamic Sitting Fair   Static Standing Fair -   Dynamic Standing Poor +   Ambulatory Poor +   Activity Tolerance   Activity Tolerance Patient limited by fatigue;Patient tolerated treatment well   Medical Staff Made Aware Pranav Lambert, RN   Nurse Made Aware yes   RUE Assessment   RUE Assessment WFL   RUE Strength   RUE Overall Strength Within Functional Limits - able to perform ADL tasks with strength  (4-/5 throughout)   LUE Assessment   LUE Assessment WFL   LUE Strength   LUE Overall Strength Within Functional Limits - able to perform ADL tasks with strength  (4-/5 throughout)   Hand Function   Gross Motor Coordination Functional   Fine Motor Coordination Functional   Sensation   Light Touch No apparent deficits   Proprioception   Proprioception No apparent deficits   Vision-Basic Assessment   Current Vision Wears glasses all the time   Vision - Complex Assessment   Ocular Range of Motion [facility]   Acuity Able to read clock/calendar on wall without difficulty; Able to read employee name badge without difficulty   Perception   Inattention/Neglect Appears intact   Cognition   Overall Cognitive Status [facility]   Arousal/Participation Alert; Cooperative   Attention Within functional limits   Orientation Level Oriented to person;Oriented to place;Oriented to time   Memory Within functional limits   Following Commands Follows one step commands without difficulty   Comments Pleasant and cooperative; Engages in conversation appropriately   Assessment   Limitation Decreased ADL status; Decreased UE strength;Decreased Safe judgement during ADL;Decreased endurance;Decreased self-care trans;Decreased high-level ADLs   Prognosis Good   Assessment Pt is a 80 y o  female seen for OT evaluation s/p adm to [facility] on 1/11/2021 w/ hypoxia, SOB, and weakness/fatigue and dx'd w/ Pneumonia due to COVID-19 virus, acute on chronic systolic and diastolic CHF, type 2 MI, and sepsis 2* COVID-19  Comorbidities affecting pts functional performance include a significant PMH of GERD, murmur, R intertrochanteric femur fx s/p long TFN (8/28/20) and R humeral fracture  Pt with active OT orders and activity orders for Up as tolerated  Pt lives at [address] [place] [address]  At baseline, pt required assist w/ ADLs and IADLs, Mod I for functional mobility/transfers w/ use of RW, (-) , and reports 1 fall PTA  Upon evaluation, pt currently requires Supervision for UB ADLs, Mod-Min A for LB ADLs, Min A for toileting, and Min A for functional mobility/transfers 2* the following deficits impacting occupational performance: weakness, decreased strength, decreased balance, decreased tolerance and decreased safety awareness  These impairments, as well at pts difficulty performing ADLS and limited insight into deficits limit pts ability to safely engage in all baseline areas of occupation  Pt scored overall 55/100 on the Barthel Index  Based on the aforementioned OT evaluation, functional performance deficits, and assessments, pt has been identified as a Moderate complexity evaluation  Pt to continue to benefit from continued acute OT services during hospital stay to address defined deficits and to maximize level of functional independence in the following Occupational Performance areas: grooming, bathing/shower, toilet hygiene, dressing, medication management, health maintenance, functional mobility, community mobility, clothing management and social participation  From OT standpoint, recommend return to [facility] with continued OT upon D/C  OT will continue to follow pt 3-5x/wk to address the following goals to  w/in 10-14 days:   Goals   Patient Goals To go home   LTG Time Frame 10-14   Long Term Goal Please refer to LTGs listed below   Plan   Treatment Interventions ADL retraining;Functional transfer training;UE strengthening/ROM; Endurance training;Patient/family training;Equipment evaluation/education; Compensatory technique education; Energy conservation; Activityengagement   Goal Expiration Date 21   OT Treatment Day 0   OT Frequency 3-5x/wk   Recommendation   OT Discharge Recommendation Home with skilled therapy  (Return to [facility] with continued OT) OT - OK to Discharge Yes  (when medically cleared)   Barthel Index   Feeding 10   Bathing 0   Grooming Score 5   Dressing Score 5   Bladder Score 10   Bowels Score 10   Toilet Use Score 5   Transfers (Bed/Chair) Score 10   Mobility (Level Surface) Score 0   Stairs Score 0   Barthel Index Score 55   Modified Custer Scale   Modified Custer Scale 4          GOALS    1  Pt will improve activity tolerance to G for min 30 min txment sessions for increase engagement in functional tasks    2  Pt will complete bed mobility at a Mod I level w/ G balance/safety demonstrated to decrease caregiver assistance required     3  Pt will complete UB/LB dressing/self care w/ Supervision using adaptive device and DME as needed    4  Pt will complete bathing w/ Supervision w/ use of AE and DME as needed    5  Pt will complete toileting w/ Supervision w/ G hygiene/thoroughness using DME as needed    6  Pt will improve functional transfers to Mod I on/off all surfaces using DME as needed w/ G balance/safety     7  Pt will improve functional mobility during ADL/IADL/leisure tasks to Mod I using DME as needed w/ G balance/safety     8  Pt will be attentive 100% of the time during ongoing cognitive assessment w/ G participation to assist w/ safe d/c planning/recommendations    9  Pt will demonstrate G carryover of pt/caregiver education and training as appropriate w/o cues w/ good tolerance to increase safety during functional tasks    10  Pt will demonstrate 100% carryover of energy conservation techniques t/o functional I/ADL/leisure tasks w/o cues s/p skilled education to increase endurance during functional tasks    11  Pt will increase BUE strength by 1MM grade via AROM exercises to increase independence in ADLs and transfers     12  Pt will verbalize 3 potential fall hazards and identify appropriate compensatory techniques to decrease fall risk in home environment     13   Pt will increase standing tolerance to 5-8 mins with Fair+ dynamic standing balance to increase safety during participation in ADLs       Milda Apley, OTR/L

## 2021-01-14 NOTE — PROGRESS NOTES
Progress Note - Esther Rahman 12/8/1927, 80 y o  female MRN: [id number]    Unit/Bed#: [place] [address] -01 Encounter: [id number]    Primary Care Provider: Richard Kay MD   Date and time admitted to hospital: 1/11/2021  9:33 AM        * Pneumonia due to COVID-19 virus  Assessment & Plan  Stable currently on room air  On day 4 of remdesivir, dexamethasone, Lipitor, vitamin D3 Pepcid, vitamin-C  Improving inflammatory markers  Overall stable    Acute on chronic combined systolic and diastolic CHF (congestive heart failure) ([facility])  Assessment & Plan  Wt Readings from Last 3 Encounters:   01/14/21 75 7 kg (166 lb 14 2 oz)   01/10/21 76 kg (167 lb 8 8 oz)   11/13/20 75 8 kg (167 lb)     With suspected underlying ischemic cardiomyopathy, exacerbated by acute viral pneumonia  Continue IV Lasix for today per Cardiology and switch to PO diuretics tomorrow  Repeat CXR improving   Continue Toprol XL 12 5 mg daily    Type 2 MI (myocardial infarction) ([facility])  Assessment & Plan  None MI troponin elevation secondary to acute COVID infection and congestive heart failure  No ACS  Continue Lipitor 40 mg daily    CKD (chronic kidney disease), stage III  Assessment & Plan  Stable and improved after intravenous diuretics    Sepsis due to COVID-19 [facility])  Assessment & Plan  · Improving  · See plan for COVID pneumonia above    Cardiomyopathy ([facility] )  Assessment & Plan  Likely ischemic cardiomyopathy, known EF 40%  Continue Toprol XL 12 5 mg daily  Switched to Lasix 20 mg p o  daily  Not on Ace or Arb presumably due to kidney disease        VTE Pharmacologic Prophylaxis:   Pharmacologic: Heparin  Mechanical VTE Prophylaxis in Place: No    Patient Centered Rounds: I have performed bedside rounds with nursing staff today  Discussions with Specialists or Other Care Team Provider: none    Education and Discussions with Family / Patient: spoke with daughter and gave update   Spoke with son Sharon Verdin and gave update  Questions answered      Time Spent for Care: 20 minutes  More than 50% of total time spent on counseling and coordination of care as described above  Current Length of Stay: 3 day(s)    Current Patient Status: Inpatient   Certification Statement: The patient will continue to require additional inpatient hospital stay due to covid and chf    Discharge Plan: to [address] in 24 hours    Code Status: Level 3 - DNAR and DNI      Subjective:   Am I going home today  Denies SOB    Objective:     Vitals:   Temp (24hrs), Av 3 °F (36 3 °C), Min:96 3 °F (35 7 °C), Max:97 6 °F (36 4 °C)    Temp:  [96 3 °F (35 7 °C)-97 6 °F (36 4 °C)] 96 3 °F (35 7 °C)  HR:  [83-91] 90  Resp:  [18] 18  BP: ()/(64-68) 97/68  SpO2:  [92 %-95 %] 92 %  Body mass index is 26 94 kg/m²  Input and Output Summary (last 24 hours): Intake/Output Summary (Last 24 hours) at 2021 1245  Last data filed at 2021 1001  Gross per 24 hour   Intake 380 ml   Output 1700 ml   Net -1320 ml       Physical Exam:     Physical Exam  Vitals signs reviewed  Constitutional:       Appearance: She is not ill-appearing or diaphoretic  HENT:      Head: Normocephalic and atraumatic  Nose: No rhinorrhea  Eyes:      General: No scleral icterus  Neck:      Musculoskeletal: Neck supple  Cardiovascular:      Rate and Rhythm: Regular rhythm  Heart sounds: No murmur  No gallop  Pulmonary:      Effort: No respiratory distress  Breath sounds: Normal breath sounds  Abdominal:      General: Abdomen is flat  Palpations: Abdomen is soft  Musculoskeletal:      Right lower leg: No edema  Left lower leg: No edema  Skin:     General: Skin is warm and dry  Neurological:      Mental Status: Mental status is at baseline     Psychiatric:         Mood and Affect: Mood normal          Behavior: Behavior normal      Additional Data:     Labs:    Results from last 7 days   Lab Units 21  0608   WBC Thousand/uL 3 84*   HEMOGLOBIN g/dL 11 3*   HEMATOCRIT % 36 1   PLATELETS Thousands/uL 204   NEUTROS PCT % 63   LYMPHS PCT % 23   MONOS PCT % 13*   EOS PCT % 0     Results from last 7 days   Lab Units 01/14/21  0609   SODIUM mmol/L 139   POTASSIUM mmol/L 3 6   CHLORIDE mmol/L 104   CO2 mmol/L 26   BUN mg/dL 32*   CREATININE mg/dL 1 14   ANION GAP mmol/L 9   CALCIUM mg/dL 7 9*   ALBUMIN g/dL 2 7*   TOTAL BILIRUBIN mg/dL 0 37   ALK PHOS U/L 59   ALT U/L 18   AST U/L 39   GLUCOSE RANDOM mg/dL 120     Results from last 7 days   Lab Units 01/11/21  1001   INR  1 17             Results from last 7 days   Lab Units 01/13/21  0514 01/12/21  0602 01/11/21  1001   LACTIC ACID mmol/L  --   --  1 7   PROCALCITONIN ng/ml 0 13 0 23 <0 05       * I Have Reviewed All Lab Data Listed Above  * Additional Pertinent Lab Tests Reviewed:  All Labs Within Last 24 Hours Reviewed    Imaging:    Imaging Reports Reviewed Today Include:  Chest x-ray      Recent Cultures (last 7 days):           Last 24 Hours Medication List:   Current Facility-Administered Medications   Medication Dose Route Frequency Provider Last Rate    acetaminophen  650 mg Oral Q4H PRN Rachel Hammer DO      ascorbic acid  1,000 mg Oral Q12H [address] , DO      aspirin  324 mg Oral Once The DANIEL Mon      atorvastatin  40 mg Oral HS Rachel Hammer DO      cholecalciferol  2,000 Units Oral Daily Rachel Hammer DO      dexamethasone  6 mg Intravenous Q24H Rachel Hammer DO      famotidine  20 mg Oral Daily Rachel Hammer DO      furosemide  20 mg Oral Daily [employer], PA-C      heparin (porcine)  5,000 Units Subcutaneous [facility] Rachel Hammer DO      metoprolol succinate  12 5 mg Oral Daily Rachel Hammer DO      zinc sulfate  220 mg Oral Daily Rachel Hammer DO      Followed by   [facility] ON 1/19/2021] multivitamin-minerals  1 tablet Oral Daily Rachel Hammer DO      oxybutynin  5 mg Oral Daily Joshua Mijaresa      remdesivir  100 mg Intravenous Q24H Rachel Hammer DO      senna  1 tablet Oral HS Gladys Rosario DO          Today, Patient Was Seen By: Carlotta Louise MD    ** Please Note: Dictation voice to text software may have been used in the creation of this document   **

## 2021-01-15 PROBLEM — A41.89 SEPSIS DUE TO COVID-19 (HCC): Status: RESOLVED | Noted: 2021-01-01 | Resolved: 2021-01-01

## 2021-01-15 PROBLEM — U07.1 SEPSIS DUE TO COVID-19 (HCC): Status: RESOLVED | Noted: 2021-01-01 | Resolved: 2021-01-01

## 2021-01-15 NOTE — DISCHARGE SUMMARY
Discharge- Patti Sparks 12/8/1927, 80 y o  female MRN: [id number]    [facility] [address] Encounter: [id number]    Primary Care Provider: Dave Rodriguez MD   Date and time admitted to hospital: 1/11/2021  9:33 AM        * Pneumonia due to COVID-19 virus  Assessment & Plan  Overall improved and not requiring oxygen  Received 4 days of remdesivir and dexamethasone  For current protocol, given her improvement she will not require further remdesivir or dexamethasone  Continue Lipitor for two weeks  Continue vitamin-C and zinc for 3 days  Switch Pepcid back to Protonix  Stable for discharge back to assisted living    Acute on chronic combined systolic and diastolic CHF (congestive heart failure) ([facility])  Assessment & Plan  Wt Readings from Last 3 Encounters:   01/15/21 76 kg (167 lb 8 8 oz)   01/10/21 76 kg (167 lb 8 8 oz)   11/13/20 75 8 kg (167 lb)     With suspected underlying ischemic cardiomyopathy, exacerbated by acute viral pneumonia  Overall improved  Repeat CXR with improving infiltrate  Continue Toprol XL 12 5 mg daily  Continue oral Lasix 20 mg daily    Type 2 MI (myocardial infarction) ([facility] )  Assessment & Plan  None MI troponin elevation secondary to acute COVID infection and congestive heart failure  No ACS  Continue aspirin 81 mg , metoprolol 12 5 mg daily and Lipitor 40 mg daily    CKD (chronic kidney disease), stage III  Assessment & Plan  Stable and improved after intravenous diuretics    Sepsis due to COVID-19 (HCC)resolved as of 1/15/2021  Assessment & Plan  · POA, resolved          Discharging Physician / Practitioner: Diana Britt MD  PCP: Dave Rodriguez MD  Admission Date:   Admission Orders (From admission, onward)     Ordered        01/11/21 1113  Inpatient Admission  Once                   Discharge Date: 01/15/21    Resolved Problems  Date Reviewed: 1/14/2021          Resolved    Sepsis due to COVID-19 [facility]) 1/15/2021     Resolved by  Diana Britt MD          Consultations During [facility] Pt states she called MD on Tuesday and sent him a picture of her knee due to redness and feeling like her knee was on fire.  The MD prescribed an antibiotic and stated she had an infection in her knee.  She started taking antibiotic Tuesday night.  Pt states her knee looks a little better, but still is uncomfortable so she will cancel her PT appt today and will return on Monday.      Pt instructed to contact MD by tomorrow if her symptoms do not improve or worsen.  Pt gave verbal understanding.   Stay:  · Cardiology    Procedures Performed:   · None    Significant Findings / Test Results:   · COVID positive    Incidental Findings:   · Chest x-ray-patchy right upper lobe opacity     Test Results Pending at Discharge (will require follow up): · None     Outpatient Tests Requested:  · None    Complications:  None    Reason for Admission:  Shortness of breath    Hospital Course:     Amberly Mtz is a 80 y o  female patient who originally presented to the hospital on 1/11/2021 due to shortness of breath and lethargy  She tested positive for COVID and her x-ray showed a right upper lobe pneumonia  She was mildly hypoxic in the ED (86-87% on room air on arrival) requiring oxygen at 2 L  She was admitted for COVID pneumonia with mild acute respiratory insufficiency  She was placed on COVID treatment protocol with remdesivir, dexamethasone and COVID vitamins  In addition, she was treated with intravenous Lasix for acute on chronic congestive heart failure  She was seen by Cardiology for this together with her troponin elevation felt to be due to type 2 myocardial infarction from her pneumonia and CHF, not due to ACS  The patient responded well with COVID treatment and diuretics  She was only on oxygen very briefly and was stable on room air for most of her stay  She is stable for discharge today back to her assisted living facility  Please see above list of diagnoses and related plan for additional information  Condition at Discharge: good     Discharge Day Visit / Exam:     Subjective:  No shortness of breath  No fever  No need for additional oxygen    [place] to go home  Vitals: Blood Pressure: 120/58 (01/15/21 0754)  Pulse: 76 (01/15/21 0754)  Temperature: (!) 97 3 °F (36 3 °C) (01/15/21 0754)  Temp Source: Oral (01/15/21 0754)  Respirations: 20 (01/15/21 0754)  Height: 5' 6" (167 6 cm) (01/11/21 1226)  Weight - Scale: 76 kg (167 lb 8 8 oz) (01/15/21 0600)  SpO2: 92 % (01/15/21 0754)  Exam: Physical Exam  Constitutional:       Appearance: She is not ill-appearing or diaphoretic  HENT:      Head: Normocephalic and atraumatic  Nose: No rhinorrhea  Eyes:      General: No scleral icterus  Neck:      Musculoskeletal: Neck supple  Cardiovascular:      Rate and Rhythm: Regular rhythm  Heart sounds: No murmur  No gallop  Pulmonary:      Effort: Pulmonary effort is normal  No respiratory distress  Breath sounds: No wheezing or rales  Abdominal:      General: Abdomen is flat  Palpations: Abdomen is soft  Musculoskeletal:      Right lower leg: No edema  Left lower leg: No edema  Skin:     General: Skin is warm and dry  Neurological:      Mental Status: She is alert  Mental status is at baseline  Psychiatric:         Mood and Affect: Mood normal          Behavior: Behavior normal          Discussion with Family:  Spoke with daughter and gave update    Discharge instructions/Information to patient and family:   See after visit summary for information provided to patient and family  Provisions for Follow-Up Care:  See after visit summary for information related to follow-up care and any pertinent home health orders  Disposition:     Assisted Living Facility at [address] [place] [address],[facility] Readmission: no     Discharge Statement:  I spent >30 minutes discharging the patient  This time was spent on the day of discharge  I had direct contact with the patient on the day of discharge  Greater than 50% of the total time was spent examining patient, answering all patient questions, arranging and discussing plan of care with patient as well as directly providing post-discharge instructions  Additional time then spent on discharge activities  Discharge Medications:  See after visit summary for reconciled discharge medications provided to patient and family        ** Please Note: This note has been constructed using a voice recognition system **

## 2021-01-15 NOTE — CASE MANAGEMENT
LISSETH called for transportation to [facility] today  [place]  van to transport with  at 0 - MD, RN, Daughter and facility all aware  CM to fax AVS to facility when available  Addendum from [phone]: AVS faxed to [facility] @ [phone]  PCP inboxed/messaged for follow up appointment as pt is [employer]

## 2021-01-15 NOTE — ASSESSMENT & PLAN NOTE
Wt Readings from Last 3 Encounters:   01/15/21 76 kg (167 lb 8 8 oz)   01/10/21 76 kg (167 lb 8 8 oz)   11/13/20 75 8 kg (167 lb)     With suspected underlying ischemic cardiomyopathy, exacerbated by acute viral pneumonia  Overall improved  Repeat CXR with improving infiltrate  Continue Toprol XL 12 5 mg daily  Continue oral Lasix 20 mg daily

## 2021-01-15 NOTE — DISCHARGE INSTR - AVS FIRST PAGE

## 2021-01-15 NOTE — ASSESSMENT & PLAN NOTE
None MI troponin elevation secondary to acute COVID infection and congestive heart failure  No ACS  Continue aspirin 81 mg , metoprolol 12 5 mg daily and Lipitor 40 mg daily

## 2021-01-15 NOTE — UTILIZATION REVIEW
Initial Clinical Review    Admission: Date/Time/Statement:   Admission Orders (From admission, onward)     Ordered        01/11/21 1113  Inpatient Admission  Once                   Orders Placed This Encounter   Procedures    Inpatient Admission     Standing Status:   Standing     Number of Occurrences:   1     Order Specific Question:   Admitting Physician     Answer:   Nithya Henson     Order Specific Question:   Level of Care     Answer:   Med Surg [16]     Order Specific Question:   Estimated length of stay     Answer:   More than 2 Midnights     Order Specific Question:   Certification     Answer:   I certify that inpatient services are medically necessary for this patient for a duration of greater than two midnights  See H&P and MD Progress Notes for additional information about the patient's course of treatment  ED Arrival Information     Expected Arrival Acuity Means of Arrival Escorted By Service Admission Type    - 1/11/2021 09:33 [facility] [address] [facility] Emergency    Arrival Complaint    covid        Chief Complaint   Patient presents with    Shortness of Breath     seen here yesterday for covid and d/c  patient feeling worse and increasing weakness/SOB  family would like patient seen by cardiology     Assessment/Plan: 81 yo female presents to ED from [facility] with increased fatigue and SOB this am, + cough, chills  She was seen in ED 1/10 and diagnosed with covid, was maintaining andrés and discharged home  On arrival today:  tachycardic, tachypneic, + resp distress, wheezing/rhonchi;  sats 86-87 % and placed on O2 @ 2l  Elevated NTproBNP 16,276, Trop 5 87, CRP,  CXR shows Worsening right upper lobe infiltrate     Admitted to Inpatient with Acute Respiratory Failure w/ Hypoxia and Pneumonia due to COVID-19  Plan is for Tx under mild protocol with IV Decardon, IV Remdesivir, IV Rocephin, po doxycycline, vitamins c,d,& zinc, trend inflammatory markers    Lasix orderd by Cardio for acute on chronic heart failure  Type 2 MI secondary to acute COVID pneumonia and suspected acute on chronic systolic and diastolic CHF  Pt refused asa  Per Cardio: Pneumonia primary culprit of clinical presentation  NTproBNP significantly elevated from baseline and pulmonary vascular congestion seen on CXR (personally reviewed)  Recommend lasix 20 IV BID--first dose now  Elevated troponin with slight ST depressions laterally suggestive of type 2 MI in setting of COVID-19 pneumonia    Diurese, focus on BP/lipid control and decreasing cardiac strain/stress          ED Triage Vitals   Temperature Pulse Respirations Blood Pressure SpO2   01/11/21 0937 01/11/21 0937 01/11/21 0937 01/11/21 0937 01/11/21 0937   99 4 °F (37 4 °C) (!) 119 (!) 28 131/73 92 %      Temp Source Heart Rate Source Patient Position - Orthostatic VS BP Location FiO2 (%)   01/11/21 1436 01/11/21 0937 01/11/21 1051 01/11/21 1051 --   Temporal Monitor Lying Left arm       Pain Score       01/11/21 1227       No Pain          Wt Readings from Last 1 Encounters:   01/15/21 76 kg (167 lb 8 8 oz)     Additional Vital Signs:   01/12/21 08:03:18  97 1 °F (36 2 °C)   85  20  111/56  74  96 %           01/11/21 23:10:20  97 7 °F (36 5 °C)  88    112/55  74  95 %           01/11/21 2200    90  22  104/55  72  95 %      Nasal cannula  Sitting   01/11/21 2100    82  22  101/60  75  97 %  28  2 L/min  Nasal cannula  Sitting   01/11/21 2023    89  20  97/56    96 %  32  3 L/min  Nasal cannula  Sitting   01/11/21 1815    84  20  105/55  76  96 %           01/11/21 1803    90  18  105/55    97 %      None (Room air)  Lying   01/11/21 1745    92  25Abnormal   93/50  65  97 %           01/11/21 1645    88  20  88/52Abnormal   65  98 %           01/11/21 1626    93  20  89/51Abnormal     97 %  36  4 L/min       01/11/21 1557    94  16  87/51Abnormal     98 %  36  4 L/min  Nasal cannula  Lying   01/11/21 1436  97 6 °F (36 4 °C)  102  24Abnormal   102/55  74  99 %  32  3 L/min  Nasal cannula  Lying   01/11/21 1253    98  24Abnormal   109/55  76  98 %  28  2 L/min  Nasal cannula  Lying   01/11/21 1226    98  26Abnormal       97 %  32  3 L/min       01/11/21 1051    111Abnormal   24Abnormal   126/65  87  97 %  32  3 L/min  Nasal cannula  Lying       Pertinent Labs/Diagnostic Test Results:   Results from last 7 days   Lab Units 01/10/21  1321   SARS-COV-2  Positive*     Lab Units 01/12/21  0602 01/11/21  2138 01/11/21  1001   WBC Thousand/uL 3 71*  --  5 80   HEMOGLOBIN g/dL 10 5*  --  11 1*   HEMATOCRIT % 35 0  --  36 0   PLATELETS Thousands/uL 165 166 203   NEUTROS ABS Thousands/µL 2 81  --  4 52     Lab Units 01/12/21  0840 01/11/21  1001   SODIUM mmol/L 141 139   POTASSIUM mmol/L 4 0 4 1   CHLORIDE mmol/L 105 102   CO2 mmol/L 26 25   ANION GAP mmol/L 10 12   BUN mg/dL 27* 23   CREATININE mg/dL 1 32* 1 14   EGFR ml/min/1 73sq m 35 42   CALCIUM mg/dL 8 0* 8 1*   CALCIUM, IONIZED mmol/L  --  1 00*   MAGNESIUM mg/dL 2 5 2 1     Results from last 7 days   Lab Units 01/14/21  0609 01/13/21  0514 01/12/21  0840 01/11/21  1001   AST U/L 39 55* 54* 48*   ALT U/L 18 20 18 17   ALK PHOS U/L 59 65 65 77   TOTAL PROTEIN g/dL 6 1* 6 9 6 7 7 0   ALBUMIN g/dL 2 7* 3 0* 2 9* 3 1*   TOTAL BILIRUBIN mg/dL 0 37 0 34 0 34 0 53     Lab Units 01/12/21  0840 01/11/21  1001   GLUCOSE RANDOM mg/dL 119 116     Results from last 7 days   Lab Units 01/12/21  0840 01/11/21  1001   CK TOTAL U/L 632* 175   CK MB INDEX % 1 4 1 3   CK MB ng/mL 9 0* 2 3     Results from last 7 days   Lab Units 01/12/21  0602 01/11/21  1710 01/11/21  1325 01/11/21  1001   TROPONIN I ng/mL 5 25* 6 70* 6 83* 5 87*     Results from last 7 days   Lab Units 01/11/21  1001   PROTIME seconds 14 7*   INR  1 17     Results from last 7 days   Lab Units 01/12/21  0840   TSH 3RD GENERATON uIU/mL 1 256     Results from last 7 days   Lab Units 01/13/21  0514 01/12/21  0602 01/11/21  1001   PROCALCITONIN ng/ml 0 13 0 23 <0 05     Results from last 7 days   Lab Units 01/11/21  1001   LACTIC ACID mmol/L 1 7     Results from last 7 days   Lab Units 01/14/21  0609 01/12/21  0602 01/11/21  1001   NT-PRO BNP pg/mL 20,967* 16,814* 16,276*     Results from last 7 days   Lab Units 01/13/21  0514 01/11/21  1001   FERRITIN ng/mL 330 188     Results from last 7 days   Lab Units 01/13/21  0514 01/12/21  0840 01/11/21  1001   CRP mg/L 60 8* 77 5* 49 8*     Results from last 7 days   Lab Units 01/11/21  2137   CLARITY UA  Clear   COLOR UA  Yellow   SPEC GRAV UA  1 015   PH UA  5 0   GLUCOSE UA mg/dl Negative   KETONES UA mg/dl Negative   BLOOD UA  Negative   PROTEIN UA mg/dl Negative   NITRITE UA  Negative   BILIRUBIN UA  Negative   UROBILINOGEN UA E U /dl 0 2   LEUKOCYTES UA  Negative     Results from last 7 days   Lab Units 01/10/21  1321   INFLUENZA A PCR  Negative   INFLUENZA B PCR  Negative   RSV PCR  Negative     1/11 CXR: Worsening right upper lobe infiltrate    1/11 EKG:  sinus tachycardia no ST elevation, occasional unifocal PVC, mild ST depression in lateral leads as before as in inferior leads as before    Normal axis      ED Treatment:   Medication Administration from 01/11/2021 0933 to 01/11/2021 2258       Date/Time Order Dose Route Action     01/11/2021 1010 albuterol inhalation solution 5 mg 5 mg Nebulization Given     01/11/2021 1010 ipratropium (ATROVENT) 0 02 % inhalation solution 0 5 mg 0 5 mg Nebulization Given     01/11/2021 1144 doxycycline hyclate (VIBRAMYCIN) capsule 100 mg 100 mg Oral Given     01/11/2021 1146 ceftriaxone (ROCEPHIN) 1 g/50 mL in dextrose IVPB 1,000 mg Intravenous New Bag     01/11/2021 1147 metoprolol (LOPRESSOR) injection 2 5 mg 2 5 mg Intravenous Given     01/11/2021 1714 furosemide (LASIX) injection 20 mg 20 mg Intravenous Given     01/11/2021 2136 senna (SENOKOT) tablet 8 6 mg 8 6 mg Oral Given     01/11/2021 2023 ascorbic acid (VITAMIN C) tablet 1,000 mg 1,000 mg Oral Given     01/11/2021 1723 remdesivir (Veklury) 200 mg in sodium chloride 0 9 % 250 mL IVPB 200 mg Intravenous New Bag     01/11/2021 2023 doxycycline hyclate (VIBRAMYCIN) capsule 100 mg 100 mg Oral Given     01/11/2021 2136 atorvastatin (LIPITOR) tablet 40 mg 40 mg Oral Given     01/11/2021 1712 dexamethasone (DECADRON) injection 6 mg 6 mg Intravenous Given        Past Medical History:   Diagnosis Date    GERD (gastroesophageal reflux disease)     Murmur 8/27/2020    Right humeral fracture 7/20/2018     Present on Admission:   CKD (chronic kidney disease), stage III   DNR (do not resuscitate)   [facility]   Pneumonia due to COVID-19 virus   Acute on chronic combined systolic and diastolic CHF (congestive heart failure) ([facility])   Cardiomyopathy ([facility] )      Admitting Diagnosis: Hypoxia [R09 02]  Elevated troponin [R77 8]  Acute on chronic combined systolic and diastolic CHF (congestive heart failure) (formerly [facility]) [I50 43]  COVID toes [U07 1, R23 8]  Pneumonia due to COVID-19 virus [U07 1, J12 82]  Age/Sex: 80 y o  female     Admission Orders:  Scheduled Medications:  ascorbic acid, 1,000 mg, Oral, Q12H [address]  aspirin, 324 mg, Oral, Once  atorvastatin, 40 mg, Oral, HS  cefTRIAXone, 1,000 mg, Intravenous, Q24H  cholecalciferol, 2,000 Units, Oral, Daily  dexamethasone, 6 mg, Intravenous, Q24H  doxycycline hyclate, 100 mg, Oral, Q12H  famotidine, 20 mg, Oral, Daily  furosemide, 20 mg, Intravenous, BID (diuretic)  heparin (porcine), 5,000 Units, Subcutaneous, Q8H EDEN  metoprolol succinate, 12 5 mg, Oral, Daily  zinc sulfate, 220 mg, Oral, Daily    Followed by  Ambreen Mercado ON 1/19/2021] multivitamin-minerals, 1 tablet, Oral, Daily  oxybutynin, 5 mg, Oral, Daily  remdesivir, 100 mg, Intravenous, Q24H  senna, 1 tablet, Oral, HS    Continuous IV Infusions: n/a  PRN Meds:  acetaminophen, 650 mg, Oral, Q4H PRN    TELEMETRY  SCDs  IP CONSULT TO CARDIOLOGY  IP CONSULT TO NUTRITION SERVICES    Network Utilization Review Department  ATTENTION: Please call with any questions or concerns to [phone] and carefully listen to the prompts so that you are directed to the right person  All voicemails are confidential   Diamond Wolf all requests for admission clinical reviews, approved or denied determinations and any other requests to dedicated fax number below belonging to the campus where the patient is receiving treatment   List of dedicated fax numbers for the Facilities:  [address] DENIALS (Administrative/Medical Necessity) [phone]   [address] (Maternity/NICU/Pediatrics) [phone]   [address] [place] [address] Dr Marino Chou 5768 (  Les Porter "Petra" 103) [address] [place] [address] Felice [address] [place] [address] [phone]

## 2021-01-15 NOTE — ASSESSMENT & PLAN NOTE
Overall improved and not requiring oxygen  Received 4 days of remdesivir and dexamethasone  For current protocol, given her improvement she will not require further remdesivir or dexamethasone  Continue Lipitor for two weeks  Continue vitamin-C and zinc for 3 days  Switch Pepcid back to Protonix  Stable for discharge back to assisted living

## 2021-01-26 NOTE — TELEPHONE ENCOUNTER
Based on telephone call, PT and OT entered      Problem List Items Addressed This Visit     Pneumonia due to COVID-19 virus - Primary    Relevant Orders    Ambulatory referral to Physical Therapy    Ambulatory referral to Occupational Therapy      Other Visit Diagnoses     Physical deconditioning        Relevant Orders    Ambulatory referral to Physical Therapy    Ambulatory referral to Occupational Therapy    Balance disorder        Relevant Orders    Ambulatory referral to Physical Therapy    Ambulatory referral to Occupational Therapy    Gait abnormality        Relevant Orders    Ambulatory referral to Physical Therapy    Ambulatory referral to Occupational Therapy

## 2021-01-26 NOTE — TELEPHONE ENCOUNTER
[address] [place] [address] [phone], ASKING FOR A SCRIPT FOR PT/OT FOR FATIGUE AND DECONDITIONING  PT IS GETTING OVER NOEL BROWN IS ASKING TO HAVE THE SCRIPT FAXED TO HER [phone], ANY QUESTIONS SHE CAN BE REACHED [phone]

## 2021-02-04 NOTE — TELEPHONE ENCOUNTER
Evelina Gaytan is requesting order for physical therapy and occupational therapy for fatigue and deconditioning post COVID infection  Please fax to [phone]

## 2021-02-23 NOTE — CONSULTS
Consult - Cardiology   Deb Cradle 80 y o  female MRN: [id number]  Unit/Bed#: ED 32 Encounter: [id number]        Reason For Consult:  Heart failure                ASSESSMENT:  1  Shortness of breath, probable acute CHF  2  Acute on chronic systolic and diastolic heart failure  3  Cardiomyopathy, likely ischemic with EF 40% in August 2020  4  Hypertension  5  Non MI troponin elevation of 0 15, due to CHF  6  CKD, baseline creatinine 1 1-1 4    PLAN/ DISCUSSION:     Baseline weight probably close to 166 lb based on discharge about 6 weeks ago  She is 175 on standing scale on admission  Chest x-ray has bilateral pulmonary edema  · Continue IV Lasix but augment to 40 b i d  · Restart metoprolol succinate for cardiomyopathy    · Follow I/O, renal function, daily standing weights, electrolytes    · Continue empiric aspirin and statin      History Of Present Illness: This is a pleasant 72-year-old female who receives her care from Dr Matt Mcconnell of our group  Cardiac past medical history is as outlined  She was recently hospitalized at the beginning of January 2021 for COVID-19 pneumonia  We saw her in consultation at this point for concomitant congestive heart failure  She was treated with IV Lasix and diuresed with a weight of 166 lb  She notes to me that since this time she has been doing well  She is residing in the nursing home and has plenty of assistance  She is working with physical and occupational therapy as well  Approximately 2 days ago this patient noticed that she was becoming more short of breath  The symptoms were relatively constant  They were worse with exertion and with lying down  With this she believes she may have had some swelling in her lower extremities  She also had some substernal chest pressure which was constant without obvious aggravating or alleviating factors    Due to the persistence of her symptoms she notified staff at the nursing home in the early morning hours of 02/23/2021 and came to [facility] for evaluation  She was given IV Lasix in the emergency department and notes that she has already urinating more often than normal   Her chest pressure has resolved but she is still short of breath  Past Medical History:        Past Medical History:   Diagnosis Date    GERD (gastroesophageal reflux disease)     Murmur 8/27/2020    Right humeral fracture 7/20/2018      Past Surgical History:   Procedure Laterality Date    HYSTERECTOMY  1973    Total    JOINT REPLACEMENT Right     knee    [place] OPEN RX FEMUR FX+INTRAMED MORE Right 8/27/2020    Procedure: INSERTION NAIL IM FEMUR ANTEGRADE (TROCHANTERIC); Surgeon: Ashley Bajwa MD;  Location: [facility];  Service: Orthopedics        Allergy:        Allergies   Allergen Reactions    Sulfa Antibiotics Hives       Medications:       Prior to Admission medications    Medication Sig Start Date End Date Taking?  Authorizing Provider   acetaminophen (TYLENOL) 325 mg tablet Take 2 tablets (650 mg total) by mouth every 6 (six) hours 9/2/20  Yes Estella Szymanski MD   aspirin 81 mg chewable tablet Chew 81 mg daily   Yes Historical Provider, MD   cholecalciferol (VITAMIN D3) 1,000 units tablet Take by mouth 1/15/18  Yes Historical Provider, MD   furosemide (LASIX) 20 mg tablet Take 1 tablet (20 mg total) by mouth daily 9/2/20  Yes Estella Szymanski MD   hydrocortisone (ANUSOL-HC) 2 5 % rectal cream Apply topically 2 (two) times a day 10/29/20  Yes Larry Oh ,    metoprolol succinate (TOPROL-XL) 25 mg 24 hr tablet Take 0 5 tablets (12 5 mg total) by mouth daily 9/2/20  Yes Estella Szymanski MD   Multiple Vitamins-Minerals (PRESERVISION AREDS) CAPS Take by mouth 2 (two) times a day  1/15/18  Yes Historical Provider, MD   pantoprazole (PROTONIX) 40 mg tablet TAKE 1 TABLET BY MOUTH EVERY DAY 8/7/20  Yes Drew Anne MD   phenylephrine-shark liver oil-mineral oil-petrolatum (PREPARATION H) 0 25-3-14-71 9 % rectal ointment Insert into the rectum 2 (two) times a day as needed for hemorrhoids 10/29/20  Yes Kati Hedges , DO   senna (SENOKOT) 8 6 mg Take 1 tablet (8 6 mg total) by mouth daily at bedtime 9/2/20  Yes Faisal Fuentes MD   tolterodine (DETROL LA) 4 mg 24 hr capsule Take 4 mg by mouth daily   Yes Jessica Zamora MD   ascorbic acid (VITAMIN C) 1000 MG tablet Take 1 tablet (1,000 mg total) by mouth every 12 (twelve) hours for 6 doses 1/15/21 1/18/21  Diana Britt MD   atorvastatin (LIPITOR) 40 mg tablet Take 1 tablet (40 mg total) by mouth daily at bedtime for 14 days 1/15/21 1/29/21  Diana Britt MD   zinc sulfate (ZINCATE) 220 mg capsule Take 1 capsule (220 mg total) by mouth daily for 3 doses 1/16/21 1/19/21  Diana Britt MD       Family History:     Family History   Problem Relation Age of Onset    Heart failure Mother     Coronary artery disease Father         Social History:       Social History     Socioeconomic History    Marital status: /Civil Union     Spouse name: None    Number of children: None    Years of education: None    Highest education level: None   Occupational History    Occupation: Retired   Social Needs    Financial resource strain: None    Food insecurity     Worry: None     Inability: None    Transportation needs     Medical: None     Non-medical: None   Tobacco Use    Smoking status: Never Smoker    Smokeless tobacco: Never Used    Tobacco comment: No secondhand smoke exposure   Substance and Sexual Activity    Alcohol use: Yes     Comment: Minimal consumption    Drug use: No    Sexual activity: Not Currently   Lifestyle    Physical activity     Days per week: None     Minutes per session: None    Stress: None   Relationships    Social connections     Talks on phone: None     Gets together: None     Attends Religion service: None     Active member of club or organization: None     Attends meetings of clubs or organizations: None     Relationship status: None    Intimate partner violence     Fear of current or ex partner: None     Emotionally abused: None     Physically abused: None     Forced sexual activity: None   Other Topics Concern    None   Social History Narrative    None       ROS:  Symptoms per HPI  Remainder review of systems is negative    Exam:  General:  alert, oriented and in no distress, cooperative  Head: Normocephalic, atraumatic  Eyes:  EOMI  Pupils - equal, round, reactive to accomodation  No icterus  Normal Conjunctiva  Oropharynx: moist and normal-appearing mucosa  Neck: supple, symmetrical, trachea midline and no JVD  Heart:  RRR, No: murmer, rub or gallop, S1 & S2 normal   Respiratory effort / Chest Inspection: unlabored  Lungs:  normal air entry, lungs clear to auscultation and no rales, rhonchi or wheezing   Abdomen: flat, normal findings: bowel sounds normal and soft, non-tender  Lower Limbs:  Compression stockings on, legs do appear edematous  Pulses[de-identified]  RLE - DP: present 2+                 LLE - DP: present 2+  Musculoskeletal: ROM grossly normal      DATA:      ECG:    Normal sinus rhythm  Poor R wave progression  Nonspecific ST and T wave abnormality  Abnormal ECG                   Telemetry: Normal sinus rhythm,   HR 80's         Weights: Wt Readings from Last 3 Encounters:   02/23/21 79 4 kg (175 lb 0 7 oz)   01/15/21 76 kg (167 lb 8 8 oz)   01/10/21 76 kg (167 lb 8 8 oz)   , Body mass index is 28 25 kg/m²           Lab Studies:    Results from last 7 days   Lab Units 02/23/21  0804 02/23/21  0434   TROPONIN I ng/mL 0 15* 0 15*          Results from last 7 days   Lab Units 02/23/21  0632 02/23/21  0434   WBC Thousand/uL  --  5 84   HEMOGLOBIN g/dL  --  10 2*   HEMATOCRIT %  --  33 2*   PLATELETS Thousands/uL 212 240   ,   Results from last 7 days   Lab Units 02/23/21  0434   POTASSIUM mmol/L 4 2   CHLORIDE mmol/L 103   CO2 mmol/L 27   BUN mg/dL 27*   CREATININE mg/dL 1 28   CALCIUM mg/dL 8 8   ALK PHOS U/L 80   ALT U/L 20   AST U/L 23

## 2021-02-23 NOTE — OCCUPATIONAL THERAPY NOTE
Occupational Therapy Evaluation     Patient Name: Lucinda Hudson  [id number] Date: 2/23/2021  Problem List  Principal Problem:    Acute on chronic systolic (congestive) heart failure (HCC)  Active Problems:    Hyperlipidemia    Elevated troponin    CKD (chronic kidney disease), stage III    Benign essential HTN    Past Medical History  Past Medical History:   Diagnosis Date    GERD (gastroesophageal reflux disease)     Murmur 8/27/2020    Right humeral fracture 7/20/2018     Past Surgical History  Past Surgical History:   Procedure Laterality Date    HYSTERECTOMY  1973    Total    JOINT REPLACEMENT Right     knee    WA OPEN RX FEMUR FX+INTRAMED MORE Right 8/27/2020    Procedure: INSERTION NAIL IM FEMUR ANTEGRADE (TROCHANTERIC); Surgeon: Wilver Salazar MD;  Location: [facility];  Service: Orthopedics           02/23/21 1158   Note Type   Note type Evaluation   Restrictions/Precautions   Weight Bearing Precautions Per Order No   Other Precautions Multiple lines;Telemetry; Fall Risk   Pain Assessment   Pain Assessment Tool Pain Assessment not indicated - pt denies pain   Pain Score No Pain   Home Living   Type of Home Assisted living  [facility]   Home Layout One level;Ramped entrance;Elevator   Bathroom Shower/Tub Walk-in shower   Bathroom Toilet Standard   Bathroom Equipment Grab bars in shower; Shower chair;Commode   [facility]; Other (Comment)  (bed rail)   Additional Comments Pt lives at [address] [place] [address]  Prior Function   Level of [place] Independent with ADLs and functional mobility; Needs assistance with IADLs   Lives With Facility staff   Receives Help From Personal care attendant   ADL Assistance Independent  (assist to don/doff socks and shoes   Supervision for bathing)   IADLs Needs assistance   Falls in the last 6 months 0   Vocational Retired   Comments At baseline, pt was I w/ ADLs (assist to don/doff shoes only, Supervision for showers) and functional mobility/transfers w/ use of RW, required assist w/ IADLs, (-) , and denies falls PTA  Pt reports receiving HHPT/OT  Lifestyle   Autonomy At baseline, pt was I w/ ADLs (assist to don/doff shoes only, Supervision for showers) and functional mobility/transfers w/ use of RW, required assist w/ IADLs, (-) , and denies falls PTA  Pt reports receiving HHPT/OT  Reciprocal Relationships Retired   Service to farmhopping, son   Intrinsic Gratification Watching TV   Psychosocial   Psychosocial (WDL) WDL   ADL   Where Assessed Edge of bed   Eating Assistance 5  Supervision/Setup   Grooming Assistance 5  Supervision/Setup   UB Bathing Assistance 5  Supervision/Setup   LB Bathing Assistance 4  [address] 5  Supervision/Setup   LB Dressing Assistance 3  Moderate [address] 4  Minimal Assistance   Bed Mobility   Supine to Sit 4  Minimal assistance   Additional items Assist x 1; Increased time required;Verbal cues   Sit to Supine 3  Moderate assistance   Additional items Assist x 1; Increased time required;Verbal cues;LE management   Additional Comments Pt lying supine at end of session w/ call bell and phone within reach   All needs met and pt reports no further questions for OT at this time   Transfers   Sit to Stand 5  Supervision   Additional items Increased time required;Verbal cues   Stand to Sit 5  Supervision   Additional items Increased time required;Verbal cues   Additional Comments Cues for safe technique and hand placement   Functional Mobility   Functional Mobility 4  Minimal assistance   Additional Comments Assist x1   Additional items Hand hold assistance   Balance   Static Sitting Fair   Dynamic Sitting Fair -   Static Standing Fair -   Dynamic Standing Poor +   Ambulatory Poor +   Activity Tolerance   Activity Tolerance Patient limited by fatigue;Patient tolerated treatment well   Medical Staff Made Aware Devora Bermudez PT   Nurse Made Aware yes; Pt appropriate to be seen for therapy per RN   RUE Assessment   RUE Assessment WFL   RUE Strength   RUE Overall Strength Within Functional Limits - able to perform ADL tasks with strength  (4-/5 throughout)   LUE Assessment   LUE Assessment WFL   LUE Strength   LUE Overall Strength Within Functional Limits - able to perform ADL tasks with strength  (4-/5 throughout)   Hand Function   Gross Motor Coordination Functional   Fine Motor Coordination Functional   Sensation   Light Touch No apparent deficits   Proprioception   Proprioception No apparent deficits   Vision-Basic Assessment   Current Vision Wears glasses all the time   Vision - Complex Assessment   Ocular Range of Motion [facility]   Acuity Able to read clock/calendar on wall without difficulty; Able to read employee name badge without difficulty   Perception   Inattention/Neglect Appears intact   Cognition   Overall Cognitive Status [facility]   Arousal/Participation Alert; Cooperative   Attention Attends with cues to redirect   Orientation Level Oriented X4   Memory Within functional limits   Following Commands Follows one step commands without difficulty   Comments Pleasant and cooperative   Assessment   Limitation Decreased ADL status; Decreased UE strength;Decreased Safe judgement during ADL;Decreased endurance;Decreased high-level ADLs; Decreased self-care trans   Prognosis Good   Assessment Pt is a 80 y o  female seen for OT evaluation s/p adm to [facility] on 2/23/2021 w/ SOB and chest tightness and dx'd w/ Acute on chronic systolic CHF  Comorbidities affecting pts functional performance include a significant PMH of GERD, murmur, R intertrochanteric femur fx s/p long TFN (8/28/20) and R humeral fracture  Pt with active OT orders and activity orders for Up as tolerated  Pt lives at [address] [place] [address]   At baseline, pt was I w/ ADLs (assist to don/doff shoes only, Supervision for showers) and functional mobility/transfers w/ use of RW, required assist w/ IADLs, (-) , and denies falls PTA  Pt reports receiving HHPT/OT  Upon evaluation, pt currently requires Supervision for UB ADLs, Mod-Min A for LB ADLs, Min A for toileting, Mod-Min A for bed mobility, Supervision for transfers, and Min A for functional mobility 2* the following deficits impacting occupational performance: weakness, decreased strength, decreased balance, decreased tolerance and decreased safety awareness  These impairments, as well at pts difficulty performing ADLS and limited insight into deficits limit pts ability to safely engage in all baseline areas of occupation  The patient's raw score on the AM-PAC Daily Activity inpatient short form is 19, standardized score is 40 22, greater than 39 4  Patients at this level are likely to benefit from DC to home  Please refer to the recommendation of the Occupational Therapist for safe DC planning  Based on the aforementioned OT evaluation, functional performance deficits, and assessments, pt has been identified as a Moderate complexity evaluation  Pt to continue to benefit from continued acute OT services during hospital stay to address defined deficits and to maximize level of functional independence in the following Occupational Performance areas: grooming, bathing/shower, toilet hygiene, dressing, health maintenance, functional mobility, community mobility, clothing management and social participation  From OT standpoint, recommend return to [facility] with continued OT upon D/C  OT will continue to follow pt 3-5x/wk to address the following goals to  w/in 10-14 days:   Goals   Patient Goals To be able to breathe better   LTG Time Frame 10-14   Long Term Goal Please refer to LTGs listed below   Plan   Treatment Interventions ADL retraining;Functional transfer training;UE strengthening/ROM; Endurance training;Patient/family training;Equipment evaluation/education; Compensatory technique education; Energy conservation; Activityengagement   Goal Expiration Date 03/09/21   OT Treatment Day 0   OT Frequency 3-5x/wk   Recommendation   OT Discharge Recommendation Home with skilled therapy   OT - OK to Discharge Yes  (when medically cleared)   AM-PAC Daily Activity Inpatient   Lower Body Dressing 2   Bathing 3   Toileting 3   Upper Body Dressing 3   Grooming 4   Eating 4   Daily Activity Raw Score 19   Daily Activity Standardized Score (Calc for Raw Score >=11) 40 22   AM-PAC Applied Cognition Inpatient   Following a Speech/Presentation 3   Understanding Ordinary Conversation 3   Taking Medications 2   Remembering Where Things Are Placed or Put Away 2   Remembering List of 4-5 Errands 2   Taking Care of Complicated Tasks 2   Applied Cognition Raw Score 14   Applied Cognition Standardized Score 32 02      GOALS    1  Pt will improve activity tolerance to G for min 30 min txment sessions for increase engagement in functional tasks    2  Pt will complete bed mobility at a Mod I level w/ G balance/safety demonstrated to decrease caregiver assistance required     3  Pt will complete UB/LB dressing/self care w/ mod I using adaptive device and DME as needed    4  Pt will complete bathing w/ Supervision w/ use of AE and DME as needed    5  Pt will complete toileting w/ mod I w/ G hygiene/thoroughness using DME as needed    6  Pt will improve functional transfers to Mod I on/off all surfaces using DME as needed w/ G balance/safety     7  Pt will improve functional mobility during ADL/IADL/leisure tasks to Mod I using DME as needed w/ G balance/safety     8  Pt will be attentive 100% of the time during ongoing cognitive assessment w/ G participation to assist w/ safe d/c planning/recommendations    9  Pt will demonstrate G carryover of pt/caregiver education and training as appropriate w/o cues w/ good tolerance to increase safety during functional tasks    10   Pt will demonstrate 100% carryover of energy conservation techniques t/o functional I/ADL/leisure tasks w/o cues s/p skilled education to increase endurance during functional tasks    11  Pt will increase BUE strength by 1MM grade via AROM exercises to increase independence in ADLs and transfers    12   Pt will increase standing tolerance to 8-10 mins with Fair+ dynamic standing balance to increase safety during participation in ADLs       Bernice Santiago, OTR/L

## 2021-02-23 NOTE — PLAN OF CARE
Problem: OCCUPATIONAL THERAPY ADULT  Goal: Performs self-care activities at highest level of function for planned discharge setting  See evaluation for individualized goals  Description: Treatment Interventions: ADL retraining, Functional transfer training, UE strengthening/ROM, Endurance training, Patient/family training, Equipment evaluation/education, Compensatory technique education, Energy conservation, Activityengagement          See flowsheet documentation for full assessment, interventions and recommendations  Note: Limitation: Decreased ADL status, Decreased UE strength, Decreased Safe judgement during ADL, Decreased endurance, Decreased high-level ADLs, Decreased self-care trans  Prognosis: Good  Assessment: Pt is a 80 y o  female seen for OT evaluation s/p adm to [facility] [facility] on 2/23/2021 w/ SOB and chest tightness and dx'd w/ Acute on chronic systolic CHF  Comorbidities affecting pts functional performance include a significant PMH of GERD, murmur, R intertrochanteric femur fx s/p long TFN (8/28/20) and R humeral fracture  Pt with active OT orders and activity orders for Up as tolerated  Pt lives at [address] [place] [address]  At baseline, pt was I w/ ADLs (assist to don/doff shoes only, Supervision for showers) and functional mobility/transfers w/ use of RW, required assist w/ IADLs, (-) , and denies falls PTA  Pt reports receiving HHPT/OT  Upon evaluation, pt currently requires Supervision for UB ADLs, Mod-Min A for LB ADLs, Min A for toileting, Mod-Min A for bed mobility, Supervision for transfers, and Min A for functional mobility 2* the following deficits impacting occupational performance: weakness, decreased strength, decreased balance, decreased tolerance and decreased safety awareness  These impairments, as well at pts difficulty performing ADLS and limited insight into deficits limit pts ability to safely engage in all baseline areas of occupation   The patient's raw score on the AM-PAC Daily Activity inpatient short form is 19, standardized score is 40 22, greater than 39 4  Patients at this level are likely to benefit from DC to home  Please refer to the recommendation of the Occupational Therapist for safe DC planning  Based on the aforementioned OT evaluation, functional performance deficits, and assessments, pt has been identified as a Moderate complexity evaluation  Pt to continue to benefit from continued acute OT services during hospital stay to address defined deficits and to maximize level of functional independence in the following Occupational Performance areas: grooming, bathing/shower, toilet hygiene, dressing, health maintenance, functional mobility, community mobility, clothing management and social participation  From OT standpoint, recommend return to [facility] with continued OT upon D/C   OT will continue to follow pt 3-5x/wk to address the following goals to  w/in 10-14 days:     OT Discharge Recommendation: Home with skilled therapy  OT - OK to Discharge: Yes(when medically cleared)

## 2021-02-23 NOTE — PLAN OF CARE
Problem: PHYSICAL THERAPY ADULT  Goal: Performs mobility at highest level of function for planned discharge setting  See evaluation for individualized goals  Description: Treatment/Interventions: Functional transfer training, LE strengthening/ROM, Elevations, Therapeutic exercise, Endurance training, Patient/family training, Equipment eval/education, Bed mobility, Gait training, Continued evaluation, Spoke to nursing, OT          See flowsheet documentation for full assessment, interventions and recommendations  Note: Prognosis: Fair  Problem List: Decreased strength, Decreased range of motion, Decreased endurance, Impaired balance, Decreased mobility, Obesity, Decreased skin integrity  Assessment: Nella Hidalgo is a 80 y o  female admitted to [facility] on 2/23/2021 for Acute on chronic systolic (congestive) heart failure ([facility] [address] )  PT was consulted and pt was seen on 2/23/2021 for mobility assessment and d/c planning  Pt presents medium fall risk, multiple lines  Pt is currently functioning at a minimum assistance x1 and moderate assistance x1 level for bed mobility, supervision assistance x1 level for transfers, minimum assistance x1 level for ambulation with HHA  O2 97% at rest  Overall tolerance to activity currently limited by pt reports of feeling weak and fatigued  Did have some increased WOB post ambulation short distances  However overall seems to be functioning near her baseline, as she utilizes a RW at baseline, is limited in distances able to ambulate and requires A for bed mobility  Pt will benefit from continued skilled IP PT to address the above mentioned impairments  in order to maximize recovery and increase functional independence when completing mobility and ADLs  At this time PT recommendations for d/c are return to [facility] w continued PT services  Pt denies concerns w d/c home at this time  End of session pt repositioned in bed, call bell in reach     Barriers to Discharge: None     PT Discharge Recommendation: Return to previous environment with social support, Home with skilled therapy([facility] w continued PT)     PT - OK to Discharge: Yes    See flowsheet documentation for full assessment

## 2021-02-23 NOTE — ASSESSMENT & PLAN NOTE
Wt Readings from Last 3 Encounters:   02/23/21 79 4 kg (175 lb 0 7 oz)   01/15/21 76 kg (167 lb 8 8 oz)   01/10/21 76 kg (167 lb 8 8 oz)       Weight up approx 8 lbs from recent hospitalization  nt pro bnp up to 27k cxr concerning for central congestion, pt w/+orthopnea/pnd  Estimated Ef 40% in 08/2020 but technically limited study  On lasix 20mg daily at home and toprol xl 25mg daily  Hold toprol given marginal bp    rec'd iv lasix 40mg in ed, will continue w/20mg IV BID, daily weights I/os low salt diet  Consult cardiology

## 2021-02-23 NOTE — H&P
H&P- Lucinda Hy 12/8/1927, 80 y o  female MRN: [id number]    Unit/Bed#: ED 18 Encounter: [id number]    Primary Care Provider: Kentrell Foley MD   Date and time admitted to hospital: 2/23/2021  4:05 AM        * Acute on chronic systolic (congestive) heart failure (HCC)  Assessment & Plan  Wt Readings from Last 3 Encounters:   02/23/21 79 4 kg (175 lb 0 7 oz)   01/15/21 76 kg (167 lb 8 8 oz)   01/10/21 76 kg (167 lb 8 8 oz)       Weight up approx 8 lbs from recent hospitalization  nt pro bnp up to 27k cxr concerning for central congestion, pt w/+orthopnea/pnd  Estimated Ef 40% in 08/2020 but technically limited study  On lasix 20mg daily at home and toprol xl 25mg daily  Hold toprol given marginal bp    rec'd iv lasix 40mg in ed, will continue w/20mg IV BID, daily weights I/os low salt diet  Consult cardiology      Benign essential HTN  Assessment & Plan  Hold bb continue iv lasix as above    CKD (chronic kidney disease), stage III  Assessment & Plan  Lab Results   Component Value Date    EGFR 36 02/23/2021    EGFR 42 01/14/2021    EGFR 34 01/13/2021    CREATININE 1 28 02/23/2021    CREATININE 1 14 01/14/2021    CREATININE 1 36 (H) 01/13/2021   creatinine near baseline  Monitor w/iv diuresis    Elevated troponin  Assessment & Plan  Mild  Does have chest pressure/tightness likely related to chf exacerbation as pt had some orthopnea the last 2 days or so  ekg w/borderline st segment depressions in inferior leads but appears roughly stable from 1/21  rec'd asa 325mg in ed  Trend ekgs/troponins for completeness      Hyperlipidemia  Assessment & Plan  Continue statin    VTE Prophylaxis: Heparin  / sequential compression device   Code Status: level 3 dni/dnr  POLST: There is no POLST form on file for this patient (pre-hospital)  Discussion with family:     Anticipated Length of Stay:  Patient will be admitted on an Inpatient basis with an anticipated length of stay of  Greater than 2 midnights     Justification for Hospital Stay: chf    Total Time for Visit, including Counseling / Coordination of Care: 45 minutes  Greater than 50% of this total time spent on direct patient counseling and coordination of care  Chief Complaint:   Sob pnd orthopnea, chest tightness    History of Present Illness:    Carlyle Guzman is a 80 y o  female who presents with pmh of chronic systolic CHF, htn, CKD stage 3, gerd coming to hospital for sob/chest tightness  Pt notes over last 2 nights difficulty sleeping  She also noted some sob/dry loose cough, chest tightness  She notes no sore throat fevers/chills/congestion  No abd pain n/v/d  Uncertain if has worsening LE edema but overnight on the evening prior to admission had been orthopena w/her PND  She also noted substernal nonradiating chest tightness     She  came in to be evaluated  Her cxr was concerning for overload, her nt pro bnp was elevated up to [id number]  Admission was requested for chf exacerbation  Review of Systems:    Review of Systems   Constitutional: Negative for chills and fever  HENT: Negative for congestion and sore throat  Respiratory: Positive for cough and shortness of breath  Cardiovascular: Positive for chest pain  Negative for palpitations and leg swelling         + PND   Gastrointestinal: Negative for abdominal pain, diarrhea, nausea and vomiting  Neurological: Negative for light-headedness  Psychiatric/Behavioral: Positive for sleep disturbance  All other systems reviewed and are negative  Past Medical and Surgical History:     Past Medical History:   Diagnosis Date    GERD (gastroesophageal reflux disease)     Murmur 8/27/2020    Right humeral fracture 7/20/2018       Past Surgical History:   Procedure Laterality Date    HYSTERECTOMY  1973    Total    JOINT REPLACEMENT Right     knee    VT OPEN RX FEMUR FX+INTRAMED MORE Right 8/27/2020    Procedure: INSERTION NAIL IM FEMUR ANTEGRADE (TROCHANTERIC);   Surgeon: Trupti Loo MD;  Location: [facility];  Service: Orthopedics       Meds/Allergies:    Prior to Admission medications    Medication Sig Start Date End Date Taking?  Authorizing Provider   acetaminophen (TYLENOL) 325 mg tablet Take 2 tablets (650 mg total) by mouth every 6 (six) hours 9/2/20  Yes Sonny Flowers MD   aspirin 81 mg chewable tablet Chew 81 mg daily   Yes Historical Provider, MD   cholecalciferol (VITAMIN D3) 1,000 units tablet Take by mouth 1/15/18  Yes Historical Provider, MD   furosemide (LASIX) 20 mg tablet Take 1 tablet (20 mg total) by mouth daily 9/2/20  Yes Sonny Flowers MD   hydrocortisone (ANUSOL-HC) 2 5 % rectal cream Apply topically 2 (two) times a day 10/29/20  Yes Abigail Arango DO   metoprolol succinate (TOPROL-XL) 25 mg 24 hr tablet Take 0 5 tablets (12 5 mg total) by mouth daily 9/2/20  Yes Sonny Flowers MD   Multiple Vitamins-Minerals (PRESERVISION AREDS) CAPS Take by mouth 2 (two) times a day  1/15/18  Yes Historical Provider, MD   pantoprazole (PROTONIX) 40 mg tablet TAKE 1 TABLET BY MOUTH EVERY DAY 8/7/20  Yes Nahed Jasmine MD   phenylephrine-shark liver oil-mineral oil-petrolatum (PREPARATION H) 0 25-3-14-71 9 % rectal ointment Insert into the rectum 2 (two) times a day as needed for hemorrhoids 10/29/20  Yes Abigail Arango DO   senna (SENOKOT) 8 6 mg Take 1 tablet (8 6 mg total) by mouth daily at bedtime 9/2/20  Yes Sonny Flowers MD   tolterodine (DETROL LA) 4 mg 24 hr capsule Take 4 mg by mouth daily   Yes Historical Provider, MD   ascorbic acid (VITAMIN C) 1000 MG tablet Take 1 tablet (1,000 mg total) by mouth every 12 (twelve) hours for 6 doses 1/15/21 1/18/21  Carlotta Louise MD   atorvastatin (LIPITOR) 40 mg tablet Take 1 tablet (40 mg total) by mouth daily at bedtime for 14 days 1/15/21 1/29/21  Carlotta Louise MD   zinc sulfate (ZINCATE) 220 mg capsule Take 1 capsule (220 mg total) by mouth daily for 3 doses 1/16/21 1/19/21  Carlotta Louise MD     I have reviewed home medications with patient personally  Allergies: Allergies   Allergen Reactions    Sulfa Antibiotics Hives       Social History:     Marital Status: /Civil Union   Occupation:   Patient Pre-hospital Living Situation:   Patient Pre-hospital Level of Mobility:   Patient Pre-hospital Diet Restrictions:   Substance Use History:   Social History     Substance and Sexual Activity   Alcohol Use Yes    Comment: Minimal consumption     Social History     Tobacco Use   Smoking Status Never Smoker   Smokeless Tobacco Never Used   Tobacco Comment    No secondhand smoke exposure     Social History     Substance and Sexual Activity   Drug Use No       Family History:    Family History   Problem Relation Age of Onset    Heart failure Mother     Coronary artery disease Father        Physical Exam:     Vitals:   Blood Pressure: 100/63 (02/23/21 0547)  Pulse: 92 (02/23/21 0547)  Temperature: 97 9 °F (36 6 °C) (02/23/21 0547)  Temp Source: Oral (02/23/21 0547)  Respirations: (!) 24 (02/23/21 0547)  Height: 5' 6" (167 6 cm) (02/23/21 0547)  Weight - Scale: 79 4 kg (175 lb 0 7 oz) (02/23/21 0547)  SpO2: 94 % (02/23/21 0547)    Physical Exam  Vitals signs reviewed  Constitutional:       General: She is not in acute distress  Appearance: She is not ill-appearing, toxic-appearing or diaphoretic  Comments: Appears uncomfortable stated age but no distress  On ra at 30 deg elevated hob   HENT:      Head: Normocephalic and atraumatic  Right Ear: External ear normal       Left Ear: External ear normal       Nose: Nose normal    Neck:      Musculoskeletal: Normal range of motion  Cardiovascular:      Rate and Rhythm: Normal rate  Heart sounds: No murmur  No friction rub  No gallop  Pulmonary:      Breath sounds: Rales (fine rales in LLL and coarse breath sounds in RLL) present  Comments: On ra w/o wob  Mild conversational dyspnea  Abdominal:      General: There is no distension  Palpations: There is no mass  Tenderness: There is no abdominal tenderness  There is no guarding or rebound  Hernia: No hernia is present  Musculoskeletal:      Right lower leg: Edema present  Left lower leg: Edema present  Skin:     General: Skin is warm and dry  Neurological:      Mental Status: She is alert  Mental status is at baseline  Comments: Conversationally fluent   Psychiatric:         Mood and Affect: Mood normal          (  Be Sure to Include Physical Exam: Delete this entire line when you have entered your exam)    Additional Data:     Lab Results: I have personally reviewed pertinent reports  Results from last 7 days   Lab Units 02/23/21  0434   WBC Thousand/uL 5 84   HEMOGLOBIN g/dL 10 2*   HEMATOCRIT % 33 2*   PLATELETS Thousands/uL 240   NEUTROS PCT % 52   LYMPHS PCT % 32   MONOS PCT % 9   EOS PCT % 6     Results from last 7 days   Lab Units 02/23/21  0434   SODIUM mmol/L 138   POTASSIUM mmol/L 4 2   CHLORIDE mmol/L 103   CO2 mmol/L 27   BUN mg/dL 27*   CREATININE mg/dL 1 28   ANION GAP mmol/L 8   CALCIUM mg/dL 8 8   ALBUMIN g/dL 3 5   TOTAL BILIRUBIN mg/dL 0 53   ALK PHOS U/L 80   ALT U/L 20   AST U/L 19   GLUCOSE RANDOM mg/dL 102                       Imaging: I have personally reviewed pertinent films in PACS and cxr w/concern for central vascular congestion    XR chest 1 view portable    (Results Pending)       EKG, Pathology, and Other Studies Reviewed on Admission:   · EKG: sinus w/borderline st segment depressions in inferior leads but appears stable from 01/21    Allscripts / Epic Records Reviewed: Yes     ** Please Note: This note has been constructed using a voice recognition system   **

## 2021-02-23 NOTE — ASSESSMENT & PLAN NOTE
Mild   Does have chest pressure/tightness likely related to chf exacerbation as pt had some orthopnea the last 2 days or so  ekg w/borderline st segment depressions in inferior leads but appears roughly stable from 1/21  rec'd asa 325mg in ed  Trend ekgs/troponins for completeness

## 2021-02-23 NOTE — ASSESSMENT & PLAN NOTE
Lab Results   Component Value Date    EGFR 36 02/23/2021    EGFR 42 01/14/2021    EGFR 34 01/13/2021    CREATININE 1 28 02/23/2021    CREATININE 1 14 01/14/2021    CREATININE 1 36 (H) 01/13/2021   creatinine near baseline  Monitor w/iv diuresis

## 2021-02-23 NOTE — PHYSICAL THERAPY NOTE
PHYSICAL THERAPY EVALUATION          Patient Name: Gwendolyn VINCENT Date: 2/23/2021   PT EVALUATION    80 y o     [id number]    SOB (shortness of breath) [R06 02]    Past Medical History:   Diagnosis Date    GERD (gastroesophageal reflux disease)     Murmur 8/27/2020    Right humeral fracture 7/20/2018     Past Surgical History:   Procedure Laterality Date    HYSTERECTOMY  1973    Total    JOINT REPLACEMENT Right     knee    DC OPEN RX FEMUR FX+INTRAMED MORE Right 8/27/2020    Procedure: INSERTION NAIL IM FEMUR ANTEGRADE (TROCHANTERIC); Surgeon: Ashley Bajwa MD;  Location: [facility];  Service: Orthopedics        02/23/21 1157   PT Last Visit   PT Visit Date 02/23/21   Note Type   Note type Evaluation   Pain Assessment   Pain Assessment Tool Pain Assessment not indicated - pt denies pain   Pain Score No Pain   Home Living   Type of Home Assisted living   Home Layout Elevator; One level   Bathroom Shower/Tub Walk-in shower   [facility]; Other (Comment)  (bedrail)   Additional Comments from [address] [place] [id number] with ADLs and functional mobility; Needs assistance with IADLs   Lives With Facility staff   ADL Assistance Independent  (s for showers)   IADLs Needs assistance  (via facility)   Falls in the last 6 months 0  (hx of fall in Aug)   Comments pta pt reports being indep, amb w RW  reports workingw PT/OT  unable to ambulat distance to get to dining de oliveira, either eats in room or gets brought down in wc  also gets A for bed mobility sit>supine d/t inability to lift BLE into bed   Restrictions/Precautions   Other Precautions Multiple lines;Telemetry; Fall Risk   General   Additional Pertinent History pt admitted 2/23/21 for acute on chronic systolic HF  up as tolerated orders  prev admitted to [address] from 1/11-15/21 for pna d/t covid   currently off airborne precautions  Family/Caregiver Present No   Cognition   Overall Cognitive Status WFL   Arousal/Participation Cooperative   Orientation Level Oriented X4   Memory Within functional limits   Following Commands Follows one step commands without difficulty   RLE Assessment   RLE Assessment X  (hip ROM limited by body habitus)   Strength RLE   R Hip Flexion 3-/5   R Hip ABduction 3-/5  (4/5 in available ROM)   R Hip ADduction 3-/5  (4/5 in available ROM)   R Knee Extension 4/5   R Ankle Dorsiflexion 3-/5   LLE Assessment   LLE Assessment X  (hip ROM limited by body habitus)   Strength LLE   L Hip Flexion 3-/5   L Hip ABduction 3-/5  (4/5 in available ROM)   L Hip ADduction 3-/5  (4/5 in available ROM)   L Knee Extension 4/5   L Ankle Dorsiflexion 3-/5   Coordination   Sensation WFL  (edema BLE L>R)   Light Touch   RLE Light Touch Grossly intact   LLE Light Touch Grossly intact   Bed Mobility   Supine to Sit 4  Minimal assistance   Additional items Assist x 1; Increased time required; Other  (pull to sit)   Sit to Supine 3  Moderate assistance   Additional items Assist x 1; Increased time required;Verbal cues;LE management   Transfers   Sit to Stand 5  Supervision   Additional items Increased time required   Stand to Sit 5  Supervision   Additional items Increased time required;Verbal cues   Ambulation/Elevation   Gait pattern Short stride; Excessively slow   Gait Assistance 4  Minimal assist   Additional items Assist x 1   Assistive Device Other (Comment)  (HHA)   Distance 15'  (limited by fatigue, weakness)   Balance   Dynamic Sitting Fair -   Static Standing Fair -   Dynamic Standing Poor +   Ambulatory Poor +   Endurance Deficit   Endurance Deficit Yes   Endurance Deficit Description pt reporting fatigue and feeling weak limiting ambulation   Activity Tolerance   Activity Tolerance Patient limited by fatigue   Medical Staff Felisha Mora 85   Nurse Made Aware cleraed for therapy   Assessment   Prognosis Fair   Problem List Decreased strength;Decreased range of motion;Decreased endurance; Impaired balance;Decreased mobility;Obesity; Decreased skin integrity   Assessment Marques Nj is a 80 y o  female admitted to [address] on 2/23/2021 for Acute on chronic systolic (congestive) heart failure ([facility] [address] )  PT was consulted and pt was seen on 2/23/2021 for mobility assessment and d/c planning  Pt presents medium fall risk, multiple lines  Pt is currently functioning at a minimum assistance x1 and moderate assistance x1 level for bed mobility, supervision assistance x1 level for transfers, minimum assistance x1 level for ambulation with HHA  O2 97% at rest  Overall tolerance to activity currently limited by pt reports of feeling weak and fatigued  Did have some increased WOB post ambulation short distances  However overall seems to be functioning near her baseline, as she utilizes a RW at baseline, is limited in distances able to ambulate and requires A for bed mobility  Pt will benefit from continued skilled IP PT to address the above mentioned impairments  in order to maximize recovery and increase functional independence when completing mobility and ADLs  At this time PT recommendations for d/c are return to care home w continued PT services  Pt denies concerns w d/c home at this time  End of session pt repositioned in bed, call bell in reach  Barriers to Discharge None   Goals   Patient Goals to be able to breathe better   STG Expiration Date 03/05/21   Short Term Goal #1 1)  Pt will perform bed mobility with min A demonstrating appropriate technique 100% of the time in order to improve function and decrease caregiver burden  2)  Perform all transfers with M[place] demonstrating safe and appropriate technique 100% of the time in order to improve ability to negotiate safely in home environment  3) Amb with least restrictive AD > 50'x1 with mod I in order to demonstrate ability to negotiate in home environment  4)  Improve overall strength and balance 1/2 grade in order to optimize ability to perform functional tasks and reduce fall risk  5) Increase activity tolerance to 45 minutes in order to improve endurance to functional tasks  6) PT for ongoing patient and family/caregiver education, DME needs and d/c planning in order to promote highest level of function in least restrictive environment  PT Treatment Day 0   Plan   Treatment/Interventions Functional transfer training;LE strengthening/ROM; Elevations; Therapeutic exercise; Endurance training;Patient/family training;Equipment eval/education; Bed mobility;Gait training;Continued evaluation;Spoke to nursing;OT   PT Frequency Other (Comment)  (3-5x)   Recommendation   PT Discharge Recommendation Return to previous environment with social support;Home with skilled therapy  (KAREN w continued PT)   PT - OK to Discharge Yes   Additional Comments when medicalyl stable   AM-PAC Basic Mobility Inpatient   Turning in Bed Without Bedrails 3   Lying on Back to Sitting on Edge of Flat Bed 3   Moving Bed to Chair 3   Standing Up From Chair 3   Walk in Room 3   Climb 3-5 Stairs 2   Basic Mobility Inpatient Raw Score 17   Basic Mobility Standardized Score 39 67   History: co - morbidities, age, social background, past experience, fall risk, use of assistive device, multiple lines  Exam: impairments in systems including musculoskeletal (strength), neuromuscular (balance, gait, transfers, motor function and sensation), AM-PAC, cardiopulmonary, cognition  Clinical: unstable/unpredictable  Complexity:high      Sophia Marsh, PT

## 2021-02-23 NOTE — PLAN OF CARE
Problem: Potential for Falls  Goal: Patient will remain free of falls  Description: INTERVENTIONS:  - Assess patient frequently for physical needs  -  Identify cognitive and physical deficits and behaviors that affect risk of falls    -  [place] fall precautions as indicated by assessment   - Educate patient/family on patient safety including physical limitations  - Instruct patient to call for assistance with activity based on assessment  - Modify environment to reduce risk of injury  - Consider OT/PT consult to assist with strengthening/mobility  Outcome: Progressing     Problem: PAIN - ADULT  Goal: Verbalizes/displays adequate comfort level or baseline comfort level  Description: Interventions:  - Encourage patient to monitor pain and request assistance  - Assess pain using appropriate pain scale  - Administer analgesics based on type and severity of pain and evaluate response  - Implement non-pharmacological measures as appropriate and evaluate response  - Consider cultural and social influences on pain and pain management  - Notify physician/advanced practitioner if interventions unsuccessful or patient reports new pain  Outcome: Progressing     Problem: INFECTION - ADULT  Goal: Absence or prevention of progression during hospitalization  Description: INTERVENTIONS:  - Assess and monitor for signs and symptoms of infection  - Monitor lab/diagnostic results  - Monitor all insertion sites, i e  indwelling lines, tubes, and drains  - Monitor endotracheal if appropriate and nasal secretions for changes in amount and color  - [place] appropriate cooling/warming therapies per order  - Administer medications as ordered  - Instruct and encourage patient and family to use good hand hygiene technique  - Identify and instruct in appropriate isolation precautions for identified infection/condition  Outcome: Progressing  Goal: Absence of fever/infection during neutropenic period  Description: INTERVENTIONS:  - Monitor WBC    Outcome: Progressing     Problem: SAFETY ADULT  Goal: Patient will remain free of falls  Description: INTERVENTIONS:  - Assess patient frequently for physical needs  -  Identify cognitive and physical deficits and behaviors that affect risk of falls    -  [place] fall precautions as indicated by assessment   - Educate patient/family on patient safety including physical limitations  - Instruct patient to call for assistance with activity based on assessment  - Modify environment to reduce risk of injury  - Consider OT/PT consult to assist with strengthening/mobility  Outcome: Progressing  Goal: Maintain or return to baseline ADL function  Description: INTERVENTIONS:  -  Assess patient's ability to carry out ADLs; assess patient's baseline for ADL function and identify physical deficits which impact ability to perform ADLs (bathing, care of mouth/teeth, toileting, grooming, dressing, etc )  - Assess/evaluate cause of self-care deficits   - Assess range of motion  - Assess patient's mobility; develop plan if impaired  - Assess patient's need for assistive devices and provide as appropriate  - Encourage maximum independence but intervene and supervise when necessary  - Involve family in performance of ADLs  - Assess for home care needs following discharge   - Consider OT consult to assist with ADL evaluation and planning for discharge  - Provide patient education as appropriate  Outcome: Progressing  Goal: Maintain or return mobility status to optimal level  Description: INTERVENTIONS:  - Assess patient's baseline mobility status (ambulation, transfers, stairs, etc )    - Identify cognitive and physical deficits and behaviors that affect mobility  - Identify mobility aids required to assist with transfers and/or ambulation (gait belt, sit-to-stand, lift, walker, cane, etc )  - [place] fall precautions as indicated by assessment  - Record patient progress and toleration of activity level on Mobility SBAR; progress patient to next Phase/Stage  - Instruct patient to call for assistance with activity based on assessment  - Consider rehabilitation consult to assist with strengthening/weightbearing, etc   Outcome: Progressing     Problem: DISCHARGE PLANNING  Goal: Discharge to home or other facility with appropriate resources  Description: INTERVENTIONS:  - Identify barriers to discharge w/patient and caregiver  - Arrange for needed discharge resources and transportation as appropriate  - Identify discharge learning needs (meds, wound care, etc )  - Arrange for interpretive services to assist at discharge as needed  - Refer to Case Management Department for coordinating discharge planning if the patient needs post-hospital services based on physician/advanced practitioner order or complex needs related to functional status, cognitive ability, or social support system  Outcome: Progressing     Problem: Knowledge Deficit  Goal: Patient/family/caregiver demonstrates understanding of disease process, treatment plan, medications, and discharge instructions  Description: Complete learning assessment and assess knowledge base    Interventions:  - Provide teaching at level of understanding  - Provide teaching via preferred learning methods  Outcome: Progressing

## 2021-02-23 NOTE — ED PROVIDER NOTES
History  Chief Complaint   Patient presents with    Shortness of Breath     sob and chest heaviness since yesterday  states dry cough  "I feel like I can't catch my breath" denies n/v/d     89-YOF brought in via EMS from her group home for chest pressure & SOB for unknown duration  Pmhx COVID PNA (COVID+ 1/10/21), CHF (TTE 8/27/20 EF 40%), T2MI, and CKD 3  Recently admitted 1/11-1/15/21 for COVID PNA  Pt states that she feels retrosternal chest pressure & SOB but cannot remember time of onset  Also states her legs are usually swollen & she has to wear compression stocking every night  Otherwise pt denies any HA, dizziness, abdo pain, diarrhea, constipation, dysuria, or any other concern  Prior to Admission Medications   Prescriptions Last Dose Informant Patient Reported? Taking?    Multiple Vitamins-Minerals (PRESERVISION AREDS) CAPS  Self Yes Yes   Sig: Take by mouth 2 (two) times a day    acetaminophen (TYLENOL) 325 mg tablet   No Yes   Sig: Take 2 tablets (650 mg total) by mouth every 6 (six) hours   ascorbic acid (VITAMIN C) 1000 MG tablet   No No   Sig: Take 1 tablet (1,000 mg total) by mouth every 12 (twelve) hours for 6 doses   aspirin 81 mg chewable tablet   Yes Yes   Sig: Chew 81 mg daily   atorvastatin (LIPITOR) 40 mg tablet   No No   Sig: Take 1 tablet (40 mg total) by mouth daily at bedtime for 14 days   cholecalciferol (VITAMIN D3) 1,000 units tablet  Self Yes Yes   Sig: Take by mouth   furosemide (LASIX) 20 mg tablet   No Yes   Sig: Take 1 tablet (20 mg total) by mouth daily   hydrocortisone (ANUSOL-HC) 2 5 % rectal cream   No Yes   Sig: Apply topically 2 (two) times a day   metoprolol succinate (TOPROL-XL) 25 mg 24 hr tablet   No Yes   Sig: Take 0 5 tablets (12 5 mg total) by mouth daily   pantoprazole (PROTONIX) 40 mg tablet   No Yes   Sig: TAKE 1 TABLET BY MOUTH EVERY DAY   phenylephrine-shark liver oil-mineral oil-petrolatum (PREPARATION H) 0 25-3-14-71 9 % rectal ointment   No Yes Sig: Insert into the rectum 2 (two) times a day as needed for hemorrhoids   senna (SENOKOT) 8 6 mg   No Yes   Sig: Take 1 tablet (8 6 mg total) by mouth daily at bedtime   tolterodine (DETROL LA) 4 mg 24 hr capsule   Yes Yes   Sig: Take 4 mg by mouth daily   zinc sulfate (ZINCATE) 220 mg capsule   No No   Sig: Take 1 capsule (220 mg total) by mouth daily for 3 doses      Facility-Administered Medications: None       Past Medical History:   Diagnosis Date    GERD (gastroesophageal reflux disease)     Murmur 8/27/2020    Right humeral fracture 7/20/2018       Past Surgical History:   Procedure Laterality Date    HYSTERECTOMY  1973    Total    JOINT REPLACEMENT Right     knee    NV OPEN RX FEMUR FX+INTRAMED MORE Right 8/27/2020    Procedure: INSERTION NAIL IM FEMUR ANTEGRADE (TROCHANTERIC); Surgeon: Sheridan Smiley MD;  Location[facility] OR;  Service: Orthopedics       Family History   Problem Relation Age of Onset    Heart failure Mother     Coronary artery disease Father      I have reviewed and agree with the history as documented  E-Cigarette/Vaping    E-Cigarette Use Never User      E-Cigarette/Vaping Substances     Social History     Tobacco Use    Smoking status: Never Smoker    Smokeless tobacco: Never Used    Tobacco comment: No secondhand smoke exposure   Substance Use Topics    Alcohol use: Yes     Comment: Minimal consumption    Drug use: No        Review of Systems   Constitutional: Negative for activity change, appetite change, fatigue, fever and unexpected weight change  HENT: Negative for congestion, ear discharge, ear pain, hearing loss, nosebleeds, rhinorrhea, sore throat, tinnitus, trouble swallowing and voice change  Eyes: Negative for pain, discharge and visual disturbance  Respiratory: Positive for cough and shortness of breath  Negative for apnea, choking, chest tightness, wheezing and stridor  Cardiovascular: Positive for chest pain and leg swelling   Negative for palpitations  Gastrointestinal: Negative for abdominal distention, abdominal pain, constipation, diarrhea, nausea and vomiting  Genitourinary: Negative for dysuria  Musculoskeletal: Negative for arthralgias, myalgias, neck pain and neck stiffness  Neurological: Negative for dizziness, tremors and weakness  Psychiatric/Behavioral: Negative for agitation and behavioral problems  Physical Exam  ED Triage Vitals   Temperature Pulse Respirations Blood Pressure SpO2   02/23/21 0421 02/23/21 0411 02/23/21 0411 02/23/21 0411 02/23/21 0411   97 7 °F (36 5 °C) 100 (!) 24 100/68 94 %      Temp Source Heart Rate Source Patient Position - Orthostatic VS BP Location FiO2 (%)   02/23/21 0421 02/23/21 0411 02/23/21 0411 02/23/21 0411 --   Oral Monitor Sitting Right arm       Pain Score       --                    Orthostatic Vital Signs  Vitals:    02/23/21 0411   BP: 100/68   Pulse: 100   Patient Position - Orthostatic VS: Sitting       Physical Exam  Vitals signs and nursing note reviewed  Constitutional:       General: She is not in acute distress  Appearance: Normal appearance  She is normal weight  She is not ill-appearing, toxic-appearing or diaphoretic  Interventions: She is not intubated  HENT:      Head: Normocephalic and atraumatic  Right Ear: External ear normal       Left Ear: External ear normal       Nose: Nose normal  No congestion or rhinorrhea  Mouth/Throat:      Mouth: Mucous membranes are moist       Pharynx: Oropharynx is clear  No oropharyngeal exudate or posterior oropharyngeal erythema  Eyes:      General: No scleral icterus  Right eye: No discharge  Left eye: No discharge  Extraocular Movements: Extraocular movements intact  Conjunctiva/sclera: Conjunctivae normal       Pupils: Pupils are equal, round, and reactive to light  Neck:      Musculoskeletal: Normal range of motion  Vascular: JVD present     Cardiovascular:      Rate and Rhythm: Normal rate and regular rhythm  Pulses: Normal pulses  Heart sounds: Murmur present  No friction rub  No gallop  Pulmonary:      Effort: Pulmonary effort is normal  Tachypnea present  No accessory muscle usage or respiratory distress  She is not intubated  Breath sounds: No stridor  Examination of the right-middle field reveals rhonchi and rales  Examination of the left-middle field reveals rhonchi and rales  Examination of the right-lower field reveals rhonchi and rales  Examination of the left-lower field reveals rhonchi and rales  Rhonchi and rales present  No decreased breath sounds or wheezing  Chest:      Chest wall: No tenderness  Abdominal:      General: Abdomen is flat  Bowel sounds are normal  There is no distension  Palpations: Abdomen is soft  There is no mass  Tenderness: There is no abdominal tenderness  There is no guarding or rebound  Hernia: No hernia is present  Musculoskeletal: Normal range of motion  General: No swelling, tenderness, deformity or signs of injury  Right lower leg: She exhibits no tenderness  Edema (B/L +4 LE edema up to knee level) present  Left lower leg: She exhibits no tenderness  Edema present  Skin:     General: Skin is warm  Neurological:      General: No focal deficit present  Mental Status: She is alert and oriented to person, place, and time  Mental status is at baseline  Psychiatric:         Mood and Affect: Mood normal          Behavior: Behavior normal          Thought Content:  Thought content normal          Judgment: Judgment normal          ED Medications  Medications   furosemide (LASIX) injection 40 mg (40 mg Intravenous Given 2/23/21 8514)       Diagnostic Studies  Results Reviewed     Procedure Component Value Units Date/Time    Comprehensive metabolic panel [[id number]]  (Abnormal) Collected: 02/23/21 9861    Lab Status: Final result Specimen: Blood from Arm, Left Updated: 02/23/21 4402 Sodium 138 mmol/L      Potassium 4 2 mmol/L      Chloride 103 mmol/L      CO2 27 mmol/L      ANION GAP 8 mmol/L      BUN 27 mg/dL      Creatinine 1 28 mg/dL      Glucose 102 mg/dL      Calcium 8 8 mg/dL      AST 19 U/L      ALT 20 U/L      Alkaline Phosphatase 80 U/L      Total Protein 7 5 g/dL      Albumin 3 5 g/dL      Total Bilirubin 0 53 mg/dL      eGFR 36 ml/min/1 73sq m     Narrative:      [place] guidelines for Chronic Kidney Disease (CKD):     Stage 1 with normal or high GFR (GFR > 90 mL/min/1 73 square meters)    Stage 2 Mild CKD (GFR = 60-89 mL/min/1 73 square meters)    Stage 3A Moderate CKD (GFR = 45-59 mL/min/1 73 square meters)    Stage 3B Moderate CKD (GFR = 30-44 mL/min/1 73 square meters)    Stage 4 Severe CKD (GFR = 15-29 mL/min/1 73 square meters)    Stage 5 End Stage CKD (GFR <15 mL/min/1 73 square meters)  Note: GFR calculation is accurate only with a steady state creatinine    Urine Microscopic [[id number]] Collected: 02/23/21 0500    Lab Status:  In process Specimen: Urine, Clean Catch Updated: 02/23/21 0508    Urine Macroscopic, POC [[id number]]  (Abnormal) Collected: 02/23/21 0500    Lab Status: Final result Specimen: Urine Updated: 02/23/21 0501     Color, UA Yellow     Clarity, UA Clear     pH, UA 5 5     Leukocytes, UA Small     Nitrite, UA Negative     Protein, UA Negative mg/dl      Glucose, UA Negative mg/dl      Ketones, UA Negative mg/dl      Urobilinogen, UA 0 2 E U /dl      Bilirubin, UA Negative     Blood, UA Negative     Specific Gravity, UA 1 020    Narrative:      CLINITEK RESULT    CBC and differential [[id number]]  (Abnormal) Collected: 02/23/21 0434    Lab Status: Final result Specimen: Blood from Arm, Left Updated: 02/23/21 0446     WBC 5 84 Thousand/uL      RBC 3 22 Million/uL      Hemoglobin 10 2 g/dL      Hematocrit 33 2 %       fL      MCH 31 7 pg      MCHC 30 7 g/dL      RDW 15 6 %      MPV 11 8 fL      Platelets 804 Thousands/uL nRBC 0 /100 WBCs      Neutrophils Relative 52 %      Immat GRANS % 0 %      Lymphocytes Relative 32 %      Monocytes Relative 9 %      Eosinophils Relative 6 %      Basophils Relative 1 %      Neutrophils Absolute 3 04 Thousands/µL      Immature Grans Absolute 0 02 Thousand/uL      Lymphocytes Absolute 1 84 Thousands/µL      Monocytes Absolute 0 52 Thousand/µL      Eosinophils Absolute 0 37 Thousand/µL      Basophils Absolute 0 05 Thousands/µL     NT-BNP PRO [[id number]] Collected: 02/23/21 0434    Lab Status: In process Specimen: Blood from Arm, Left Updated: 02/23/21 0442    Troponin I [[id number]] Collected: 02/23/21 0434    Lab Status: In process Specimen: Blood from Arm, Left Updated: 02/23/21 0442                 XR chest 1 view portable    (Results Pending)         Procedures  Procedures      ED Course       MDM  Number of Diagnoses or Management Options  Acute exacerbation of CHF (congestive heart failure) [facility]):   Chest pain:   CHF (congestive heart failure) ([facility] [address] ):   Shortness of breath:   Diagnosis management comments: 89-YOF brought in via EMS from her group home for chest pressure & SOB for unknown duration, likely 2/2 acute CHF exacerbation  TTE 8/27/20 EF 40%  Pt appears tachypneic, RR 24, OVSS  On exam pt appears comfortable, NAD  However pt becomes tachypneic when talking, O2Sat down to 88-89%, which improved to 94-95% when instructed to take deep breaths  Exam shows OAAx3, dry cough, (+) ejection murmur, B/L rales & crackles up to mid lungs, & B/L +3 pitting edema up to knee level  Workup includes EKG, CBC, CMP, Trop-I, BNP, & CXR  Pt placed on continuous pulse ox, cardiac monitoring, & received Lasix 40 mg IV  Plan for admission to inpatient for further eval workup        Disposition  Final diagnoses:   Shortness of breath   Chest pain   CHF (congestive heart failure) ([facility])   Acute exacerb[facility] of CH[facility] (congest[facility] )     Time reflects when diagnosis was documented in both MDM as applicable and the Disposition within this note     Time User Action Codes Description Comment    2/23/2021  4:30 AM Lonzell Closs Add [R06 02] Shortness of breath     2/23/2021  4:30 AM Lonzell Closs Add [R07 9] Chest pain     2/23/2021  4:30 AM Lonzell Closs Add [I50 9] CHF (congestive heart failure) ([facility] [address] )     2/23/2021  4:30 AM Lonzell Closs Add [I50 9] Acute exacerbation of CHF (congestive heart failure) [facility])       ED Disposition     None      Follow-up Information    None         Patient's Medications   Discharge Prescriptions    No medications on file     No discharge procedures on file  PDMP Review     None           ED Provider  Attending physically available and evaluated Gioia Schaumann  I managed the patient along with the ED Attending  Electronically Signed by    VAZQUEZ Robles   PGY-1, Family Medicine  02/23/21  5:16 AM    Dear reader, please be aware that portions of my note contain dictated text  I have done my best to proof-read this note prior to signing  However, there may be occasional unnoticed errors pertaining to "sound-alike" words and/or grammar during my dictation process  If there is any words or information that is unclear or appears erroneous, please kindly let me know and I will clarify and/or addend my notes accordingly  Thank you for your understanding         Joo Esparza MD  02/23/21 4045

## 2021-02-23 NOTE — ED ATTENDING ATTESTATION
2/23/2021  IShruthi DO, saw and evaluated the patient  I have discussed the patient with the resident/non-physician practitioner and agree with the resident's/non-physician practitioner's findings, Plan of Care, and MDM as documented in the resident's/non-physician practitioner's note, except where noted  All available labs and Radiology studies were reviewed  I was present for key portions of any procedure(s) performed by the resident/non-physician practitioner and I was immediately available to provide assistance  At this point I agree with the current assessment done in the Emergency Department  I have conducted an independent evaluation of this patient a history and physical is as follows:    ED Course     Pt seen and examined  79 yo female brought in by EMS from her nursing home for SOB and chest pressure  Unsure when symptoms began  Pt was diagnosed with COVID 1/10/21 and had concomitant PNA - discharged back to [facility] 1/15/21  CHF (EF 40%), T2MI, and CKD 3  On exam appears to be having acute CHF exacerbation  Rales throughout, b/l pitting edema LE  Takes 20mg po lasix - will give dose of 40mg IV here and obtain cardiac labs, EKG and chest xray  Chest xray + pulm congestion  BNP 27k, trop 0 15 - ASA 324mg chewable ordered and will admit to [facility]      Final diagnoses:   Shortness of breath   Chest pain   CHF (congestive heart failure) ([facility])   Acute exacerbation of CHF (congestive heart failure) [facility])         Critical Care Time  Procedures

## 2021-02-23 NOTE — ED NOTES
Kandy Johnson pt's son to be called for updates if he doesn't call at [phone]     Palomo Laura RN  02/23/21 2929

## 2021-02-23 NOTE — ED NOTES
PT/OT at pt's bedside  Call received from pt's son and update given          Dagmar Lainez RN  02/23/21 0791

## 2021-02-24 NOTE — CASE MANAGEMENT
LOS: 1 DAY  BUNDLE: NO  READMISSION: NO  READMISSION RISK SCORE: 24 AND LOW    Cm met pt at bedside to discuss cm role and dcp needs  Cm obtained the following information from the patient  HOME: [address] [place], [place] [address]  LIVES WITH: alone  ADLS: needs partial assistance with ADL's  DME: walker  SNF: report no history  VNA/HHC: Open with [facility] for Home therapy  MH/DRUG&ETOH HX: Denies history  PCP: Dr Sierra Snyder: Retired  PHARMACY: [facility] in [address]: No  DRIVES: No  TRANSPORT AT D/C: Family or [address] [place], [place] [address]keegan Velasco will transport to appt  Daughter Romy Jaimes will transport at d/c     CM reviewed d/c planning process including the following: identifying help at home, patient preference for d/c planning needs, Discharge Lounge, [facility] to [facility] program, availability of treatment team to discuss questions or concerns patient and/or family may have regarding understanding medications and recognizing signs and symptoms once discharged  CM also encouraged patient to follow up with all recommended appointments after discharge  Patient advised of importance for patient and family to participate in managing patients medical well being  A post acute care recommendation was made by your care team for STR  Discussed Freedom of Choice with patient  List of facilities given to patient via in person  patient aware the list is custom filtered for them by zip code location and that [facility] post acute providers are designated  Pt refused STR recommendation  Pt states that she would like to return home and resume care with [facility] rehab   Pt will need scripts before d/c       Cm department will continue to follow through discharge

## 2021-02-24 NOTE — ASSESSMENT & PLAN NOTE
Wt Readings from Last 3 Encounters:   02/24/21 75 1 kg (165 lb 9 1 oz)   01/15/21 76 kg (167 lb 8 8 oz)   01/10/21 76 kg (167 lb 8 8 oz)     - Weight down from 173 yesterday morning  - nt pro bnp up to 27k yesterday  - cxr concerning for central congestion, will repeat tomorrow  - denying orthopnea or dyspnea, but still with volume overload  - continue lasix 40mg BID per cardiology recommendations  - continue toprol 12 5, HR 95

## 2021-02-24 NOTE — PROGRESS NOTES
Progress Note - Cardiology   Gwendolyn Plata 80 y o  female MRN: [id number]  Unit/Bed#: E4 -01 Encounter: [id number]      Assessment/Recommendations/Discussion:   1  Acute on chronic systolic and diastolic congestive heart failure  2  Cardiomyopathy, likely nonischemic, ejection fraction 40% 8/2020--was down to 25% in 2019  3  Dyslipidemia  4  HTN  5  CKD    PLAN   Weight recorded as 173-> 165   Still with volume overload today   Continue with IV lasix 40 IV BID   BP Stable   HR okay @95bpm, on toprol XL 12 5    Subjective:   HPI  Breathing improved but not back to baseline, LE improved, no chest pain      Review of Systems: As noted in HPI  Rest of ROS is negative  Vitals:   /82 (BP Location: Left arm)   Pulse 95   Temp (!) 97 4 °F (36 3 °C) (Temporal)   Resp 20   Ht 5' 6" (1 676 m)   Wt 75 1 kg (165 lb 9 1 oz)   SpO2 96%   BMI 26 72 kg/m²   Vitals:    02/23/21 1500 02/24/21 0600   Weight: 78 9 kg (173 lb 15 1 oz) 75 1 kg (165 lb 9 1 oz)       Intake/Output Summary (Last 24 hours) at 2/24/2021 1599  Last data filed at 2/24/2021 0500  Gross per 24 hour   Intake 500 ml   Output 2150 ml   Net -1650 ml       Physical Exam:  General appearance: alert and oriented, in no acute distress  Head: Normocephalic, without obvious abnormality, atraumatic  Eyes: conjunctivae/corneas clear  Anicteric    Neck: no adenopathy, no carotid bruit, no JVD  Lungs: rales bilaterally  Heart: regular rate and rhythm, S1, S2 normal, no murmur, no click, rub or gallop  Abdomen:  soft, non-tender; bowel sounds normal; no masses,  no organomegaly  Extremities: 2+ edema  Skin: Skin color, texture, turgor normal  No rashes or lesions       Lab Results:  Results from last 7 days   Lab Units 02/23/21  0632 02/23/21  0434   WBC Thousand/uL  --  5 84   HEMOGLOBIN g/dL  --  10 2*   HEMATOCRIT %  --  33 2*   PLATELETS Thousands/uL 212 240     Results from last 7 days   Lab Units 02/24/21  0456 02/23/21  0434   POTASSIUM mmol/L 3 8 4 2 CHLORIDE mmol/L 104 103   CO2 mmol/L 28 27   BUN mg/dL 26* 27*   CREATININE mg/dL 1 31* 1 28   CALCIUM mg/dL 8 5 8 8   ALK PHOS U/L  --  80   ALT U/L  --  20   AST U/L  --  19     Results from last 7 days   Lab Units 02/24/21  0456   POTASSIUM mmol/L 3 8   CHLORIDE mmol/L 104   CO2 mmol/L 28   BUN mg/dL 26*   CREATININE mg/dL 1 31*   CALCIUM mg/dL 8 5           Medications:    Current Facility-Administered Medications:     aspirin chewable tablet 81 mg, 81 mg, Oral, Daily, Leticia Olivo PA-C    atorvastatin (LIPITOR) tablet 40 mg, 40 mg, Oral, HS, Leticia Olivo PA-C, 40 mg at 02/23/21 2118    furosemide (LASIX) injection 40 mg, 40 mg, Intravenous, BID (diuretic), Marco A Hawkins PA-C, 40 mg at 02/23/21 1849    heparin (porcine) subcutaneous injection 5,000 Units, 5,000 Units, Subcutaneous, Q8H [facility], 5,000 Units at 02/24/21 0504 **AND** [COMPLETED] Platelet count, , , Once, Leticia Olivo PA-C    hydrocortisone (ANUSOL-HC) 2 5 % rectal cream, , Topical, BID, Leticia Olivo PA-C, 1 application at 76/62/10 1848    metoprolol succinate (TOPROL-XL) 24 hr tablet 12 5 mg, 12 5 mg, Oral, Daily, Rick Hawkins PA-C    ondansetron (ZOFRAN) injection 4 mg, 4 mg, Intravenous, Q6H PRN, Leticia Olivo PA-C    pantoprazole (PROTONIX) EC tablet 40 mg, 40 mg, Oral, Early Morning, Leticia Olivo PA-C, 40 mg at 02/24/21 0504    senna (SENOKOT) tablet 8 6 mg, 1 tablet, Oral, HS, Leticia Olivo PA-C, 8 6 mg at 02/23/21 2118    This note was completed in part utilizing [employer] Software  Grammatical errors, random word insertions, spelling mistakes, and incomplete sentences may be an occasional consequence of this system secondary to software limitations, ambient noise, and hardware issues  If you have any questions or concerns about the content, text, or information contained within the body of this dictation, please contact the provider for clarification      Anup Grant , [facility]  2/24/2021 9:10 AM

## 2021-02-24 NOTE — NURSING NOTE
Received report from previous RN  No change from prior assessment  Patient sleeping in bed with call bell in reach  Bed alarm activated

## 2021-02-24 NOTE — UTILIZATION REVIEW
Initial Clinical Review    Admission: Date/Time/Statement:   Admission Orders (From admission, onward)     Ordered        02/23/21 0548  Inpatient Admission  Once                   Orders Placed This Encounter   Procedures    Inpatient Admission     Standing Status:   Standing     Number of Occurrences:   1     Order Specific Question:   Level of Care     Answer:   Med Surg [16]     Order Specific Question:   Estimated length of stay     Answer:   More than 2 Midnights     Order Specific Question:   Certification     Answer:   I certify that inpatient services are medically necessary for this patient for a duration of greater than two midnights  See H&P and MD Progress Notes for additional information about the patient's course of treatment  ED Arrival Information     Expected Arrival Acuity Means of Arrival Escorted By Service Admission Type    - 2/23/2021 04:04 [facility] Hospitalist Urgent    Arrival Complaint    sob        Chief Complaint   Patient presents with    Shortness of Breath     sob and chest heaviness since yesterday  states dry cough  "I feel like I can't catch my breath" denies n/v/d     Assessment/Plan:     80year old female presents to ed for evaluation and treatment of chest pressure and shortness of breath  PMHX: [place] / pneumonia 1-10-21  Returned to nursing home 1-15-21,  CKD 3 CHF ( ef 40%)  Clinical assessment significant for rales throughout and bilateral pitting edema 4+ to knees  BNP 27,274  Troponin 0 15  GFR 36  Treated in ed with iv lasix, aspirin, sq heparin, po protonix  Admit to inpatient medical surgical for acute on chronic systolic heart failure  Plan includes:  Consult cardiology, continue iv lasix, echo, trend troponin            ED Triage Vitals   02/23/21 0421 02/23/21 0411 02/23/21 0411 02/23/21 0411 02/23/21 0411   97 7 °F (36 5 °C) 100 (!) 24 100/68 94 %      Oral Monitor         Pain Score       2          02/24/21 75 1 kg (165 lb 9 1 oz)     Additional Vital Signs:     Date/Time  Temp  Pulse  Resp  BP  MAP (mmHg)  SpO2  O2 Device    02/24/21 1022  --  91  --  184/84Abnormal   --  --  --    02/24/21 0800  97 4 °F (36 3 °C)Abnormal   95  20  131/82  --  96 %  None (Room air)    02/24/21 0300  97 5 °F (36 4 °C)  102  20  109/63  80  94 %  None (Room air)    02/23/21 2300  97 3 °F (36 3 °C)  Abnormal   102  18  126/60  79  96 %  None (Room air)    02/23/21 1900  97 9 °F (36 6 °C)  100  16  97/51  71  96 %  None (Room air)    02/23/21 1500  97 6 °F (36 4 °C)  99  18  107/68  82  97 %  None (Room air)    02/23/21 1339  97 7 °F (36 5 °C)  94  18  111/56  --  96 %  None (Room air)    02/23/21 1201  98 1 °F (36 7 °C)  91  20  102/62  77  97 %  None (Room air)    02/23/21 0745  97 5 °F (36 4 °C)  100  22  110/84  93  94 %  --    02/23/21 0547  97 9 °F (36 6 °C)  92  24Abnormal   100/63  --  94 %  None (Room air)                  Pertinent Labs/Diagnostic Test Results:     CHEST   Collected: 02/23/21 0727  INDICATION:   CP, likely CHF exacerbation  Extensive interstitial and vascular congestion   Probable small effusions  Collection Time Result Time Vent R Atrial R OH Int  QRSD Int  QT Int  QTC Int  P Axis QRS Axis T Wave Ax    02/23/21 11:32:30 02/23/21 13:08:22 101 101 126 96 314 407 72 -6 251       Final result                Narrative:    Sinus tachycardia with occasional Premature ventricular complexes   Premature atrial complexes   Nonspecific ST and T wave abnormality   Poor anterior R wave progression is noted   Abnormal ECG   When compared with ECG of 23-FEB-2021 08:11,   Premature ventricular complexes are now Present   Premature atrial complexes are now Present                   Collection Time Result Time Vent R Atrial R OH Int  QRSD Int  QT Int  QTC Int   P Axis QRS Axis T Wave Ax    02/23/21 04:15:14 02/23/21 08:38:33 95 95 142 102 320 402 9 -4 234       Final result                Narrative:    Normal sinus rhythm   Poor R wave progression   Nonspecific ST and T wave abnormality   Abnormal ECG   When compared with ECG of 11-JAN-2021 13:45,   No significant change was found                       Results from last 7 days   Lab Units 02/23/21  0632 02/23/21  0434   WBC Thousand/uL  --  5 84   HEMOGLOBIN g/dL  --  10 2*   HEMATOCRIT %  --  33 2*   PLATELETS Thousands/uL 212 240   NEUTROS ABS Thousands/µL  --  3 04         Results from last 7 days   Lab Units 02/24/21  0456 02/23/21  0434   SODIUM mmol/L 141 138   POTASSIUM mmol/L 3 8 4 2   CHLORIDE mmol/L 104 103   CO2 mmol/L 28 27   ANION GAP mmol/L 9 8   BUN mg/dL 26* 27*   CREATININE mg/dL 1 31* 1 28   EGFR ml/min/1 73sq m 35 36   CALCIUM mg/dL 8 5 8 8     Results from last 7 days   Lab Units 02/23/21  0434   AST U/L 19   ALT U/L 20   ALK PHOS U/L 80   TOTAL PROTEIN g/dL 7 5   ALBUMIN g/dL 3 5   TOTAL BILIRUBIN mg/dL 0 53         Results from last 7 days   Lab Units 02/24/21  0456 02/23/21  0434   GLUCOSE RANDOM mg/dL 85 102       Results from last 7 days   Lab Units 02/23/21  1136 02/23/21  0804 02/23/21  0434   TROPONIN I ng/mL 0 15* 0 15* 0 15*         Results from last 7 days   Lab Units 02/23/21  0434   NT-PRO BNP pg/mL 27,274*       Results from last 7 days   Lab Units 02/23/21  0500   CLARITY UA  Clear   COLOR UA  Yellow   SPEC GRAV UA  1 020   PH UA  5 5   GLUCOSE UA mg/dl Negative   KETONES UA mg/dl Negative   BLOOD UA  Negative   PROTEIN UA mg/dl Negative   NITRITE UA  Negative   BILIRUBIN UA  Negative   UROBILINOGEN UA E U /dl 0 2   LEUKOCYTES UA  Small*   WBC UA /hpf 10-20*   RBC UA /hpf 0-1*   BACTERIA UA /hpf Occasional   EPITHELIAL CELLS WET PREP /hpf Moderate*   MUCUS THREADS  Occasional*       Results from last 7 days   Lab Units 02/23/21  0500   URINE CULTURE  No Growth <1000 cfu/mL       ED Treatment:   Medication Administration from 02/23/2021 0404 to 02/23/2021 1513       Date/Time Order Dose Route Action     02/23/2021 0435 furosemide (LASIX) injection 40 mg 40 mg Intravenous Given     02/23/2021 0543 aspirin chewable tablet 324 mg 324 mg Oral Given     02/23/2021 0812 hydrocortisone (ANUSOL-HC) 2 5 % rectal cream 1 application Topical Given     02/23/2021 0637 pantoprazole (PROTONIX) EC tablet 40 mg 40 mg Oral Given     02/23/2021 1358 heparin (porcine) subcutaneous injection 5,000 Units 5,000 Units Subcutaneous Given     02/23/2021 5909 heparin (porcine) subcutaneous injection 5,000 Units 5,000 Units Subcutaneous Given        Past Medical History:   Diagnosis Date    GERD (gastroesophageal reflux disease)     Murmur 8/27/2020    Right humeral fracture 7/20/2018     Present on Admission:   Elevated troponin   CKD (chronic kidney disease), stage III   Hyperlipidemia   Acute on chronic systolic (congestive) heart failure ([facility])   Benign essential HTN      Admitting Diagnosis:   Shortness of breath [R06 02]  CHF (congestive heart failure) ([facility]) [I50 9]  Chest pain [R07 9]  SOB (shortness of breath) [R06 02]  Acute exacerbation of CHF (congestive heart failure) ([facility]) [I50 9]  Acute on chronic combined systolic and diastolic [facility] [I50 43]    Age/Sex: 80 y o  female     Admission Orders:      aspirin, 81 mg, Oral, Daily  atorvastatin, 40 mg, Oral, HS  furosemide, 40 mg, Intravenous, BID (diuretic)  heparin (porcine), 5,000 Units, Subcutaneous, Q8H EDEN  hydrocortisone, , Topical, BID  metoprolol succinate, 12 5 mg, Oral, Daily  pantoprazole, 40 mg, Oral, Early Morning  senna, 1 tablet, Oral, HS      Continuous IV Infusions:     PRN Meds:  ondansetron, 4 mg, Intravenous, Q6H PRN        IP CONSULT TO NUTRITION SERVICES  IP CONSULT TO CARDIOLOGY  IP CONSULT TO CASE MANAGEMENT    Network Utilization Review Department  ATTENTION: Please call with any questions or concerns to [phone] and carefully listen to the prompts so that you are directed to the right person   All voicemails are confidential   Cecilio Orellana all requests for admission clinical reviews, approved or denied determinations and any other requests to dedicated fax number below belonging to the campus where the patient is receiving treatment   List of dedicated fax numbers for the Facilities:  [address] [place] [address] DENIALS (Administrative/Medical Necessity) [phone]   [address] (Maternity/NICU/Pediatrics) [phone]   [address] [place] [address] (Shannan Gelyfn) [address] [address] [place] [address] [place] [address]) [address] [place] [address] [phone]

## 2021-02-24 NOTE — PLAN OF CARE
Problem: Potential for Falls  Goal: Patient will remain free of falls  Description: INTERVENTIONS:  - Assess patient frequently for physical needs  -  Identify cognitive and physical deficits and behaviors that affect risk of falls    -  [place] fall precautions as indicated by assessment   - Educate patient/family on patient safety including physical limitations  - Instruct patient to call for assistance with activity based on assessment  - Modify environment to reduce risk of injury  - Consider OT/PT consult to assist with strengthening/mobility  Outcome: Progressing     Problem: PAIN - ADULT  Goal: Verbalizes/displays adequate comfort level or baseline comfort level  Description: Interventions:  - Encourage patient to monitor pain and request assistance  - Assess pain using appropriate pain scale  - Administer analgesics based on type and severity of pain and evaluate response  - Implement non-pharmacological measures as appropriate and evaluate response  - Consider cultural and social influences on pain and pain management  - Notify physician/advanced practitioner if interventions unsuccessful or patient reports new pain  Outcome: Progressing     Problem: INFECTION - ADULT  Goal: Absence or prevention of progression during hospitalization  Description: INTERVENTIONS:  - Assess and monitor for signs and symptoms of infection  - Monitor lab/diagnostic results  - Monitor all insertion sites, i e  indwelling lines, tubes, and drains  - Monitor endotracheal if appropriate and nasal secretions for changes in amount and color  - [place] appropriate cooling/warming therapies per order  - Administer medications as ordered  - Instruct and encourage patient and family to use good hand hygiene technique  - Identify and instruct in appropriate isolation precautions for identified infection/condition  Outcome: Progressing  Goal: Absence of fever/infection during neutropenic period  Description: INTERVENTIONS:  - Monitor WBC    Outcome: Progressing     Problem: SAFETY ADULT  Goal: Patient will remain free of falls  Description: INTERVENTIONS:  - Assess patient frequently for physical needs  -  Identify cognitive and physical deficits and behaviors that affect risk of falls    -  [place] fall precautions as indicated by assessment   - Educate patient/family on patient safety including physical limitations  - Instruct patient to call for assistance with activity based on assessment  - Modify environment to reduce risk of injury  - Consider OT/PT consult to assist with strengthening/mobility  Outcome: Progressing  Goal: Maintain or return to baseline ADL function  Description: INTERVENTIONS:  -  Assess patient's ability to carry out ADLs; assess patient's baseline for ADL function and identify physical deficits which impact ability to perform ADLs (bathing, care of mouth/teeth, toileting, grooming, dressing, etc )  - Assess/evaluate cause of self-care deficits   - Assess range of motion  - Assess patient's mobility; develop plan if impaired  - Assess patient's need for assistive devices and provide as appropriate  - Encourage maximum independence but intervene and supervise when necessary  - Involve family in performance of ADLs  - Assess for home care needs following discharge   - Consider OT consult to assist with ADL evaluation and planning for discharge  - Provide patient education as appropriate  Outcome: Progressing  Goal: Maintain or return mobility status to optimal level  Description: INTERVENTIONS:  - Assess patient's baseline mobility status (ambulation, transfers, stairs, etc )    - Identify cognitive and physical deficits and behaviors that affect mobility  - Identify mobility aids required to assist with transfers and/or ambulation (gait belt, sit-to-stand, lift, walker, cane, etc )  - [place] fall precautions as indicated by assessment  - Record patient progress and toleration of activity level on Mobility SBAR; progress patient to next Phase/Stage  - Instruct patient to call for assistance with activity based on assessment  - Consider rehabilitation consult to assist with strengthening/weightbearing, etc   Outcome: Progressing     Problem: DISCHARGE PLANNING  Goal: Discharge to home or other facility with appropriate resources  Description: INTERVENTIONS:  - Identify barriers to discharge w/patient and caregiver  - Arrange for needed discharge resources and transportation as appropriate  - Identify discharge learning needs (meds, wound care, etc )  - Arrange for interpretive services to assist at discharge as needed  - Refer to Case Management Department for coordinating discharge planning if the patient needs post-hospital services based on physician/advanced practitioner order or complex needs related to functional status, cognitive ability, or social support system  Outcome: Progressing     Problem: Knowledge Deficit  Goal: Patient/family/caregiver demonstrates understanding of disease process, treatment plan, medications, and discharge instructions  Description: Complete learning assessment and assess knowledge base    Interventions:  - Provide teaching at level of understanding  - Provide teaching via preferred learning methods  Outcome: Progressing

## 2021-02-24 NOTE — PROGRESS NOTES
Progress Note - Amari Butcehr 12/8/1927, 80 y o  female MRN: [id number]    Unit/Bed#: E4 -01 Encounter: [id number]    Primary Care Provider: Charli Jones MD   Date and time admitted to hospital: 2/23/2021  4:05 AM        * Acute on chronic systolic (congestive) heart failure (HCC)  Assessment & Plan  Wt Readings from Last 3 Encounters:   02/24/21 75 1 kg (165 lb 9 1 oz)   01/15/21 76 kg (167 lb 8 8 oz)   01/10/21 76 kg (167 lb 8 8 oz)     - Weight down from 173 yesterday morning  - nt pro bnp up to 27k yesterday  - cxr concerning for central congestion, will repeat tomorrow  - denying orthopnea or dyspnea, but still with volume overload  - continue lasix 40mg BID per cardiology recommendations  - continue toprol 12 5, HR 95          Elevated troponin  Assessment & Plan  - Troponin's trended- 0 15, 0 15,0 15  - Denies any chest pain or tightness     - Repeat EKG shows new PVCs and PACs  nonspecific ST and T wave abnormalities appear baseline   - Cardiology following      CKD (chronic kidney disease), stage III  Assessment & Plan  Lab Results   Component Value Date    EGFR 35 02/24/2021    EGFR 36 02/23/2021    EGFR 42 01/14/2021    CREATININE 1 31 (H) 02/24/2021    CREATININE 1 28 02/23/2021    CREATININE 1 14 01/14/2021     - Creatinine near baseline  - Creatinine increase likely secondary to diuresis  - Continue to monitor renal function with diuresis    Benign essential HTN  Assessment & Plan  - Continue toprol 12 5mg   - Continue Lasix 40mg BID  - BP stable at 131/82 most recently    Hyperlipidemia  Assessment & Plan  - Continue statin      VTE Pharmacologic Prophylaxis:   Pharmacologic: Heparin  Mechanical VTE Prophylaxis in Place: Yes    Patient Centered Rounds: I have performed bedside rounds with nursing staff today      Discussions with Specialists or Other Care Team Provider: Dr Karis Lawrence, Cardiology, Case management    Education and Discussions with Family / Patient: Spoke with patient at bedside and spoke with daughter, Pascual Hernandez  Reviewed plan of care and answered all questions  Time Spent for Care: 20 minutes  More than 50% of total time spent on counseling and coordination of care as described above  Current Length of Stay: 1 day(s)    Current Patient Status: Inpatient   Certification Statement: The patient will continue to require additional inpatient hospital stay due to acute CHF    Discharge Plan: Pending clinical course, return to previous environment    Code Status: Level 3 - DNAR and DNI      Subjective:   Patient states that she is feeling better overall and denies any orthopnea, dyspnea, chest pain, or palpitations  States that she is feeling fatigued but likely due to being woken up during the evening  Objective:     Vitals:   Temp (24hrs), Av 6 °F (36 4 °C), Min:97 3 °F (36 3 °C), Max:98 1 °F (36 7 °C)    Temp:  [97 3 °F (36 3 °C)-98 1 °F (36 7 °C)] 97 4 °F (36 3 °C)  HR:  [] 95  Resp:  [16-20] 20  BP: ()/(51-82) 131/82  SpO2:  [94 %-97 %] 96 %  Body mass index is 26 72 kg/m²  Input and Output Summary (last 24 hours): Intake/Output Summary (Last 24 hours) at 2021 0951  Last data filed at 2021 0500  Gross per 24 hour   Intake 500 ml   Output 2150 ml   Net -1650 ml       Physical Exam:     Physical Exam  Constitutional:       General: She is not in acute distress  Appearance: Normal appearance  HENT:      Head: Normocephalic  Eyes:      Conjunctiva/sclera: Conjunctivae normal    Neck:      Vascular: No JVD  Cardiovascular:      Rate and Rhythm: Normal rate and regular rhythm  Heart sounds: Murmur present  No friction rub  No gallop  Pulmonary:      Effort: Pulmonary effort is normal  No respiratory distress  Breath sounds: Rales present  No wheezing  Comments: Mild diffuse rales noted    Chest:      Chest wall: No tenderness  Abdominal:      General: Abdomen is flat  Palpations: Abdomen is soft  Tenderness:  There is no abdominal tenderness  Musculoskeletal:      Comments: 2+ pitting edema to B/L LE   Neurological:      General: No focal deficit present  Mental Status: She is alert  Additional Data:     Labs:    Results from last 7 days   Lab Units 02/23/21  0632 02/23/21  0434   WBC Thousand/uL  --  5 84   HEMOGLOBIN g/dL  --  10 2*   HEMATOCRIT %  --  33 2*   PLATELETS Thousands/uL 212 240   NEUTROS PCT %  --  52   LYMPHS PCT %  --  32   MONOS PCT %  --  9   EOS PCT %  --  6     Results from last 7 days   Lab Units 02/24/21  0456 02/23/21  0434   SODIUM mmol/L 141 138   POTASSIUM mmol/L 3 8 4 2   CHLORIDE mmol/L 104 103   CO2 mmol/L 28 27   BUN mg/dL 26* 27*   CREATININE mg/dL 1 31* 1 28   ANION GAP mmol/L 9 8   CALCIUM mg/dL 8 5 8 8   ALBUMIN g/dL  --  3 5   TOTAL BILIRUBIN mg/dL  --  0 53   ALK PHOS U/L  --  80   ALT U/L  --  20   AST U/L  --  19   GLUCOSE RANDOM mg/dL 85 102                           * I Have Reviewed All Lab Data Listed Above  * Additional Pertinent Lab Tests Reviewed:  [facility]    Imaging:    Imaging Reports Reviewed Today Include: CXR  Imaging Personally Reviewed by Myself Includes:  CXR    Recent Cultures (last 7 days):     Results from last 7 days   Lab Units 02/23/21  0500   URINE CULTURE  No Growth <1000 cfu/mL       Last 24 Hours Medication List:   Current Facility-Administered Medications   Medication Dose Route Frequency Provider Last Rate    aspirin  81 mg Oral Daily Leticia Olivo PA-C      atorvastatin  40 mg Oral HS Leticia Olivo PA-C      furosemide  40 mg Intravenous BID (diuretic) Kaila Hawkins PA-C      heparin (porcine)  5,000 Units Subcutaneous Q8H [address] Leticia Chopra PA-C      hydrocortisone   Topical BID Leticia Olivo PA-C      metoprolol succinate  12 5 mg Oral Daily Rick Hawkins PA-C      ondansetron  4 mg Intravenous Q6H PRN Leticia Olivo PA-C      pantoprazole  40 mg Oral Early Morning Leticia Lilly Root PA-C      senna  1 tablet Oral HS Leticia Root PA-C          Today, Patient Was Seen By: Elmer Damon PA-C    ** Please Note: Dictation voice to text software may have been used in the creation of this document   **

## 2021-02-24 NOTE — ASSESSMENT & PLAN NOTE
Lab Results   Component Value Date    EGFR 35 02/24/2021    EGFR 36 02/23/2021    EGFR 42 01/14/2021    CREATININE 1 31 (H) 02/24/2021    CREATININE 1 28 02/23/2021    CREATININE 1 14 01/14/2021     - Creatinine near baseline  - Creatinine increase likely secondary to diuresis  - Continue to monitor renal function with diuresis

## 2021-02-24 NOTE — ASSESSMENT & PLAN NOTE
- Troponin's trended- 0 15, 0 15,0 15  - Denies any chest pain or tightness     - Repeat EKG shows new PVCs and PACs   nonspecific ST and T wave abnormalities appear baseline   - Cardiology following

## 2021-02-25 NOTE — ASSESSMENT & PLAN NOTE
- /68 most recently   - most likely secondary to decrease in volume from diuresis   - will likely hold second lasix dose this afternoon and transition to oral diuretics- following cardiology recommendations  - continue Toprol 12 5 with hold parameters, HR stable at 85

## 2021-02-25 NOTE — PLAN OF CARE
Problem: Potential for Falls  Goal: Patient will remain free of falls  Description: INTERVENTIONS:  - Assess patient frequently for physical needs  -  Identify cognitive and physical deficits and behaviors that affect risk of falls    -  [place] fall precautions as indicated by assessment   - Educate patient/family on patient safety including physical limitations  - Instruct patient to call for assistance with activity based on assessment  - Modify environment to reduce risk of injury  - Consider OT/PT consult to assist with strengthening/mobility  Outcome: Progressing     Problem: PAIN - ADULT  Goal: Verbalizes/displays adequate comfort level or baseline comfort level  Description: Interventions:  - Encourage patient to monitor pain and request assistance  - Assess pain using appropriate pain scale  - Administer analgesics based on type and severity of pain and evaluate response  - Implement non-pharmacological measures as appropriate and evaluate response  - Consider cultural and social influences on pain and pain management  - Notify physician/advanced practitioner if interventions unsuccessful or patient reports new pain  Outcome: Progressing     Problem: INFECTION - ADULT  Goal: Absence or prevention of progression during hospitalization  Description: INTERVENTIONS:  - Assess and monitor for signs and symptoms of infection  - Monitor lab/diagnostic results  - Monitor all insertion sites, i e  indwelling lines, tubes, and drains  - Monitor endotracheal if appropriate and nasal secretions for changes in amount and color  - [place] appropriate cooling/warming therapies per order  - Administer medications as ordered  - Instruct and encourage patient and family to use good hand hygiene technique  - Identify and instruct in appropriate isolation precautions for identified infection/condition  Outcome: Progressing  Goal: Absence of fever/infection during neutropenic period  Description: INTERVENTIONS:  - Monitor WBC    Outcome: Progressing     Problem: SAFETY ADULT  Goal: Patient will remain free of falls  Description: INTERVENTIONS:  - Assess patient frequently for physical needs  -  Identify cognitive and physical deficits and behaviors that affect risk of falls    -  [place] fall precautions as indicated by assessment   - Educate patient/family on patient safety including physical limitations  - Instruct patient to call for assistance with activity based on assessment  - Modify environment to reduce risk of injury  - Consider OT/PT consult to assist with strengthening/mobility  Outcome: Progressing  Goal: Maintain or return to baseline ADL function  Description: INTERVENTIONS:  -  Assess patient's ability to carry out ADLs; assess patient's baseline for ADL function and identify physical deficits which impact ability to perform ADLs (bathing, care of mouth/teeth, toileting, grooming, dressing, etc )  - Assess/evaluate cause of self-care deficits   - Assess range of motion  - Assess patient's mobility; develop plan if impaired  - Assess patient's need for assistive devices and provide as appropriate  - Encourage maximum independence but intervene and supervise when necessary  - Involve family in performance of ADLs  - Assess for home care needs following discharge   - Consider OT consult to assist with ADL evaluation and planning for discharge  - Provide patient education as appropriate  Outcome: Progressing  Goal: Maintain or return mobility status to optimal level  Description: INTERVENTIONS:  - Assess patient's baseline mobility status (ambulation, transfers, stairs, etc )    - Identify cognitive and physical deficits and behaviors that affect mobility  - Identify mobility aids required to assist with transfers and/or ambulation (gait belt, sit-to-stand, lift, walker, cane, etc )  - [place] fall precautions as indicated by assessment  - Record patient progress and toleration of activity level on Mobility SBAR; progress patient to next Phase/Stage  - Instruct patient to call for assistance with activity based on assessment  - Consider rehabilitation consult to assist with strengthening/weightbearing, etc   Outcome: Progressing     Problem: DISCHARGE PLANNING  Goal: Discharge to home or other facility with appropriate resources  Description: INTERVENTIONS:  - Identify barriers to discharge w/patient and caregiver  - Arrange for needed discharge resources and transportation as appropriate  - Identify discharge learning needs (meds, wound care, etc )  - Arrange for interpretive services to assist at discharge as needed  - Refer to Case Management Department for coordinating discharge planning if the patient needs post-hospital services based on physician/advanced practitioner order or complex needs related to functional status, cognitive ability, or social support system  Outcome: Progressing     Problem: Knowledge Deficit  Goal: Patient/family/caregiver demonstrates understanding of disease process, treatment plan, medications, and discharge instructions  Description: Complete learning assessment and assess knowledge base    Interventions:  - Provide teaching at level of understanding  - Provide teaching via preferred learning methods  Outcome: Progressing     Problem: Prexisting or High Potential for Compromised Skin Integrity  Goal: Skin integrity is maintained or improved  Description: INTERVENTIONS:  - Identify patients at risk for skin breakdown  - Assess and monitor skin integrity  - Assess and monitor nutrition and hydration status  - Monitor labs   - Assess for incontinence   - Turn and reposition patient  - Assist with mobility/ambulation  - Relieve pressure over bony prominences  - Avoid friction and shearing  - Provide appropriate hygiene as needed including keeping skin clean and dry  - Evaluate need for skin moisturizer/barrier cream  - Collaborate with interdisciplinary team   - Patient/family teaching  - Consider wound care consult   Outcome: Progressing     Problem: Nutrition/Hydration-ADULT  Goal: Nutrient/Hydration intake appropriate for improving, restoring or maintaining nutritional needs  Description: Monitor and assess patient's nutrition/hydration status for malnutrition  Collaborate with interdisciplinary team and initiate plan and interventions as ordered  Monitor patient's weight and dietary intake as ordered or per policy  Utilize nutrition screening tool and intervene as necessary  Determine patient's food preferences and provide high-protein, high-caloric foods as appropriate       INTERVENTIONS:  - Monitor oral intake, urinary output, labs, and treatment plans  - Assess nutrition and hydration status and recommend course of action  - Evaluate amount of meals eaten  - Assist patient with eating if necessary   - Allow adequate time for meals  - Recommend/ encourage appropriate diets, oral nutritional supplements, and vitamin/mineral supplements  - Order, calculate, and assess calorie counts as needed  - Recommend, monitor, and adjust tube feedings and TPN/PPN based on assessed needs  - Assess need for intravenous fluids  - Provide specific nutrition/hydration education as appropriate  - Include patient/family/caregiver in decisions related to nutrition  Outcome: Progressing

## 2021-02-25 NOTE — PROGRESS NOTES
Progress Note - Ella Macdonald 12/8/1927, 80 y o  female MRN: [id number]  Unit/Bed#: E4 -01 Encounter: [id number]  Primary Care Provider: Dot Tolbert MD   Date and time admitted to hospital: 2/23/2021  4:05 AM    * Acute on chronic systolic (congestive) heart failure (HCC)  Assessment & Plan  Wt Readings from Last 3 Encounters:   02/25/21 72 4 kg (159 lb 9 8 oz)   01/15/21 76 kg (167 lb 8 8 oz)   01/10/21 76 kg (167 lb 8 8 oz)     - Weight down to 159 from 173 tuesday morning  - nt pro bnp up to 27k tuesday  - initial cxr concerning for central congestion, PE revealed clear lungs today  - denying orthopnea or dyspnea, much improved since yesterday  - will likely hold second lasix dose this afternoon and transition to oral diuretics- following cardiology recommendations            Elevated troponin  Assessment & Plan  - Troponin's trended- 0 15, 0 15,0 15  - Denies any chest pain or tightness     - Repeat EKG shows new PVCs and PACs   nonspecific ST and T wave abnormalities appear baseline   - Cardiology following      CKD (chronic kidney disease), stage III  Assessment & Plan  Lab Results   Component Value Date    EGFR 29 02/25/2021    EGFR 35 02/24/2021    EGFR 36 02/23/2021    CREATININE 1 55 (H) 02/25/2021    CREATININE 1 31 (H) 02/24/2021    CREATININE 1 28 02/23/2021     - Creatinine increase likely secondary to agressive diuresis  - will likely hold second lasix dose this afternoon and transition to oral diuretics- following cardiology recommendations  - repeat BMP in am    Benign essential HTN  Assessment & Plan  - /68 most recently   - most likely secondary to decrease in volume from diuresis   - will likely hold second lasix dose this afternoon and transition to oral diuretics- following cardiology recommendations  - continue Toprol 12 5 with hold parameters, HR stable at 85    Hyperlipidemia  Assessment & Plan  - Continue statin    VTE Pharmacologic Prophylaxis:   Pharmacologic: Heparin  Mechanical VTE Prophylaxis in Place: Yes    Patient Centered Rounds: I have performed bedside rounds with nursing staff today  Discussions with Specialists or Other Care Team Provider: Cardiology    Education and Discussions with Family / Patient: Will talk to daughter later today and discuss plan of care and answer any questions  Time Spent for Care: 20 minutes  More than 50% of total time spent on counseling and coordination of care as described above  Current Length of Stay: 2 day(s)    Current Patient Status: Inpatient   Certification Statement: The patient will continue to require additional inpatient hospital stay due to CHF    Discharge Plan: return to previous environment with social support per PT reccomendations    Code Status: Level 3 - DNAR and DNI      Subjective:   Patient states that her breathing is much improved since yesterday  Still complaining of fatigue but attributes it to "getting woken up in the middle of the night"  Denies any chest pain, palpations, lightheadedness, or dizziness  Objective:     Vitals:   Temp (24hrs), Av 6 °F (36 4 °C), Min:96 9 °F (36 1 °C), Max:98 7 °F (37 1 °C)    Temp:  [96 9 °F (36 1 °C)-98 7 °F (37 1 °C)] 97 1 °F (36 2 °C)  HR:  [82-91] 82  Resp:  [20-22] 20  BP: ()/(41-84) 103/68  SpO2:  [92 %-97 %] 96 %  Body mass index is 25 76 kg/m²  Input and Output Summary (last 24 hours): Intake/Output Summary (Last 24 hours) at 2021 0925  Last data filed at 2021 0501  Gross per 24 hour   Intake 30 ml   Output 1900 ml   Net -1870 ml       Physical Exam:     Physical Exam  Vitals signs and nursing note reviewed  Constitutional:       General: She is not in acute distress  Appearance: She is not diaphoretic  HENT:      Head: Normocephalic and atraumatic  Cardiovascular:      Rate and Rhythm: Normal rate  Heart sounds: Murmur present  No friction rub  No gallop      Pulmonary:      Effort: Pulmonary effort is normal  No respiratory distress  Breath sounds: No wheezing, rhonchi or rales  Chest:      Chest wall: No tenderness  Abdominal:      General: Abdomen is flat  Palpations: Abdomen is soft  Tenderness: There is no abdominal tenderness  There is no guarding  Musculoskeletal:      Right lower leg: Edema present  Left lower leg: Edema present  Comments: 2+ pitting edema b/l     Neurological:      General: No focal deficit present  Mental Status: She is alert  Mental status is at baseline  Additional Data:     Labs:    Results from last 7 days   Lab Units 02/23/21  0632 02/23/21  0434   WBC Thousand/uL  --  5 84   HEMOGLOBIN g/dL  --  10 2*   HEMATOCRIT %  --  33 2*   PLATELETS Thousands/uL 212 240   NEUTROS PCT %  --  52   LYMPHS PCT %  --  32   MONOS PCT %  --  9   EOS PCT %  --  6     Results from last 7 days   Lab Units 02/25/21  0550  02/23/21  0434   SODIUM mmol/L 143   < > 138   POTASSIUM mmol/L 3 6   < > 4 2   CHLORIDE mmol/L 105   < > 103   CO2 mmol/L 28   < > 27   BUN mg/dL 27*   < > 27*   CREATININE mg/dL 1 55*   < > 1 28   ANION GAP mmol/L 10   < > 8   CALCIUM mg/dL 8 8   < > 8 8   ALBUMIN g/dL  --   --  3 5   TOTAL BILIRUBIN mg/dL  --   --  0 53   ALK PHOS U/L  --   --  80   ALT U/L  --   --  20   AST U/L  --   --  19   GLUCOSE RANDOM mg/dL 87   < > 102    < > = values in this interval not displayed  * I Have Reviewed All Lab Data Listed Above  * Additional Pertinent Lab Tests Reviewed:  Daniela 66 Admission Reviewed    Imaging:    Imaging Reports Reviewed Today Include: none  Imaging Personally Reviewed by Myself Includes:  none    Recent Cultures (last 7 days):     Results from last 7 days   Lab Units 02/23/21  0500   URINE CULTURE  No Growth <1000 cfu/mL       Last 24 Hours Medication List:   Current Facility-Administered Medications   Medication Dose Route Frequency Provider Last Rate    aspirin  81 mg Oral Daily Vilma Parekh PA-C      atorvastatin  40 mg Oral HS Leticia Olivo PA-C      furosemide  40 mg Intravenous BID (diuretic) Tasneem Hawkins PA-C      heparin (porcine)  5,000 Units Subcutaneous [facility] Leticia Aponte PA-C      hydrocortisone   Topical BID Vilma Parekh PA-C      metoprolol succinate  12 5 mg Oral Daily Rick Hawkins PA-C      ondansetron  4 mg Intravenous Q6H PRN Leticia Olivo PA-C      pantoprazole  40 mg Oral Early Morning Leticia Aponte PA-C      senna  1 tablet Oral HS Leticia Aponte PA-C          Today, Patient Was Seen By: Eric Gtz PA-C    ** Please Note: Dictation voice to text software may have been used in the creation of this document   **

## 2021-02-25 NOTE — ASSESSMENT & PLAN NOTE
Wt Readings from Last 3 Encounters:   02/25/21 72 4 kg (159 lb 9 8 oz)   01/15/21 76 kg (167 lb 8 8 oz)   01/10/21 76 kg (167 lb 8 8 oz)     - Weight down to 159 from 173 tuesday morning  - nt pro bnp up to 27k tuesday  - initial cxr concerning for central congestion, PE revealed clear lungs today  - denying orthopnea or dyspnea, much improved since yesterday  - will likely hold second lasix dose this afternoon and transition to oral diuretics- following cardiology recommendations

## 2021-02-25 NOTE — PLAN OF CARE
Problem: PHYSICAL THERAPY ADULT  Goal: Performs mobility at highest level of function for planned discharge setting  See evaluation for individualized goals  Description: Treatment/Interventions: Functional transfer training, LE strengthening/ROM, Elevations, Therapeutic exercise, Endurance training, Patient/family training, Equipment eval/education, Bed mobility, Gait training, Continued evaluation, Spoke to nursing, OT          See flowsheet documentation for full assessment, interventions and recommendations  Outcome: Progressing  Note: Prognosis: Fair  Problem List: Decreased strength, Decreased range of motion, Decreased endurance, Impaired balance, Decreased mobility, Obesity, Decreased skin integrity  Assessment: Pt seen for PT treatment session this date with interventions consisting of gait training w/ emphasis on improving pt's ability to ambulate level surfaces x 10 feet & 25 feet with close S provided by therapist with RW and Therapeutic exercise consisting of: AROM 10-20 reps B LE in sitting position  Pt agreeable to PT treatment session upon arrival, pt found supine in bed w/ HOB elevated, in no apparent distress  In comparison to previous session, pt with improvements in amb distance  Post session: pt returned back to recliner, chair alarm engaged and all needs in reach Continue to recommend KAREN with PT services at time of d/c in order to maximize pt's functional independence and safety w/ mobility  Pt continues to be functioning below baseline level, and remains limited 2* factors listed above and including decreased strength, endurance & safe functional mobility  PT will continue to see pt while here in order to address the deficits listed above and provide interventions consistent w/ POC in effort to achieve STGs    Barriers to Discharge: None     PT Discharge Recommendation: Home with skilled therapy, Return to previous environment with social support(USP with PT)     PT - OK to Discharge: Yes(when med cleared)    See flowsheet documentation for full assessment

## 2021-02-25 NOTE — ASSESSMENT & PLAN NOTE
Lab Results   Component Value Date    EGFR 29 02/25/2021    EGFR 35 02/24/2021    EGFR 36 02/23/2021    CREATININE 1 55 (H) 02/25/2021    CREATININE 1 31 (H) 02/24/2021    CREATININE 1 28 02/23/2021     - Creatinine increase likely secondary to agressive diuresis  - will likely hold second lasix dose this afternoon and transition to oral diuretics- following cardiology recommendations  - repeat BMP in am

## 2021-02-25 NOTE — PROGRESS NOTES
Progress Note - Cardiology   Sharyne Members 80 y o  female MRN: [id number]  Unit/Bed#: E4 -01 Encounter: [id number]      Assessment/Recommendations/Discussion:   1  Acute on chronic systolic and diastolic congestive heart failure  2  Cardiomyopathy, likely nonischemic, ejection fraction 40% 8/2020--was down to 25% in 2019  3  Dyslipidemia  4  HTN  5  CKD    PLAN    breathing improved a bit however not back to normal, Likely component of intravascular volume depletion, however still with significant lower extremity edema   Plan to give albumin this afternoon, to mobilize lower extremity edema and support intravascular volume status and recheck tomorrow  Already received Lasix this a m  [place] Given   continue Toprol XL 12 5, weight improved significantly since admission, now down to 159 lb    Subjective:   HPI   breathing improved but not back to normal yet, still has significant lower extremity edema, blood pressure low, creatinine did elevate a bit, likely intravascularly volume depleted      Review of Systems: As noted in HPI  Rest of ROS is negative  Vitals:   BP (!) 86/52 (BP Location: Left arm)   Pulse 80   Temp (!) 97 2 °F (36 2 °C) (Temporal)   Resp 20   Ht 5' 6" (1 676 m)   Wt 72 4 kg (159 lb 9 8 oz)   SpO2 95%   BMI 25 76 kg/m²   Vitals:    02/24/21 0600 02/25/21 0600   Weight: 75 1 kg (165 lb 9 1 oz) 72 4 kg (159 lb 9 8 oz)       Intake/Output Summary (Last 24 hours) at 2/25/2021 1351  Last data filed at 2/25/2021 0501  Gross per 24 hour   Intake 30 ml   Output 1000 ml   Net -970 ml       Physical Exam:   General appearance: alert and oriented, in no acute distress  Head: Normocephalic, without obvious abnormality, atraumatic  Eyes: conjunctivae/corneas clear  Anicteric    Neck: no adenopathy, no carotid bruit, no JVD  Lungs:  Trace rales bilaterally  Heart: regular rate and rhythm, S1, S2 normal, no murmur, no click, rub or gallop  Abdomen:  soft, non-tender; bowel sounds normal; no masses,  no organomegaly  Extremities: 2 to 3+ edema, pitting  Skin: Skin color, texture, turgor normal  No rashes or lesions     Lab Results:  Results from last 7 days   Lab Units 02/23/21  0632 02/23/21  0434   WBC Thousand/uL  --  5 84   HEMOGLOBIN g/dL  --  10 2*   HEMATOCRIT %  --  33 2*   PLATELETS Thousands/uL 212 240     Results from last 7 days   Lab Units 02/25/21  0550  02/23/21  0434   POTASSIUM mmol/L 3 6   < > 4 2   CHLORIDE mmol/L 105   < > 103   CO2 mmol/L 28   < > 27   BUN mg/dL 27*   < > 27*   CREATININE mg/dL 1 55*   < > 1 28   CALCIUM mg/dL 8 8   < > 8 8   ALK PHOS U/L  --   --  80   ALT U/L  --   --  20   AST U/L  --   --  19    < > = values in this interval not displayed       Results from last 7 days   Lab Units 02/25/21  0550   POTASSIUM mmol/L 3 6   CHLORIDE mmol/L 105   CO2 mmol/L 28   BUN mg/dL 27*   CREATININE mg/dL 1 55*   CALCIUM mg/dL 8 8           Medications:    Current Facility-Administered Medications:     aspirin chewable tablet 81 mg, 81 mg, Oral, Daily, Leticia Olivo PA-C, 81 mg at 02/25/21 0900    atorvastatin (LIPITOR) tablet 40 mg, 40 mg, Oral, HS, Leticia Olivo PA-C, 40 mg at 02/24/21 2148    heparin (porcine) subcutaneous injection 5,000 Units, 5,000 Units, Subcutaneous, Q8H [facility], 5,000 Units at 02/25/21 0545 **AND** [COMPLETED] Platelet count, , , Once, Leticia Olivo PA-C    hydrocortisone (ANUSOL-HC) 2 5 % rectal cream, , Topical, BID, Leticia Olivo PA-C, 1 application at 19/19/22 1842    metoprolol succinate (TOPROL-XL) 24 hr tablet 12 5 mg, 12 5 mg, Oral, Daily, Shalini Hawkins PA-C, 12 5 mg at 02/25/21 0900    ondansetron (ZOFRAN) injection 4 mg, 4 mg, Intravenous, Q6H PRN, Leticia Olivo PA-C    pantoprazole (PROTONIX) EC tablet 40 mg, 40 mg, Oral, Early Morning, Leticia Olivo PA-C, 40 mg at 02/25/21 0544    senna (SENOKOT) tablet 8 6 mg, 1 tablet, Oral, HS, Leticia Olivo PA-C, 8 6 mg at 02/23/21 2429    This note was completed in part utilizing M-Modal Fluency Direct Software  Grammatical errors, random word insertions, spelling mistakes, and incomplete sentences may be an occasional consequence of this system secondary to software limitations, ambient noise, and hardware issues  If you have any questions or concerns about the content, text, or information contained within the body of this dictation, please contact the provider for clarification      Tsering Huddleston DO, [facility] [place]  2/25/2021 1:51 PM

## 2021-02-25 NOTE — PHYSICAL THERAPY NOTE
02/25/21 1145   PT Last Visit   PT Visit Date 02/25/21   Note Type   Note Type Treatment   Pain Assessment   Pain Assessment Tool Pain Assessment not indicated - pt denies pain   Restrictions/Precautions   Weight Bearing Precautions Per Order No   Other Precautions Telemetry; Fall Risk   General   Chart Reviewed Yes   Family/Caregiver Present No   Cognition   Overall Cognitive Status WFL   Arousal/Participation Alert; Cooperative   Attention Attends with cues to redirect   Orientation Level Oriented X4   Memory Within functional limits   Following Commands Follows one step commands without difficulty   Comments pt agreed to PT session   Subjective   Subjective "I was hoping to go home but I have to stay another day to get rid of more fluid "   Bed Mobility   Supine to Sit 5  Supervision   Additional items Assist x 1;Bedrails; Increased time required;Verbal cues   Additional Comments pt sat OOB in bedside chair to end session all needs in reach, alarm on  Pt deferred SCDs   Transfers   Sit to Stand 5  Supervision   Additional items Increased time required;Verbal cues   Stand to Sit 5  Supervision   Additional items Increased time required;Verbal cues   Additional Comments vcs for safety/technique   Ambulation/Elevation   Gait pattern Short stride; Excessively slow   Gait Assistance 5  Supervision   Additional items Assist x 1;Verbal cues   Assistive Device Rolling walker   Distance 10 feet & 25 feet  (limited by fatigue)   Stair Management Assistance Not tested   Balance   Static Sitting Fair   Dynamic Sitting Fair -   Static Standing Fair -   Dynamic Standing Poor +   Ambulatory Poor +   Endurance Deficit   Endurance Deficit Yes   Endurance Deficit Description pt reports fatigues quickly   Activity Tolerance   Activity Tolerance Patient limited by fatigue;Patient tolerated treatment well   Exercises   Hip Flexion Sitting;10 reps;AROM; Bilateral   Hip Abduction Sitting;10 reps;AROM; Bilateral   Hip Adduction Sitting;20 reps;AROM; Bilateral  (isometric)   Knee AROM Long Arc Quad Sitting;10 reps;AROM; Bilateral   Ankle Pumps Sitting;20 reps;AROM; Bilateral   Balance training  standing to wash/dry hands with RW S x 1   Assessment   Prognosis Fair   Problem List Decreased strength;Decreased range of motion;Decreased endurance; Impaired balance;Decreased mobility;Obesity; Decreased skin integrity   Assessment Pt seen for PT treatment session this date with interventions consisting of gait training w/ emphasis on improving pt's ability to ambulate level surfaces x 10 feet & 25 feet with close S provided by therapist with RW and Therapeutic exercise consisting of: AROM 10-20 reps B LE in sitting position  Pt agreeable to PT treatment session upon arrival, pt found supine in bed w/ HOB elevated, in no apparent distress  In comparison to previous session, pt with improvements in amb distance  Post session: pt returned back to recliner, chair alarm engaged and all needs in reach Continue to recommend [facility] with PT services at time of d/c in order to maximize pt's functional independence and safety w/ mobility  Pt continues to be functioning below baseline level, and remains limited 2* factors listed above and including decreased strength, endurance & safe functional mobility  PT will continue to see pt while here in order to address the deficits listed above and provide interventions consistent w/ POC in effort to achieve STGs  Barriers to Discharge None   Goals   Patient Goals to go back to intermediate soon   PT Treatment Day 1   Plan   Treatment/Interventions Functional transfer training;LE strengthening/ROM; Elevations; Therapeutic exercise; Endurance training;Patient/family training;Equipment eval/education; Bed mobility;Gait training   Progress Slow progress, decreased activity tolerance   PT Frequency   (3-5 x week)   Recommendation   PT Discharge Recommendation Home with skilled therapy; Return to previous environment with social support  ([facility] with PT)   PT - OK to Discharge Yes  (when med cleared)   [address] Mobility Inpatient   Turning in Bed Without Bedrails 3   Lying on Back to Sitting on Edge of Flat Bed 3   Moving Bed to Chair 3   Standing Up From Chair 3   Walk in Room 3   Climb 3-5 Stairs 2   Basic Mobility Inpatient Raw Score 17   Basic Mobility Standardized Score 39 67   Chandu Leonardo, PTA

## 2021-02-25 NOTE — PLAN OF CARE
Problem: Potential for Falls  Goal: Patient will remain free of falls  Description: INTERVENTIONS:  - Assess patient frequently for physical needs  -  Identify cognitive and physical deficits and behaviors that affect risk of falls    -  [place] fall precautions as indicated by assessment   - Educate patient/family on patient safety including physical limitations  - Instruct patient to call for assistance with activity based on assessment  - Modify environment to reduce risk of injury  - Consider OT/PT consult to assist with strengthening/mobility  Outcome: Progressing     Problem: PAIN - ADULT  Goal: Verbalizes/displays adequate comfort level or baseline comfort level  Description: Interventions:  - Encourage patient to monitor pain and request assistance  - Assess pain using appropriate pain scale  - Administer analgesics based on type and severity of pain and evaluate response  - Implement non-pharmacological measures as appropriate and evaluate response  - Consider cultural and social influences on pain and pain management  - Notify physician/advanced practitioner if interventions unsuccessful or patient reports new pain  Outcome: Progressing     Problem: INFECTION - ADULT  Goal: Absence or prevention of progression during hospitalization  Description: INTERVENTIONS:  - Assess and monitor for signs and symptoms of infection  - Monitor lab/diagnostic results  - Monitor all insertion sites, i e  indwelling lines, tubes, and drains  - Monitor endotracheal if appropriate and nasal secretions for changes in amount and color  - [place] appropriate cooling/warming therapies per order  - Administer medications as ordered  - Instruct and encourage patient and family to use good hand hygiene technique  - Identify and instruct in appropriate isolation precautions for identified infection/condition  Outcome: Progressing  Goal: Absence of fever/infection during neutropenic period  Description: INTERVENTIONS:  - Monitor WBC    Outcome: Progressing     Problem: SAFETY ADULT  Goal: Patient will remain free of falls  Description: INTERVENTIONS:  - Assess patient frequently for physical needs  -  Identify cognitive and physical deficits and behaviors that affect risk of falls    -  [place] fall precautions as indicated by assessment   - Educate patient/family on patient safety including physical limitations  - Instruct patient to call for assistance with activity based on assessment  - Modify environment to reduce risk of injury  - Consider OT/PT consult to assist with strengthening/mobility  Outcome: Progressing  Goal: Maintain or return to baseline ADL function  Description: INTERVENTIONS:  -  Assess patient's ability to carry out ADLs; assess patient's baseline for ADL function and identify physical deficits which impact ability to perform ADLs (bathing, care of mouth/teeth, toileting, grooming, dressing, etc )  - Assess/evaluate cause of self-care deficits   - Assess range of motion  - Assess patient's mobility; develop plan if impaired  - Assess patient's need for assistive devices and provide as appropriate  - Encourage maximum independence but intervene and supervise when necessary  - Involve family in performance of ADLs  - Assess for home care needs following discharge   - Consider OT consult to assist with ADL evaluation and planning for discharge  - Provide patient education as appropriate  Outcome: Progressing  Goal: Maintain or return mobility status to optimal level  Description: INTERVENTIONS:  - Assess patient's baseline mobility status (ambulation, transfers, stairs, etc )    - Identify cognitive and physical deficits and behaviors that affect mobility  - Identify mobility aids required to assist with transfers and/or ambulation (gait belt, sit-to-stand, lift, walker, cane, etc )  - [place] fall precautions as indicated by assessment  - Record patient progress and toleration of activity level on Mobility SBAR; progress patient to next Phase/Stage  - Instruct patient to call for assistance with activity based on assessment  - Consider rehabilitation consult to assist with strengthening/weightbearing, etc   Outcome: Progressing     Problem: DISCHARGE PLANNING  Goal: Discharge to home or other facility with appropriate resources  Description: INTERVENTIONS:  - Identify barriers to discharge w/patient and caregiver  - Arrange for needed discharge resources and transportation as appropriate  - Identify discharge learning needs (meds, wound care, etc )  - Arrange for interpretive services to assist at discharge as needed  - Refer to Case Management Department for coordinating discharge planning if the patient needs post-hospital services based on physician/advanced practitioner order or complex needs related to functional status, cognitive ability, or social support system  Outcome: Progressing     Problem: Knowledge Deficit  Goal: Patient/family/caregiver demonstrates understanding of disease process, treatment plan, medications, and discharge instructions  Description: Complete learning assessment and assess knowledge base    Interventions:  - Provide teaching at level of understanding  - Provide teaching via preferred learning methods  Outcome: Progressing     Problem: Prexisting or High Potential for Compromised Skin Integrity  Goal: Skin integrity is maintained or improved  Description: INTERVENTIONS:  - Identify patients at risk for skin breakdown  - Assess and monitor skin integrity  - Assess and monitor nutrition and hydration status  - Monitor labs   - Assess for incontinence   - Turn and reposition patient  - Assist with mobility/ambulation  - Relieve pressure over bony prominences  - Avoid friction and shearing  - Provide appropriate hygiene as needed including keeping skin clean and dry  - Evaluate need for skin moisturizer/barrier cream  - Collaborate with interdisciplinary team   - Patient/family teaching  - Consider wound care consult   Outcome: Progressing     Problem: Nutrition/Hydration-ADULT  Goal: Nutrient/Hydration intake appropriate for improving, restoring or maintaining nutritional needs  Description: Monitor and assess patient's nutrition/hydration status for malnutrition  Collaborate with interdisciplinary team and initiate plan and interventions as ordered  Monitor patient's weight and dietary intake as ordered or per policy  Utilize nutrition screening tool and intervene as necessary  Determine patient's food preferences and provide high-protein, high-caloric foods as appropriate       INTERVENTIONS:  - Monitor oral intake, urinary output, labs, and treatment plans  - Assess nutrition and hydration status and recommend course of action  - Evaluate amount of meals eaten  - Assist patient with eating if necessary   - Allow adequate time for meals  - Recommend/ encourage appropriate diets, oral nutritional supplements, and vitamin/mineral supplements  - Order, calculate, and assess calorie counts as needed  - Recommend, monitor, and adjust tube feedings and TPN/PPN based on assessed needs  - Assess need for intravenous fluids  - Provide specific nutrition/hydration education as appropriate  - Include patient/family/caregiver in decisions related to nutrition  Outcome: Progressing

## 2021-02-26 PROBLEM — R77.8 ELEVATED TROPONIN: Status: RESOLVED | Noted: 2019-04-05 | Resolved: 2021-01-01

## 2021-02-26 NOTE — PHYSICAL THERAPY NOTE
02/26/21 0919   PT Last Visit   PT Visit Date 02/26/21   Note Type   Note Type Treatment   Pain Assessment   Pain Assessment Tool Pain Assessment not indicated - pt denies pain   Restrictions/Precautions   Weight Bearing Precautions Per Order No   Other Precautions Telemetry; Fall Risk   General   Chart Reviewed Yes   Family/Caregiver Present No   Cognition   Overall Cognitive Status WFL   Arousal/Participation Alert; Cooperative   Attention Attends with cues to redirect   Orientation Level Oriented X4   Memory Within functional limits   Following Commands Follows one step commands without difficulty   Comments pt agreed to PT session   Subjective   Subjective "I had some difficulty breathing earlier this morning but I'm OK now  I hope I go home today "   Bed Mobility   Additional Comments pt seated OOB in bedside chair to begin & end session, alarm on   Transfers   Sit to Stand 5  Supervision   Additional items Assist x 1; Armrests; Increased time required;Verbal cues   Stand to Sit 5  Supervision   Additional items Assist x 1; Armrests; Increased time required;Verbal cues   Toilet transfer 5  Supervision   Additional items Assist x 1; Increased time required;Verbal cues;Armrests  (BSC over toilet)   Additional Comments vcs for safety/technique  Pt stood at sink with RW S x 1 to wash/dry hands   Ambulation/Elevation   Gait pattern Short stride; Excessively slow   Gait Assistance 5  Supervision   Additional items Assist x 1;Verbal cues   Assistive Device Rolling walker   Distance 25 feet x 2   Stair Management Assistance Not tested   Balance   Static Sitting Fair   Dynamic Sitting Fair -   Static Standing Fair -   Dynamic Standing Poor +   Ambulatory Poor +   Endurance Deficit   Endurance Deficit Yes   Endurance Deficit Description pt reports fatigues quickly   Activity Tolerance   Activity Tolerance Patient limited by fatigue;Patient tolerated treatment well   Nurse Made Aware yes   Exercises   Hip Flexion Sitting;10 reps;AROM; Bilateral   Hip Abduction Sitting;10 reps;AROM; Bilateral   Hip Adduction Sitting;20 reps;AROM; Bilateral  (isometric)   Knee AROM Long Arc Quad Sitting;15 reps;AROM; Bilateral   Ankle Pumps Sitting;20 reps;AROM; Bilateral   Assessment   Prognosis Fair   Problem List Decreased strength;Decreased range of motion;Decreased endurance; Impaired balance;Decreased mobility; Decreased skin integrity   Assessment Pt seen for PT treatment session this date with interventions consisting of gait training w/ emphasis on improving pt's ability to ambulate level surfaces x 25 feet x 2 with close S provided by therapist with RW and Therapeutic exercise consisting of: AROM 10-20 reps B LE in sitting position  Pt agreeable to PT treatment session upon arrival, pt found seated OOB in chair, in no apparent distress  In comparison to previous session, pt with improvements in activity tolerance  Post session: pt returned back to recliner, chair alarm engaged and all needs in reach Continue to recommend return to [facility] with PT  at time of d/c in order to maximize pt's functional independence and safety w/ mobility  Pt continues to be functioning below baseline level, and remains limited 2* factors listed above and including decreased strength, endurance & safe functional mobility  PT will continue to see pt while here in order to address the deficits listed above and provide interventions consistent w/ POC in effort to achieve STGs  Barriers to Discharge None   Goals   Patient Goals to go back to [facility] today   PT Treatment Day 2   Plan   Treatment/Interventions Functional transfer training;LE strengthening/ROM; Therapeutic exercise; Endurance training;Patient/family training;Equipment eval/education; Bed mobility;Gait training;Spoke to nursing   Progress Slow progress, decreased activity tolerance   PT Frequency   (3-5 x week)   Recommendation   PT Discharge Recommendation Home with skilled therapy; Return to previous environment with social support  (skilled nursing with PT)   PT - OK to Discharge Yes  (when med cleared)   [place] 8 in Bed Without Bedrails 3   Lying on Back to Sitting on Edge of Flat Bed 3   Moving Bed to Chair 3   Standing Up From Chair 3   Walk in Room 3   Climb 3-5 Stairs 2   Basic Mobility Inpatient Raw Score 17   Basic Mobility Standardized Score 39 67   Lawence Hinders, PTA

## 2021-02-26 NOTE — PROGRESS NOTES
Progress Note - Cardiology   Bobbi Old 80 y o  female MRN: [id number]  Unit/Bed#: E4 -01 Encounter: [id number]      Assessment/Recommendations/Discussion:   1  Acute on chronic systolic and diastolic congestive heart failure  2  Cardiomyopathy, likely nonischemic, ejection fraction 40% 8/2020--was down to 25% in 2019  3  Dyslipidemia  4  HTN  5  CKD    PLAN   Improved from yesterday   Lower extremity edema significantly improved   Appears just about euvolemic today   Weight is 158 lb she states she is usually 168 lb   Continue Toprol XL 12 5 mg   Okay for p o  Diuretics today would do Lasix 40 mg daily (was on 20 mg daily prior to admission)   Would keep on potassium 40 mEq daily as well   Creatinine same as yesterday, will need to be repeated either tomorrow or early next week if discharged  Subjective:   HPI  Feeling better  Lower extremity edema has improved, breathing is improved, denies significant shortness of breath at this time, weight is actually 10 lb under where she usually runs she states      Review of Systems: As noted in HPI  Rest of ROS is negative  Vitals:   /73   Pulse 85   Temp 97 5 °F (36 4 °C) (Temporal)   Resp 18   Ht 5' 6" (1 676 m)   Wt 71 8 kg (158 lb 4 6 oz)   SpO2 94%   BMI 25 55 kg/m²   Vitals:    02/25/21 0600 02/26/21 0537   Weight: 72 4 kg (159 lb 9 8 oz) 71 8 kg (158 lb 4 6 oz)       Intake/Output Summary (Last 24 hours) at 2/26/2021 1106  Last data filed at 2/26/2021 0034  Gross per 24 hour   Intake 210 ml   Output 1000 ml   Net -790 ml       Physical Exam:  General appearance: alert and oriented, in no acute distress  Head: Normocephalic, without obvious abnormality, atraumatic  Eyes: conjunctivae/corneas clear  Anicteric    Neck: no adenopathy, no carotid bruit, no JVD  Lungs: clear to auscultation bilaterally  Heart:  Irregular, normal heart rate,, S1, S2 normal, no murmur, no click, rub or gallop  Abdomen:  soft, non-tender; bowel sounds normal; no masses,  no organomegaly  Extremities:  1+ edema left lower extremity, no significant pitting edema right lower extremity  Skin: Skin color, texture, turgor normal  No rashes or lesions     TELEMETRY:   Lab Results:  Results from last 7 days   Lab Units 02/23/21  0632 02/23/21  0434   WBC Thousand/uL  --  5 84   HEMOGLOBIN g/dL  --  10 2*   HEMATOCRIT %  --  33 2*   PLATELETS Thousands/uL 212 240     Results from last 7 days   Lab Units 02/26/21  0537  02/23/21  0434   POTASSIUM mmol/L 3 2*   < > 4 2   CHLORIDE mmol/L 104   < > 103   CO2 mmol/L 29   < > 27   BUN mg/dL 28*   < > 27*   CREATININE mg/dL 1 52*   < > 1 28   CALCIUM mg/dL 8 7   < > 8 8   ALK PHOS U/L  --   --  80   ALT U/L  --   --  20   AST U/L  --   --  19    < > = values in this interval not displayed       Results from last 7 days   Lab Units 02/26/21  0537   POTASSIUM mmol/L 3 2*   CHLORIDE mmol/L 104   CO2 mmol/L 29   BUN mg/dL 28*   CREATININE mg/dL 1 52*   CALCIUM mg/dL 8 7           Medications:    Current Facility-Administered Medications:     aspirin chewable tablet 81 mg, 81 mg, Oral, Daily, Leticia Olivo PA-C, 81 mg at 02/26/21 0917    atorvastatin (LIPITOR) tablet 40 mg, 40 mg, Oral, HS, Leticia Olivo PA-C, 40 mg at 02/25/21 2151    heparin (porcine) subcutaneous injection 5,000 Units, 5,000 Units, Subcutaneous, Q8H [facility], 5,000 Units at 02/26/21 0545 **AND** [COMPLETED] Platelet count, , , Once, Leticia Olivo PA-C    hydrocortisone (ANUSOL-HC) 2 5 % rectal cream, , Topical, BID, Leticia Olivo PA-C, 1 application at 41/52/07 1842    metoprolol succinate (TOPROL-XL) 24 hr tablet 12 5 mg, 12 5 mg, Oral, Daily, Kentrell Hawkins PA-C, 12 5 mg at 02/26/21 0917    ondansetron (ZOFRAN) injection 4 mg, 4 mg, Intravenous, Q6H PRN, Leticia Olivo PA-C    pantoprazole (PROTONIX) EC tablet 40 mg, 40 mg, Oral, Early Morning, Leticia Olivo PA-C, 40 mg at 02/26/21 0544    senna (SENOKOT) tablet 8 6 mg, 1 tablet, Oral, HS, Ghulam Tierney PA-C, 8 6 mg at 02/25/21 4440    This note was completed in part utilizing M-Modal Fluency Direct Software  Grammatical errors, random word insertions, spelling mistakes, and incomplete sentences may be an occasional consequence of this system secondary to software limitations, ambient noise, and hardware issues  If you have any questions or concerns about the content, text, or information contained within the body of this dictation, please contact the provider for clarification      Joel Jenkins DO, [facility] [place]  2/26/2021 11:06 AM

## 2021-02-26 NOTE — NURSING NOTE
IV and telemetry removed  AVS reviewed with daughter and patient  Report called to receiving facility  Patient's daughter transported patient back to assisted living facility

## 2021-02-26 NOTE — PLAN OF CARE
Problem: Potential for Falls  Goal: Patient will remain free of falls  Description: INTERVENTIONS:  - Assess patient frequently for physical needs  -  Identify cognitive and physical deficits and behaviors that affect risk of falls    -  [place] fall precautions as indicated by assessment   - Educate patient/family on patient safety including physical limitations  - Instruct patient to call for assistance with activity based on assessment  - Modify environment to reduce risk of injury  - Consider OT/PT consult to assist with strengthening/mobility  Outcome: Progressing     Problem: PAIN - ADULT  Goal: Verbalizes/displays adequate comfort level or baseline comfort level  Description: Interventions:  - Encourage patient to monitor pain and request assistance  - Assess pain using appropriate pain scale  - Administer analgesics based on type and severity of pain and evaluate response  - Implement non-pharmacological measures as appropriate and evaluate response  - Consider cultural and social influences on pain and pain management  - Notify physician/advanced practitioner if interventions unsuccessful or patient reports new pain  Outcome: Progressing     Problem: INFECTION - ADULT  Goal: Absence or prevention of progression during hospitalization  Description: INTERVENTIONS:  - Assess and monitor for signs and symptoms of infection  - Monitor lab/diagnostic results  - Monitor all insertion sites, i e  indwelling lines, tubes, and drains  - Monitor endotracheal if appropriate and nasal secretions for changes in amount and color  - [place] appropriate cooling/warming therapies per order  - Administer medications as ordered  - Instruct and encourage patient and family to use good hand hygiene technique  - Identify and instruct in appropriate isolation precautions for identified infection/condition  Outcome: Progressing  Goal: Absence of fever/infection during neutropenic period  Description: INTERVENTIONS:  - Monitor WBC    Outcome: Progressing     Problem: SAFETY ADULT  Goal: Patient will remain free of falls  Description: INTERVENTIONS:  - Assess patient frequently for physical needs  -  Identify cognitive and physical deficits and behaviors that affect risk of falls    -  [place] fall precautions as indicated by assessment   - Educate patient/family on patient safety including physical limitations  - Instruct patient to call for assistance with activity based on assessment  - Modify environment to reduce risk of injury  - Consider OT/PT consult to assist with strengthening/mobility  Outcome: Progressing  Goal: Maintain or return to baseline ADL function  Description: INTERVENTIONS:  -  Assess patient's ability to carry out ADLs; assess patient's baseline for ADL function and identify physical deficits which impact ability to perform ADLs (bathing, care of mouth/teeth, toileting, grooming, dressing, etc )  - Assess/evaluate cause of self-care deficits   - Assess range of motion  - Assess patient's mobility; develop plan if impaired  - Assess patient's need for assistive devices and provide as appropriate  - Encourage maximum independence but intervene and supervise when necessary  - Involve family in performance of ADLs  - Assess for home care needs following discharge   - Consider OT consult to assist with ADL evaluation and planning for discharge  - Provide patient education as appropriate  Outcome: Progressing  Goal: Maintain or return mobility status to optimal level  Description: INTERVENTIONS:  - Assess patient's baseline mobility status (ambulation, transfers, stairs, etc )    - Identify cognitive and physical deficits and behaviors that affect mobility  - Identify mobility aids required to assist with transfers and/or ambulation (gait belt, sit-to-stand, lift, walker, cane, etc )  - [place] fall precautions as indicated by assessment  - Record patient progress and toleration of activity level on Mobility SBAR; progress patient to next Phase/Stage  - Instruct patient to call for assistance with activity based on assessment  - Consider rehabilitation consult to assist with strengthening/weightbearing, etc   Outcome: Progressing     Problem: DISCHARGE PLANNING  Goal: Discharge to home or other facility with appropriate resources  Description: INTERVENTIONS:  - Identify barriers to discharge w/patient and caregiver  - Arrange for needed discharge resources and transportation as appropriate  - Identify discharge learning needs (meds, wound care, etc )  - Arrange for interpretive services to assist at discharge as needed  - Refer to Case Management Department for coordinating discharge planning if the patient needs post-hospital services based on physician/advanced practitioner order or complex needs related to functional status, cognitive ability, or social support system  Outcome: Progressing     Problem: Knowledge Deficit  Goal: Patient/family/caregiver demonstrates understanding of disease process, treatment plan, medications, and discharge instructions  Description: Complete learning assessment and assess knowledge base    Interventions:  - Provide teaching at level of understanding  - Provide teaching via preferred learning methods  Outcome: Progressing     Problem: Prexisting or High Potential for Compromised Skin Integrity  Goal: Skin integrity is maintained or improved  Description: INTERVENTIONS:  - Identify patients at risk for skin breakdown  - Assess and monitor skin integrity  - Assess and monitor nutrition and hydration status  - Monitor labs   - Assess for incontinence   - Turn and reposition patient  - Assist with mobility/ambulation  - Relieve pressure over bony prominences  - Avoid friction and shearing  - Provide appropriate hygiene as needed including keeping skin clean and dry  - Evaluate need for skin moisturizer/barrier cream  - Collaborate with interdisciplinary team   - Patient/family teaching  - Consider wound care consult   Outcome: Progressing     Problem: Nutrition/Hydration-ADULT  Goal: Nutrient/Hydration intake appropriate for improving, restoring or maintaining nutritional needs  Description: Monitor and assess patient's nutrition/hydration status for malnutrition  Collaborate with interdisciplinary team and initiate plan and interventions as ordered  Monitor patient's weight and dietary intake as ordered or per policy  Utilize nutrition screening tool and intervene as necessary  Determine patient's food preferences and provide high-protein, high-caloric foods as appropriate       INTERVENTIONS:  - Monitor oral intake, urinary output, labs, and treatment plans  - Assess nutrition and hydration status and recommend course of action  - Evaluate amount of meals eaten  - Assist patient with eating if necessary   - Allow adequate time for meals  - Recommend/ encourage appropriate diets, oral nutritional supplements, and vitamin/mineral supplements  - Order, calculate, and assess calorie counts as needed  - Recommend, monitor, and adjust tube feedings and TPN/PPN based on assessed needs  - Assess need for intravenous fluids  - Provide specific nutrition/hydration education as appropriate  - Include patient/family/caregiver in decisions related to nutrition  Outcome: Progressing

## 2021-02-26 NOTE — ASSESSMENT & PLAN NOTE
Lab Results   Component Value Date    EGFR 29 02/26/2021    EGFR 29 02/25/2021    EGFR 35 02/24/2021    CREATININE 1 52 (H) 02/26/2021    CREATININE 1 55 (H) 02/25/2021    CREATININE 1 31 (H) 02/24/2021     - Cr baseline 1 1-1 3   - renal function slightly higher than baseline but stable today   - check BMP on Monday, discussed with daughter

## 2021-02-26 NOTE — CASE MANAGEMENT
Received notification from [facility], that patient is medically cleared to d/c back to [address] [place] [address] and that the patient's daughter will transport  Uri Person [facility] and spoke to the nurse, Mick Roach, to confirm they are able to accept  Mick Roach requests phone call from hospital RN with report and that script for PT be in d/c orders  Mick Roach also confirmed that patient does not require COVID testing prior to d/c  Relayed information to [address] [place] [address],[address]  [address] [place] [address],[facility] & [address] will place PT script  Called RIANNA Ziegler and updated her on d/c plan  She agrees and will call Mick Roach with report  No other needs at this time  Patient ready to d/c

## 2021-02-26 NOTE — DISCHARGE SUMMARY
Discharge- Milad Cartwright 12/8/1927, 80 y o  female MRN: [id number]  Unit/Bed#: E4 -01 Encounter: [id number]  Primary Care Provider: Jessica Love MD   Date and time admitted to hospital: 2/23/2021  4:05 AM    * Acute on chronic systolic (congestive) heart failure (HCC)  Assessment & Plan  Wt Readings from Last 3 Encounters:   02/26/21 71 8 kg (158 lb 4 6 oz)   01/15/21 76 kg (167 lb 8 8 oz)   01/10/21 76 kg (167 lb 8 8 oz)     - Weight down to 158 from 173 tuesday morning  - initial cxr concerning for vascular congestion   - on room air   - received IV diuresis and s/p IV albumin yesterday   - discharge on increased lasix 40 mg po daily and potassium supplementation 40 mEq daily   - check BMP on Monday         Hyperlipidemia  Assessment & Plan  - Continue statin    Elevated troponin-resolved as of 2/26/2021  Assessment & Plan  - Troponin's trended- 0 15, 0 15,0 15  - Denies any chest pain or tightness     - Repeat EKG shows new PVCs and PACs   nonspecific ST and T wave abnormalities appear baseline   - Cardiology following      CKD (chronic kidney disease), stage III  Assessment & Plan  Lab Results   Component Value Date    EGFR 29 02/26/2021    EGFR 29 02/25/2021    EGFR 35 02/24/2021    CREATININE 1 52 (H) 02/26/2021    CREATININE 1 55 (H) 02/25/2021    CREATININE 1 31 (H) 02/24/2021     - Cr baseline 1 1-1 3   - renal function slightly higher than baseline but stable today   - check BMP on Monday, discussed with daughter       Benign essential HTN  Assessment & Plan  continue Toprol XL 12 5 mg daily       Discharging Physician / Practitioner: Daisy Phillips PA-C  PCP: Jessica Love MD  Admission Date:   Admission Orders (From admission, onward)     Ordered        02/23/21 0548  Inpatient Admission  Once                   Discharge Date: 02/26/21    Resolved Problems  Date Reviewed: 2/26/2021          Resolved    Elevated troponin 2/26/2021     Resolved by  Daisy Phillips PA-C          Consultations During Hospital Stay:  · Cardiology     Procedures Performed:   · none    Significant Findings / Test Results:   · Urine culture from 2/23 no growth   · CXR: Extensive interstitial and vascular congestion  Probable small effusions  ·     Incidental Findings:   · none     Test Results Pending at Discharge (will require follow up): · BMP on Monday      Outpatient Tests Requested:  · Cardiology   · PCP within 1 week     Complications:  none    Reason for Admission: acute on chronic systolic CHF     Hospital Course:     Carlyle Guzman is a 80 y o  female patient who originally presented to the hospital on 2/23/2021 due to shortness of breath  Patient was noted to be in acute on chronic systolic CHF on admission  Yaritzarosa Diaz was seen in consultation with Cardiology  She received IV diuresis with much improvement  She was given one dose IV albumin on 2/25/21 with improvement in lower extremity edema  She will be discharged on increased diuretics from lasix 20 mg daily to 40 mg daily and daily potassium supplementation  She will need repeat BMP on Monday to monitor renal function and electrolytes  She will return back to [facility] today and daughter will provide transportation  Please see above list of diagnoses and related plan for additional information  Condition at Discharge: stable     Discharge Day Visit / Exam:     Subjective:  Doing well today  No acute complaints  No SOB  No chest pain  States leg swelling is much better  Vitals: Blood Pressure: 119/73 (02/26/21 0917)  Pulse: 85 (02/26/21 0917)  Temperature: 97 5 °F (36 4 °C) (02/26/21 0729)  Temp Source: Temporal (02/26/21 0729)  Respirations: 18 (02/26/21 0729)  Height: 5' 6" (167 6 cm) (02/23/21 1500)  Weight - Scale: 71 8 kg (158 lb 4 6 oz) (02/26/21 0537)  SpO2: 94 % (02/26/21 0729)  Exam:   Physical Exam  Vitals signs and nursing note reviewed  Constitutional:       General: She is not in acute distress  Appearance: She is not ill-appearing     HENT: Head: Normocephalic  Eyes:      Conjunctiva/sclera: Conjunctivae normal    Cardiovascular:      Rate and Rhythm: Normal rate and regular rhythm  Pulmonary:      Effort: Pulmonary effort is normal  No respiratory distress  Breath sounds: Normal breath sounds  No wheezing, rhonchi or rales  Abdominal:      General: Bowel sounds are normal       Palpations: Abdomen is soft  Musculoskeletal:      Right lower leg: Edema (trace b/l) present  Left lower leg: Edema present  Neurological:      Mental Status: She is alert and oriented to person, place, and time  Psychiatric:         Mood and Affect: Mood normal          Discussion with Family: daughter on the phone prior to discharge  Discharge instructions/Information to patient and family:   See after visit summary for information provided to patient and family  Provisions for Follow-Up Care:  See after visit summary for information related to follow-up care and any pertinent home health orders  Disposition:     Home to [address] [facility]:   · Not Applicable to this Patient - Not Applicable to this Patient    Planned Readmission: none     Discharge Statement:  I spent 31 minutes discharging the patient  This time was spent on the day of discharge  I had direct contact with the patient on the day of discharge  Greater than 50% of the total time was spent examining patient, answering all patient questions, arranging and discussing plan of care with patient as well as directly providing post-discharge instructions  Additional time then spent on discharge activities  Discharge Medications:  See after visit summary for reconciled discharge medications provided to patient and family        ** Please Note: This note has been constructed using a voice recognition system **

## 2021-02-26 NOTE — ASSESSMENT & PLAN NOTE
Wt Readings from Last 3 Encounters:   02/26/21 71 8 kg (158 lb 4 6 oz)   01/15/21 76 kg (167 lb 8 8 oz)   01/10/21 76 kg (167 lb 8 8 oz)     - Weight down to 158 from 173 tuesday morning  - initial cxr concerning for vascular congestion   - on room air   - received IV diuresis and s/p IV albumin yesterday   - discharge on increased lasix 40 mg po daily and potassium supplementation 40 mEq daily   - check BMP on Monday

## 2021-02-26 NOTE — PLAN OF CARE
Problem: PHYSICAL THERAPY ADULT  Goal: Performs mobility at highest level of function for planned discharge setting  See evaluation for individualized goals  Description: Treatment/Interventions: Functional transfer training, LE strengthening/ROM, Elevations, Therapeutic exercise, Endurance training, Patient/family training, Equipment eval/education, Bed mobility, Gait training, Continued evaluation, Spoke to nursing, OT          See flowsheet documentation for full assessment, interventions and recommendations  Outcome: Progressing  Note: Prognosis: Fair  Problem List: Decreased strength, Decreased range of motion, Decreased endurance, Impaired balance, Decreased mobility, Decreased skin integrity  Assessment: Pt seen for PT treatment session this date with interventions consisting of gait training w/ emphasis on improving pt's ability to ambulate level surfaces x 25 feet x 2 with close S provided by therapist with RW and Therapeutic exercise consisting of: AROM 10-20 reps B LE in sitting position  Pt agreeable to PT treatment session upon arrival, pt found seated OOB in chair, in no apparent distress  In comparison to previous session, pt with improvements in activity tolerance  Post session: pt returned back to recliner, chair alarm engaged and all needs in reach Continue to recommend return to care home with PT  at time of d/c in order to maximize pt's functional independence and safety w/ mobility  Pt continues to be functioning below baseline level, and remains limited 2* factors listed above and including decreased strength, endurance & safe functional mobility  PT will continue to see pt while here in order to address the deficits listed above and provide interventions consistent w/ POC in effort to achieve STGs    Barriers to Discharge: None     PT Discharge Recommendation: Home with skilled therapy, Return to previous environment with social support(care home with PT)     PT - OK to Discharge: Yes(when med cleared)    See flowsheet documentation for full assessment

## 2021-02-26 NOTE — DISCHARGE INSTRUCTIONS
Heart Failure   WHAT YOU NEED TO KNOW:   Heart failure (HF) is a condition that does not allow your heart to fill or pump properly  Not enough oxygen in your blood gets to your organs and tissues  Fluid may not move through your body properly  Fluid builds up and causes swelling and difficulty breathing  This is known as congestive heart failure  HF may start in the left or right ventricle  HF is often caused by damage or injury to your heart  The damage may be caused by other heart problems, diabetes, or high blood pressure  The damage may have also been caused by an infection  HF is a long-term condition that tends to get worse over time  It is important to manage your health to improve your quality of life  DISCHARGE INSTRUCTIONS:   Call your local emergency number (911 in the [address] [facility] [facility]) if:   · You have any of the following signs of a heart attack:      ? Squeezing, pressure, or pain in your chest    ? You may  also have any of the following:     § Discomfort or pain in your back, neck, jaw, stomach, or arm    § Shortness of breath    § Nausea or vomiting    § Lightheadedness or a sudden cold sweat      Call your doctor if:   · Your heartbeat is fast, slow, or uneven all the time  · You have symptoms of worsening HF:      ? Shortness of breath at rest, at night, or that is getting worse in any way    ? Weight gain of 3 or more pounds (1 4 kg) in a day, or more than your healthcare provider says is okay    ? More swelling in your legs or ankles    ? Abdominal pain or swelling    ? More coughing    ? Loss of appetite    ? Feeling tired all the time    · You feel hopeless or depressed, or you have lost interest in things you used to enjoy  · You often feel worried or afraid  · You have questions or concerns about your condition or care  Medicines:   · Medicines  may be given to help regulate your heart rhythm and lower your blood pressure  You may also need medicines to help decrease extra fluids  Medicines, such as NSAIDs, may be stopped because they are causing your HF to become worse  · Take your medicine as directed  Contact your healthcare provider if you think your medicine is not helping or if you have side effects  Tell him or her if you are allergic to any medicine  Keep a list of the medicines, vitamins, and herbs you take  Include the amounts, and when and why you take them  Bring the list or the pill bottles to follow-up visits  Carry your medicine list with you in case of an emergency  Follow up with your doctor within 7 days and as directed: You may need to return for other tests  You may need home health care  A healthcare provider will monitor your vital signs, weight, and make sure your medicines are working  Write down your questions so you remember to ask them during your visits  Go to cardiac rehab if directed:  Cardiac rehab is a program run by specialists who will help you safely strengthen your heart  In the program you will learn about exercise, relaxation, stress management, and heart-healthy nutrition  Manage HF:   · Do not smoke  Nicotine and other chemicals in cigarettes and cigars can cause lung and heart damage  Ask your healthcare provider for information if you currently smoke and need help to quit  E-cigarettes or smokeless tobacco still contain nicotine  Talk to your healthcare provider before you use these products  · Do not drink alcohol or use illegal drugs  Alcohol and drugs can increase your risk for high blood pressure, diabetes, and coronary artery disease  · Eat heart-healthy foods and limit sodium (salt)  Eat more fresh fruits and vegetables  Eat fewer canned and processed foods  Replace butter and margarine with heart-healthy oils such as olive oil and canola oil  Other heart-healthy foods include walnuts, whole-grain breads, low-fat dairy products, beans, and lean meats  Fatty fish such as salmon and tuna are also heart healthy   Ask how much salt you can eat each day  · Manage any chronic health conditions you have  These include high blood pressure, diabetes, obesity, high cholesterol, metabolic syndrome, and COPD  You will have fewer symptoms if you manage these health conditions  Follow your healthcare provider's recommendations and follow up with him or her regularly  · Drink liquids as directed  You may need to limit the amount of liquids you drink if you have fluid buildup  Ask how much liquid to drink each day and which liquids are best for you  · Maintain a healthy weight  Being overweight can increase your risk for high blood pressure, diabetes, and coronary artery disease  These conditions can make your symptoms worse  Ask your healthcare provider how much you should weigh  Ask him or her to help you create a weight loss plan if you are overweight  · Stay active  Activity can help keep your symptoms from getting worse  Walking is a type of physical activity that helps maintain your strength and improve your mood  Physical activity also helps you manage your weight  Work with your healthcare provider to create an exercise plan that is right for you  · Weigh yourself every morning  Use the same scale, in the same spot  Do this after you use the bathroom, but before you eat or drink  Wear the same type of clothing each time  Write down your weight and call your healthcare provider if you have a sudden weight gain  Swelling and weight gain are signs of fluid buildup  · Get vaccines as directed  Get a flu shot every year  You may also need the pneumonia vaccine  The flu and pneumonia can be severe for a person who has HF  Vaccines protect you from these infections  Join a support group:  HF can be difficult to manage  It may be helpful to talk with others who have HF  You may learn how to better manage your condition or get emotional support   For more information:  · Cara Smith Matthew Kumar   Phone: 4- 627 - 940-8575  Web Address: https://www strong [employer]/  [place] [address] Information is for End User's use only and may not be sold, redistributed or otherwise used for commercial purposes  All illustrations and images included in CareNotes® are the copyrighted property of [employer]  or [facility] Navi Mosquera   The above information is an  only  It is not intended as medical advice for individual conditions or treatments  Talk to your doctor, nurse or pharmacist before following any medical regimen to see if it is safe and effective for you

## 2021-03-08 NOTE — TELEPHONE ENCOUNTER
pc from son who is concerned that the patient was discharged on ASA and Lipitor which he states in the past she had not been on due to her age   she is questioning if these are necessary for her to be on  Told him will message Dr Lindsey Pepper and ask his opinion  Please advise

## 2021-03-08 NOTE — TELEPHONE ENCOUNTER
Please tell him i'm ok w/ these meds, unless she has side effects from either  They are being used for possible underlying coronary artery disease  If he wants to talk, let me know, will be happy to call   Lizzy REYNOLDS

## 2021-03-09 NOTE — TELEPHONE ENCOUNTER
Called left message for Mary Brown with Dr Gwendolyn Martinez response  He did mention her seeing PCP later this week when I spoke to him earlier  He is to call back if any concerns and PCP feels is cardiac related or not  Bright Martinez

## 2021-03-09 NOTE — TELEPHONE ENCOUNTER
Called son with Dr Camilla Roberts he was satisfied with his answer but now has another question   the patient has SOB which he can hear with just talking to her   she is concerned if this is COVID related or possible cardiac issue  He states her lasix was increased on discharge to 40 daily and states her weights at the home are stable when questioned  He is following up with the home that they actually increased her lasix and that the potassium they are providing in a crushable form (sounds like he lacks trust in her facility)  Please advise

## 2021-03-09 NOTE — TELEPHONE ENCOUNTER
Hard for me to say why she sounds more SOB without evaluating her  Does she FEEL SOB? Iirna Esposito, is there a PCP seeing her at the facility? Maybe he can see if Anmol Kurtz can be evaluated for this  Perhaps they can order a CXR  IF he cannot get through to the primary team, he can let us know and I can see if we can squeeze her in somewhere  Clerical team, can we please schedule f/u with me

## 2021-03-11 PROBLEM — I50.22 CHRONIC SYSTOLIC CONGESTIVE HEART FAILURE (HCC): Status: RESOLVED | Noted: 2019-05-22 | Resolved: 2021-01-01

## 2021-03-11 PROBLEM — I13.10 HYPERTENSIVE HEART AND KIDNEY DISEASE: Status: ACTIVE | Noted: 2020-01-01

## 2021-03-11 PROBLEM — N18.9 CKD (CHRONIC KIDNEY DISEASE): Status: ACTIVE | Noted: 2019-04-05

## 2021-03-11 PROBLEM — J12.82 PNEUMONIA DUE TO COVID-19 VIRUS: Status: RESOLVED | Noted: 2021-01-01 | Resolved: 2021-01-01

## 2021-03-11 PROBLEM — I50.9 CHF (CONGESTIVE HEART FAILURE) (HCC): Status: ACTIVE | Noted: 2021-01-01

## 2021-03-11 PROBLEM — U07.1 PNEUMONIA DUE TO COVID-19 VIRUS: Status: RESOLVED | Noted: 2021-01-01 | Resolved: 2021-01-01

## 2021-03-11 NOTE — PROGRESS NOTES
Assessment/Plan:     Hyperlipidemia  Patient was started on atorvastatin while she was in hospital, but is not interested in this medication at this time  Understands benefits and risks  Based on her advanced age, there is likely minimal benefit at this point, and significant risk for side effects  Will discontinue  CHF (congestive heart failure) (HCC)  Wt Readings from Last 3 Encounters:   03/11/21 72 6 kg (160 lb)   02/26/21 71 8 kg (158 lb 4 6 oz)   01/15/21 76 kg (167 lb 8 8 oz)   Patient is stable at this point  She is still having some symptoms  Would not make any adjustments at this point  During the hospitalization, they felt that she did have underlying ischemic cardiomyopathy  Follow-up in the future as needed  Continue on current medications, including diuretics  Will need to monitor for kidney function as well  CKD (chronic kidney disease)  Lab Results   Component Value Date    EGFR 29 02/26/2021    EGFR 29 02/25/2021    EGFR 35 02/24/2021    CREATININE 1 52 (H) 02/26/2021    CREATININE 1 55 (H) 02/25/2021    CREATININE 1 31 (H) 02/24/2021   Patient's GFR was quite significantly depressed recently  It is, however, fairly stable versus before  Reviewed with patient and daughter about the balance between it kidney function and heart failure  Continue on diuretics  Potassium supplementation as appropriate, and check basic metabolic profile  DNR (do not resuscitate)  Discussed with patient again about her end of life decisions, i e  DNR  Patient clearly understands risks of not having further interventions  She would prefer to not have any interventions if possible  I also discussed with her about palliative care, which she agreed with  Will enter referral so she can speak with them in the future  POLST form recommended to be available at [facility]  Family assures me that it is available      Hyperkalemia  Patient is having stomach upset when she uses the potassium chloride crushed  Change to liquid format to see if that makes any difference  Hypertensive heart and kidney disease  Blood pressure today is quite good  No change  Diagnoses and all orders for this visit:    Acute on chronic systolic congestive heart failure ([facility] )  -     Ambulatory referral to Palliative Care; Future  -     Basic metabolic panel; Future    Stage 4 chronic kidney disease ([facility] )  -     Ambulatory referral to Palliative Care; Future  -     Basic metabolic panel; Future    Hyperkalemia  -     potassium chloride 40 MEQ/15ML (20%) oral solution; Take 15 mL (40 mEq total) by mouth daily  -     Basic metabolic panel; Future    Mixed hyperlipidemia    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, stage 1 through stage 4 or unspecified chronic kidney disease, with heart failure ([facility] )  -     Ambulatory referral to Palliative Care; Future    Candidal intertrigo  Comments:  Recommend nystatin  This should help out quite a bit  Powder sent to pharmacy  Orders:  -     nystatin (MYCOSTATIN) powder; Apply topically 4 (four) times a day    DNR (do not resuscitate)         Subjective:     Patient ID: Tao Walker is a 80 y o  female  Patient was recently in the hospital     This was for heart failure  She had been developing a dry cough, and then ended up being transferred to the emergency room with significant issues with heart failure  While she was in the hospital for a long weekend, she had a significant amount of weight loss due to diuresis  Was approximately 13 lb  Reviewed hospital course  She had IV fluids, as well as IV albumin  Patient did have a basic metabolic profile drawn after she was back at [address] [place] [address] Rod  Has significant bruising on her right hand from that  Reviewed heart failure in general   Reviewed cardiomyopathy  Reviewed treatments that could be offered for either 1  Reviewed cardiac echo as an option for further evaluation    Patient was very clear that she did not wish to have procedures if at all possible  And like discussion:  Patient clearly understands risks of not having invasive treatments, and does not wish to have anything that will prolong her life at this time  She does have a POLST form available at her living situation, [facility] house  Reviewed medications  Prior to her admission, the patient was not on statin or aspirin  Family reports they talk to Cardiology about this, and they had no particular concerns 1 where the other about taking these medications  Patient was taking potassium supplementation from hospital   Unfortunately, with taking the potassium the way she had to up until this point, she was getting nausea with it  She wondered if there was another option for the potassium supplementation  Review of Systems      Objective:     Physical Exam      Vitals:    03/11/21 1641   BP: 110/60   Pulse: 88   Temp: (!) 97 °F (36 1 °C)   Weight: 72 6 kg (160 lb)   Height: 5' 6" (1 676 m)       Transitional Care Management Review:  Parisa Franco is a 80 y o  female here for TCM follow up  During the TCM phone call patient stated:    TCM Call (since 2/8/2021)     Date and time call was made  3/1/2021 12:58 PM    Hospital care reviewed  Records reviewed    Patient was hospitialized at  [facility]        Date of Admission  02/23/21    Date of discharge  02/26/21    Diagnosis  CHF, HTN, CKD    Disposition  Home    Were the patients medications reviewed and updated  No    Current Symptoms  None      TCM Call (since 2/8/2021)     Post hospital issues  None    Scheduled for follow up? Yes    Did you obtain your prescribed medications  Yes    Do you need help managing your prescriptions or medications  No    Is transportation to your appointment needed  No    I have advised the patient to call PCP with any new or worsening symptoms  Sheree Buchanan, Practice Administrator       BMI Counseling: Body mass index is 25 82 kg/m²   The BMI is above normal  No BMI follow-up plan is appropriate  Patient is currently receiving palliative care      Dot Tolbert MD

## 2021-03-11 NOTE — ASSESSMENT & PLAN NOTE
Wt Readings from Last 3 Encounters:   03/11/21 72 6 kg (160 lb)   02/26/21 71 8 kg (158 lb 4 6 oz)   01/15/21 76 kg (167 lb 8 8 oz)   Patient is stable at this point  She is still having some symptoms  Would not make any adjustments at this point  During the hospitalization, they felt that she did have underlying ischemic cardiomyopathy  Follow-up in the future as needed  Continue on current medications, including diuretics  Will need to monitor for kidney function as well

## 2021-03-11 NOTE — ASSESSMENT & PLAN NOTE
Lab Results   Component Value Date    EGFR 29 02/26/2021    EGFR 29 02/25/2021    EGFR 35 02/24/2021    CREATININE 1 52 (H) 02/26/2021    CREATININE 1 55 (H) 02/25/2021    CREATININE 1 31 (H) 02/24/2021   Patient's GFR was quite significantly depressed recently  It is, however, fairly stable versus before  Reviewed with patient and daughter about the balance between it kidney function and heart failure  Continue on diuretics  Potassium supplementation as appropriate, and check basic metabolic profile

## 2021-03-11 NOTE — ASSESSMENT & PLAN NOTE
Discussed with patient again about her end of life decisions, i e  DNR  Patient clearly understands risks of not having further interventions  She would prefer to not have any interventions if possible  I also discussed with her about palliative care, which she agreed with  Will enter referral so she can speak with them in the future  POLST form recommended to be available at [facility]  Family assures me that it is available

## 2021-03-11 NOTE — ASSESSMENT & PLAN NOTE
Patient is having stomach upset when she uses the potassium chloride crushed  Change to liquid format to see if that makes any difference

## 2021-03-11 NOTE — PATIENT INSTRUCTIONS
Problem List Items Addressed This Visit     CHF (congestive heart failure) ([facility] ) - Primary     Wt Readings from Last 3 Encounters:   03/11/21 72 6 kg (160 lb)   02/26/21 71 8 kg (158 lb 4 6 oz)   01/15/21 76 kg (167 lb 8 8 oz)   Patient is stable at this point  She is still having some symptoms  Would not make any adjustments at this point  During the hospitalization, they felt that she did have underlying ischemic cardiomyopathy  Follow-up in the future as needed  Continue on current medications, including diuretics  Will need to monitor for kidney function as well  Relevant Orders    Ambulatory referral to Palliative Care    Basic metabolic panel    CKD (chronic kidney disease)     Lab Results   Component Value Date    EGFR 29 02/26/2021    EGFR 29 02/25/2021    EGFR 35 02/24/2021    CREATININE 1 52 (H) 02/26/2021    CREATININE 1 55 (H) 02/25/2021    CREATININE 1 31 (H) 02/24/2021   Patient's GFR was quite significantly depressed recently  It is, however, fairly stable versus before  Reviewed with patient and daughter about the balance between it kidney function and heart failure  Continue on diuretics  Potassium supplementation as appropriate, and check basic metabolic profile  Relevant Orders    Ambulatory referral to Palliative Care    Basic metabolic panel    DNR (do not resuscitate)     Discussed with patient again about her end of life decisions, i e  DNR  Patient clearly understands risks of not having further interventions  She would prefer to not have any interventions if possible  I also discussed with her about palliative care, which she agreed with  Will enter referral so she can speak with them in the future  POLST form recommended to be available at [facility]  Family assures me that it is available  Hyperkalemia     Patient is having stomach upset when she uses the potassium chloride crushed    Change to liquid format to see if that makes any difference  Relevant Medications    potassium chloride 40 MEQ/15ML (20%) oral solution    Other Relevant Orders    Basic metabolic panel    Hyperlipidemia     Patient was started on atorvastatin while she was in hospital, but is not interested in this medication at this time  Understands benefits and risks  Based on her advanced age, there is likely minimal benefit at this point, and significant risk for side effects  Will discontinue  Hypertensive heart and kidney disease     Blood pressure today is quite good  No change  Relevant Orders    Ambulatory referral to Palliative Care      Other Visit Diagnoses     Candidal intertrigo        Recommend nystatin  This should help out quite a bit  Powder sent to pharmacy    Relevant Medications    nystatin (MYCOSTATIN) powder        COVID 19 Instructions    Pattinae Sparks was advised to limit contact with others to essential tasks such as getting food, medications, and medical care  Proper handwashing reviewed, and Hand sanitzer when washing is not available  If the patient develops symptoms of COVID 19, the patient should call the office as soon as possible  For [id number] Flu season, it is strongly recommended that Flu Vaccinations be obtained  Please try to download [employer] Duo  Once you do download this on your phone, you will be prompted to add your phone number to the account  After that, he should receive a text from [employer], and use that code to verify your phone number  After that, you should be able to use Google Duo to receive and make video calls  Please download Microsoft Teams to your phone or computer  We will be transitioning to this platform for Video Visits  Instructions for downloading this are available from the office  We are committed to getting you vaccinated as soon as possible and will be closely following CDC and [facility]  guidelines as they are released and revised    Please refer to our COVID-19 vaccine webpage for the most up to date information on the vaccine and our distribution efforts  Fabiano chung    Dilated Cardiomyopathy   AMBULATORY CARE:   Dilated cardiomyopathy (DCM)  develops when one or both ventricles (lower chambers of your heart) are damaged and become enlarged  The enlarged ventricles are too weak to pump enough blood to your body for your usual daily activities  What increases your risk for DCM:   · Family history of DCM     · Conditions that cause heart damage, such as coronary artery disease     · Infections such as HIV, viruses, or toxoplasmosis    · Long-term alcohol or drug abuse     · Long-term conditions, such as diabetes, hypothyroidism, or autoimmune disorders    · Medicines or treatments such as chemotherapy, radiation, or antivirals    Signs and symptoms of DCM include:   · Coughing, wheezing, or trouble breathing     · Increasing fatigue and weakness    · Swelling in your legs, ankles, or fingers    · Feeling of fullness and no appetite    · Unexplained weight gain    · Fast or fluttering heartbeat, fainting episodes    How DCM is diagnosed:   · An x-ray or MRI  will show the size and thickness of your ventricles and if your heart is enlarged  If you have an MRI, you may be given dye to help the pictures show up better  Tell the healthcare provider if you have ever had an allergic reaction to contrast dye  Do not enter the MRI room with anything metal  Metal can cause serious injury  Tell the healthcare provider if you have any metal in or on your body  · An echocardiogram  uses sound waves to take pictures of your heart  The pictures show the size of your ventricles, and how well they are pumping blood  · Cardiac catheterization  is a procedure used to see inside your heart and its blood vessels   X-rays and dye injected into your heart's blood vessels allow your healthcare provider to see blood move through your heart  Tell the healthcare provider if you have ever had an allergic reaction to contrast dye  Call your local emergency number (911 in the [place])if:   · You have any of the following signs of a heart attack:      ? Squeezing, pressure, or pain in your chest    ? You may  also have any of the following:     ? Discomfort or pain in your back, neck, jaw, stomach, or arm    ? Shortness of breath    ? Nausea or vomiting    ? Lightheadedness or a sudden cold sweat    · You have any of the following signs of a stroke:      ? Numbness or drooping on one side of your face     ? Weakness in an arm or leg    ? Confusion or difficulty speaking    ? Dizziness, a severe headache, or vision loss    · You cough up blood  · You are weak, sweaty, or pale, with cold feet or hands  · You lose consciousness  Call your doctor if:   · You have more trouble breathing while you do your daily activities or exercise  · You have new swelling in your legs, ankles, or fingers  · You gain 2 or more pounds in a day  · You have constant pain or fullness in your abdomen, or you lose your appetite  · You have questions or concerns about your condition or care  Treatment may include:   · Medicines  may be given to help regulate your heart rhythm, your heart rate, and lower your blood pressure  You may also need medicines to help decrease extra fluids and improve blood flow to your heart  Blood thinners may also be needed  · Cardiac rehab  is a program that will help you safely strengthen your heart  This plan includes exercise, relaxation, stress management, and heart-healthy nutrition instructions  Healthcare providers will make sure your medicines are helping to reduce your symptoms  · A surgically implanted device , such as a pacemaker or ventricular assist device (VAD), may be placed in your chest  The device may regulate your heartbeat or help your heart pump blood to your body       · Surgery  may be done to treat other conditions and reduce your symptoms  Manage your DCM:   · Weigh yourself every morning  Use the same scale, in the same spot  Weigh yourself after you use the bathroom, but before you eat or drink anything  Wear the same type of clothing each day  Do not wear shoes  Keep a record of your daily weights so you will notice sudden weight gain  Swelling and weight gain are signs of fluid retention  If you are overweight, ask your healthcare provider how to lose weight safely  · Eat heart-healthy foods and limit sodium (salt)  Eat more fresh fruits and vegetables and fewer canned and processed foods  Replace butter and margarine with heart-healthy oils such as olive oil and canola oil  Other heart-healthy foods include walnuts, fatty fish such as salmon and tuna, whole-grain breads, low-fat dairy products, beans, and lean meats  You may need to eat less than 2 grams of salt per day  Do not use salt substitutes  Ask your healthcare provider for more information on heart-healthy and low-salt diets  · Limit alcohol  Alcohol may weaken your heart  Ask your healthcare provider if it is safe for you to drink any alcohol  If it is safe, talk to him or her about how much alcohol is safe for you  · Do not smoke  If you smoke, it is never too late to quit  Smoking weakens your heart and makes shortness of breath and other symptoms worse  Ask your healthcare provider for information if you need help quitting  · Manage other health conditions  Diabetes, sleep apnea, and other heart conditions can put more stress on your heart if not managed  © Copyright [address] Information is for End User's use only and may not be sold, redistributed or otherwise used for commercial purposes  All illustrations and images included in CareNotes® are the copyrighted property of [employer]  or [address] [place], [place] [address]   The above information is an  only   It is not intended as medical advice for individual conditions or treatments  Talk to your doctor, nurse or pharmacist before following any medical regimen to see if it is safe and effective for you  Skin Yeast Infection   AMBULATORY CARE:   What do I need to know about a skin yeast infection? Yeast is normally present on the skin  Infection happens when you have too much yeast, or when it gets into a cut on your skin  Certain types of mold and fungus can cause a yeast infection  A skin yeast infection can appear anywhere on your skin or nail beds  Skin yeast infections are usually found on warm, moist parts of the body  Examples include between skin folds or under the breasts  Common symptoms include the following:  Signs and symptoms will depend on the type of yeast causing the infection, and where the infection is located  · Red, scaly skin    · Changes in skin color, especially a beefy red color    · Itching, dry skin    · Painful, cracking skin at the corners of your mouth    · Thick, discolored, chipping nails    · Skin lesions that may be red or purple and round    · Pus bumps    Seek care immediately if:   · You have signs of infection, such as pus, warmth or red streaks coming from the wound, or a fever  Contact your healthcare provider if:   · Your symptoms worsen or do not get better within 7 to 10 days  · You have new or returning signs of a skin yeast infection after treatment  · You have questions or concerns about your condition or care  Treatment for a skin yeast infection  may include antifungal medicine to treat the fungal infection  The medicine be given as a cream, ointment, or pill  Care for the skin near the infection:  You may only have discolored patches of skin, or areas that are dry and flaking  Care for these skin problems as directed by your healthcare provider  If you have painful skin or an open sore, you will need to protect the skin and prevent damage   You will also need to keep the skin dry as much as possible  Ask your healthcare provider how to care for your skin while the infection clears  The following are general guidelines for caring for painful or open skin:  · Keep the skin clean  Ask your healthcare provider if you should wash with mild soap and water  Do not use soap that contains alcohol  Alcohol can dry and irritate the skin and make symptoms worse  Your baby's healthcare provider may tell you to use diaper cream or ointment when you change his diaper  This will protect the skin and prevent moisture from collecting  · Keep the skin dry  Pat the area dry with a towel  Do not rub, because this may irritate the skin  If you have a skin yeast infection between skin folds, lift the top part gently and hold it while you dry between your skin folds  Always dry your feet completely after you swim or bathe, including between your toes  Dry your skin if you are sweating from exercise or exposure to heat  Use a clean towel each time to prevent spreading or continuing the infection  · Keep the skin protected  Ask your healthcare provider if you should cover the area with a bandage or leave it open  Check your skin each day to make sure you do not have new or worsening problems  You may need to have someone check the skin if you cannot see the area easily      Prevent another skin yeast infection:   · Do not share clothing or towels    · Wear shower shoes if you need to use a public shower    · Dry your feet completely after you bathe, and apply antifungal powder or cream as directed    · Put on socks before you get dressed so you do not spread fungus from your feet    · Wear light clothing that allows air to get to your skin    · Manage your weight to prevent skin folds where yeast can collect    · Manage diabetes    · Change your baby's diaper often, and keep the area clean and dry as much as possible    · Use a diaper cream or ointment that contains zinc oxide or dimethicone on your baby's diaper area as directed    Follow up with your healthcare provider as directed:  Write down your questions so you remember to ask them during your visits  © Copyright [address] Information is for End User's use only and may not be sold, redistributed or otherwise used for commercial purposes  All illustrations and images included in CareNotes® are the copyrighted property of [employer]  or [employer]   The above information is an  only  It is not intended as medical advice for individual conditions or treatments  Talk to your doctor, nurse or pharmacist before following any medical regimen to see if it is safe and effective for you

## 2021-03-11 NOTE — ASSESSMENT & PLAN NOTE
Patient was started on atorvastatin while she was in hospital, but is not interested in this medication at this time  Understands benefits and risks  Based on her advanced age, there is likely minimal benefit at this point, and significant risk for side effects  Will discontinue

## 2021-03-12 NOTE — TELEPHONE ENCOUNTER
Recommend that  [facility] pharmacy to confirm what medications can be crushed, and then it would be reasonable for her to have those medications crushed

## 2021-03-12 NOTE — TELEPHONE ENCOUNTER
Mick Roach from [facility] called asking for a verbal order from Dr Sergio Hooker  Per Huyen Ji is having trouble swallowing and will like to know if she can crush patient meds  Please advise?

## 2021-03-15 NOTE — TELEPHONE ENCOUNTER
Spoke to Yamilet at [employer]  She is aware of recommendation to contact pharmacy on what meds can be crushed  She will let us know

## 2021-03-17 NOTE — TELEPHONE ENCOUNTER
Daughter reached out to office asking about palliative care, I explained to patient's daugher the role for palliative care versus  PCP  Daughter stated she will call back if she will like to make an appointment for the patient

## 2021-03-17 NOTE — TELEPHONE ENCOUNTER
[address] [facility] [place], [place] [address] [phone], IS CALLING TO ASK FOR SPEECH THERAPY ORDER, PT IS HAVING TROUBLE SWOLLOWING AND SHE FEELS SHE HAS SOMETHING IN HER THROAT  ALSO ASKING IF PROVIDER WOULD LIKE TO SEND HER OUT FOR TESTING AT ALL    GIVE VANE DELACRUZ CALL BACK [phone]

## 2021-03-18 PROBLEM — R13.10 DYSPHAGIA: Status: ACTIVE | Noted: 2021-01-01

## 2021-03-18 PROBLEM — R77.8 ELEVATED TROPONIN: Status: ACTIVE | Noted: 2021-01-01

## 2021-03-18 PROBLEM — N18.30 CKD (CHRONIC KIDNEY DISEASE) STAGE 3, GFR 30-59 ML/MIN (HCC): Status: ACTIVE | Noted: 2021-01-01

## 2021-03-18 PROBLEM — E87.2 LACTIC ACID INCREASED: Status: ACTIVE | Noted: 2021-01-01

## 2021-03-18 PROBLEM — N18.9 CKD (CHRONIC KIDNEY DISEASE): Status: RESOLVED | Noted: 2019-04-05 | Resolved: 2021-01-01

## 2021-03-18 NOTE — ASSESSMENT & PLAN NOTE
Lactic acid elevated in the setting of vomiting episode  Given 1 dose of intravenous Zosyn  Procalcitonin is pending  Chest x-ray reviewed, she is on room air  1/4 sirs criteria being elevated heart rate  Hold antibiotics for now, likely small component of aspiration pneumonitis

## 2021-03-18 NOTE — ASSESSMENT & PLAN NOTE
Lab Results   Component Value Date    EGFR 27 03/18/2021    EGFR 29 02/26/2021    EGFR 29 02/25/2021    CREATININE 1 65 (H) 03/18/2021    CREATININE 1 52 (H) 02/26/2021    CREATININE 1 55 (H) 02/25/2021

## 2021-03-18 NOTE — ASSESSMENT & PLAN NOTE
Lab Results   Component Value Date    EGFR 27 03/18/2021    EGFR 29 02/26/2021    EGFR 29 02/25/2021    CREATININE 1 65 (H) 03/18/2021    CREATININE 1 52 (H) 02/26/2021    CREATININE 1 55 (H) 02/25/2021   Monitor renal function with diuresis

## 2021-03-18 NOTE — ASSESSMENT & PLAN NOTE
Wt Readings from Last 3 Encounters:   03/11/21 72 6 kg (160 lb)   02/26/21 71 8 kg (158 lb 4 6 oz)   01/15/21 76 kg (167 lb 8 8 oz)     With acute on chronic systolic congestive heart failure exacerbation  She appears to be 2 lb from her dry weight  Will place on intravenous furosemide  Continue beta-blocker  Chest x-ray reviewed with improvement from her previous from February  Likely resume home regimen tomorrow  ProBNP elevated, mild lower extremity edema - continue compression stocking  Lab Results   Component Value Date    NTBNP 33,000 (H) 03/18/2021

## 2021-03-18 NOTE — ASSESSMENT & PLAN NOTE
Minimally elevated troponin level in the setting no congestive heart failure  No chest pain  Suspect non a mild troponin elevation  Recent Labs     03/18/21  1151   TROPONINI 0 16*

## 2021-03-18 NOTE — PLAN OF CARE
Problem: Potential for Falls  Goal: Patient will remain free of falls  Description: INTERVENTIONS:  - Assess patient frequently for physical needs  -  Identify cognitive and physical deficits and behaviors that affect risk of falls  -  [place] fall precautions as indicated by assessment   - Educate patient/family on patient safety including physical limitations  - Instruct patient to call for assistance with activity based on assessment  - Modify environment to reduce risk of injury  - Consider OT/PT consult to assist with strengthening/mobility  Outcome: Progressing     Problem: Prexisting or High Potential for Compromised Skin Integrity  Goal: Skin integrity is maintained or improved  Description: INTERVENTIONS:  - Identify patients at risk for skin breakdown  - Assess and monitor skin integrity  - Assess and monitor nutrition and hydration status  - Monitor labs   - Assess for incontinence   - Turn and reposition patient  - Assist with mobility/ambulation  - Relieve pressure over bony prominences  - Avoid friction and shearing  - Provide appropriate hygiene as needed including keeping skin clean and dry  - Evaluate need for skin moisturizer/barrier cream  - Collaborate with interdisciplinary team   - Patient/family teaching  - Consider wound care consult   Outcome: Progressing     Problem: SAFETY ADULT  Goal: Patient will remain free of falls  Description: INTERVENTIONS:  - Assess patient frequently for physical needs  -  Identify cognitive and physical deficits and behaviors that affect risk of falls    -  [place] fall precautions as indicated by assessment   - Educate patient/family on patient safety including physical limitations  - Instruct patient to call for assistance with activity based on assessment  - Modify environment to reduce risk of injury  - Consider OT/PT consult to assist with strengthening/mobility  Outcome: Progressing  Goal: Maintain or return to baseline ADL function  Description: INTERVENTIONS:  -  Assess patient's ability to carry out ADLs; assess patient's baseline for ADL function and identify physical deficits which impact ability to perform ADLs (bathing, care of mouth/teeth, toileting, grooming, dressing, etc )  - Assess/evaluate cause of self-care deficits   - Assess range of motion  - Assess patient's mobility; develop plan if impaired  - Assess patient's need for assistive devices and provide as appropriate  - Encourage maximum independence but intervene and supervise when necessary  - Involve family in performance of ADLs  - Assess for home care needs following discharge   - Consider OT consult to assist with ADL evaluation and planning for discharge  - Provide patient education as appropriate  Outcome: Progressing  Goal: Maintain or return mobility status to optimal level  Description: INTERVENTIONS:  - Assess patient's baseline mobility status (ambulation, transfers, stairs, etc )    - Identify cognitive and physical deficits and behaviors that affect mobility  - Identify mobility aids required to assist with transfers and/or ambulation (gait belt, sit-to-stand, lift, walker, cane, etc )  - [place] fall precautions as indicated by assessment  - Record patient progress and toleration of activity level on Mobility SBAR; progress patient to next Phase/Stage  - Instruct patient to call for assistance with activity based on assessment  - Consider rehabilitation consult to assist with strengthening/weightbearing, etc   Outcome: Progressing     Problem: Knowledge Deficit  Goal: Patient/family/caregiver demonstrates understanding of disease process, treatment plan, medications, and discharge instructions  Description: Complete learning assessment and assess knowledge base    Interventions:  - Provide teaching at level of understanding  - Provide teaching via preferred learning methods  Outcome: Progressing     Problem: Nutrition/Hydration-ADULT  Goal: Nutrient/Hydration intake appropriate for improving, restoring or maintaining nutritional needs  Description: Monitor and assess patient's nutrition/hydration status for malnutrition  Collaborate with interdisciplinary team and initiate plan and interventions as ordered  Monitor patient's weight and dietary intake as ordered or per policy  Utilize nutrition screening tool and intervene as necessary  Determine patient's food preferences and provide high-protein, high-caloric foods as appropriate       INTERVENTIONS:  - Monitor oral intake, urinary output, labs, and treatment plans  - Assess nutrition and hydration status and recommend course of action  - Evaluate amount of meals eaten  - Assist patient with eating if necessary   - Allow adequate time for meals  - Recommend/ encourage appropriate diets, oral nutritional supplements, and vitamin/mineral supplements  - Order, calculate, and assess calorie counts as needed  - Recommend, monitor, and adjust tube feedings and TPN/PPN based on assessed needs  - Assess need for intravenous fluids  - Provide specific nutrition/hydration education as appropriate  - Include patient/family/caregiver in decisions related to nutrition  Outcome: Not Progressing

## 2021-03-18 NOTE — ASSESSMENT & PLAN NOTE
Patient with significant dysphagia to solid food which has been present for at least 1-2 weeks   upper esophagus is moderately distended with air, possibly representing mild achalasia  Speech consultation placed and discussed    Recommend she eye evaluation given location the esophagus  Continue sips with liquids for now  Potential swallow study for tomorrow versus direct evaluation

## 2021-03-18 NOTE — ASSESSMENT & PLAN NOTE
Recent Labs     03/18/21  1151   K 5 6*   Hold potassium supplementation  Repeat in a m    Will receive intravenous furosemide

## 2021-03-18 NOTE — TELEPHONE ENCOUNTER
Rito Corrales at [facility] notified of [address] response  She will send speech therapy  order for signature

## 2021-03-18 NOTE — ED PROVIDER NOTES
History  Chief Complaint   Patient presents with    Vomiting     Pt reports vomiting since this morning  Pt states having generalized abdominal ache  Reports not feeling well since last night  Pt also report SOB  Pt is tachypneic  No CP  79 yo F h/o CHF, HTN presenting for evaluation of nausea and vomiting  Difficulty to obtain history  Pt states that yesterday she was given liquid potassium and after that she had 2 episodes of loose nonbloody stools, otherwise no sx yesterday  Today she started with diffuse abdominal discomfort, no focal area of pain  Numerous episodes of NBNB vomiting  On review of systems, pt does reports some chest discomfort, unable to describe but not chest pain  Admits to mild SOB and is tachypneic on exam  Denies fevers, chills, urinary complaints  States she vomited up her morning medications  Nursing facility states pt having new issues with swallowing and were planning for eval, but this has not been completed yet  H/o COVID on 1/10  SHx: hysterectomy, appendectomy    MDM: 79 yo F  - N/V- abdominal labs, cardiac workup, CT A/P, urine  - tachypnea- cardiac workup, BNP, CXR, diuresis  - new dysphagia- CTH to r/o intracranial bleed, CT neck to eval for cause of sx as she does feel abnormal sensation to throat/neck                 Prior to Admission Medications   Prescriptions Last Dose Informant Patient Reported? Taking?    Multiple Vitamins-Minerals (PRESERVISION AREDS) CAPS  Self Yes No   Sig: Take by mouth 2 (two) times a day    acetaminophen (TYLENOL) 325 mg tablet   No Yes   Sig: Take 2 tablets (650 mg total) by mouth every 6 (six) hours   cholecalciferol (VITAMIN D3) 1,000 units tablet  Self Yes Yes   Sig: Take by mouth   furosemide (LASIX) 40 mg tablet   No Yes   Sig: Take 1 tablet (40 mg total) by mouth daily   hydrocortisone (ANUSOL-HC) 2 5 % rectal cream   No Yes   Sig: Apply topically 2 (two) times a day   Patient taking differently: Apply topically 2 (two) times a day as needed    metoprolol succinate (TOPROL-XL) 25 mg 24 hr tablet   No Yes   Sig: Take 0 5 tablets (12 5 mg total) by mouth daily   nystatin (MYCOSTATIN) powder   No Yes   Sig: Apply topically 4 (four) times a day   pantoprazole (PROTONIX) 40 mg tablet   No Yes   Sig: TAKE 1 TABLET BY MOUTH EVERY DAY   phenylephrine-shark liver oil-mineral oil-petrolatum (PREPARATION H) 0 25-3-14-71 9 % rectal ointment   No Yes   Sig: Insert into the rectum 2 (two) times a day as needed for hemorrhoids   potassium chloride 40 MEQ/15ML (20%) oral solution   No Yes   Sig: Take 15 mL (40 mEq total) by mouth daily   senna (SENOKOT) 8 6 mg   No Yes   Sig: Take 1 tablet (8 6 mg total) by mouth daily at bedtime   tolterodine (DETROL LA) 4 mg 24 hr capsule   Yes Yes   Sig: Take 4 mg by mouth daily      Facility-Administered Medications: None       Past Medical History:   Diagnosis Date    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)     GERD (gastroesophageal reflux disease)     Murmur 8/27/2020    Pneumonia due to COVID-19 virus 1/11/2021    Right humeral fracture 7/20/2018       Past Surgical History:   Procedure Laterality Date    HYSTERECTOMY  1973    Total    JOINT REPLACEMENT Right     knee    IA OPEN RX FEMUR FX+INTRAMED MORE Right 8/27/2020    Procedure: INSERTION NAIL IM FEMUR ANTEGRADE (TROCHANTERIC); Surgeon: Jeff Felix MD;  Location: [facility] OR;  Service: Orthopedics       Family History   Problem Relation Age of Onset    Heart failure Mother     Coronary artery disease Father      I have reviewed and agree with the history as documented      E-Cigarette/Vaping    E-Cigarette Use Never User      E-Cigarette/Vaping Substances     Social History     Tobacco Use    Smoking status: Never Smoker    Smokeless tobacco: Never Used    Tobacco comment: No secondhand smoke exposure   Substance Use Topics    Alcohol use: Yes     Comment: Minimal consumption    Drug use: No       Review of Systems Constitutional: Negative for chills, fever and unexpected weight change  HENT: Negative for ear pain, rhinorrhea and sore throat  Eyes: Negative for pain and visual disturbance  Respiratory: Positive for shortness of breath  Negative for cough  Cardiovascular: Negative for chest pain and leg swelling  Gastrointestinal: Positive for nausea and vomiting  Negative for abdominal pain, constipation and diarrhea  Endocrine: Negative for polydipsia, polyphagia and polyuria  Genitourinary: Negative for dysuria, frequency, hematuria and urgency  Musculoskeletal: Negative for back pain, myalgias and neck pain  Skin: Negative for color change and rash  Allergic/Immunologic: Negative for environmental allergies and immunocompromised state  Neurological: Negative for dizziness, weakness, light-headedness, numbness and headaches  Hematological: Negative for adenopathy  Does not bruise/bleed easily  Psychiatric/Behavioral: Negative for agitation and confusion  All other systems reviewed and are negative  Physical Exam  Physical Exam  Vitals signs and nursing note reviewed  Constitutional:       General: She is not in acute distress  Appearance: Normal appearance  She is well-developed  She is not toxic-appearing  HENT:      Head: Normocephalic and atraumatic  Nose: Nose normal    Eyes:      Conjunctiva/sclera: Conjunctivae normal    Neck:      Musculoskeletal: Normal range of motion and neck supple  Cardiovascular:      Rate and Rhythm: Normal rate and regular rhythm  Heart sounds: Normal heart sounds  Pulmonary:      Effort: Tachypnea present  No respiratory distress  Breath sounds: Normal breath sounds  No stridor  No wheezing  Chest:      Chest wall: No tenderness  Abdominal:      General: There is no distension  Palpations: Abdomen is soft  Tenderness: There is no abdominal tenderness  There is no guarding or rebound     Musculoskeletal: General: No swelling, tenderness or deformity  Skin:     General: Skin is warm and dry  Findings: No rash  Neurological:      General: No focal deficit present  Mental Status: She is alert and oriented to person, place, and time  Motor: No abnormal muscle tone  Coordination: Coordination normal    Psychiatric:         Thought Content:  Thought content normal          Judgment: Judgment normal          Vital Signs  ED Triage Vitals   Temperature Pulse Respirations Blood Pressure SpO2   03/18/21 1201 03/18/21 1146 03/18/21 1146 03/18/21 1146 03/18/21 1146   (!) 97 4 °F (36 3 °C) 104 22 109/73 96 %      Temp Source Heart Rate Source Patient Position - Orthostatic VS BP Location FiO2 (%)   03/18/21 1201 03/18/21 1146 03/18/21 1146 03/18/21 1146 --   Oral Monitor Lying Right arm       Pain Score       03/18/21 1146       3           Vitals:    03/18/21 1350 03/18/21 1522 03/18/21 1600 03/18/21 1627   BP: 113/64 113/70 112/65 113/71   Pulse: 98 95 94 100   Patient Position - Orthostatic VS: Lying Lying  Lying         Visual Acuity      ED Medications  Medications   sodium chloride (PF) 0 9 % injection 3 mL (has no administration in time range)   metoprolol succinate (TOPROL-XL) 24 hr tablet 12 5 mg (has no administration in time range)   pantoprazole (PROTONIX) EC tablet 40 mg (has no administration in time range)   senna (SENOKOT) tablet 8 6 mg (has no administration in time range)   oxybutynin (DITROPAN-XL) 24 hr tablet 5 mg (has no administration in time range)   furosemide (LASIX) injection 20 mg (has no administration in time range)   ondansetron (ZOFRAN) injection 4 mg (4 mg Intravenous Given 3/18/21 1156)   furosemide (LASIX) injection 40 mg (40 mg Intravenous Given 3/18/21 1330)   piperacillin-tazobactam (ZOSYN) 2 25 g in sodium chloride 0 9 % 50 mL IVPB (0 g Intravenous Stopped 3/18/21 1410)   ondansetron (ZOFRAN) injection 4 mg (4 mg Intravenous Given 3/18/21 1326)   metoclopramide (REGLAN) injection 10 mg (10 mg Intravenous Given 3/18/21 1519)   aspirin rectal suppository 300 mg (300 mg Rectal Given 3/18/21 1602)       Diagnostic Studies  Results Reviewed     Procedure Component Value Units Date/Time    Procalcitonin with AM Reflex [[id number]]  (Normal) Collected: 03/18/21 1151    Lab Status: Final result Specimen: Blood from Arm, Right Updated: 03/18/21 1708     Procalcitonin <0 05 ng/ml     Procalcitonin Reflex [[id number]]     Lab Status: No result Specimen: Blood     Urine Microscopic [[id number]]  (Abnormal) Collected: 03/18/21 1524    Lab Status: Final result Specimen: Urine, Clean Catch Updated: 03/18/21 1639     RBC, UA None Seen /hpf      WBC, UA 0-1 /hpf      Epithelial Cells Occasional /hpf      Bacteria, UA Occasional /hpf     Troponin I [[id number]]  (Abnormal) Collected: 03/18/21 1530    Lab Status: Final result Specimen: Blood from Hand, Left Updated: 03/18/21 1638     Troponin I 0 14 ng/mL     Lactic acid, plasma [[id number]]     Lab Status: No result Specimen: Blood     UA w Reflex to Microscopic w Reflex to Culture [[id number]]  (Abnormal) Collected: 03/18/21 1524    Lab Status: Final result Specimen: Urine, Clean Catch Updated: 03/18/21 1604     Color, UA Yellow     Clarity, UA Clear     Specific [place], UA 1 020     pH, UA 5 5     Leukocytes, UA Trace     Nitrite, UA Negative     Protein, UA Negative mg/dl      Glucose, UA Negative mg/dl      Ketones, UA Negative mg/dl      Urobilinogen, UA 0 2 E U /dl      Bilirubin, UA Negative     Blood, UA Trace-Intact    Lactic acid 2 Hours [[id number]]  (Abnormal) Collected: 03/18/21 1357    Lab Status: Final result Specimen: Blood from Arm, Right Updated: 03/18/21 1443     LACTIC ACID 2 1 mmol/L     Narrative:      Result may be elevated if tourniquet was used during collection  Blood culture #2 [[id number]] Collected: 03/18/21 1243    Lab Status:  In process Specimen: Blood from Hand, Left Updated: 03/18/21 1246    Lactic Acid [[id number]]  (Abnormal) Collected: 03/18/21 1151    Lab Status: Final result Specimen: Blood from Arm, Right Updated: 03/18/21 1237     LACTIC ACID 2 3 mmol/L     Narrative:      Result may be elevated if tourniquet was used during collection      NT-BNP PRO [[id number]]  (Abnormal) Collected: 03/18/21 1151    Lab Status: Final result Specimen: Blood from Arm, Right Updated: 03/18/21 1233     NT-proBNP 33,000 pg/mL     Lipase [[id number]]  (Normal) Collected: 03/18/21 1151    Lab Status: Final result Specimen: Blood from Arm, Right Updated: 03/18/21 1233     Lipase 103 u/L     APTT [[id number]]  (Normal) Collected: 03/18/21 1151    Lab Status: Final result Specimen: Blood from Arm, Right Updated: 03/18/21 1230     PTT 33 seconds     Protime-INR [[id number]]  (Abnormal) Collected: 03/18/21 1151    Lab Status: Final result Specimen: Blood from Arm, Right Updated: 03/18/21 1230     Protime 15 9 seconds      INR 1 30    Troponin I [[id number]]  (Abnormal) Collected: 03/18/21 1151    Lab Status: Final result Specimen: Blood from Arm, Right Updated: 03/18/21 1227     Troponin I 0 16 ng/mL     Comprehensive metabolic panel [[id number]]  (Abnormal) Collected: 03/18/21 1151    Lab Status: Final result Specimen: Blood from Arm, Right Updated: 03/18/21 1226     Sodium 139 mmol/L      Potassium 5 6 mmol/L      Chloride 105 mmol/L      CO2 23 mmol/L      ANION GAP 11 mmol/L      BUN 34 mg/dL      Creatinine 1 65 mg/dL      Glucose 134 mg/dL      Calcium 9 4 mg/dL      AST 20 U/L      ALT 25 U/L      Alkaline Phosphatase 109 U/L      Total Protein 7 9 g/dL      Albumin 4 1 g/dL      Total Bilirubin 1 41 mg/dL      eGFR 27 ml/min/1 73sq m     Narrative:      Radha guidelines for Chronic Kidney Disease (CKD):     Stage 1 with normal or high GFR (GFR > 90 mL/min/1 73 square meters)    Stage 2 Mild CKD (GFR = 60-89 mL/min/1 73 square meters)    Stage 3A Moderate CKD (GFR = 45-59 mL/min/1 73 square meters)   Stage 3B Moderate CKD (GFR = 30-44 mL/min/1 73 square meters)    Stage 4 Severe CKD (GFR = 15-29 mL/min/1 73 square meters)    Stage 5 End Stage CKD (GFR <15 mL/min/1 73 square meters)  Note: GFR calculation is accurate only with a steady state creatinine    CBC and differential [[id number]]  (Abnormal) Collected: 03/18/21 1151    Lab Status: Final result Specimen: Blood from Arm, Right Updated: 03/18/21 1207     WBC 6 08 Thousand/uL      RBC 3 70 Million/uL      Hemoglobin 12 2 g/dL      Hematocrit 38 0 %       fL      MCH 33 0 pg      MCHC 32 1 g/dL      RDW 16 3 %      MPV 12 5 fL      Platelets 733 Thousands/uL      nRBC 0 /100 WBCs      Neutrophils Relative 74 %      Immat GRANS % 0 %      Lymphocytes Relative 17 %      Monocytes Relative 7 %      Eosinophils Relative 1 %      Basophils Relative 1 %      Neutrophils Absolute 4 52 Thousands/µL      Immature Grans Absolute 0 02 Thousand/uL      Lymphocytes Absolute 1 04 Thousands/µL      Monocytes Absolute 0 42 Thousand/µL      Eosinophils Absolute 0 04 Thousand/µL      Basophils Absolute 0 04 Thousands/µL     Blood culture #1 [[id number]] Collected: 03/18/21 1151    Lab Status: In process Specimen: Blood from Arm, Right Updated: 03/18/21 1204    Fingerstick Glucose (POCT) [[id number]]  (Normal) Collected: 03/18/21 1158    Lab Status: Final result Updated: 03/18/21 1203     POC Glucose 132 mg/dl                  CT head without contrast   ED Interpretation by Herlinda Rivera DO (03/18 1522)   IMPRESSION:     No acute intracranial abnormality        Chronic cerebral white matter microangiopathic changes      Stable head CT when compared to the prior study of August 26, 2020  Final Result by Darnell Miller MD (03/18 1518)      No acute intracranial abnormality  Chronic cerebral white matter microangiopathic changes  Stable head CT when compared to the prior study of August 26, 2020                    Workstation performed: [id number]         CT soft tissue neck wo contrast   ED Interpretation by Baldo Salas DO (03/18 2775)   IMPRESSION:     The upper esophagus is moderately distended with air, possibly representing mild achalasia  Consider follow-up nonemergent swallowing study      No pathologic adenopathy or mass within the neck          Final Result by Barbara Andrea DO (03/18 2079)      The upper esophagus is moderately distended with air, possibly representing mild achalasia  Consider follow-up nonemergent swallowing study  No pathologic adenopathy or mass within the neck  Workstation performed: [id number]         CT abdomen pelvis wo contrast   ED Interpretation by Baldo Salas DO (03/18 2474)   IMPRESSION:     1  No acute findings to account for the patient's symptoms within the limits of unenhanced technique      2  Infrarenal abdominal aortic aneurysm measuring 3 8 cm      3   Age-indeterminate compression fractures which are severe at L1 with bony retropulsion, and moderate at L3        4   Small to moderate bilateral pleural effusions      5  Nonspecific right lower lobe groundglass opacities may be due to atelectasis or pneumonia in the appropriate clinical setting      6   Cardiomegaly  Final Result by Shirlie Habermann, MD (03/18 0586)      1  No acute findings to account for the patient's symptoms within the limits of unenhanced technique  2   Infrarenal abdominal aortic aneurysm measuring 3 8 cm  3   Age-indeterminate compression fractures which are severe at L1 with bony retropulsion, and moderate at L3         4   Small to moderate bilateral pleural effusions  5   Nonspecific right lower lobe groundglass opacities may be due to atelectasis or pneumonia in the appropriate clinical setting  6   Cardiomegaly  The study was marked in [facility] for immediate notification                 Workstation performed: [id number]         XR chest 1 view portable   Final Result by Barbara Andrea DO (03/18 1614)      Left basilar effusion with superimposed basilar opacity, possibly atelectasis or pneumonia  No congestive failure  Workstation performed: [id number]                    Procedures  Procedures         ED Course  ED Course as of Mar 18 1812   Thu Mar 18, 2021   1201 Respirations: 22   1201 Pulse: 104   1208 Tachycardia/tachypnea possibly from vomiting/pain/SOB/CHF, no evidence of infection at this time      1226 EKG: sinus tachycardia @ 102 bpm, LAD, nonspecific change swith slight depressions laterally with t wave inversions but present on previous 2/23/21      1227 Potassium(!): 5 6   1227 Slowly increasing over the past weeks, 1 3 3 weeks ago, 1 5 2 weeks ago   Creatinine(!): 1 65   1228 TOTAL BILIRUBIN(!): 1 41   1228 Troponin I(!): 0 16   1228 0 15 3 weeks ago   Troponin I(!): 0 16   1233 27,000 3 weeks ago   NT-proBNP(!): 33,000   1238 LACTIC ACID(!!): 2 3   1244 CXR: L lung base effusion/infiltrate otherwise pulm edema looks slightly improved from prior admission      [address]  18  stating pt was ahving trouble swallowing yesterday/decreased po intake and was pending speech eval      1308 Pt still nauseous on reassessment, but looks more comfortable      1452 Pt still nauseous after 2 doses of zofran, will give reglan and repeat EKG      1508 Repeat EKG performed for persistent nausea, unchanged from prior without any new ST/T wave changes      1528 upper esophagus is moderately distended with air, possibly representing mild achalasia  Consider follow-up nonemergent swallowing study                  HEART Risk Score      Most Recent Value   Heart Score Risk Calculator   History  0 Filed at: 03/18/2021 1229   ECG  1 Filed at: 03/18/2021 1229   Age  2 Filed at: 03/18/2021 1229   Risk Factors  2 Filed at: 03/18/2021 1229   Troponin  2 Filed at: 03/18/2021 1229   HEART Score  7 Filed at: 03/18/2021 1229                  Initial Sepsis Screening     Row Name 03/18/21 1241 03/18/21 1201             Is the patient's history suggestive of a new or worsening infection? (!) Yes (Proceed)  -[address]  No  -[address]       Suspected source of infection  suspect infection, source unknown  -KH  --  -KH       Are two or more of the following signs & symptoms of infection both present and new to the patient? (!) Yes (Proceed)  -KH  --  -KH       Indicate SIRS criteria  Tachycardia > 90 bpm;Tachypnea > 20 resp per min  -KH  --  -KH       If the answer is yes to both questions, suspicion of sepsis is present  --  --       If severe sepsis is present AND tissue hypoperfusion perists in the hour after fluid resuscitation or lactate > 4, the patient meets criteria for SEPTIC SHOCK  --  --       Are any of the following organ dysfunction criteria present within 6 hours of suspected infection and SIRS criteria that are NOT considered to be chronic conditions? (!) Yes  -KH  --  -KH       Organ dysfunction  Lactate > 2 0 mmol/L  -KH  --       Date of presentation of severe sepsis  03/18/21  -KH  --       Time of presentation of severe sepsis  1242  -KH  --       Tissue hypoperfusion persists in the hour after crystalloid fluid administration, evidenced, by either:  --  --       Was hypotension present within one hour of the conclusion of crystalloid fluid administration?   No  -KH  --       Date of presentation of septic shock  --  --       Time of presentation of septic shock  --  --         User Key  (r) = Recorded By, (t) = Taken By, (c) = Cosigned By    [address] Name Provider Type    [address] [place] [address],  Physician           Default Flowsheet Data (last 720 hours)      Sepsis Reassess     Row Name 03/18/21 1448                   Repeat Volume Status and Tissue Perfusion Assessment Performed    Repeat Volume Status and Tissue Perfusion Assessment Performed  Yes  -KH           Volume Status and Tissue Perfusion Post Fluid Resuscitation * Must Document All *    Vital Signs Reviewed (HR, RR, BP, T)  Yes  -KH Shock Index Reviewed  Yes  -[address]        Arterial Oxygen Saturation Reviewed (POx, SaO2 or SpO2)  Yes (comment %)  -        Cardio  Normal S1/S2; Regular rate and rhythm  -        Pulmonary  (!) Tachypnea;Normal effort  -        Capillary Refill  Brisk  -        Peripheral Pulses  Radial;Dorsalis Pedis; Posterior Tibialis  -        Peripheral Pulse  +1  -KH        Dorsalis Pedis  +1  -KH        Posterior Tibialis  +1  -KH        Skin  Warm;Dry  -        Urine output assessed  Adequate  -KH           *OR*   Intensive Monitoring- Must Document One of the Following Four *:    Vital Signs Reviewed  --        * Central Venous Pressure (CVP or RAP)  --        * Central Venous Oxygen (SVO2, ScvO2 or Oxygen saturation via central catheter)  --        * Bedside Cardiovascular US in IVC diameter and % collapse  --        * Passive Leg Raise OR Crystalloid Challenge  --          User Key  (r) = Recorded By, (t) = Taken By, (c) = Cosigned By    Initials Name Provider Type    Lugenia Sports, DO Physician                      MDM  Number of Diagnoses or Management Options  AAA (abdominal aortic aneurysm) ([facility] [address] ):   Acute on chronic diastolic CHF (congestive heart failure) ([facility] [address] ):   Bilateral pleural effusion:   Dysphagia:   Elevated troponin:   Hyperkalemia:   Lactic acidosis:   Nausea and vomiting:   Diagnosis management comments: 79 yo F presenting with dysphagia/N/V, needs further workup for this  SOB/tachypneic with acute on chronic heart failure, b/l pleural effusions, started IV diuresis  Slowly rising creatinine over the past few weeks as well  Pt met SIRS criteria and was given 1 dose of zosyn, no obvious infection other than possible RLL PNA  Admitted to [facility] for further workup/management       Amount and/or Complexity of Data Reviewed  Clinical lab tests: ordered and reviewed  Tests in the radiology section of CPT®: ordered and reviewed  Tests in the medicine section of CPT®: ordered and reviewed  Review and summarize past medical records: yes  Independent visualization of images, tracings, or specimens: yes        Disposition  Final diagnoses:   Nausea and vomiting   Elevated troponin   Hyperkalemia   Lactic acidosis   Bilateral pleural effusion   AAA (abdominal aortic aneurysm) ([facility])   Acute on chronic diastolic CHF (congestive heart failure) ([facility] )   Dysphagia     Time reflects when diagnosis was documented in both MDM as applicable and the Disposition within this note     Time User Action Codes Description Comment    3/18/2021 12:40 PM Nisa Blonder A Add [R11 2] Nausea and vomiting     3/18/2021 12:40 PM Ronette Sang Add [R77 8] Elevated troponin     3/18/2021 12:40 PM Ronette Sang Add [E87 5] Hyperkalemia     3/18/2021 12:41 PM Nisa Blonder A Add [E87 2] Lactic acidosis     3/18/2021  1:57 PM Nisa Blonder A Add [J90] Bilateral pleural effusion     3/18/2021  1:57 PM Margarita Pickard A Add [I71 4] AAA (abdominal aortic aneurysm) ([place] [address] )     3/18/2021  3:09 PM Nisa Blonder A Add [I50 33] Acute on chronic diastolic CHF (congestive heart failure) ([place] [address] )     3/18/2021  3:31 PM Nisa Blonder A Add [R13 10] Dysphagia       ED Disposition     ED Disposition Condition Date/Time Comment    Admit Stable u Mar 18, 2021  3:31 PM Case was discussed with KEMAR and the patient's admission status was agreed to be Admission Status: inpatient status to the service of Dr Rosalinda Patrick           Follow-up Information    None         Current Discharge Medication List      CONTINUE these medications which have NOT CHANGED    Details   acetaminophen (TYLENOL) 325 mg tablet Take 2 tablets (650 mg total) by mouth every 6 (six) hours  Qty: 30 tablet, Refills: 0    Associated Diagnoses: Right femoral fracture (HCC)      cholecalciferol (VITAMIN D3) 1,000 units tablet Take by mouth      furosemide (LASIX) 40 mg tablet Take 1 tablet (40 mg total) by mouth daily  Qty: 30 tablet, Refills: 0    Associated Diagnoses: Acute on chronic systolic (congestive) heart failure (HCC) hydrocortisone (ANUSOL-HC) 2 5 % rectal cream Apply topically 2 (two) times a day  Qty: 1 Tube, Refills: 0    Associated Diagnoses: Bleeding hemorrhoid      metoprolol succinate (TOPROL-XL) 25 mg 24 hr tablet Take 0 5 tablets (12 5 mg total) by mouth daily  Qty:  , Refills: 0    Associated Diagnoses: Cardiomyopathy ([address] )      nystatin (MYCOSTATIN) powder Apply topically 4 (four) times a day  Qty: 15 g, Refills: 2    Associated Diagnoses: Candidal intertrigo      pantoprazole (PROTONIX) 40 mg tablet TAKE 1 TABLET BY MOUTH EVERY DAY  Qty: 30 tablet, Refills: 0    Associated Diagnoses: Gastroesophageal reflux disease without esophagitis      phenylephrine-shark liver oil-mineral oil-petrolatum (PREPARATION H) 0 25-3-14-71 9 % rectal ointment Insert into the rectum 2 (two) times a day as needed for hemorrhoids  Qty: 30 g, Refills: 0    Associated Diagnoses: Bleeding hemorrhoid      potassium chloride 40 MEQ/15ML (20%) oral solution Take 15 mL (40 mEq total) by mouth daily  Qty: 450 mL, Refills: 5    Associated Diagnoses: Hyperkalemia      senna (SENOKOT) 8 6 mg Take 1 tablet (8 6 mg total) by mouth daily at bedtime  Qty: 120 each, Refills: 0    Associated Diagnoses: Constipation      tolterodine (DETROL LA) 4 mg 24 hr capsule Take 4 mg by mouth daily      Multiple Vitamins-Minerals (PRESERVISION AREDS) CAPS Take by mouth 2 (two) times a day            No discharge procedures on file      PDMP Review     None          ED Provider  Electronically Signed by           Herlinda Rivera DO  03/18/21 0943

## 2021-03-18 NOTE — H&P
Sandra Garza 19 12/8/1927, 80 y o  female MRN: [id number]  Unit/Bed#: ISABELA Encounter: [id number]  Primary Care Provider: Ledy Smith MD   Date and time admitted to hospital: 3/18/2021 11:41 AM    * Dysphagia  Assessment & Plan  Patient with significant dysphagia to solid food which has been present for at least 1-2 weeks   upper esophagus is moderately distended with air, possibly representing mild achalasia  Speech consultation placed and discussed    Recommend she eye evaluation given location the esophagus  Continue sips with liquids for now  Potential swallow study for tomorrow versus direct evaluation    CKD (chronic kidney disease) stage 3, GFR 30-59 ml/min  Assessment & Plan  Lab Results   Component Value Date    EGFR 27 03/18/2021    EGFR 29 02/26/2021    EGFR 29 02/25/2021    CREATININE 1 65 (H) 03/18/2021    CREATININE 1 52 (H) 02/26/2021    CREATININE 1 55 (H) 02/25/2021   Monitor renal function with diuresis    Lactic acid increased  Assessment & Plan  Lactic acid elevated in the setting of vomiting episode  Given 1 dose of intravenous Zosyn  Procalcitonin is pending  Chest x-ray reviewed, she is on room air  1/4 sirs criteria being elevated heart rate  Hold antibiotics for now, likely small component of aspiration pneumonitis    Elevated troponin  Assessment & Plan  Minimally elevated troponin level in the setting no congestive heart failure  No chest pain  Suspect non a mild troponin elevation  Recent Labs     03/18/21  1151   TROPONINI 0 16*         CHF (congestive heart failure) ([facility])  Assessment & Plan  Wt Readings from Last 3 Encounters:   03/11/21 72 6 kg (160 lb)   02/26/21 71 8 kg (158 lb 4 6 oz)   01/15/21 76 kg (167 lb 8 8 oz)     With acute on chronic systolic congestive heart failure exacerbation  She appears to be 2 lb from her dry weight  Will place on intravenous furosemide  Continue beta-blocker  Chest x-ray reviewed with improvement from her previous from February  Likely resume home regimen tomorrow  ProBNP elevated, mild lower extremity edema - continue compression stocking  Lab Results   Component Value Date    NTBNP 33,000 (H) 03/18/2021           Anemia  Assessment & Plan  Hemoglobin is stable, monitor while admitted    GERD (gastroesophageal reflux disease)  Assessment & Plan  Resume Protonix    Hyperkalemia  Assessment & Plan  Recent Labs     03/18/21  1151   K 5 6*   Hold potassium supplementation  Repeat in a m  Will receive intravenous furosemide      CKD (chronic kidney disease)-resolved as of 3/18/2021  Assessment & Plan  Lab Results   Component Value Date    EGFR 27 03/18/2021    EGFR 29 02/26/2021    EGFR 29 02/25/2021    CREATININE 1 65 (H) 03/18/2021    CREATININE 1 52 (H) 02/26/2021    CREATININE 1 55 (H) 02/25/2021         VTE Prophylaxis: Heparin  / sequential compression device   Code Status:  DNR DNI  POLST: There is no POLST form on file for this patient (pre-hospital)  Discussion with family:  Patient's daughter at bedside    Anticipated Length of Stay:  Patient will be admitted on an Inpatient basis with an anticipated length of stay of  greater than 2 midnights  Justification for Hospital Stay:  Heart failure exacerbation, dysphagia    Total Time for Visit, including Counseling / Coordination of Care: 45 minutes  Greater than 50% of this total time spent on direct patient counseling and coordination of care  Chief Complaint:   Vomiting    History of Present Illness:    Leif Valera is a 80 y o  female who presents with vomiting and dysphagia to solid foods  She reports that since her discharge a few weeks ago, she has had difficulty with eating solid foods  She is able to take her pills  Today she had a few vomiting episodes while eating  She also endorses some weight gain as well as mild shortness of breath  She denies any chest pain  No nausea or vomiting during my examination    She is chest pain-free currently  She has no other medication changes, denies any recent travel except history  Denies any diarrhea    Review of Systems:    A complete and comprehensive 14 point organ system review was performed and all other systems are negative other than stated above in the HPI    Past Medical and Surgical History:     Past Medical History:   Diagnosis Date    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  Authorizing Provider   acetaminophen (TYLENOL) 325 mg tablet Take 2 tablets (650 mg total) by mouth every 6 (six) hours 9/2/20  Yes Dara Reynaga MD   cholecalciferol (VITAMIN D3) 1,000 units tablet Take by mouth 1/15/18  Yes Historical Provider, MD   furosemide (LASIX) 40 mg tablet Take 1 tablet (40 mg total) by mouth daily 2/27/21 3/29/21 Yes Nelida Jerome PA-C   hydrocortisone (ANUSOL-HC) 2 5 % rectal cream Apply topically 2 (two) times a day  Patient taking differently: Apply topically 2 (two) times a day as needed  10/29/20  Yes Micheal Yanez DO   metoprolol succinate (TOPROL-XL) 25 mg 24 hr tablet Take 0 5 tablets (12 5 mg total) by mouth daily 9/2/20  Yes Dara Reynaga MD   nystatin (MYCOSTATIN) powder Apply topically 4 (four) times a day 3/11/21  Yes Segundo Lal MD   pantoprazole (PROTONIX) 40 mg tablet TAKE 1 TABLET BY MOUTH EVERY DAY 8/7/20  Yes Segundo Lal MD   phenylephrine-shark liver oil-mineral oil-petrolatum (PREPARATION H) 0 25-3-14-71 9 % rectal ointment Insert into the rectum 2 (two) times a day as needed for hemorrhoids 10/29/20  Yes Gracilea David DO   potassium chloride 40 MEQ/15ML (20%) oral solution Take 15 mL (40 mEq total) by mouth daily 3/11/21 9/7/21 Yes Ranjit Millard MD   senna (SENOKOT) 8 6 mg Take 1 tablet (8 6 mg total) by mouth daily at bedtime 9/2/20  Yes Nirali Velasquez MD   tolterodine (DETROL LA) 4 mg 24 hr capsule Take 4 mg by mouth daily   Yes Historical Provider, MD   Multiple Vitamins-Minerals (PRESERVISION AREDS) CAPS Take by mouth 2 (two) times a day  1/15/18   Historical Provider, MD   ascorbic acid (VITAMIN C) 1000 MG tablet Take 1 tablet (1,000 mg total) by mouth every 12 (twelve) hours for 6 doses 1/15/21 3/18/21  Heath Dupree MD     I have reviewed home medications with patient personally  Allergies:    Allergies   Allergen Reactions    Sulfa Antibiotics Hives       Social History:     Marital Status: /Civil Union   Occupation:  Retired    Substance Use History:   Social History     Substance and Sexual Activity   Alcohol Use Yes    Comment: Minimal consumption     Social History     Tobacco Use   Smoking Status Never Smoker   Smokeless Tobacco Never Used   Tobacco Comment    No secondhand smoke exposure     Social History     Substance and Sexual Activity   Drug Use No       Family History:    Family History   Problem Relation Age of Onset    Heart failure Mother     Coronary artery disease Father        Physical Exam:     Vitals:   Blood Pressure: 112/65 (03/18/21 1600)  Pulse: 94 (03/18/21 1600)  Temperature: (!) 97 4 °F (36 3 °C) (03/18/21 1201)  Temp Source: Oral (03/18/21 1201)  Respirations: 22 (03/18/21 1600)  SpO2: 93 % (03/18/21 1600)      General: well appearing, no acute distress  HEENT: atraumatic, PERRLA, moist mucosa, normal pharynx, normal tonsils and adenoids, normal tongue, no fluid in sinuses  Neck: Trachea midline, no carotid bruit, no masses  Respiratory: normal chest wall expansion, rales bilaterally  Cardiovascular: RRR, murmur present  Abdomen: Soft, non-tender, non-distended, normal bowel sounds in all quadrants, no hepatosplenomegaly, no tympany  Rectal: deferred  Musculoskeletal: normal ROM in upper and lower extremities  Integumentary: warm, dry, and pink, with no rash, purpura, or petechia  Heme/Lymph: no lymphadenopathy, no bruises  Neurological: Cranial Nerves II-XII grossly intact  Psychiatric:  Apparent dementia    Additional Data:     Lab Results: I have personally reviewed pertinent reports  Results from last 7 days   Lab Units 03/18/21  1151   WBC Thousand/uL 6 08   HEMOGLOBIN g/dL 12 2   HEMATOCRIT % 38 0   PLATELETS Thousands/uL 189   NEUTROS PCT % 74   LYMPHS PCT % 17   MONOS PCT % 7   EOS PCT % 1     Results from last 7 days   Lab Units 03/18/21  1151   SODIUM mmol/L 139   POTASSIUM mmol/L 5 6*   CHLORIDE mmol/L 105   CO2 mmol/L 23   BUN mg/dL 34*   CREATININE mg/dL 1 65*   ANION GAP mmol/L 11   CALCIUM mg/dL 9 4   ALBUMIN g/dL 4 1   TOTAL BILIRUBIN mg/dL 1 41*   ALK PHOS U/L 109   ALT U/L 25   AST U/L 20   GLUCOSE RANDOM mg/dL 134     Results from last 7 days   Lab Units 03/18/21  1151   INR  1 30*     Results from last 7 days   Lab Units 03/18/21  1158   POC GLUCOSE mg/dl 132         Results from last 7 days   Lab Units 03/18/21  1357 03/18/21  1151   LACTIC ACID mmol/L 2 1* 2 3*       Imaging: I have personally reviewed pertinent reports  CT head without contrast   ED Interpretation by Mathew Wilkinson DO (03/18 1522)   IMPRESSION:     No acute intracranial abnormality        Chronic cerebral white matter microangiopathic changes      Stable head CT when compared to the prior study of August 26, 2020  Final Result by Cira Friend MD (03/18 1518)      No acute intracranial abnormality  Chronic cerebral white matter microangiopathic changes        Stable head CT when compared to the prior study of August 26, 2020  Workstation performed: [id number]         CT soft tissue neck wo contrast   ED Interpretation by Alanna Dean DO (03/18 5398)   IMPRESSION:     The upper esophagus is moderately distended with air, possibly representing mild achalasia  Consider follow-up nonemergent swallowing study      No pathologic adenopathy or mass within the neck          Final Result by Pierre Broussard DO (03/18 8856)      The upper esophagus is moderately distended with air, possibly representing mild achalasia  Consider follow-up nonemergent swallowing study  No pathologic adenopathy or mass within the neck  Workstation performed: [id number]         CT abdomen pelvis wo contrast   ED Interpretation by Alanna Dean DO (03/18 0956)   IMPRESSION:     1  No acute findings to account for the patient's symptoms within the limits of unenhanced technique      2  Infrarenal abdominal aortic aneurysm measuring 3 8 cm      3   Age-indeterminate compression fractures which are severe at L1 with bony retropulsion, and moderate at L3        4   Small to moderate bilateral pleural effusions      5  Nonspecific right lower lobe groundglass opacities may be due to atelectasis or pneumonia in the appropriate clinical setting      6   Cardiomegaly  Final Result by Jasper Ruano MD (03/18 9905)      1  No acute findings to account for the patient's symptoms within the limits of unenhanced technique  2   Infrarenal abdominal aortic aneurysm measuring 3 8 cm  3   Age-indeterminate compression fractures which are severe at L1 with bony retropulsion, and moderate at L3         4   Small to moderate bilateral pleural effusions  5   Nonspecific right lower lobe groundglass opacities may be due to atelectasis or pneumonia in the appropriate clinical setting  6   Cardiomegaly  The study was marked in [facility] for immediate notification                 Workstation performed: [id number]         XR chest 1 view portable   Final Result by Mara Beck DO (03/18 1614)      Left basilar effusion with superimposed basilar opacity, possibly atelectasis or pneumonia  No congestive failure  Workstation performed: [id number]             EKG, Pathology, and Other Studies Reviewed on Admission:   · EKG: With a normal sinus rhythm, rate of 99  QTC of 474  · No acute ST segment elevations or depressions     Allscripts / Epic Records Reviewed: Yes     ** Please Note: This note was completed in part utilizing [employer] Software  Grammatical errors, random word insertions, spelling mistakes, and incomplete sentences may be an occasional consequence of this system secondary to software limitations, ambient noise, and hardware issues  If you have any questions or concerns about the content, text, or information contained within the body of this dictation, please contact the provider for clarification  **

## 2021-03-18 NOTE — ED NOTES
Patient transported to [facility], [address] [place] [address]  03/18/21 4967

## 2021-03-18 NOTE — ED NOTES
Junior Degroot updated on patients status and treatment plan with verbal consent from Patient          Jadyn Landers RN  03/18/21 4190

## 2021-03-18 NOTE — ED NOTES
Spoke to son with patient's consent  Son Juliet Rios raised concerns regarding mother's swallowing lucas [employer]  Nurse Elizabeth at [address] [facility] confirmed patient had difficulty swallowing medications and swallowing consult was pending  Dr Danni Clark aware                   Abdoulaye Gutierrez, RIANNA  03/18/21 4642

## 2021-03-19 PROBLEM — I50.23 ACUTE ON CHRONIC SYSTOLIC CONGESTIVE HEART FAILURE (HCC): Status: ACTIVE | Noted: 2021-01-01

## 2021-03-19 PROBLEM — E87.2 LACTIC ACID INCREASED: Status: RESOLVED | Noted: 2021-01-01 | Resolved: 2021-01-01

## 2021-03-19 PROBLEM — N18.32 STAGE 3B CHRONIC KIDNEY DISEASE (HCC): Status: ACTIVE | Noted: 2021-01-01

## 2021-03-19 NOTE — RAPID RESPONSE
Rapid Response Note  Mckayla Chase 80 y o  female MRN: [id number]  Unit/Bed#: E4 -01 Encounter: [id number]    Rapid Response Notifications/Interventions     Background/Situation:   Mckayla Chase is a 80 y o  female who PRESENTS WITH COMPLAINTS OF VOMITING AND DYSPHAGIA WITH SOLID FOOD  A RAPID RESPONSE WAS CALLED TODAY SECONDARY TO THE PATIENT BEING SHORT OF BREATH AND HYPOTENSIVE  PATIENT HAS A HISTORY OF CONGESTIVE HEART FAILURE AND IS PRESENTING WITH A EXACERBATION OF HEART FAILURE AT THIS TIME WITH A 2 LB WEIGHT GAIN  UPON MY ARRIVAL THE PATIENT IS ON A NON-REBREATHER MASK AND IS NOT RESPONDING  DR Georgette Burris IS AT THE BEDSIDE  HE STATES THAT THIS ISN'T ACUTE CHANGE IN THE PATIENT'S CONDITION  FURTHERMORE, HE INFORMED ME THAT THE PATIENT IS A DNR DNI  IN LIGHT OF THIS INFORMATION THERE WAS NOTHING CRITICAL CARE HAD TO OFFER  MEDICINE WAS IN CONTROL THE SITUATION  Review of Systems    Objective:   Vitals:    03/19/21 0300 03/19/21 0700 03/19/21 1025 03/19/21 1227   BP: 110/70 144/89  142/68   BP Location: Left arm Left arm  Left arm   Pulse: 96 (!) 116  (!) 107   Resp: 20 18  (!) 24   Temp: (!) 97 2 °F (36 2 °C) 98 5 °F (36 9 °C)  (!) 96 8 °F (36 °C)   TempSrc: Temporal Temporal  Temporal   SpO2: 90% 92% 94% 94%   Weight:       Height:         Physical Exam    Assessment:   · ACUTE ON CHRONIC CONGESTIVE HEART FAILURE  · SEVERE HYPOTENSION  · DYSPHAGIA  · DNR DNI    Plan:   · I OFFERED TO ASSIST IN ANY WAY NEEDED, DR Georgette Burris ASSUMED CONTROL OF THE SITUATION     Rapid Response Outcome  Family notified of transfer: {YES BY DR Georgette Burris  Family member contacted:  SON     Portions of the record may have been created with voice recognition software  Occasional wrong word or "sound a like" substitutions may have occurred due to the inherent limitations of voice recognition software  Read the chart carefully and recognize, using context, where substitutions have occurred      HALLE Shin

## 2021-03-19 NOTE — ASSESSMENT & PLAN NOTE
Stable  No indication for transfusion at this time      Recent Labs     03/18/21  1151   HGB 12 2   *   RDW 16 3*

## 2021-03-19 NOTE — PLAN OF CARE
Problem: Potential for Falls  Goal: Patient will remain free of falls  Description: INTERVENTIONS:  - Assess patient frequently for physical needs  -  Identify cognitive and physical deficits and behaviors that affect risk of falls  -  [place] fall precautions as indicated by assessment   - Educate patient/family on patient safety including physical limitations  - Instruct patient to call for assistance with activity based on assessment  - Modify environment to reduce risk of injury  - Consider OT/PT consult to assist with strengthening/mobility  Outcome: Progressing     Problem: Prexisting or High Potential for Compromised Skin Integrity  Goal: Skin integrity is maintained or improved  Description: INTERVENTIONS:  - Identify patients at risk for skin breakdown  - Assess and monitor skin integrity  - Assess and monitor nutrition and hydration status  - Monitor labs   - Assess for incontinence   - Turn and reposition patient  - Assist with mobility/ambulation  - Relieve pressure over bony prominences  - Avoid friction and shearing  - Provide appropriate hygiene as needed including keeping skin clean and dry  - Evaluate need for skin moisturizer/barrier cream  - Collaborate with interdisciplinary team   - Patient/family teaching  - Consider wound care consult   Outcome: Progressing     Problem: SAFETY ADULT  Goal: Patient will remain free of falls  Description: INTERVENTIONS:  - Assess patient frequently for physical needs  -  Identify cognitive and physical deficits and behaviors that affect risk of falls    -  [place] fall precautions as indicated by assessment   - Educate patient/family on patient safety including physical limitations  - Instruct patient to call for assistance with activity based on assessment  - Modify environment to reduce risk of injury  - Consider OT/PT consult to assist with strengthening/mobility  Outcome: Progressing  Goal: Maintain or return to baseline ADL function  Description: INTERVENTIONS:  -  Assess patient's ability to carry out ADLs; assess patient's baseline for ADL function and identify physical deficits which impact ability to perform ADLs (bathing, care of mouth/teeth, toileting, grooming, dressing, etc )  - Assess/evaluate cause of self-care deficits   - Assess range of motion  - Assess patient's mobility; develop plan if impaired  - Assess patient's need for assistive devices and provide as appropriate  - Encourage maximum independence but intervene and supervise when necessary  - Involve family in performance of ADLs  - Assess for home care needs following discharge   - Consider OT consult to assist with ADL evaluation and planning for discharge  - Provide patient education as appropriate  Outcome: Progressing  Goal: Maintain or return mobility status to optimal level  Description: INTERVENTIONS:  - Assess patient's baseline mobility status (ambulation, transfers, stairs, etc )    - Identify cognitive and physical deficits and behaviors that affect mobility  - Identify mobility aids required to assist with transfers and/or ambulation (gait belt, sit-to-stand, lift, walker, cane, etc )  - [place] fall precautions as indicated by assessment  - Record patient progress and toleration of activity level on Mobility SBAR; progress patient to next Phase/Stage  - Instruct patient to call for assistance with activity based on assessment  - Consider rehabilitation consult to assist with strengthening/weightbearing, etc   Outcome: Progressing     Problem: DISCHARGE PLANNING  Goal: Discharge to home or other facility with appropriate resources  Description: INTERVENTIONS:  - Identify barriers to discharge w/patient and caregiver  - Arrange for needed discharge resources and transportation as appropriate  - Identify discharge learning needs (meds, wound care, etc )  - Arrange for interpretive services to assist at discharge as needed  - Refer to Case Management Department for coordinating discharge planning if the patient needs post-hospital services based on physician/advanced practitioner order or complex needs related to functional status, cognitive ability, or social support system  Outcome: Progressing     Problem: Knowledge Deficit  Goal: Patient/family/caregiver demonstrates understanding of disease process, treatment plan, medications, and discharge instructions  Description: Complete learning assessment and assess knowledge base  Interventions:  - Provide teaching at level of understanding  - Provide teaching via preferred learning methods  Outcome: Progressing     Problem: Nutrition/Hydration-ADULT  Goal: Nutrient/Hydration intake appropriate for improving, restoring or maintaining nutritional needs  Description: Monitor and assess patient's nutrition/hydration status for malnutrition  Collaborate with interdisciplinary team and initiate plan and interventions as ordered  Monitor patient's weight and dietary intake as ordered or per policy  Utilize nutrition screening tool and intervene as necessary  Determine patient's food preferences and provide high-protein, high-caloric foods as appropriate       INTERVENTIONS:  - Monitor oral intake, urinary output, labs, and treatment plans  - Assess nutrition and hydration status and recommend course of action  - Evaluate amount of meals eaten  - Assist patient with eating if necessary   - Allow adequate time for meals  - Recommend/ encourage appropriate diets, oral nutritional supplements, and vitamin/mineral supplements  - Order, calculate, and assess calorie counts as needed  - Recommend, monitor, and adjust tube feedings and TPN/PPN based on assessed needs  - Assess need for intravenous fluids  - Provide specific nutrition/hydration education as appropriate  - Include patient/family/caregiver in decisions related to nutrition  Outcome: Progressing

## 2021-03-19 NOTE — NURSING NOTE
Patient noted to be increasingly tachypneic with RR at 24-26  Patient O2 saturation was 89% on 2 L NC  Patient noted to be mouth breathing, pt placed on 15 L nonrebreather to maintain sats in low 90%  Patient's color noted to be gray with circumoral cyanosis  Dr  Avanell Cinnamon called to bedside and rapid response was called

## 2021-03-19 NOTE — DISCHARGE SUMMARY
[facility]  Discharge- Zaire Umanzor 1927, 80 y o  female MRN: [id number]  Unit/Bed#: E4 -01 Encounter: [id number]  Primary Care Provider: Ledy Smith MD   Date and time admitted to hospital: 3/18/2021 11:41 AM    * Dysphagia  Assessment & Plan  80year old female admitted due to dysphagia  CT A/p showing upper esophagus is moderately distended with air, possibly representing mild achalasia  Spoke to both speech and GI   - She may possibly benefit from an EGD, but due to critically ill condition  Procedure was cancelled  Primary cause of death: Acute respiratory failure with hypoxia due to 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    Discharging Physician / Practitioner: Dina Ayers MD  PCP: Ledy Smith MD  Admission Date:   Admission Orders (From admission, onward)     Ordered        21 1533  Inpatient Admission  Once                   Discharge Date: 21    Resolved Problems  Date Reviewed: 3/19/2021          Resolved    CKD (chronic kidney disease) 3/18/2021     Resolved by  Terrell Yun DO    Lactic acid increased 3/19/2021     Resolved by  Dina Ayers MD          Consultations During Hospital Stay:  · Cardiology, GI, rapid response    Significant Findings / Test Results:   · See Epic    Complications:      Reason for Admission: dysphagia    Hospital Course:     Zaire Umanzor is a 80 y o  female patient who originally presented to the hospital on 3/18/2021 due to dysphagia  Imaging studies were performed  See epic  Cardiology and GI were both consulted  GI stated that she is not stable enough for an EGD  She decompensated despite IV diuresis  Rapid response was called  Patient and her son Michelle Lama who I personally spoke over the telephone did not want to escalate care  DNR/DNI confirmed  She continued to have agonal breathing, thus Michelle Lama requested placing her on comfort measures   Patients daughter and granddaughter went to the hospital immediately  At 2:38PM, patient   Family informed  Condition at Discharge:     Discharge Day Visit / Exam:     Subjective:  Patient   Vitals: no HR, BP, RR  Exam:   Unresponsive to noxious stimuli, Spontaneous respirations absent, Breath sounds absent, Carotid pulse absent, Heart sounds absent, Pupillary light reflex absent and Corneal blink reflex absent    Discussion with Family: Rhona Villalobos    Discharge instructions/Information to patient and family:   See after visit summary for information provided to patient and family  Provisions for Follow-Up Care:  See after visit summary for information related to follow-up care and any pertinent home health orders  Disposition:           Planned Readmission: No     Discharge Statement:  I spent 70 minutes discharging the patient  This time was spent on the day of discharge  I had direct contact with the patient on the day of discharge  Greater than 50% of the total time was spent examining patient, answering all patient questions, arranging and discussing plan of care with patient as well as directly providing post-discharge instructions  Additional time then spent on discharge activities  Discharge Medications:  See after visit summary for reconciled discharge medications provided to patient and family        ** Please Note: This note has been constructed using a voice recognition system **

## 2021-03-19 NOTE — DEATH NOTE
INPATIENT DEATH NOTE  Terrell Boogie 80 y o  female MRN: [id number]  Unit/Bed#: E4 -01 Encounter: [id number]             PHYSICAL EXAM:  Unresponsive to noxious stimuli, Spontaneous respirations absent, Breath sounds absent, Carotid pulse absent, Heart sounds absent, Pupillary light reflex absent and Corneal blink reflex absent    Medical Examiner notification criteria:  NONE APPLICABLE   Medical Examiner's office notified?:  No, does not meet ME notification criteria   Medical Examiner accepted case?:  No    Autopsy Options:  Post-mortem examination declined by next of kin    Primary Service Attending Physician notified?:  yes - Attending:  Padmini Dominguez MD    Physician/Resident responsible for completing Discharge Summary:  Attending

## 2021-03-19 NOTE — ASSESSMENT & PLAN NOTE
80year old female admitted due to dysphagia  CT A/p showing upper esophagus is moderately distended with air, possibly representing mild achalasia  Spoke to both speech and GI   - EGD was deferred due to unstable medical status    Unfortunately, she developed respiratory distress despite IV diuresis  Cardiology was consulted who suspects that she is having an acute exacerbation of her acute on chronic heart failure  Rapid response called  Spoke to patient and her son Arley Riedel who both did not want to escalate care  DNR/DNI was confirmed, but Arley Riedel decided to switch her to comfort care measures since she continued to have agonal breathing and did not want her to suffer anymore

## 2021-03-19 NOTE — ASSESSMENT & PLAN NOTE
Continue monitoring  Renally dose medications      Recent Labs     03/18/21  1151   BUN 34*   CREATININE 1 65*   EGFR 27       Results from last 7 days   Lab Units 03/18/21  1524   BLOOD UA  Trace-Intact*   PROTEIN UA mg/dl Negative

## 2021-03-19 NOTE — ASSESSMENT & PLAN NOTE
Possibly secondary to demand    Recent Labs     03/18/21  1151 03/18/21  1530   TROPONINI 0 16* 0 14*

## 2021-03-19 NOTE — ASSESSMENT & PLAN NOTE
Wt Readings from Last 3 Encounters:   03/18/21 73 1 kg (161 lb 2 5 oz)   03/11/21 72 6 kg (160 lb)   02/26/21 71 8 kg (158 lb 4 6 oz)     2 lbs above dry weight   - Continue IV lasix  - Continue BB

## 2021-03-19 NOTE — PROGRESS NOTES
[facility]  Progress Note Josue Valentin 12/8/1927, 80 y o  female MRN: [id number]  Unit/Bed#: E4 -01 Encounter: [id number]  Primary Care Provider: Alisa Godinez MD   Date and time admitted to hospital: 3/18/2021 11:41 AM    * Dysphagia  Assessment & Plan  80year old female admitted due to dysphagia  CT A/p showing upper esophagus is moderately distended with air, possibly representing mild achalasia  Spoke to both speech and GI   - She may possibly benefit from an EGD, but will defer to GI  Will maintain on NPO status for now as per my discussion with GI who will consider EGD   - D5 NS for now  Stage 3b chronic kidney disease  Assessment & Plan  Continue monitoring  Renally dose medications  Recent Labs     03/18/21  1151   BUN 34*   CREATININE 1 65*   EGFR 27       Results from last 7 days   Lab Units 03/18/21  1524   BLOOD UA  Trace-Intact*   PROTEIN UA mg/dl Negative         Anemia  Assessment & Plan  Stable  No indication for transfusion at this time  Recent Labs     03/18/21  1151   HGB 12 2   *   RDW 16 3*         GERD (gastroesophageal reflux disease)  Assessment & Plan  Continue PPI    Hyperkalemia  Assessment & Plan  Recent Labs     03/18/21  1151   K 5 6*     Awaiting repeat BMP      Elevated troponin  Assessment & Plan  Possibly secondary to demand    Recent Labs     03/18/21  1151 03/18/21  1530   TROPONINI 0 16* 0 14*         Acute on chronic systolic congestive heart failure [facility])  Assessment & Plan  Wt Readings from Last 3 Encounters:   03/18/21 73 1 kg (161 lb 2 5 oz)   03/11/21 72 6 kg (160 lb)   02/26/21 71 8 kg (158 lb 4 6 oz)     2 lbs above dry weight   - Continue IV lasix  - Continue BB      Lactic acid increased-resolved as of 3/19/2021  Assessment & Plan  Resolved    Recent Labs     03/18/21  1151 03/18/21  1357 03/18/21  1833   LACTICACID 2 3* 2 1* 1 9           VTE Pharmacologic Prophylaxis:   Pharmacologic: in case procedure will happen  Mechanical VTE Prophylaxis in Place: Yes    Patient Centered Rounds: I have performed bedside rounds with nursing staff today  Discussions with Specialists or Other Care Team Provider: Nursing    Education and Discussions with Family / Patient: patient, son    Time Spent for Care: 30 minutes  More than 50% of total time spent on counseling and coordination of care as described above  Current Length of Stay: 1 day(s)    Current Patient Status: Inpatient   Certification Statement: The patient will continue to require additional inpatient hospital stay due to iv diuresis, cardiology, and gi eval    Discharge Plan: active    Code Status: Prior      Subjective:   Patient seen and examined at bedside, she seems to have some increased work of breathing  Speech deferred diet to GI, but GI currently deferring procedure at this time due to breathing  Discussed case with gi and cards  Updated her son via telephone  Objective:     Vitals:   Temp (24hrs), Av 6 °F (36 4 °C), Min:96 5 °F (35 8 °C), Max:98 6 °F (37 °C)    Temp:  [96 5 °F (35 8 °C)-98 6 °F (37 °C)] 98 5 °F (36 9 °C)  HR:  [] 116  Resp:  [18-22] 18  BP: (110-144)/(64-89) 144/89  SpO2:  [90 %-95 %] 92 %  Body mass index is 26 01 kg/m²  Input and Output Summary (last 24 hours): Intake/Output Summary (Last 24 hours) at 3/19/2021 1158  Last data filed at 3/18/2021 1945  Gross per 24 hour   Intake 50 ml   Output 400 ml   Net -350 ml       Physical Exam:     Physical Exam  Vitals signs reviewed  HENT:      Head: Normocephalic  Nose: Nose normal    Eyes:      General: No scleral icterus  Cardiovascular:      Rate and Rhythm: Tachycardia present  Rhythm irregular  Pulmonary:      Effort: Respiratory distress present  Breath sounds: No wheezing  Abdominal:      General: There is no distension  Palpations: Abdomen is soft  Tenderness: There is no abdominal tenderness  Skin:     General: Skin is warm  Neurological:      Mental Status: She is alert  Mental status is at baseline  Psychiatric:         Mood and Affect: Mood normal          Behavior: Behavior normal        Additional Data:     Labs:    Results from last 7 days   Lab Units 03/18/21  1151   WBC Thousand/uL 6 08   HEMOGLOBIN g/dL 12 2   HEMATOCRIT % 38 0   PLATELETS Thousands/uL 189   NEUTROS PCT % 74   LYMPHS PCT % 17   MONOS PCT % 7   EOS PCT % 1     Results from last 7 days   Lab Units 03/18/21  1151   SODIUM mmol/L 139   POTASSIUM mmol/L 5 6*   CHLORIDE mmol/L 105   CO2 mmol/L 23   BUN mg/dL 34*   CREATININE mg/dL 1 65*   ANION GAP mmol/L 11   CALCIUM mg/dL 9 4   ALBUMIN g/dL 4 1   TOTAL BILIRUBIN mg/dL 1 41*   ALK PHOS U/L 109   ALT U/L 25   AST U/L 20   GLUCOSE RANDOM mg/dL 134     Results from last 7 days   Lab Units 03/18/21  1151   INR  1 30*     Results from last 7 days   Lab Units 03/18/21  1158   POC GLUCOSE mg/dl 132         Results from last 7 days   Lab Units 03/18/21  1833 03/18/21  1357 03/18/21  1151   LACTIC ACID mmol/L 1 9 2 1* 2 3*   PROCALCITONIN ng/ml  --   --  <0 05           * I Have Reviewed All Lab Data Listed Above  * Additional Pertinent Lab Tests Reviewed: [facility]    Imaging:    Imaging Reports Reviewed Today Include: xr chest, ct a/p, ct head wo contrast, ct soft tissue neck    Recent Cultures (last 7 days):     Results from last 7 days   Lab Units 03/18/21  1243 03/18/21  1151   BLOOD CULTURE  Received in Microbiology Lab  Culture in Progress  Received in Microbiology Lab  Culture in Progress         Last 24 Hours Medication List:   Current Facility-Administered Medications   Medication Dose Route Frequency Provider Last Rate    dextrose 5 % and sodium chloride 0 9 %  50 mL/hr Intravenous Continuous Kalyani Arroyo MD      furosemide  20 mg Intravenous BID (diuretic) Ilene Barrios DO      HYDROmorphone  0 2 mg Intravenous Q6H PRN Lenoard DANIEL Jean Baptiste      metoclopramide  10 mg Intravenous Q6H PRN Agustin Husbands, PA-C      metoprolol succinate  12 5 mg Oral Daily Dallas Rae, DO      oxybutynin  5 mg Oral Daily Dallas Rae, DO      pantoprazole  40 mg Oral Daily Before Breakfast Dallas Rae, DO      senna  1 tablet Oral HS Dallas Rae,       sodium chloride (PF)  3 mL Intravenous Q1H PRN Leena Samaniego DO          Today, Patient Was Seen By: Natalya Mays MD    ** Please Note: Dictation voice to text software may have been used in the creation of this document   **

## 2021-03-19 NOTE — PLAN OF CARE
Problem: Potential for Falls  Goal: Patient will remain free of falls  Description: INTERVENTIONS:  - Assess patient frequently for physical needs  -  Identify cognitive and physical deficits and behaviors that affect risk of falls  -  [place] fall precautions as indicated by assessment   - Educate patient/family on patient safety including physical limitations  - Instruct patient to call for assistance with activity based on assessment  - Modify environment to reduce risk of injury  - Consider OT/PT consult to assist with strengthening/mobility  Outcome: Progressing     Problem: Prexisting or High Potential for Compromised Skin Integrity  Goal: Skin integrity is maintained or improved  Description: INTERVENTIONS:  - Identify patients at risk for skin breakdown  - Assess and monitor skin integrity  - Assess and monitor nutrition and hydration status  - Monitor labs   - Assess for incontinence   - Turn and reposition patient  - Assist with mobility/ambulation  - Relieve pressure over bony prominences  - Avoid friction and shearing  - Provide appropriate hygiene as needed including keeping skin clean and dry  - Evaluate need for skin moisturizer/barrier cream  - Collaborate with interdisciplinary team   - Patient/family teaching  - Consider wound care consult   Outcome: Progressing     Problem: SAFETY ADULT  Goal: Patient will remain free of falls  Description: INTERVENTIONS:  - Assess patient frequently for physical needs  -  Identify cognitive and physical deficits and behaviors that affect risk of falls    -  [place] fall precautions as indicated by assessment   - Educate patient/family on patient safety including physical limitations  - Instruct patient to call for assistance with activity based on assessment  - Modify environment to reduce risk of injury  - Consider OT/PT consult to assist with strengthening/mobility  Outcome: Progressing  Goal: Maintain or return to baseline ADL function  Description: INTERVENTIONS:  -  Assess patient's ability to carry out ADLs; assess patient's baseline for ADL function and identify physical deficits which impact ability to perform ADLs (bathing, care of mouth/teeth, toileting, grooming, dressing, etc )  - Assess/evaluate cause of self-care deficits   - Assess range of motion  - Assess patient's mobility; develop plan if impaired  - Assess patient's need for assistive devices and provide as appropriate  - Encourage maximum independence but intervene and supervise when necessary  - Involve family in performance of ADLs  - Assess for home care needs following discharge   - Consider OT consult to assist with ADL evaluation and planning for discharge  - Provide patient education as appropriate  Outcome: Progressing  Goal: Maintain or return mobility status to optimal level  Description: INTERVENTIONS:  - Assess patient's baseline mobility status (ambulation, transfers, stairs, etc )    - Identify cognitive and physical deficits and behaviors that affect mobility  - Identify mobility aids required to assist with transfers and/or ambulation (gait belt, sit-to-stand, lift, walker, cane, etc )  - [place] fall precautions as indicated by assessment  - Record patient progress and toleration of activity level on Mobility SBAR; progress patient to next Phase/Stage  - Instruct patient to call for assistance with activity based on assessment  - Consider rehabilitation consult to assist with strengthening/weightbearing, etc   Outcome: Progressing     Problem: DISCHARGE PLANNING  Goal: Discharge to home or other facility with appropriate resources  Description: INTERVENTIONS:  - Identify barriers to discharge w/patient and caregiver  - Arrange for needed discharge resources and transportation as appropriate  - Identify discharge learning needs (meds, wound care, etc )  - Arrange for interpretive services to assist at discharge as needed  - Refer to Case Management Department for coordinating discharge planning if the patient needs post-hospital services based on physician/advanced practitioner order or complex needs related to functional status, cognitive ability, or social support system  Outcome: Progressing     Problem: Knowledge Deficit  Goal: Patient/family/caregiver demonstrates understanding of disease process, treatment plan, medications, and discharge instructions  Description: Complete learning assessment and assess knowledge base  Interventions:  - Provide teaching at level of understanding  - Provide teaching via preferred learning methods  Outcome: Progressing     Problem: Nutrition/Hydration-ADULT  Goal: Nutrient/Hydration intake appropriate for improving, restoring or maintaining nutritional needs  Description: Monitor and assess patient's nutrition/hydration status for malnutrition  Collaborate with interdisciplinary team and initiate plan and interventions as ordered  Monitor patient's weight and dietary intake as ordered or per policy  Utilize nutrition screening tool and intervene as necessary  Determine patient's food preferences and provide high-protein, high-caloric foods as appropriate       INTERVENTIONS:  - Monitor oral intake, urinary output, labs, and treatment plans  - Assess nutrition and hydration status and recommend course of action  - Evaluate amount of meals eaten  - Assist patient with eating if necessary   - Allow adequate time for meals  - Recommend/ encourage appropriate diets, oral nutritional supplements, and vitamin/mineral supplements  - Order, calculate, and assess calorie counts as needed  - Recommend, monitor, and adjust tube feedings and TPN/PPN based on assessed needs  - Assess need for intravenous fluids  - Provide specific nutrition/hydration education as appropriate  - Include patient/family/caregiver in decisions related to nutrition  Outcome: Progressing

## 2021-03-19 NOTE — ASSESSMENT & PLAN NOTE
Wt Readings from Last 3 Encounters:   03/18/21 73 1 kg (161 lb 2 5 oz)   03/11/21 72 6 kg (160 lb)   02/26/21 71 8 kg (158 lb 4 6 oz)     As written above

## 2021-03-19 NOTE — SPEECH THERAPY NOTE
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)     GERD (gastroesophageal reflux disease)     Murmur 8/27/2020    Pneumonia due to COVID-19 virus 1/11/2021    Right humeral fracture 7/20/2018     Past Surgical History  Past Surgical History:   Procedure Laterality Date    HYSTERECTOMY  1973    Total    JOINT REPLACEMENT Right     knee    NV OPEN RX FEMUR FX+INTRAMED MORE Right 8/27/2020    Procedure: INSERTION NAIL IM FEMUR ANTEGRADE (TROCHANTERIC); Surgeon: Huma Umanzor MD;  Location: [facility] OR;  Service: Orthopedics       IMPRESSION: Per CT neck  The upper esophagus is moderately distended with air, possibly representing mild achalasia  Consider follow-up nonemergent swallowing study  No pathologic adenopathy or mass within the neck      Bedside Swallow Evaluation:    Summary:  Pt presents w/ recent vomiting  Reports inability to get food down  Able to tolerate a few small sips of gingerale w/ fairly prompt swallow and adequate appearing laryngeal rise w/ palpation  Did not procede w/ additional PO given likely needs EGD  (See CT findings above)  Currently satting at 94 on 2L  Does not appear to be in any distress  H/o gerd  Recommendations:  Diet: NPO if EGD  Otherwise small "sips" clears as tolerated  Liquid: thin (if no EGD today)  Meds: tube/iv  (? Start IV fluids)  Supervision: full w/ sips if EGD is not being done yet  Positioning:Upright  Strategies: Pt to take PO/Meds only when fully alert and upright     Oral care  Aspiration precautions  Reflux precautions  Therapy Prognosis: pending egd  Prognosis considerations: depends of results of EGD  Frequency: await EGD    Goal(s):  Pending EGD:  Pt will tolerate least restrictive diet w/out s/s aspiration or oral/pharyngeal difficulties  Patient's goal: none stated    Consider consult w/:  GI saw pt this am  Nutrition    Reason for consult:  R/o aspiration  Determine safest and least restrictive diet  poor intake  weight loss    H/o Head and neck CA  C/o solid food dysphagia  Current diet:  npo  Premorbid diet[de-identified]  Not eating due to vomiting  Previous VBS:  None known  O2 requirement:  2L  Voice/Speech:  Delayed responses  Weak voice  Social:  ?? [address] commands:  basic                  Cognitive Status:  Alert but appears fatigued  Oral mech exam:  Dentition:natural dentition  Labial strength and ROM:generalized weakness  Lingual strength and ROM:generalized weakness   Mandibular strength and ROM: suspect weak  Secretion management:mouth dry  Oral carecompleted    Items administered:  gingerale by tsp/small straw sip    Oral stage:limited exam    Lip closure:wfl  Mastication:na  Bolus formation:wfl  Bolus control: wfl  Transfer:wfl  Oral residue:-    Pharyngeal stage: no overt s/s  Swallow promptness:+  Laryngeal rise:appeared adequate  Wet voice:-  Throat clear:-  Cough:-  Secondary swallows:-  Audible swallows:-  No overt s/s aspiration    Esophageal stage:  H/o GERD  Recent vomiting    Results d/w:  Pt, nursing, physician, GI PA      Chief Complaint:   Vomiting  History of Present Illness:  Trung Clark is a 80 y o  female who presents with vomiting and dysphagia to solid foods  She reports that since her discharge a few weeks ago, she has had difficulty with eating solid foods  She is able to take her pills  Today she had a few vomiting episodes while eating  She also endorses some weight gain as well as mild shortness of breath    She denies any chest pain   No nausea or vomiting during my examination  She is chest pain-free currently  She has no other medication changes, denies any recent travel except history    Denies any diarrhea I have reviewed and confirmed nurses' notes for patient's medications, allergies, medical history, and surgical history.

## 2021-03-19 NOTE — ASSESSMENT & PLAN NOTE
80year old female admitted due to dysphagia  CT A/p showing upper esophagus is moderately distended with air, possibly representing mild achalasia  Spoke to both speech and GI   - She may possibly benefit from an EGD, but will defer to GI  Will maintain on NPO status for now as per my discussion with GI who will consider EGD   - D5 NS for now

## 2021-03-19 NOTE — CONSULTS
Consultation - 126 [facility]  Kita Reynaga 80 y o  female MRN: [id number]  Unit/Bed#: E4 -01 Encounter: [id number]        ASSESSMENT/PLAN:   Dysphagia    Patient admitted with nausea and vomiting, CT showing dilated esophagus, concerning for achalasia versus possible gastric outlet obstruction versus stricture  Patient is having some respiratory distress and a x-ray showing possible pneumonia  She likely aspirated  She does need an endoscopy for further evaluation however would be at extremely high risk  I did discuss this with Anesthesia since feels she would likely need to be intubated and may have a very difficult time being extubated  We also discussed possible NG tube, helping to clear out her esophagus and stomach as well as to potentially feed her as she is NPO  I called the son to discuss further but had to leave a message  -NPO  -possible NG tube placement  -possible endoscopy in the future once respiratory status improves    Inpatient consult to gastroenterology  Consult performed by: Pinky Guerrero PA-C  Consult ordered by: Amado Lopez DO          Reason for Consult / Principal Problem: Dysphagia    HPI: Kita Reynaga is a 80y o  year old female with a PMH of chronic kidney disease, CHF, who presented hospital yesterday with nausea and vomiting  Patient has difficult historian but states that her symptoms have been ongoing for quite some time  Majority of the history is from the chart as well as her son  He states that he was only aware that she was having trouble swallowing pills recently  She does have some shortness of breath  On admission she had a chest x-ray showing possible pneumonia  CT showed dilated esophagus that was fluid filled, concern for achalasia  She also complains of abdominal discomfort  No findings on CT  She states she has not had a bowel movement several days but also has not been eating      Review of Systems: as per HPI  Review of Systems   All other systems reviewed and are negative  Historical Information   Past Medical History:   Diagnosis Date    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)     GERD (gastroesophageal reflux disease)     Murmur 8/27/2020    Pneumonia due to COVID-19 virus 1/11/2021    Right humeral fracture 7/20/2018     Past Surgical History:   Procedure Laterality Date    HYSTERECTOMY  1973    Total    JOINT REPLACEMENT Right     knee    [place] OPEN RX FEMUR FX+INTRAMED MORE Right 8/27/2020    Procedure: INSERTION NAIL IM FEMUR ANTEGRADE (TROCHANTERIC);   Surgeon: Jake Barr MD;  Location: [facility];  Service: Orthopedics     Social History   Social History     Substance and Sexual Activity   Alcohol Use Yes    Comment: Minimal consumption     Social History     Substance and Sexual Activity   Drug Use No     Social History     Tobacco Use   Smoking Status Never Smoker   Smokeless Tobacco Never Used   Tobacco Comment    No secondhand smoke exposure     Family History   Problem Relation Age of Onset    Heart failure Mother     Coronary artery disease Father        Meds/Allergies     Medications Prior to Admission   Medication    acetaminophen (TYLENOL) 325 mg tablet    cholecalciferol (VITAMIN D3) 1,000 units tablet    furosemide (LASIX) 40 mg tablet    hydrocortisone (ANUSOL-HC) 2 5 % rectal cream    metoprolol succinate (TOPROL-XL) 25 mg 24 hr tablet    nystatin (MYCOSTATIN) powder    pantoprazole (PROTONIX) 40 mg tablet    phenylephrine-shark liver oil-mineral oil-petrolatum (PREPARATION H) 0 25-3-14-71 9 % rectal ointment    potassium chloride 40 MEQ/15ML (20%) oral solution    senna (SENOKOT) 8 6 mg    tolterodine (DETROL LA) 4 mg 24 hr capsule    Multiple Vitamins-Minerals (PRESERVISION AREDS) CAPS     Current Facility-Administered Medications   Medication Dose Route Frequency    dextrose 5 % and sodium chloride 0 9 % infusion  50 mL/hr Intravenous Continuous    furosemide (LASIX) injection 20 mg  20 mg Intravenous BID (diuretic)    HYDROmorphone (DILAUDID) injection 0 2 mg  0 2 mg Intravenous Q6H PRN    metoclopramide (REGLAN) injection 10 mg  10 mg Intravenous Q6H PRN    metoprolol succinate (TOPROL-XL) 24 hr tablet 12 5 mg  12 5 mg Oral Daily    oxybutynin (DITROPAN-XL) 24 hr tablet 5 mg  5 mg Oral Daily    pantoprazole (PROTONIX) EC tablet 40 mg  40 mg Oral Daily Before Breakfast    senna (SENOKOT) tablet 8 6 mg  1 tablet Oral HS    sodium chloride (PF) 0 9 % injection 3 mL  3 mL Intravenous Q1H PRN       Allergies   Allergen Reactions    Sulfa Antibiotics Hives       Objective     Blood pressure 144/89, pulse (!) 116, temperature 98 5 °F (36 9 °C), temperature source Temporal, resp  rate 18, height 5' 6" (1 676 m), weight 73 1 kg (161 lb 2 5 oz), SpO2 92 %, not currently breastfeeding  Intake/Output Summary (Last 24 hours) at 3/19/2021 1154  Last data filed at 3/18/2021 1945  Gross per 24 hour   Intake 50 ml   Output 400 ml   Net -350 ml       PHYSICAL EXAM     Physical Exam  Constitutional:       Appearance: She is well-developed  Comments: Frail   HENT:      Head: Normocephalic and atraumatic  Eyes:      Conjunctiva/sclera: Conjunctivae normal    Neck:      Musculoskeletal: Normal range of motion  Cardiovascular:      Rate and Rhythm: Normal rate and regular rhythm  Pulmonary:      Breath sounds: Normal breath sounds  Comments: Mild distress, on 3 L of oxygen via nasal cannula  Abdominal:      General: Bowel sounds are normal       Palpations: Abdomen is soft  Tenderness: There is abdominal tenderness  Musculoskeletal: Normal range of motion  Skin:     General: Skin is warm and dry  Neurological:      Mental Status: She is alert and oriented to person, place, and time     Psychiatric:         Mood and Affect: Mood normal          Behavior: Behavior normal          Lab Results:   CBC: No results found for: WBC, HGB, HCT, MCV, PLT, ADJUSTEDWBC, MCH, MCHC, RDW, MPV, NRBC,   CMP: No results found for: NA, K, CL, CO2, ANIONGAP, BUN, CREATININE, GLUCOSE, CALCIUM, AST, ALT, ALKPHOS, PROT, BILITOT, EGFR,   Lipase: No results found for: LIPASE,  PT/INR: No results found for: PT, INR,   Troponin:   Lab Results   Component Value Date    TROPONINI 0 14 (H) 03/18/2021   ,   Imaging Studies: I have personally reviewed pertinent reports  CT neck: The upper esophagus is moderately distended with air, possibly representing mild achalasia  Consider follow-up nonemergent swallowing study  CT head:  No acute intracranial abnormality        Chronic cerebral white matter microangiopathic changes      Stable head CT when compared to the prior study of August 26, 2020  CT abd/pelvis:  1  No acute findings to account for the patient's symptoms within the limits of unenhanced technique      2  Infrarenal abdominal aortic aneurysm measuring 3 8 cm      3   Age-indeterminate compression fractures which are severe at L1 with bony retropulsion, and moderate at L3        4   Small to moderate bilateral pleural effusions      5  Nonspecific right lower lobe groundglass opacities may be due to atelectasis or pneumonia in the appropriate clinical setting      6   Cardiomegaly  CXR:  Left basilar effusion with superimposed basilar opacity, possibly atelectasis or pneumonia      No congestive failure

## 2021-03-19 NOTE — ASSESSMENT & PLAN NOTE
No indication for transfusion at this time      Recent Labs     03/18/21  1151   HGB 12 2   *   RDW 16 3*

## 2021-03-19 NOTE — CONSULTS
Consult - Cardiology   Montana Pinzon 80 y o  female MRN: [id number]  Unit/Bed#: E4 -01 Encounter: [id number]        Reason For Consult: Dyspnea               ASSESSMENT:  1  Dyspnea, acute on chronic heart failure  2  Nausea/ vomiting, chief complaints on arrival  3  Chronic combined systolic and diastolic CHF  4  Unspecified cardiomyopathy, LVEF 40% in August 2020  5  Dyslipidemia  6  HTN  7  CKD, baseline creatinine 1 1-1 3, currently 1 65    PLAN/ DISCUSSION:     At the time of my consultation oxygen saturations are stable at 90-94%  She is on 2 L nasal cannula  She does have some increased work of breathing  Her lungs have rales at the bases, proBNP is significantly elevated at 33,000, & pleural effusion evident on CXR  · Agree with IV Lasix, right now she is NPO so will keep her at IV 20 mg BID however once she is clear for oral intake will likely need to increase the dose  On D5 1/2 NS, aim for negative fluid balance  · Discussed with nursing, will place Purwick catheter to get accurate I/O in light of IV diuretics and abnormal renal function  Follow both  · Supportive care with supplemental oxygen, augment as needed    · Heart rate in the 120 this is likely secondary to acute CHF, plan as above    · Continue metoprolol succinate as long as blood pressure allows    · Discussed with son Caridad Chavez [phone])    History Of Present Illness: This is a pleasant 80-year-old female who receives her cardiac care from Dr Francesca Jefferson of our group  She has a cardiac history as outlined above  She has a known cardiomyopathy which is thought to be nonischemic  Prior ejection fraction was 25% in 2019 but has since improved to 40% in August of 2020  She resides in a local nursing home  She was recently hospitalized about 1 month ago at [facility] for shortness of breath  She was found to be in acute on chronic congestive heart failure at this time and required a brief course of IV diuretics  She was diuresed down to a standing weight of 158 lb at which time she examines euvolemic  This patient arrived to [facility] emergency department in the morning hours of 03/18/2021 with a chief complaint of vomiting, abdominal discomfort, and shortness of breath  It is not exactly clear how long the symptoms have been ongoing  She was admitted for concern of dysphagia as well as acute CHF  She has been seen by speech pathology and Gastroenterology  Due to her breathing difficulties and EGD has been deferred at this time  We are asked to see her in consultation help with her congestive heart failure  At the time my consultation she does tell me that she is short of breath  She has no chest pain  She has some generalized abdominal discomfort  She tells me that she still urinating regularly  She is oriented x 3  Past Medical History:        Past Medical History:   Diagnosis Date    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)     GERD (gastroesophageal reflux disease)     Murmur 8/27/2020    Pneumonia due to COVID-19 virus 1/11/2021    Right humeral fracture 7/20/2018      Past Surgical History:   Procedure Laterality Date    HYSTERECTOMY  1973    Total    JOINT REPLACEMENT Right     knee    [place] OPEN RX FEMUR FX+INTRAMED MORE Right 8/27/2020    Procedure: INSERTION NAIL IM FEMUR ANTEGRADE (TROCHANTERIC); Surgeon: Darryl Lyn MD;  Location: [facility];  Service: Orthopedics        Allergy:        Allergies   Allergen Reactions    Sulfa Antibiotics Hives       Medications:       Prior to Admission medications    Medication Sig Start Date End Date Taking?  Authorizing Provider   acetaminophen (TYLENOL) 325 mg tablet Take 2 tablets (650 mg total) by mouth every 6 (six) hours 9/2/20  Yes Leslie Rashid MD   cholecalciferol (VITAMIN D3) 1,000 units tablet Take by mouth 1/15/18  Yes Historical Provider, MD   furosemide (LASIX) 40 mg tablet Take 1 tablet (40 mg total) by mouth daily 2/27/21 3/29/21 Yes Dominique Borden PA-C   hydrocortisone (ANUSOL-HC) 2 5 % rectal cream Apply topically 2 (two) times a day  Patient taking differently: Apply topically 2 (two) times a day as needed  10/29/20  Yes Richar Maldonado DO   metoprolol succinate (TOPROL-XL) 25 mg 24 hr tablet Take 0 5 tablets (12 5 mg total) by mouth daily 9/2/20  Yes Jazmin Cox MD   nystatin (MYCOSTATIN) powder Apply topically 4 (four) times a day 3/11/21  Yes Vee Altman MD   pantoprazole (PROTONIX) 40 mg tablet TAKE 1 TABLET BY MOUTH EVERY DAY 8/7/20  Yes Vee Altman MD   phenylephrine-shark liver oil-mineral oil-petrolatum (PREPARATION H) 0 25-3-14-71 9 % rectal ointment Insert into the rectum 2 (two) times a day as needed for hemorrhoids 10/29/20  Yes Richar Maldonado DO   potassium chloride 40 MEQ/15ML (20%) oral solution Take 15 mL (40 mEq total) by mouth daily 3/11/21 9/7/21 Yes Vee Altman MD   senna (SENOKOT) 8 6 mg Take 1 tablet (8 6 mg total) by mouth daily at bedtime 9/2/20  Yes Jazmin Cox MD   tolterodine (DETROL LA) 4 mg 24 hr capsule Take 4 mg by mouth daily   Yes Historical Provider, MD   Multiple Vitamins-Minerals (PRESERVISION AREDS) CAPS Take by mouth 2 (two) times a day  1/15/18   Historical Provider, MD       Family History:     Family History   Problem Relation Age of Onset    Heart failure Mother     Coronary artery disease Father         Social History:       Social History     Socioeconomic History    Marital status: /Civil Union     Spouse name: None    Number of children: None    Years of education: None    Highest education level: None   Occupational History    Occupation: Retired   Social Needs    Financial resource strain: None    Food insecurity     Worry: None     Inability: None    Transportation needs     Medical: None     Non-medical: None   Tobacco Use    Smoking status: Never Smoker    Smokeless tobacco: Never Used    Tobacco comment: No secondhand smoke exposure   Substance and Sexual Activity    Alcohol use: Yes     Comment: Minimal consumption    Drug use: No    Sexual activity: Not Currently   Lifestyle    Physical activity     Days per week: None     Minutes per session: None    Stress: None   Relationships    Social connections     Talks on phone: None     Gets together: None     Attends Cheondoism service: None     Active member of club or organization: None     Attends meetings of clubs or organizations: None     Relationship status: None    Intimate partner violence     Fear of current or ex partner: None     Emotionally abused: None     Physically abused: None     Forced sexual activity: None   Other Topics Concern    None   Social History Narrative    None       ROS:  Symptoms per HPI  Remainder review of systems is negative    Exam:  General:  alert, oriented and in no distress, cooperative   Frail elderly female   Lying in hospital bed, nontoxic appearing  Head: Normocephalic, atraumatic  Eyes:  EOMI  Pupils - equal, round, reactive to accomodation  No icterus  Normal Conjunctiva  Oropharynx: moist and normal-appearing mucosa  Neck: supple, symmetrical, trachea midline and no JVD  Heart:  Tachycardic but regular, No: murmer, rub or gallop, S1 & S2 normal   Respiratory effort / Chest Inspection:  Increased work of breathing on 2 L nasal cannula, oxygen saturations 90-94%  Lungs:  Rales at lung bases  Abdomen: flat, normal findings: bowel sounds normal and soft, non-tender  Lower Limbs:  1+ pitting edema bilaterally  Pulses[de-identified]  RLE - DP: present 2+                 LLE - DP: present 2+  Musculoskeletal: ROM grossly normal    DATA:      ECG:      Normal sinus rhythm  Nonspecific ST and T wave abnormality                 Telemetry: tachycardic  's         Weights:     Wt Readings from Last 3 Encounters:   03/18/21 73 1 kg (161 lb 2 5 oz)   03/11/21 72 6 kg (160 lb)   02/26/21 71 8 kg (158 lb 4 6 oz)   , Body mass index is 26 01 kg/m²           Lab Studies:    Results from last 7 days   Lab Units 03/18/21  1530 03/18/21  1151   TROPONIN I ng/mL 0 14* 0 16*          Results from last 7 days   Lab Units 03/18/21  1151   WBC Thousand/uL 6 08   HEMOGLOBIN g/dL 12 2   HEMATOCRIT % 38 0   PLATELETS Thousands/uL 189   ,   Results from last 7 days   Lab Units 03/18/21  1151   POTASSIUM mmol/L 5 6*   CHLORIDE mmol/L 105   CO2 mmol/L 23   BUN mg/dL 34*   CREATININE mg/dL 1 65*   CALCIUM mg/dL 9 4   ALK PHOS U/L 109   ALT U/L 25   AST U/L 20

## 2021-03-23 LAB
BACTERIA BLD CULT: NORMAL
BACTERIA BLD CULT: NORMAL

## 2021-09-09 ENCOUNTER — TELEPHONE (OUTPATIENT)
Dept: ADMINISTRATIVE | Facility: OTHER | Age: 86
End: 2021-09-09

## 2022-05-17 NOTE — CASE MANAGEMENT
Yell back from [facility]  Both locations are willing to accept patient  Since [facility] is patient and family's preferred choice, placed insurance authorization through [facility]  Awaiting response  CM reached out to patient's daughter, Eliecer Metz via email with update on acceptance and also pending insurance authorization  CM Department will continue to follow  (4) no limitation

## 2022-11-17 NOTE — PROGRESS NOTES
Assessment/Plan:  Patient Instructions   1  Mixed hyperlipidemia-presently stable with total cholesterol 207 and HDL of 70 with LDL of 117  Continue healthy diet  2   Right wrist fracture-status post fall-presently stable per orthopedic specialist   3   Vitamin-D deficiency-presently stable with level 40 on oral supplementation  4   Esophageal reflux disease-presently stable on Protonix 40 mg daily  Diagnoses and all orders for this visit:    Mixed hyperlipidemia  -     Lipid Panel with Direct LDL reflex; Future    Primary localized osteoarthritis of right hip  -     CBC and differential; Future  -     Comprehensive metabolic panel; Future    Frailty  -     Comprehensive metabolic panel; Future    Vitamin D deficiency    Esophagitis, reflux          Subjective: Follow up to chronic conditions and review bw results  +Falls  mjs     Patient ID: Arik Olvera is a 80 y o  female  HPI:  This is a 29-year-old female who presents to the office for follow-up of chronic health conditions  She has had a history of falls in the past and had a wrist fracture  She just finished following up with orthopedic specialist and seems to be getting stronger gradually  It is not bothering her at rest but does give her some pain with extension of the wrist   She does continue oral supplementation with vitamin D and calcium  She does have a history of vitamin-D deficiency  She has had recent blood test which she is here to discuss  She has had some mild hyperlipidemia but this has been mostly diet controlled  She has some general aches and pains but aside from that feels that she is doing quite well for the age of 80          The following portions of the patient's history were reviewed and updated as appropriate: allergies, current medications, past family history, past medical history, past social history, past surgical history and problem list     Review of Systems   Constitutional: Negative for chills, fatigue and Edd Beal was seen and treated in our emergency department on 11/17/2022.  He may return to work on 11/23/2022.       If you have any questions or concerns, please don't hesitate to call.      Angela Saxena M.D. fever    HENT: Negative for congestion, ear pain and sinus pressure  Eyes: Negative for visual disturbance  Respiratory: Negative for cough, chest tightness and shortness of breath  Cardiovascular: Negative for chest pain and palpitations  Gastrointestinal: Negative for diarrhea, nausea and vomiting  Endocrine: Negative for polyuria  Genitourinary: Negative for dysuria and frequency  Musculoskeletal: Positive for arthralgias and myalgias  Skin: Negative for pallor and rash  Neurological: Negative for dizziness, weakness, light-headedness, numbness and headaches  Psychiatric/Behavioral: Negative for agitation, behavioral problems and sleep disturbance  All other systems reviewed and are negative  Objective:      /60   Pulse 88   Ht 5' 5" (1 651 m)   Wt 70 3 kg (155 lb)   BMI 25 79 kg/m²          Physical Exam   Constitutional: She is oriented to person, place, and time  She appears well-developed and well-nourished  No distress  HENT:   Head: Normocephalic and atraumatic  Right Ear: External ear normal    Left Ear: External ear normal    Nose: Nose normal    Mouth/Throat: Oropharynx is clear and moist  No oropharyngeal exudate  Eyes: Pupils are equal, round, and reactive to light  Conjunctivae and EOM are normal    Neck: Normal range of motion  Neck supple  No tracheal deviation present  No thyromegaly present  Cardiovascular: Normal rate, regular rhythm and normal heart sounds  Exam reveals no friction rub  No murmur heard  Pulmonary/Chest: Effort normal and breath sounds normal  No respiratory distress  She has no wheezes  She has no rales  Abdominal: Soft  Bowel sounds are normal  She exhibits no distension  There is no tenderness  There is no rebound and no guarding  Musculoskeletal: Normal range of motion  She exhibits no edema or tenderness  Lymphadenopathy:     She has no cervical adenopathy     Neurological: She is alert and oriented to person, place, and time  No cranial nerve deficit  Coordination normal    Skin: Skin is warm and dry  No rash noted  No erythema  Psychiatric: She has a normal mood and affect  Her behavior is normal  Thought content normal    Nursing note and vitals reviewed

## 2022-11-30 NOTE — RESULT NOTES
Verified Results  (1) COMPREHENSIVE METABOLIC PANEL [id number] [facility]     Test Name Result Flag Reference   GLUCOSE 90 mg/dL  65-99   Fasting reference interval   UREA NITROGEN (BUN) 20 mg/dL  7-25   CREATININE 0 95 mg/dL H 0 60-0 88   For patients >52years of age, the reference limit  for Creatinine is approximately 13% higher for people  identified as -American  eGFR NON-AFR   AMERICAN 53 mL/min/1 73m2 L > OR = 60   eGFR AFRICAN AMERICAN 62 mL/min/1 73m2  > OR = 60   BUN/CREATININE RATIO 21 (calc)  6-22   SODIUM 142 mmol/L  135-146   POTASSIUM 3 9 mmol/L  3 5-5 3   CHLORIDE 104 mmol/L     CARBON DIOXIDE 28 mmol/L  20-31   CALCIUM 9 6 mg/dL  8 6-10 4   PROTEIN, TOTAL 7 1 g/dL  6 1-8 1   ALBUMIN 4 3 g/dL  3 6-5 1   GLOBULIN 2 8 g/dL (calc)  1 9-3 7   ALBUMIN/GLOBULIN RATIO 1 5 (calc)  1 0-2 5   BILIRUBIN, TOTAL 0 6 mg/dL  0 2-1 2   ALKALINE PHOSPHATASE 48 U/L     AST 17 U/L  10-35   ALT 10 U/L  6-29     (1) CBC/PLT/DIFF 17Oct2016 09:05AM [facility]     Test Name Result Flag Reference   WHITE BLOOD CELL COUNT 5 3 Thousand/uL  3 8-10 8   RED BLOOD CELL COUNT 3 83 Million/uL  3 80-5 10   HEMOGLOBIN 12 4 g/dL  11 7-15 5   HEMATOCRIT 38 0 %  35 0-45 0   MCV 99 3 fL  80 0-100 0   MCH 32 3 pg  27 0-33 0   MCHC 32 5 g/dL  32 0-36 0   RDW 13 5 %  11 0-15 0   PLATELET COUNT 150 Thousand/uL  140-400   MPV 10 6 fL  7 5-11 5   ABSOLUTE NEUTROPHILS 2714 cells/uL  6219-3178   ABSOLUTE LYMPHOCYTES 1876 cells/uL  850-3900   ABSOLUTE MONOCYTES 456 cells/uL  200-950   ABSOLUTE EOSINOPHILS 223 cells/uL     ABSOLUTE BASOPHILS 32 cells/uL  0-200   NEUTROPHILS 51 2 %     LYMPHOCYTES 35 4 %     MONOCYTES 8 6 %     EOSINOPHILS 4 2 %     BASOPHILS 0 6 %       (Q) LIPID PANEL WITH REFLEX TO DIRECT LDL [id number] 09:05AM [facility]     Test Name Result Flag Reference   CHOLESTEROL, TOTAL 223 mg/dL H 125-200   HDL CHOLESTEROL 72 mg/dL  > OR = 46   TRIGLICERIDES 186 mg/dL  <150 carried LDL-CHOLESTEROL 130 mg/dL (calc) H <130   Desirable range <100 mg/dL for patients with CHD or  diabetes and <70 mg/dL for diabetic patients with  known heart disease  CHOL/HDLC RATIO 3 1 (calc)  < OR = 5 0   NON HDL CHOLESTEROL 151 mg/dL (calc)     Target for non-HDL cholesterol is 30 mg/dL higher than   LDL cholesterol target  *(Q) VITAMIN D, 25-HYDROXY, LC/MS/MS 17Oct2016 09:05AM Russell Liang     Test Name Result Flag Reference   VITAMIN D, 25-OH, TOTAL 32 ng/mL     Vitamin D Status         25-OH Vitamin D:     Deficiency:                    <20 ng/mL  Insufficiency:             20 - 29 ng/mL  Optimal:                 > or = 30 ng/mL     For 25-OH Vitamin D testing on patients on   D2-supplementation and patients for whom quantitation   of D2 and D3 fractions is required, the QuestAssureD(TM)  25-OH VIT D, (D2,D3), LC/MS/MS is recommended: order   code 13192 (patients >2yrs)  For more information on this test, go to:  [email or web address]/faq/DFM275  (This link is being provided for   informational/educational purposes only )     (Q) TSH, 3RD GENERATION W/REFLEX TO FT4 17Oct2016 09:05AM Russell Liang   REPORT COMMENT:  FASTING:YES     Test Name Result Flag Reference   TSH W/REFLEX TO FT4 2 73 mIU/L  0 40-4 50

## 2023-05-26 NOTE — PROGRESS NOTES
2023  24 y.o.  at 27w5d Estimated Date of Delivery: 23. EDC per US in M office at 9+ 5/7 weeks.  OB History    Para Term  AB Living   6 5 4 1 0 5   SAB IAB Ectopic Multiple Live Births   0 0 0   5      # Outcome Date GA Lbr Deonte/2nd Weight Sex Delivery Anes PTL Lv   6 Current            5  21 29w6d  1.655 kg (3 lb 10.4 oz) M Vag-Spont None N CAROL      Complications:  premature rupture of membranes (PPROM) with unknown onset of labor   4 Term 19    M Vag-Spont None N CAROL   3 Term 18    M Vag-Spont EPI N CAROL   2 Term 17    M Vag-Spont EPI N CAROL   1 Term 12    M Vag-Spont EPI N CAROL       Here for scheduled [facility] visit. Doing well.  No lof/brvb, dysuria, fever/chills, or abdominal pain. Endorses +FM. Calm, pleasant, NAD. ROS negative with exception of aforementioned:   N&V relieved with zofran and phenergan.    Hx of PTD at 29 weeks. We discussed recommendation for MFM referral and cervical length u/s beginning at 16 weeks. Pt cancelled or no showed all CL ultrasound visits. At 19 weeks during anatomy u/s CL was 5.2 cm. She is on nightly vaginal progesterone 200 mg to prevent PTL. She has been consulted by [facility].      Normal anatomy u/s. Plan growth u/s at 32 weeks.     GTT and CBC today.     LABS:  A POS/ ABS neg  CBC 13.2/38.8/278  Rubella Immune  Sickle Cell Neg  Hep A B C Neg  HIV Neg  RPR Non-Reactive  UDS Neg  GC/CHL Neg  Urine Cx No Growth  NIPT Low Risk x 3; Male  AFP Neg    GTT/CBC today.     Review of Systems:  General ROS: negative for headache or visual changes  Breast ROS: negative for breast lumps  Gastrointestinal ROS: negative for constipation, diarrhea or nausea/vomiting  Musculoskeletal ROS: negative for pain in joints or swelling in face or hands.   Neurological ROS: negative for - headaches, numbness/tingling or visual changes    OBHX:  Grand Multip   x 4 at term. Proven to # 7 lbs  PPROM PTL/PTD at ? 35 weeks with last  Progress Note - Parisa Franco 12/8/1927, 80 y o  female MRN: [id number]    Unit/Bed#: [place] [id number] Encounter: [id number]    Primary Care Provider: Rashel August MD   Date and time admitted to hospital: 1/11/2021  9:33 AM        * Pneumonia due to COVID-19 virus  Assessment & Plan  Stable currently on room air  On day 3 of remdesivir, dexamethasone, Lipitor, vitamin D3 Pepcid, vitamin-C  Improving inflammatory markers  Overall stable    Acute on chronic combined systolic and diastolic CHF (congestive heart failure) ([facility])  Assessment & Plan  Wt Readings from Last 3 Encounters:   01/13/21 76 9 kg (169 lb 8 5 oz)   01/10/21 76 kg (167 lb 8 8 oz)   11/13/20 75 8 kg (167 lb)     With suspected underlying ischemic cardiomyopathy, exacerbated by acute viral pneumonia  Continue IV Lasix for today per Cardiology and switch to PO diuretics tomorrow  Check CXR in am   Continue Toprol XL 12 5 mg daily    Type 2 MI (myocardial infarction) ([facility])  Assessment & Plan  None MI troponin elevation secondary to acute COVID infection and congestive heart failure  No ACS  Continue Lipitor 40 mg daily    CKD (chronic kidney disease), stage III  Assessment & Plan  Stable and tolerating intravenous diuretics    Sepsis due to COVID-19 [facility])  Assessment & Plan  · Improving  · See plan for COVID pneumonia above    Cardiomyopathy ([facility] [address] )  Assessment & Plan  Likely ischemic cardiomyopathy, known EF 40%  Continue Toprol XL 12 5 mg daily  Switch to Lasix 20 mg p o  Daily tomorrow  Not on Ace or Arb presumably due to kidney disease      VTE Pharmacologic Prophylaxis:   Pharmacologic: Heparin  Mechanical VTE Prophylaxis in Place: Yes    Patient Centered Rounds: I have performed bedside rounds with nursing staff today  Discussions with Specialists or Other Care Team Provider: none    Education and Discussions with Family / Patient:  Spoke with daughter and gave update    Time Spent for Care: 20 minutes    More than 50% of total time spent on counseling and coordination of care as described above  Current Length of Stay: 2 day(s)    Current Patient Status: Inpatient   Certification Statement: The patient will continue to require additional inpatient hospital stay due to covid and chf    Discharge Plan: return to [facility]    Code Status: Level 3 - DNAR and DNI      Subjective:   + loose    Objective:     Vitals:   Temp (24hrs), Av 9 °F (36 6 °C), Min:97 6 °F (36 4 °C), Max:98 5 °F (36 9 °C)    Temp:  [97 6 °F (36 4 °C)-98 5 °F (36 9 °C)] 97 6 °F (36 4 °C)  HR:  [88-91] 91  Resp:  [17-18] 17  BP: (105-131)/(64-75) 108/64  SpO2:  [93 %-95 %] 94 %  Body mass index is 27 36 kg/m²  Input and Output Summary (last 24 hours): Intake/Output Summary (Last 24 hours) at 2021 1845  Last data filed at 2021 1752  Gross per 24 hour   Intake 240 ml   Output 2300 ml   Net -2060 ml       Physical Exam:     Physical Exam  Vitals signs reviewed  Constitutional:       Appearance: She is not ill-appearing  HENT:      Head: Normocephalic and atraumatic  Eyes:      General: No scleral icterus  Neck:      Musculoskeletal: Neck supple  Cardiovascular:      Rate and Rhythm: Regular rhythm  Heart sounds: No murmur  No gallop  Pulmonary:      Effort: Pulmonary effort is normal  No respiratory distress  Breath sounds: No wheezing or rales  Abdominal:      General: Abdomen is flat  Palpations: Abdomen is soft  Musculoskeletal:      Right lower leg: Edema present  Left lower leg: Edema present  Skin:     General: Skin is warm and dry  Neurological:      Mental Status: Mental status is at baseline     Psychiatric:         Mood and Affect: Mood normal          Behavior: Behavior normal            Additional Data:     Labs:    Results from last 7 days   Lab Units 21  0514   WBC Thousand/uL 4 23*   HEMOGLOBIN g/dL 11 5   HEMATOCRIT % 36 9   PLATELETS Thousands/uL 197   NEUTROS PCT % 68   LYMPHS PCT % 20   MONOS PCT % 11   EOS pregnancy, delivery in [place] (2021).   Desires BTL following delivery    Physical Exam:  /64   Pulse 85   Wt 68.9 kg (151 lb 12.8 oz)   LMP 2022 (Exact Date)   BMI 27.76 kg/m²   FHT: Fetal Heart Rate: 150s     Constitutional: She is oriented to person, place, and time. She appears well-developed and well-nourished. No distress.   Pulmonary/Chest: Effort normal. No respiratory distress  Abdominal: Soft, gravid, nontender. No rebound and no guarding.  Fundal Height: Fundal Height (cm): 27 cm S=D  Genitourinary: Deferred   Musculoskeletal: Normal range of motion. Minimal peripheral edema.   Neurological: She is alert and oriented to person, place, and time. Coordination normal. Gait smooth and steady  Skin: Skin is warm and dry. She is not diaphoretic.  Psychiatric: She has a normal mood and affect.      Assessment:   24 y.o., at 27w5d Gestation   Patient Active Problem List   Diagnosis    Grand multipara    History of  delivery, currently pregnant    Nausea and vomiting during pregnancy    27 weeks gestation of pregnancy     Current Outpatient Medications on File Prior to Visit   Medication Sig Dispense Refill    doxylamine-pyridoxine, vit B6, (DICLEGIS) 10-10 mg TbEC Take 2 tablets by mouth 2 (two) times daily as needed (hyperemesis, nausea and vomiting). 60 tablet 11    cefdinir (OMNICEF) 300 MG capsule Take 300 mg by mouth 2 (two) times daily.      [DISCONTINUED] progesterone (PROMETRIUM) 200 MG capsule Place 1 capsule (200 mg total) vaginally nightly. (Patient not taking: Reported on 2023) 30 capsule 11     No current facility-administered medications on file prior to visit.       Plan:  S&S of  PTL/PPROM reinforced.  Continue home meds/daily PNV. RX for Zofran and Phenergan to pharmacy/discussed.  Discussed EDC change due to US today at 9+5/7 weeks. EDC = 23.   Hx of PTL/PTD at 29 weeks. Consulted by [facility]. CL at 19 weeks was 5.2 cm. Taking nightly prometrium.   NIPT Low Risk  PCT % 0     Results from last 7 days   Lab Units 01/13/21  0514   SODIUM mmol/L 141   POTASSIUM mmol/L 3 6   CHLORIDE mmol/L 105   CO2 mmol/L 26   BUN mg/dL 32*   CREATININE mg/dL 1 36*   ANION GAP mmol/L 10   CALCIUM mg/dL 8 2*   ALBUMIN g/dL 3 0*   TOTAL BILIRUBIN mg/dL 0 34   ALK PHOS U/L 65   ALT U/L 20   AST U/L 55*   GLUCOSE RANDOM mg/dL 118     Results from last 7 days   Lab Units 01/11/21  1001   INR  1 17             Results from last 7 days   Lab Units 01/13/21  0514 01/12/21  0602 01/11/21  1001   LACTIC ACID mmol/L  --   --  1 7   PROCALCITONIN ng/ml 0 13 0 23 <0 05           * I Have Reviewed All Lab Data Listed Above  * Additional Pertinent Lab Tests Reviewed:  All Labs Within Last 24 Hours Reviewed    Imaging:    Imaging Reports Reviewed Today Include:  None    Recent Cultures (last 7 days):           Last 24 Hours Medication List:   Current Facility-Administered Medications   Medication Dose Route Frequency Provider Last Rate    acetaminophen  650 mg Oral Q4H PRN Heather Linen, DO      ascorbic acid  1,000 mg Oral Q12H [facility] Heather Linen, DO      aspirin  324 mg Oral Once The DANIEL Mon      atorvastatin  40 mg Oral HS Heather Linen, DO      cholecalciferol  2,000 Units Oral Daily Heather Linen, DO      dexamethasone  6 mg Intravenous Q24H Heather Linen, DO      famotidine  20 mg Oral Daily [facility] [START ON 1/14/2021] furosemide  20 mg Oral Daily Anup Hawkins PA-C      heparin (porcine)  5,000 Units Subcutaneous [facility] Heather Linen, DO      metoprolol succinate  12 5 mg Oral Daily Heather Linen, DO      zinc sulfate  220 mg Oral Daily Heather Linen, DO      Followed by   Thomas Agent ON 1/19/2021] multivitamin-minerals  1 tablet Oral Daily Heather Linen, DO      oxybutynin  5 mg Oral Daily [facility]      remdesivir  100 mg Intravenous Q24H Heather Linen, DO      senna  1 tablet Oral HS Heather Linen, DO          Today, Patient Was Seen By: Elio Moran x 3; Male. Neg.  Normal fetal anatomy u/s.  GTT and CBC today.   KAMLESH 3 weeks.             Fátima Maldonado MD    ** Please Note: Dictation voice to text software may have been used in the creation of this document   **

## 2023-11-07 NOTE — PROGRESS NOTES
Lisbet Guerra  80 y o   female  MR#: [id number]  8/29/2020    Post-op days: 2  Extremity: Right hip    Subjective: Patient awake in bed  No new complains  Pain is under good control  No fever, chill, or sweat  She has been getting out bed with walker yesterday  Vitals:   Vitals:    08/29/20 0738   BP: 113/55   Pulse: 80   Resp: 20   Temp: 98 1 °F (36 7 °C)   SpO2: 95%       Exam:   AAO x 3, NAD  Right leg dressing intact, no drainage  Calf is soft and non-tender  N/V intact    Labs:   WBC   Recent Labs     08/26/20  0948 08/27/20  0500 08/28/20  0519 08/29/20  0449   WBC 5 61 8 10 8 92 9 19     H/H   Recent Labs     08/26/20  0948 08/27/20  0500 08/28/20  0519 08/29/20  0449   HGB 11 0* 8 4* 7 5* 7 9*   /  Recent Labs     08/26/20  0948 08/27/20  0500 08/28/20  0519 08/29/20  0449   HCT 36 0 28 1* 23 6* 25 5*     Sed Rate No results for input(s): SEDRATE in the last 72 hours  CRP No results for input(s): CRP in the last 72 hours      Assessment:   S/p right hip TFN    Plan:   Pain control  OOB with PT  WBAT right leg  Lovenox  D/c planning no weight-bearing restrictions

## 2024-02-26 NOTE — PHYSICAL THERAPY NOTE
HEART CARE ENCOUNTER CONSULTATON NOTE      [facility]  [phone]      Assessment/Recommendations   Assessment:  1.  Coronary artery disease: CT coronary calcium score 368 placing her in the over 90th percentile for other women her age.  She also has risk factors including a significant family history and hypertension.  Goal LDL of less than 70.  2.  Dyslipidemia: Dyslipidemia is mainly hyperlipidemia and elevated triglycerides.  Most recent  off of statin therapy.  She has statin intolerance and will start on PCSK9 inhibitor.  3.  Hypertension: Poorly controlled  4.  Family history of premature coronary disease: Father history of bypass in his 50s    Plan:  1.  Recommend improvement in diet and we discussed this today  2.  Will start on Repatha with injections every 2 weeks and repeating fasting lipids in a few months time  3.  Increase losartan to 100 mg daily and continue on low-dose hydrochlorothiazide  4.  Continue on aspirin 81 mg daily  Follow-up in a year        History of Present Illness/Subjective    HPI: Maral Marino is a 63 year old female with history of coronary artery disease (CT coronary calcium score of 368 majority LAD/RCA 1/31/2022), family history of premature coronary disease, hyperlipidemia, hypertension who I am seeing today to establish care.  She has problems with statin therapy.  She was tried on Crestor 5 mg and that was then increased to 40 mg daily for better lipid management.  She has side effects even with the low-dose Crestor dose.  She was also tried on Lipitor and did not tolerate this either.  LDL tends to run around 180-200 off of statin therapy.  She does admit to a poor diet.  She has red meat 3-4 times a week and eggs on a daily basis.  She is very active.  She golfs, walks, skis and goes to [employer] 3 times a week on the treadmill.  Denies any exertional symptoms.  She did undergo an echocardiogram exercise stress test in May 2023 and  PHYSICAL THERAPY EVALUATION          Patient Name: Sarah Matthew  WVCDR'N Date: 8/28/2020   PT EVALUATION    80 y o     [phone]    Hip pain [M25 559]  Elevated troponin [R79 89]  Right femoral fracture (HCC) [S72 91XA]  Closed comminuted intertrochanteric fracture of proximal femur, right, initial encounter ([place] [address] ) [E21 401M]  Acute systolic (congestive) heart failure ([facility]) [I50 21]  Nonrheumatic mitral valve regurgitation [I34 0]  Cardiomyopathy, unspecified type ([place] [address] ) [I42 9]    Past Medical History:   Diagnosis Date    GERD (gastroesophageal reflux disease)     Murmur 8/27/2020    Right humeral fracture 7/20/2018     Past Surgical History:   Procedure Laterality Date    HYSTERECTOMY  1973    Total    JOINT REPLACEMENT Right     knee    DE OPEN RX FEMUR FX+INTRAMED MORE Right 8/27/2020    Procedure: INSERTION NAIL IM FEMUR ANTEGRADE (TROCHANTERIC); Surgeon: Araceli Singh MD;  Location: [facility];  Service: Orthopedics        08/28/20 6388   Note Type   Note type Eval only   Pain Assessment   Pain Assessment Tool 0-10   Pain Score 5   Pain Location/Orientation Orientation: Right;Location: Hip   Effect of Pain on Daily Activities pain w activity [facility]   Hospital Pain Intervention(s) Cold applied;Repositioned; Ambulation/increased activity; Emotional support;Elevated; Rest   Home Living   Type of Home Assisted living  [facility]   Home Layout One level;Ramped entrance;Elevator   Bathroom Shower/Tub Walk-in shower   Bathroom Toilet Raised   Bathroom Equipment Grab bars in shower; Shower chair;Grab bars around toilet   [address] [facility]   Prior Function   Level of [place] Needs assistance with ADLs and functional mobility; Needs assistance with IADLs   Lives With Facility staff   Receives Help From Personal care attendant   ADL Assistance Independent  (A for showers)   IADLs Needs assistance  (meals, cleaning, laundry provided by facility)   Falls in the last 6 months 1 to 4  (1 related to admission)   Vocational Retired   Comments pta pt reports being indep for dressing, A for showers to wash hair and back  amb w [facility] prn, usually longer distances  reports hx of PT when she first moved to [facility] but none recently   Restrictions/Precautions   Weight Bearing Precautions Per Order Yes   RLE Weight Bearing Per Order WBAT   Other Precautions Chair Alarm; Bed Alarm;Multiple lines; Fall Risk;Pain;Telemetry   General   Additional Pertinent History pt admitted 8/26/20 for R femoral fx s/p long TFN  Family/Caregiver Present No   Cognition   Overall Cognitive Status WFL   Arousal/Participation Cooperative   Orientation Level Oriented X4   Memory Within functional limits   Following Commands Follows one step commands without difficulty   RLE Assessment   RLE Assessment X   Strength RLE   R Hip Flexion 3-/5   RLE Overall Strength 2+/5   LLE Assessment   LLE Assessment X   Strength LLE   L Hip Flexion 3-/5   L Hip ABduction 3-/5   L Hip ADduction 3-/5   L Knee Extension 4-/5   L Ankle Dorsiflexion 4-/5   Coordination   Movements are Fluid and Coordinated 0   Coordination and Movement Description antalgic   Sensation WFL  (pt denies n/t)   Bed Mobility   Supine to Sit 2  Maximal assistance   Additional items Assist x 2;HOB elevated; Bedrails; Increased time required;Verbal cues;LE management; Other  (trunk support)   Additional Comments A to reposition at EOB   Transfers   Sit to Stand 3  Moderate assistance   Additional items Assist x 2;Bedrails; Increased time required;Verbal cues; Other  (RW)   Stand to Sit 3  Moderate assistance   Additional items Assist x 2;Armrests; Increased time required;Verbal cues; Other  (RW)   Additional Comments cues for technique including hand placement and RLE positioning   Ambulation/Elevation   Gait pattern Poor UE support;R Knee Danny; Improper Weight this was negative for inducible ischemia.  She is also concerned about her blood pressure.  Blood pressure is now mildly elevated running 140-150s.       Physical Examination  Review of Systems   Vitals: BP (!) 158/92 (BP Location: Left arm, Patient Position: Sitting, Cuff Size: Adult Regular)   Pulse 91   Resp 16   Wt 67.1 kg (148 lb)   SpO2 99%   BMI 26.64 kg/m    BMI= Body mass index is 26.64 kg/m .  Wt Readings from Last 3 Encounters:   24 67.1 kg (148 lb)   23 66 kg (145 lb 8 oz)   23 65.3 kg (144 lb)       General Appearance:   no distress, normal body habitus   ENT/Mouth: membranes moist, no oral lesions or bleeding gums.      EYES:  no scleral icterus, normal conjunctivae   Neck: no carotid bruits or thyromegaly   Chest/Lungs:   lungs are clear to auscultation   Cardiovascular:   Regular. Normal first and second heart sounds with no murmur  no edema bilaterally    Abdomen:  no organomegaly, masses, bruits, or tenderness; bowel sounds are present   Extremities: no cyanosis or clubbing   Skin: no xanthelasma, warm.    Neurologic: normal  bilateral, no tremors     Psychiatric: alert and oriented x3, calm        Please refer above for cardiac ROS details.        Medical History  Surgical History Family History Social History   Past Medical History:   Diagnosis Date    Alcohol abuse     Anxiety state     Coronary artery disease     coronary artery calcification    Dysthymic disorder     Hyperlipidemia     Hypertension     Hyponatremia 3/31/2023    Insomnia     used essential oils, meditation and is doing fine now.    Menorrhagia     iud helped, now not an issue due to menopause.    RUQ abdominal pain 2019    Status epilepticus (H)      Past Surgical History:   Procedure Laterality Date    CATARACT EXTRACTION       SECTION      right wrist fracture Right     WISDOM TOOTH EXTRACTION      [facility]  DELIVERY ONLY      Description:  Section;  Recorded: 2009;     ZZC  DELIVERY ONLY      Description:  Section;  Recorded: 2009;     Family History   Problem Relation Age of Onset    Hypertension Mother     Colon Cancer Mother         colon     Cancer Mother     Atrial fibrillation Mother     Cerebrovascular Disease Father 62        carotid endarterectomy    Coronary Artery Disease Father 58        CABG    Peripheral Vascular Disease Father     Hypertension Father     Gout Father     Lung Cancer Father         lung    Breast Cancer Father     Heart Disease Father     Alzheimer Disease Maternal Grandmother     Heart Disease Paternal Grandmother     Coronary Artery Disease Brother 66        3 stents placed    No Known Problems Brother     No Known Problems Brother     Asthma Sister     Breast Cancer Sister     Hypertension Sister     Hypertension Son     Substance Abuse Son     No Known Problems Son     Breast Cancer Maternal Aunt         Age in early 50's    Skin Cancer No family hx of     Osteoporosis No family hx of     Abnormal EKG No family hx of         Social History     Socioeconomic History    Marital status:      Spouse name: Not on file    Number of children: Not on file    Years of education: Not on file    Highest education level: Not on file   Occupational History    Not on file   Tobacco Use    Smoking status: Former     Packs/day: 0.50     Years: 18.00     Additional pack years: 0.00     Total pack years: 9.00     Types: Cigarettes     Quit date:      Years since quittin.1    Smokeless tobacco: Never   Vaping Use    Vaping Use: Never used   Substance and Sexual Activity    Alcohol use: Yes     Alcohol/week: 0.0 standard drinks of alcohol     Comment: Alcoholic Drinks/day: 3-4 drinks per week     Drug use: Never    Sexual activity: Yes     Partners: Male     Birth control/protection: Post-menopausal   Other Topics Concern    Not on file   Social History Narrative    2019  to 19pay for 37.5 years. Went  shift; Antalgic; Forward Flexion;Decreased foot clearance;Decreased R stance; Inconsistent juan david; Short stride; Excessively slow   Gait Assistance 3  Moderate assist   Additional items Assist x 2;Verbal cues; Tactile cues  (tactile cues to wt shift, prevent R knee buckling)   Assistive Device Rolling walker   Distance 3'   Balance   Static Standing Poor +   Dynamic Standing Poor   Ambulatory Poor   Endurance Deficit   Endurance Deficit Yes   Endurance Deficit Description fatigue, pain   Activity Tolerance   Activity Tolerance Patient limited by fatigue;Patient limited by pain   Medical Staff [facility] RN   Assessment   Prognosis Good   Problem List Decreased strength;Decreased endurance; Impaired balance;Decreased mobility; Decreased skin integrity;Pain   Assessment Lorna Mae is a 80 y o  female admitted to [facility] on 8/26/2020 for Right femoral fracture ([address] ) s/p long TFN 8/27/20  Pt  has a past medical history of GERD (gastroesophageal reflux disease), Murmur (8/27/2020), and Right humeral fracture (7/20/2018)    PT was consulted and pt was seen on 8/28/2020 for mobility assessment and d/c planning  Pt presents WBAT RLE, multiple lines, high fall risk  Pt is currently functioning at a maximum assistance x2 level for bed mobility, moderate assistance x2 level for transfers, moderate assistance x2 level for ambulation with Rolling Walker  In unsupported sitting and supported standing noted L lean, likely to offset RLE discomfort  Verbal cues to self correct lean however pt require physical A to maintain midline upright posture secondary to pain, weakness  Pt demonstrated difficulty wt shifting to fully WB on RLE  During SLS on R the knee frequently buckled, require tactile input to prevent buckling for safety during amb   Verbal cues for technique including increased UE support and quick steps to limit SLS on R however pt w difficulty following through, again likely secondary to pain, fatigue, weakness  Pt will benefit from continued skilled IP PT to address the above mentioned impairments  in order to maximize recovery and increase functional independence when completing mobility and ADLs  Currently PT recommendations for DME include RW  At this time PT recommendations for d/c are STR  End of session pt resting in recliner, LEs elevated and ice to R hip  Chair alarm engaged and all needs in reach  Barriers to Discharge Inaccessible home environment;Decreased caregiver support   Goals   Patient Goals to feel better   STG Expiration Date 09/11/20   Short Term Goal #1 1)  Pt will perform bed mobility with S demonstrating appropriate technique 100% of the time in order to improve function  2)  Perform all transfers with S demonstrating safe and appropriate technique 100% of the time in order to improve ability to negotiate safely in home environment  3) Amb with least restrictive AD > 50'x1 with S in order to demonstrate ability to negotiate in home environment  4)  Improve overall strength and balance 1/2 grade in order to optimize ability to perform functional tasks and reduce fall risk  5) Increase activity tolerance to 45 minutes in order to improve endurance to functional tasks  6) PT for ongoing patient and family/caregiver education, DME needs and d/c planning in order to promote highest level of function in least restrictive environment  PT Treatment Day 0   Plan   Treatment/Interventions Functional transfer training;LE strengthening/ROM; Therapeutic exercise; Endurance training;Patient/family training;Equipment eval/education; Bed mobility;Gait training; Compensatory technique education;Continued evaluation;Spoke to nursing;OT   PT Frequency Other (Comment)  (5-7x)   Recommendation   PT Discharge Recommendation Post-Acute Rehabilitation Services   Equipment Recommended Walker   PT - OK to Discharge Yes   Additional Comments to STR   Modified [place] Scale   Modified [place] Scale 4   [place] to [place] with friend for lobsterfest recently. New granddaughter, works at [employer].     9/15/2020 still doing good with marriage. Still works at [employer].      Social Determinants of Health     Financial Resource Strain: Low Risk  (12/27/2023)    Financial Resource Strain     Within the past 12 months, have you or your family members you live with been unable to get utilities (heat, electricity) when it was really needed?: No   Food Insecurity: Low Risk  (12/27/2023)    Food Insecurity     Within the past 12 months, did you worry that your food would run out before you got money to buy more?: No     Within the past 12 months, did the food you bought just not last and you didn t have money to get more?: No   Transportation Needs: Low Risk  (12/27/2023)    Transportation Needs     Within the past 12 months, has lack of transportation kept you from medical appointments, getting your medicines, non-medical meetings or appointments, work, or from getting things that you need?: No   Physical Activity: Not on file   Stress: Not on file   Social Connections: Not on file   Interpersonal Safety: Low Risk  (12/27/2023)    Interpersonal Safety     Do you feel physically and emotionally safe where you currently live?: Yes     Within the past 12 months, have you been hit, slapped, kicked or otherwise physically hurt by someone?: No     Within the past 12 months, have you been humiliated or emotionally abused in other ways by your partner or ex-partner?: No   Housing Stability: Low Risk  (12/27/2023)    Housing Stability     Do you have housing? : Yes     Are you worried about losing your housing?: No           Medications  Allergies   Current Outpatient Medications   Medication Sig Dispense Refill    aspirin 81 mg chewable tablet [ASPIRIN 81 MG CHEWABLE TABLET] Chew 81 mg daily.      cholecalciferol, vitamin D3, (VITAMIN D3) 2,000 unit cap [CHOLECALCIFEROL, VITAMIN D3, (VITAMIN D3) 2,000 UNIT  "CAP] Take 1 capsule by mouth daily.      fish oil-omega-3 fatty acids 1000 MG capsule Take 2,000 mg by mouth daily      hydrochlorothiazide (MICROZIDE) 12.5 MG capsule TAKE 1 CAPSULE BY MOUTH ONCE DAILY 90 capsule 0    Lactobacillus rhamnosus GG (CULTURELLE) 10-15 Billion cell capsule [LACTOBACILLUS RHAMNOSUS GG (CULTURELLE) 10-15 BILLION CELL CAPSULE] Take 1 capsule by mouth daily.      losartan (COZAAR) 50 MG tablet Take 1 tablet (50 mg) by mouth daily 90 tablet 0    multivitamin with minerals (THERA-M) 9 mg iron-400 mcg Tab tablet [MULTIVITAMIN WITH MINERALS (THERA-M) 9 MG IRON-400 MCG TAB TABLET] Take 1 tablet by mouth daily.      atorvastatin (LIPITOR) 40 MG tablet Take 1 tablet (40 mg) by mouth daily (Patient not taking: Reported on 12/27/2023) 90 tablet 3       Allergies   Allergen Reactions    Wellbutrin [Bupropion] Unknown     Seizure          Lab Results    Chemistry/lipid CBC Cardiac Enzymes/BNP/TSH/INR   Recent Labs   Lab Test 04/04/23  0724   CHOL 285*   HDL 60   *   TRIG 200*     Recent Labs   Lab Test 04/04/23  0724 02/01/22  0730 10/14/20  0717   * 195* 164*     Recent Labs   Lab Test 03/31/23  1239      POTASSIUM 4.0   CHLORIDE 103   CO2 22   *   BUN 23.2*   CR 0.80   GFRESTIMATED 83   LISA 9.4     Recent Labs   Lab Test 03/31/23  1239 02/01/22  0730 10/14/20  0717   CR 0.80 0.77 0.81     Recent Labs   Lab Test 05/05/23  0823   A1C 5.2          Recent Labs   Lab Test 03/31/23  1239   WBC 7.5   HGB 13.0   HCT 37.1   MCV 90        Recent Labs   Lab Test 03/31/23  1239 02/01/22  0730 10/14/20  0717   HGB 13.0 14.3 14.4    No results for input(s): \"TROPONINI\" in the last 67810 hours.  No results for input(s): \"BNP\", \"NTBNPI\", \"NTBNP\" in the last 42131 hours.  Recent Labs   Lab Test 03/31/23  1239   TSH 0.79     No results for input(s): \"INR\" in the last 81580 hours.     Ivon Collier MD                                      " Index   Feeding 10   Bathing 0   Grooming Score 5   Dressing Score 5   Bladder Score 0   Bowels Score 10   Toilet Use Score 5   Transfers (Bed/Chair) Score 5   Mobility (Level Surface) Score 0   Stairs Score 0   Barthel Index Score 40   History: co - morbidities, age, social background, fall risk, use of assistive device, assist for adl's, cognition, multiple lines, wbs  Exam: impairments in systems including musculoskeletal (strength, posture), neuromuscular (balance, gait, transfers, motor function and sensation), joint integrity, integumentary (skin integrity, presence of scars or wounds),  cognition  Clinical: unstable/unpredictable  Complexity:high      Gustavo Dense, PT

## 2024-04-30 NOTE — ED NOTES
Cardiology at pt's bedside          Ehsan Cummings RN  02/23/21 4108 X Size Of Lesion In Cm (Optional): 0 severe Detail Level: Detailed

## (undated) DEVICE — GLOVE SRG BIOGEL 8

## (undated) DEVICE — CHLORAPREP HI-LITE 26ML ORANGE

## (undated) DEVICE — THE SIMPULSE SOLO SYSTEM WITH ULTREX RETRACTABLE SPLASH SHIELD TIP: Brand: SIMPULSE SOLO

## (undated) DEVICE — COBAN 4 IN STERILE

## (undated) DEVICE — SUT VICRYL 2-0 CT-2 27 IN J269H

## (undated) DEVICE — INTENDED FOR TISSUE SEPARATION, AND OTHER PROCEDURES THAT REQUIRE A SHARP SURGICAL BLADE TO PUNCTURE OR CUT.: Brand: BARD-PARKER ® CARBON RIB-BACK BLADES

## (undated) DEVICE — 3000CC GUARDIAN II: Brand: GUARDIAN

## (undated) DEVICE — 3M™ STERI-DRAPE™ U-DRAPE 1015: Brand: STERI-DRAPE™

## (undated) DEVICE — ASTOUND STANDARD SURGICAL GOWN, XL: Brand: CONVERTORS

## (undated) DEVICE — GLOVE INDICATOR PI UNDERGLOVE SZ 8.5 BLUE

## (undated) DEVICE — GLOVE SRG BIOGEL 7.5

## (undated) DEVICE — POSITIONER HANA TABLE PACK

## (undated) DEVICE — DRAPE C-ARM X-RAY

## (undated) DEVICE — PROXIMATE SKIN STAPLERS (35 WIDE) CONTAINS 35 STAINLESS STEEL STAPLES (FIXED HEAD): Brand: PROXIMATE

## (undated) DEVICE — 2.5MM REAMING ROD WITH BALL TIP/950MM-STERILE

## (undated) DEVICE — 6617 IOBAN II PATIENT ISOLATION DRAPE 5/BX,4BX/CS: Brand: STERI-DRAPE™ IOBAN™ 2

## (undated) DEVICE — SURGICAL GOWN, XL SMARTSLEEVE: Brand: CONVERTORS

## (undated) DEVICE — SCD SEQUENTIAL COMPRESSION COMFORT SLEEVE MEDIUM KNEE LENGTH: Brand: KENDALL SCD

## (undated) DEVICE — BETHLEHEM TOTAL HIP, KIT: Brand: CARDINAL HEALTH

## (undated) DEVICE — DRESSING MEPILEX AG BORDER 4 X 4 IN